# Patient Record
Sex: FEMALE | Race: WHITE | Employment: PART TIME | URBAN - METROPOLITAN AREA
[De-identification: names, ages, dates, MRNs, and addresses within clinical notes are randomized per-mention and may not be internally consistent; named-entity substitution may affect disease eponyms.]

---

## 2017-01-06 ENCOUNTER — OFFICE VISIT (OUTPATIENT)
Dept: FAMILY MEDICINE CLINIC | Facility: CLINIC | Age: 42
End: 2017-01-06

## 2017-01-06 VITALS
TEMPERATURE: 99 F | SYSTOLIC BLOOD PRESSURE: 136 MMHG | BODY MASS INDEX: 48.82 KG/M2 | OXYGEN SATURATION: 96 % | HEART RATE: 80 BPM | RESPIRATION RATE: 16 BRPM | HEIGHT: 65 IN | DIASTOLIC BLOOD PRESSURE: 82 MMHG | WEIGHT: 293 LBS

## 2017-01-06 DIAGNOSIS — IMO0001 UNCONTROLLED TYPE 2 DIABETES MELLITUS WITHOUT COMPLICATION, WITHOUT LONG-TERM CURRENT USE OF INSULIN: ICD-10-CM

## 2017-01-06 DIAGNOSIS — J01.00 ACUTE MAXILLARY SINUSITIS, RECURRENCE NOT SPECIFIED: Primary | ICD-10-CM

## 2017-01-06 LAB
ALBUMIN SERPL-MCNC: 4.5 G/DL (ref 3.5–4.8)
ALP LIVER SERPL-CCNC: 77 U/L (ref 37–98)
ALT SERPL-CCNC: 86 U/L (ref 14–54)
AST SERPL-CCNC: 105 U/L (ref 15–41)
BILIRUB SERPL-MCNC: 0.3 MG/DL (ref 0.1–2)
BUN BLD-MCNC: 14 MG/DL (ref 8–20)
CALCIUM BLD-MCNC: 9.7 MG/DL (ref 8.3–10.3)
CHLORIDE: 103 MMOL/L (ref 101–111)
CHOLEST SMN-MCNC: 280 MG/DL (ref ?–200)
CO2: 23 MMOL/L (ref 22–32)
CREAT BLD-MCNC: 0.65 MG/DL (ref 0.55–1.02)
EST. AVERAGE GLUCOSE BLD GHB EST-MCNC: 160 MG/DL (ref 68–126)
GLUCOSE BLD-MCNC: 101 MG/DL (ref 70–99)
HBA1C MFR BLD HPLC: 7.2 % (ref ?–5.7)
HDLC SERPL-MCNC: 48 MG/DL (ref 45–?)
HDLC SERPL: 5.83 {RATIO} (ref ?–4.44)
LDLC SERPL CALC-MCNC: 170 MG/DL (ref ?–130)
M PROTEIN MFR SERPL ELPH: 8.7 G/DL (ref 6.1–8.3)
NONHDLC SERPL-MCNC: 232 MG/DL (ref ?–130)
POTASSIUM SERPL-SCNC: 4.3 MMOL/L (ref 3.6–5.1)
SODIUM SERPL-SCNC: 136 MMOL/L (ref 136–144)
TRIGLYCERIDES: 310 MG/DL (ref ?–150)
VLDL: 62 MG/DL (ref 5–40)

## 2017-01-06 PROCEDURE — 80061 LIPID PANEL: CPT | Performed by: FAMILY MEDICINE

## 2017-01-06 PROCEDURE — 80053 COMPREHEN METABOLIC PANEL: CPT | Performed by: FAMILY MEDICINE

## 2017-01-06 PROCEDURE — 99214 OFFICE O/P EST MOD 30 MIN: CPT | Performed by: FAMILY MEDICINE

## 2017-01-06 PROCEDURE — 83036 HEMOGLOBIN GLYCOSYLATED A1C: CPT | Performed by: FAMILY MEDICINE

## 2017-01-06 PROCEDURE — 36415 COLL VENOUS BLD VENIPUNCTURE: CPT | Performed by: FAMILY MEDICINE

## 2017-01-06 RX ORDER — FLUTICASONE PROPIONATE 50 MCG
2 SPRAY, SUSPENSION (ML) NASAL DAILY
Qty: 1 BOTTLE | Refills: 0 | Status: SHIPPED | OUTPATIENT
Start: 2017-01-06 | End: 2017-01-27

## 2017-01-06 RX ORDER — AMOXICILLIN 875 MG/1
875 TABLET, COATED ORAL 2 TIMES DAILY
Qty: 20 TABLET | Refills: 0 | Status: SHIPPED | OUTPATIENT
Start: 2017-01-06 | End: 2017-01-14 | Stop reason: ALTCHOICE

## 2017-01-06 NOTE — PROGRESS NOTES
HPI:   Cathy Collazo is a 35year old female who presents for upper respiratory symptoms for  1+  weeks. Patient reports sore throat, congestion, low grade fever, dry cough, sinus pain, OTC cold meds have not been helping.   DM2, compliant with medicati mouth 2 (two) times daily with meals.  Disp: 30 tablet Rfl: 0   Cyclobenzaprine HCl 5 MG Oral Tab Use 1-2 tablets po qhs prn muscle spasms Disp: 30 tablet Rfl: 0   glimepiride 4 MG Oral Tab Take 1 tablet (4 mg total) by mouth every morning before breakfast. exertion  CARDIOVASCULAR: denies chest pain on exertion  GI: no nausea or abdominal pain  NEURO: denies headaches    EXAM:   /82 mmHg  Pulse 80  Temp(Src) 98.8 °F (37.1 °C) (Oral)  Resp 16  Ht 65\"  Wt 294 lb  BMI 48.92 kg/m2  SpO2 96%  LMP 10/14/201

## 2017-01-09 ENCOUNTER — TELEPHONE (OUTPATIENT)
Dept: FAMILY MEDICINE CLINIC | Facility: CLINIC | Age: 42
End: 2017-01-09

## 2017-01-10 ENCOUNTER — TELEPHONE (OUTPATIENT)
Dept: FAMILY MEDICINE CLINIC | Facility: CLINIC | Age: 42
End: 2017-01-10

## 2017-01-10 NOTE — TELEPHONE ENCOUNTER
Spoke with patient regarding her sinus symptoms and she stated she has not tried an antihistamine as directed by Dr during her ov on 01/06/2017, she denied fever and stated that her symptoms have somewhat improved but not completley, she will try antihista

## 2017-01-10 NOTE — TELEPHONE ENCOUNTER
Pt called stated last time she came to see her pcp to get treatment for her sinus, pt was given two medications which are not working at all, amoxicillin and fluticasone.  Pt has all the symptoms again sever pressure, a lot of dripping, sneezing and headach

## 2017-01-11 NOTE — TELEPHONE ENCOUNTER
Patient called today to say she is feeling much better and that her RX is working. She is not requesting a call back- just an Mirna Shah.

## 2017-01-14 ENCOUNTER — APPOINTMENT (OUTPATIENT)
Dept: LAB | Age: 42
End: 2017-01-14
Attending: FAMILY MEDICINE
Payer: MEDICARE

## 2017-01-14 ENCOUNTER — OFFICE VISIT (OUTPATIENT)
Dept: FAMILY MEDICINE CLINIC | Facility: CLINIC | Age: 42
End: 2017-01-14

## 2017-01-14 VITALS
HEART RATE: 85 BPM | RESPIRATION RATE: 14 BRPM | SYSTOLIC BLOOD PRESSURE: 126 MMHG | OXYGEN SATURATION: 97 % | HEIGHT: 65 IN | DIASTOLIC BLOOD PRESSURE: 72 MMHG | TEMPERATURE: 98 F | WEIGHT: 293 LBS | BODY MASS INDEX: 48.82 KG/M2

## 2017-01-14 DIAGNOSIS — E66.9 DIABETES MELLITUS TYPE 2 IN OBESE (HCC): Primary | ICD-10-CM

## 2017-01-14 DIAGNOSIS — I10 ESSENTIAL HYPERTENSION: ICD-10-CM

## 2017-01-14 DIAGNOSIS — R79.89 ELEVATED LFTS: ICD-10-CM

## 2017-01-14 DIAGNOSIS — E11.69 DIABETES MELLITUS TYPE 2 IN OBESE (HCC): Primary | ICD-10-CM

## 2017-01-14 DIAGNOSIS — Z87.19 HISTORY OF HEPATOMEGALY: ICD-10-CM

## 2017-01-14 DIAGNOSIS — E78.5 HYPERLIPIDEMIA, UNSPECIFIED HYPERLIPIDEMIA TYPE: ICD-10-CM

## 2017-01-14 DIAGNOSIS — G47.33 OSA (OBSTRUCTIVE SLEEP APNEA): ICD-10-CM

## 2017-01-14 LAB
HAV IGM SER QL: NONREACTIVE
HBV CORE IGM SER QL: NONREACTIVE
HBV SURFACE AG SERPL QL IA: NONREACTIVE
HEPATITIS C VIRUS AB INTERPRETATION: NONREACTIVE

## 2017-01-14 PROCEDURE — 80074 ACUTE HEPATITIS PANEL: CPT

## 2017-01-14 PROCEDURE — 99214 OFFICE O/P EST MOD 30 MIN: CPT | Performed by: FAMILY MEDICINE

## 2017-01-14 PROCEDURE — 36415 COLL VENOUS BLD VENIPUNCTURE: CPT | Performed by: FAMILY MEDICINE

## 2017-01-14 PROCEDURE — 36415 COLL VENOUS BLD VENIPUNCTURE: CPT

## 2017-01-14 RX ORDER — RAMIPRIL 10 MG/1
10 CAPSULE ORAL 2 TIMES DAILY
Qty: 60 CAPSULE | Refills: 2 | Status: SHIPPED | OUTPATIENT
Start: 2017-01-14 | End: 2017-07-28

## 2017-01-14 NOTE — PATIENT INSTRUCTIONS
Lifestyle Changes to Control Cholesterol  You can control your cholesterol through diet, exercise, weight management, quitting smoking, stress management, and taking your medicines right. These things can also lower your risk for cardiovascular disease. · Riding a bicycle or stationary bike  · Dancing  Managing your weight  If you are overweight or obese, your healthcare provider will work with you to help you lose weight and lower your BMI (body mass index).  Making diet changes and getting more physical · Don’t skip a dose or stop taking your medicine because you feel better or because your cholesterol numbers go down. Never stop taking your medicine unless your healthcare provider has told you it’s OK.   · Ask your healthcare provider if you have any ques © 3532-9884 [address], [address] [place]. All rights reserved. This information is not intended as a substitute for professional medical care. Always follow your healthcare professional's instructions.         Harriet Delaney · Monounsaturated fats are mostly found in vegetable oils, such as olive, canola, and peanut oils. They are also found in avocados and some nuts. Monounsaturated fats are healthy for your heart. That’s because they lower LDL (unhealthy) cholesterol.   · Wagner

## 2017-01-15 NOTE — PROGRESS NOTES
HPI:   Artist Cancer is a 35year old female who presents for recheck of her diabetes, HTN, HL, and recent blood work. DM2, compliant with medication. Patient’s FBS have been 110s. Last visit with ophthalmologist was 9/2016.   Pt has been checking her tablet Rfl: 2   Metoprolol Succinate  MG Oral Tablet 24 Hr Take 1 tablet (200 mg total) by mouth every evening.  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  Diabetes mellitus type 2 in obese (HCC)  -a1c 7.2, fasting glucos

## 2017-01-27 ENCOUNTER — TELEPHONE (OUTPATIENT)
Dept: FAMILY MEDICINE CLINIC | Facility: CLINIC | Age: 42
End: 2017-01-27

## 2017-01-27 DIAGNOSIS — J01.00 ACUTE MAXILLARY SINUSITIS, RECURRENCE NOT SPECIFIED: ICD-10-CM

## 2017-01-27 DIAGNOSIS — J32.9 SINUSITIS, UNSPECIFIED CHRONICITY, UNSPECIFIED LOCATION: Primary | ICD-10-CM

## 2017-01-27 DIAGNOSIS — E11.9 TYPE 2 DIABETES MELLITUS WITHOUT COMPLICATION, WITHOUT LONG-TERM CURRENT USE OF INSULIN (HCC): Primary | ICD-10-CM

## 2017-01-27 RX ORDER — FLUTICASONE PROPIONATE 50 MCG
2 SPRAY, SUSPENSION (ML) NASAL DAILY
Qty: 1 BOTTLE | Refills: 0 | Status: SHIPPED | OUTPATIENT
Start: 2017-01-27 | End: 2017-02-02

## 2017-01-27 RX ORDER — AZITHROMYCIN 250 MG/1
TABLET, FILM COATED ORAL
Qty: 6 TABLET | Refills: 0 | Status: SHIPPED | OUTPATIENT
Start: 2017-01-27 | End: 2017-02-01 | Stop reason: ALTCHOICE

## 2017-01-27 RX ORDER — GLIMEPIRIDE 4 MG/1
4 TABLET ORAL
Qty: 30 TABLET | Refills: 2 | Status: SHIPPED | OUTPATIENT
Start: 2017-01-27 | End: 2017-02-09

## 2017-01-27 NOTE — TELEPHONE ENCOUNTER
Patient is asking for antibiotic, she stated she still has sinus issues, not allergies, as allergy medication is not working, has been sneezing and nasal drip for three days, one episode of diarrhea today, no abdominal pain,denies fever or cough, unable to

## 2017-01-27 NOTE — TELEPHONE ENCOUNTER
May try z-pack and f/u in 1wk for re-eval, cont antihistamine and flonase daily. Please call in rx. Start probiotics to help with diarrhea.

## 2017-01-27 NOTE — TELEPHONE ENCOUNTER
Pt lmsg on vm has sinus infection again/still, cannot come into office no car today wants rx or to sp w/ nurse

## 2017-02-01 ENCOUNTER — OFFICE VISIT (OUTPATIENT)
Dept: FAMILY MEDICINE CLINIC | Facility: CLINIC | Age: 42
End: 2017-02-01

## 2017-02-01 VITALS
TEMPERATURE: 98 F | HEART RATE: 77 BPM | RESPIRATION RATE: 16 BRPM | BODY MASS INDEX: 48.48 KG/M2 | WEIGHT: 291 LBS | DIASTOLIC BLOOD PRESSURE: 72 MMHG | OXYGEN SATURATION: 96 % | HEIGHT: 65 IN | SYSTOLIC BLOOD PRESSURE: 122 MMHG

## 2017-02-01 DIAGNOSIS — J01.00 SUBACUTE MAXILLARY SINUSITIS: Primary | ICD-10-CM

## 2017-02-01 DIAGNOSIS — IMO0001 UNCONTROLLED TYPE 2 DIABETES MELLITUS WITHOUT COMPLICATION, WITHOUT LONG-TERM CURRENT USE OF INSULIN: ICD-10-CM

## 2017-02-01 DIAGNOSIS — I10 ESSENTIAL HYPERTENSION: ICD-10-CM

## 2017-02-01 PROCEDURE — 99214 OFFICE O/P EST MOD 30 MIN: CPT | Performed by: FAMILY MEDICINE

## 2017-02-01 RX ORDER — PREDNISONE 20 MG/1
20 TABLET ORAL DAILY
Qty: 5 TABLET | Refills: 0 | Status: SHIPPED | OUTPATIENT
Start: 2017-02-01 | End: 2017-02-06

## 2017-02-01 NOTE — PROGRESS NOTES
Lyssa Garcia is a 35year old female. HPI:  Having sinus sxs for about 5 weeks now. did course of Amoxcillin on 1/6 for 10 days and then had Z-Pack that she finished yesterday. Used FLonase initially for about 3 weeks and did not seem to help much. TEST) In Vitro Strip Please provide test strips for truetrack meter Disp: 100 strip Rfl: 6   Lancets 30G Does not apply Misc Please provide lancets for truetrack lancing device Disp: 100 each Rfl: 6   Metoprolol Succinate  MG Oral Tablet 24 Hr Take 1 bilat, no erythema  OMM, throat with mild erythema, no exudates  Nasal mucosa: Red, edematous, cloudy drainage  NECK: supple,no adenopathy  LUNGS: clear to auscultation  CARDIO: RRR without murmur  EXTREMITIES: no cyanosis, clubbing or edema  NEURO: A&O x3

## 2017-02-02 ENCOUNTER — TELEPHONE (OUTPATIENT)
Dept: FAMILY MEDICINE CLINIC | Facility: CLINIC | Age: 42
End: 2017-02-02

## 2017-02-02 DIAGNOSIS — J01.00 ACUTE MAXILLARY SINUSITIS, RECURRENCE NOT SPECIFIED: Primary | ICD-10-CM

## 2017-02-02 RX ORDER — FLUTICASONE PROPIONATE 50 MCG
2 SPRAY, SUSPENSION (ML) NASAL DAILY
Qty: 1 BOTTLE | Refills: 2 | Status: SHIPPED | OUTPATIENT
Start: 2017-02-02 | End: 2017-04-25

## 2017-02-02 NOTE — TELEPHONE ENCOUNTER
Refill as per protocol. LOV 2017    No future appointments. Pending Prescriptions Disp Refills    Fluticasone Propionate 50 MCG/ACT Nasal Suspension 1 Bottle 2     Si sprays by Each Nare route daily.         Lov2017,No future appointmen

## 2017-02-02 NOTE — TELEPHONE ENCOUNTER
Pt taking generic Clarinex OTC med directions say she can only take 1 tab in 24 hrs pt would like to know if she can take another one or something else with this med to relieve symptoms?

## 2017-02-08 ENCOUNTER — TELEPHONE (OUTPATIENT)
Dept: FAMILY MEDICINE CLINIC | Facility: CLINIC | Age: 42
End: 2017-02-08

## 2017-02-08 DIAGNOSIS — J32.9 CHRONIC SINUSITIS, UNSPECIFIED LOCATION: Primary | ICD-10-CM

## 2017-02-08 NOTE — TELEPHONE ENCOUNTER
Pt called to request a call back from either her pcp or her nurse, pt stated she finished the antibiotic that she was prescribed for her sinus problem and it didn't help at all pt is still having a dripping runny nose, sneezing and sinus issues, pt mention

## 2017-02-08 NOTE — TELEPHONE ENCOUNTER
Patient stated she is still having sinus pressure, cough and congestion since 01/27/2017, she has tried antihistamines, prednisone and antibiotics without cessation of symptoms denies fever, nausea or vomiting.  Patient was in on 02/01/2017 to see Dr Yehuda Ambrocio

## 2017-02-09 DIAGNOSIS — E88.81 METABOLIC SYNDROME: Primary | ICD-10-CM

## 2017-02-09 RX ORDER — GLIMEPIRIDE 4 MG/1
TABLET ORAL
Qty: 30 TABLET | Refills: 3 | Status: SHIPPED | OUTPATIENT
Start: 2017-02-09 | End: 2017-05-31

## 2017-02-09 NOTE — TELEPHONE ENCOUNTER
Refill as per protocol. LOV 2/1/2017    No future appointments.        Signed Prescriptions Disp Refills    GLIMEPIRIDE 4 MG Oral Tab 30 tablet 3      Sig: TAKE 1 TABLET BY MOUTH ONCE DAILY BEFORE BREAKFAST        Authorizing Provider: Morgan Sinha

## 2017-02-24 ENCOUNTER — HOSPITAL ENCOUNTER (OUTPATIENT)
Dept: ULTRASOUND IMAGING | Age: 42
Discharge: HOME | End: 2017-02-24
Attending: FAMILY MEDICINE
Payer: MEDICARE

## 2017-02-24 DIAGNOSIS — R79.89 ELEVATED LFTS: ICD-10-CM

## 2017-02-24 DIAGNOSIS — Z87.19 HISTORY OF HEPATOMEGALY: ICD-10-CM

## 2017-02-24 PROCEDURE — 76700 US EXAM ABDOM COMPLETE: CPT

## 2017-03-01 NOTE — TELEPHONE ENCOUNTER
Refill as per protocol. LOV 2/1/2017    No future appointments. Pending Prescriptions Disp Refills    PARoxetine HCl 30 MG Oral Tab 30 tablet 2     Sig: Take 1 tablet (30 mg total) by mouth daily. Lov2/1/2017, No future appointments. last fi

## 2017-03-10 ENCOUNTER — TELEPHONE (OUTPATIENT)
Dept: FAMILY MEDICINE CLINIC | Facility: CLINIC | Age: 42
End: 2017-03-10

## 2017-03-10 DIAGNOSIS — N28.1 RENAL CYST: Primary | ICD-10-CM

## 2017-03-10 NOTE — TELEPHONE ENCOUNTER
Patient informed that she needs to follow up with a psychiatrist, telephone number to [facility] was provided.

## 2017-03-14 NOTE — TELEPHONE ENCOUNTER
----- Message from [facility], DO sent at 3/14/2017  3:36 PM CDT -----  Pls inform pt that abdominal ultrasound demonstrated diffuse fatty liver, needs to work on diet/exercise to lose weight to help with fatty liver disease.  There are multiple c

## 2017-03-14 NOTE — TELEPHONE ENCOUNTER
Pt calling again for results of ultrasound, also said she still has no relief from Sinus pressure and drainage feels as if tx from ENT not helping

## 2017-03-14 NOTE — TELEPHONE ENCOUNTER
Patient informed of test results per 's directive and verbalized understanding, information given for urology referral and advised patient to continue her antihistamine and flonase, she verbalized understanding

## 2017-03-17 ENCOUNTER — LAB ENCOUNTER (OUTPATIENT)
Dept: LAB | Age: 42
End: 2017-03-17
Attending: OTOLARYNGOLOGY
Payer: MEDICARE

## 2017-03-17 DIAGNOSIS — J30.9 ALLERGIC RHINITIS: Primary | ICD-10-CM

## 2017-03-17 PROCEDURE — 86003 ALLG SPEC IGE CRUDE XTRC EA: CPT

## 2017-03-17 PROCEDURE — 36415 COLL VENOUS BLD VENIPUNCTURE: CPT

## 2017-03-22 LAB
ALLERGEN,  IGE: <0.1 KU/L
ALLERGEN, A.ALTERNATA(TENUIS): <0.1 KU/L
ALLERGEN, BERMUDA GRASS IGE: <0.1 KU/L
ALLERGEN, BIRCH TREE IGE: <0.1 KU/L
ALLERGEN, BOX ELDER/MAPLE IGE: <0.1 KU/L
ALLERGEN, CAT DANDER IGE: <0.1 KU/L
ALLERGEN, COTTONWOOD IGE: <0.1 KU/L
ALLERGEN, D. FARINAE IGE: 0.11 KU/L
ALLERGEN, D.PTERONYSSINUS IGE: 0.16 KU/L
ALLERGEN, DOG DANDER IGE: <0.1 KU/L
ALLERGEN, GERMAN COCKROACH IGE: <0.1 KU/L
ALLERGEN, HORMODENDRUM IGE: <0.1 KU/L
ALLERGEN, MARSH ELDER IGE: <0.1 KU/L
ALLERGEN, MILK (COW) IGE: <0.1 KU/L
ALLERGEN, MOUNTAIN CEDAR IGE: <0.1 KU/L
ALLERGEN, MOUSE EPITHE IGE: <0.1 KU/L
ALLERGEN, MUCOR RACEMOSUS IGE: <0.1 KU/L
ALLERGEN, NETTLE IGE: <0.1 KU/L
ALLERGEN, OAK TREE IGE: <0.1 KU/L
ALLERGEN, PEANUT IGE: <0.1 KU/L
ALLERGEN, PENICILLIUM NOTATUM: <0.1 KU/L
ALLERGEN, RUSSIAN THISTLE IGE: <0.1 KU/L
ALLERGEN, SHORT RAGWEED IGE: <0.1 KU/L
ALLERGEN, TIMOTHY GRASS IGE: <0.1 KU/L
ALLERGEN, WHITE ASH IGE: <0.1 KU/L
ALLERGEN, WHITE MULBERRY IGE: <0.1 KU/L
ALLERGEN,ASPERGILLUS FUMIGATUS: <0.1 KU/L
IMMUNOGLOBULIN E: 55 KU/L

## 2017-03-29 DIAGNOSIS — E78.5 HYPERLIPIDEMIA, UNSPECIFIED HYPERLIPIDEMIA TYPE: ICD-10-CM

## 2017-03-29 DIAGNOSIS — K21.9 GASTROESOPHAGEAL REFLUX DISEASE, ESOPHAGITIS PRESENCE NOT SPECIFIED: ICD-10-CM

## 2017-03-29 DIAGNOSIS — E11.9 TYPE 2 DIABETES MELLITUS WITHOUT COMPLICATION, UNSPECIFIED LONG TERM INSULIN USE STATUS: ICD-10-CM

## 2017-03-29 DIAGNOSIS — I10 ESSENTIAL HYPERTENSION: Primary | ICD-10-CM

## 2017-03-30 DIAGNOSIS — E11.9 TYPE 2 DIABETES MELLITUS WITHOUT COMPLICATION, UNSPECIFIED LONG TERM INSULIN USE STATUS: Primary | ICD-10-CM

## 2017-03-30 RX ORDER — AMLODIPINE BESYLATE 10 MG/1
TABLET ORAL
Qty: 90 TABLET | Refills: 0 | Status: SHIPPED | OUTPATIENT
Start: 2017-03-30 | End: 2017-06-28

## 2017-03-30 RX ORDER — METFORMIN HYDROCHLORIDE 750 MG/1
TABLET, EXTENDED RELEASE ORAL
Qty: 180 TABLET | Refills: 0 | Status: SHIPPED | OUTPATIENT
Start: 2017-03-30 | End: 2017-06-28

## 2017-03-30 RX ORDER — RAMIPRIL 10 MG/1
CAPSULE ORAL
Qty: 60 CAPSULE | Refills: 2 | Status: SHIPPED | OUTPATIENT
Start: 2017-03-30 | End: 2017-04-21

## 2017-03-30 RX ORDER — GEMFIBROZIL 600 MG/1
TABLET, FILM COATED ORAL
Qty: 180 TABLET | Refills: 0 | Status: SHIPPED | OUTPATIENT
Start: 2017-03-30 | End: 2017-06-28

## 2017-03-30 RX ORDER — OMEPRAZOLE 20 MG/1
CAPSULE, DELAYED RELEASE ORAL
Qty: 90 CAPSULE | Refills: 0 | Status: SHIPPED | OUTPATIENT
Start: 2017-03-30 | End: 2017-06-28

## 2017-03-30 RX ORDER — METOPROLOL SUCCINATE 200 MG/1
TABLET, EXTENDED RELEASE ORAL
Qty: 30 TABLET | Refills: 2 | Status: SHIPPED | OUTPATIENT
Start: 2017-03-30 | End: 2017-06-28

## 2017-03-30 NOTE — TELEPHONE ENCOUNTER
Pending Prescriptions Disp Refills    RAMIPRIL 10 MG Oral Cap [Pharmacy Med Name: RAMIPRIL 10 MG CAPSULE 10 MG CAP] 60 capsule 2     Sig: TAKE ONE (1) CAPSULE BY MOUTH TWICE DAILY      METFORMIN HCL  MG Oral Tablet 24 Hr [Pharmacy Med Name: Margo Brown Appointments  Date Time Provider Tara Dayana   4/13/2017 4:30 PM [facility]   4/21/2017 11:00 AM [facility] GABRIELA Mcdaniel

## 2017-03-31 RX ORDER — GLUCOSAM/CHON-MSM1/C/MANG/BOSW 500-416.6
TABLET ORAL
Qty: 100 EACH | Refills: 6 | Status: SHIPPED | OUTPATIENT
Start: 2017-03-31 | End: 2017-12-15

## 2017-03-31 NOTE — TELEPHONE ENCOUNTER
Refill as per protocol.     LOV 2/1/2017    Future Appointments  Date Time Provider Tara Dayana   4/13/2017 4:30 PM Lara Miller MD [facility] AT [facility]   4/21/2017 11:00 AM [facility] Jonas          Signed Prescriptions Dis

## 2017-04-13 ENCOUNTER — PATIENT OUTREACH (OUTPATIENT)
Dept: CASE MANAGEMENT | Age: 42
End: 2017-04-13

## 2017-04-25 ENCOUNTER — TELEPHONE (OUTPATIENT)
Dept: FAMILY MEDICINE CLINIC | Facility: CLINIC | Age: 42
End: 2017-04-25

## 2017-04-25 DIAGNOSIS — J01.00 ACUTE MAXILLARY SINUSITIS, RECURRENCE NOT SPECIFIED: Primary | ICD-10-CM

## 2017-04-25 RX ORDER — FLUTICASONE PROPIONATE 50 MCG
2 SPRAY, SUSPENSION (ML) NASAL DAILY
Qty: 1 BOTTLE | Refills: 2 | Status: SHIPPED | OUTPATIENT
Start: 2017-04-25 | End: 2018-03-02 | Stop reason: ALTCHOICE

## 2017-04-25 NOTE — TELEPHONE ENCOUNTER
Refill as per protocol.     LOV 2/1/2017    Future Appointments  Date Time Provider Tara Anne   5/19/2017 11:00 AM CON[facility]O [facility], [facility] JENMG Jonas          Pending Prescriptions Disp Refills    Fluticasone Propionate 50 MCG/ACT Nasal Suspen

## 2017-05-02 DIAGNOSIS — E11.9 TYPE 2 DIABETES MELLITUS WITHOUT COMPLICATION, UNSPECIFIED LONG TERM INSULIN USE STATUS: Primary | ICD-10-CM

## 2017-05-03 RX ORDER — ISOPROPYL ALCOHOL 70 ML/100ML
SWAB TOPICAL
Qty: 100 EACH | Refills: 6 | Status: SHIPPED | OUTPATIENT
Start: 2017-05-03 | End: 2017-11-28

## 2017-05-03 NOTE — TELEPHONE ENCOUNTER
Pending Prescriptions Disp Refills    EASY TOUCH ALCOHOL PREP MEDIUM 70 % Does not apply Pads [Pharmacy Med Name: ALCOHOL PREP PADS PAD] 100 each 6     Sig: USE AS DIRECTED ONCE DAILY       Lov2/1/2017, Future Appointments  Date Time Provider Department

## 2017-05-30 ENCOUNTER — TELEPHONE (OUTPATIENT)
Dept: FAMILY MEDICINE CLINIC | Facility: CLINIC | Age: 42
End: 2017-05-30

## 2017-05-31 RX ORDER — GLIMEPIRIDE 4 MG/1
TABLET ORAL
Qty: 30 TABLET | Refills: 3 | Status: SHIPPED | OUTPATIENT
Start: 2017-05-31 | End: 2017-09-22

## 2017-06-09 ENCOUNTER — TELEPHONE (OUTPATIENT)
Dept: FAMILY MEDICINE CLINIC | Facility: CLINIC | Age: 42
End: 2017-06-09

## 2017-06-09 NOTE — TELEPHONE ENCOUNTER
Pt called to say she is currently on the \"Whole 30 Diet\" which you can only eat protein, fruit & veggie. No dairy allowed. Pt states her stomach is upset. Tried to take Pepto - not helping. Please advise.

## 2017-06-13 ENCOUNTER — TELEPHONE (OUTPATIENT)
Dept: FAMILY MEDICINE CLINIC | Facility: CLINIC | Age: 42
End: 2017-06-13

## 2017-06-13 NOTE — TELEPHONE ENCOUNTER
Informed patient that we could not find record of sleep study, she verbalized understanding and stated she will tell pulmonologist that she cant find her records and that she needs a new sleep study.

## 2017-06-13 NOTE — TELEPHONE ENCOUNTER
Attempted to contact patient again regarding her symptoms and phone rang three times then was disconnected, will close this encounter

## 2017-06-13 NOTE — TELEPHONE ENCOUNTER
Pt called to ask for dates and copy of last sleep study done approximately 2yrs ago I could not locate in pt's chart?  She was not sure of facility or exact date of test

## 2017-06-28 DIAGNOSIS — E78.5 HYPERLIPIDEMIA, UNSPECIFIED HYPERLIPIDEMIA TYPE: ICD-10-CM

## 2017-06-28 DIAGNOSIS — K21.9 GASTROESOPHAGEAL REFLUX DISEASE, ESOPHAGITIS PRESENCE NOT SPECIFIED: ICD-10-CM

## 2017-06-28 DIAGNOSIS — E11.9 TYPE 2 DIABETES MELLITUS WITHOUT COMPLICATION, UNSPECIFIED LONG TERM INSULIN USE STATUS: ICD-10-CM

## 2017-06-28 DIAGNOSIS — I10 ESSENTIAL HYPERTENSION: ICD-10-CM

## 2017-06-28 RX ORDER — METFORMIN HYDROCHLORIDE 750 MG/1
TABLET, EXTENDED RELEASE ORAL
Qty: 180 TABLET | Refills: 0 | Status: SHIPPED | OUTPATIENT
Start: 2017-06-28 | End: 2017-09-22

## 2017-06-28 RX ORDER — OMEPRAZOLE 20 MG/1
CAPSULE, DELAYED RELEASE ORAL
Qty: 90 CAPSULE | Refills: 0 | Status: SHIPPED | OUTPATIENT
Start: 2017-06-28 | End: 2017-09-22

## 2017-06-28 RX ORDER — RAMIPRIL 10 MG/1
CAPSULE ORAL
Qty: 180 CAPSULE | Refills: 0 | Status: SHIPPED | OUTPATIENT
Start: 2017-06-28 | End: 2017-09-22

## 2017-06-28 RX ORDER — GEMFIBROZIL 600 MG/1
TABLET, FILM COATED ORAL
Qty: 180 TABLET | Refills: 0 | Status: SHIPPED | OUTPATIENT
Start: 2017-06-28 | End: 2017-09-22

## 2017-06-28 RX ORDER — AMLODIPINE BESYLATE 10 MG/1
TABLET ORAL
Qty: 90 TABLET | Refills: 0 | Status: SHIPPED | OUTPATIENT
Start: 2017-06-28 | End: 2017-09-22

## 2017-06-28 RX ORDER — METOPROLOL SUCCINATE 200 MG/1
TABLET, EXTENDED RELEASE ORAL
Qty: 90 TABLET | Refills: 0 | Status: SHIPPED | OUTPATIENT
Start: 2017-06-28 | End: 2017-09-22

## 2017-06-28 NOTE — TELEPHONE ENCOUNTER
Signed Prescriptions Disp Refills    OMEPRAZOLE 20 MG Oral Capsule Delayed Release 90 capsule 0      Sig: TAKE 1 CAPSULE BY MOUTH EVERY MORNING 30 TO 60 MINUTES BEFORE BREAKFAST        Authorizing Provider: Sherry White        Ordering User: GURJIT

## 2017-07-16 ENCOUNTER — OFFICE VISIT (OUTPATIENT)
Dept: SLEEP CENTER | Facility: HOSPITAL | Age: 42
End: 2017-07-16
Attending: INTERNAL MEDICINE
Payer: MEDICARE

## 2017-07-16 PROCEDURE — 95811 POLYSOM 6/>YRS CPAP 4/> PARM: CPT

## 2017-07-19 NOTE — PROCEDURES
[address]       Accredited by the [facility] ([facility])    PATIENT'S NAME:        Aishwarya Stevenzoey  ATTENDING PHYSICIAN:   Yenny Lan M.D.   REFERRING PHYSICIAN:     PATIENT ACCOUNT #: 8%; stage 2, 32%; stage 3, 37%; stage REM 23%. RESPIRATORY MEASURES:  The patient was initiated on CPAP at 8 cm of water and titrated up to a final pressure of 14 cm of water.   On this setting, the patient had an overall apnea-hypopnea index of 0.2 and

## 2017-07-28 ENCOUNTER — OFFICE VISIT (OUTPATIENT)
Dept: FAMILY MEDICINE CLINIC | Facility: CLINIC | Age: 42
End: 2017-07-28

## 2017-07-28 VITALS
OXYGEN SATURATION: 98 % | RESPIRATION RATE: 20 BRPM | WEIGHT: 285 LBS | SYSTOLIC BLOOD PRESSURE: 130 MMHG | HEIGHT: 64.5 IN | TEMPERATURE: 99 F | HEART RATE: 75 BPM | BODY MASS INDEX: 48.06 KG/M2 | DIASTOLIC BLOOD PRESSURE: 86 MMHG

## 2017-07-28 DIAGNOSIS — G47.33 OSA (OBSTRUCTIVE SLEEP APNEA): ICD-10-CM

## 2017-07-28 DIAGNOSIS — E88.81 METABOLIC SYNDROME: ICD-10-CM

## 2017-07-28 DIAGNOSIS — Z12.31 VISIT FOR SCREENING MAMMOGRAM: ICD-10-CM

## 2017-07-28 DIAGNOSIS — E78.5 HYPERLIPIDEMIA, UNSPECIFIED HYPERLIPIDEMIA TYPE: ICD-10-CM

## 2017-07-28 DIAGNOSIS — Z00.00 ENCOUNTER FOR ANNUAL HEALTH EXAMINATION: Primary | ICD-10-CM

## 2017-07-28 DIAGNOSIS — I10 ESSENTIAL HYPERTENSION: ICD-10-CM

## 2017-07-28 DIAGNOSIS — Z13.6 SCREENING FOR CARDIOVASCULAR CONDITION: ICD-10-CM

## 2017-07-28 DIAGNOSIS — IMO0001 UNCONTROLLED TYPE 2 DIABETES MELLITUS WITHOUT COMPLICATION, WITHOUT LONG-TERM CURRENT USE OF INSULIN: ICD-10-CM

## 2017-07-28 PROCEDURE — G0439 PPPS, SUBSEQ VISIT: HCPCS | Performed by: FAMILY MEDICINE

## 2017-07-28 NOTE — PROGRESS NOTES
HPI:   Estrada Garay is a 38year old female who presents for a Medicare Subsequent Annual Wellness visit (Pt already had Initial Annual Wellness). 34yr old morbidly obese female presents for AWV and f/u on chronic conditions.    DM2, compliant wit BY MOUTH ONCE DAILY   RAMIPRIL 10 MG Oral Cap TAKE ONE (1) CAPSULE BY MOUTH TWICE DAILY   GEMFIBROZIL 600 MG Oral Tab TAKE ONE (1) TABLET BY MOUTH TWICE DAILY BEFORE MEALS   METFORMIN HCL  MG Oral Tablet 24 Hr TAKE ONE (1) TABLET BY MOUTH TWICE DAILY vision  HEENT: denies nasal congestion, sinus pain or ST  LUNGS: denies shortness of breath with exertion  CARDIOVASCULAR: denies chest pain on exertion  GI: denies abdominal pain, denies heartburn  : denies dysuria, vaginal discharge or itching   MUSCUL Cervical nodes normal   Neurologic: Normal         SUGGESTED VACCINATIONS - Influenza, Pneumococcal, Zoster, Tetanus     Immunization History   Administered Date(s) Administered   • Influenza 11/15/2016       ASSESSMENT AND OTHER RELEVANT CHRONIC CONDITION provided information       [facility] on file in Epic:    Rex Casas does not have a Power of  for [employer] on file in [facility].  Discussed with patient and provided information           PLAN:  The patient indicates Alesia 1-Yes    Does pain affect your day to day activities?: 1-Yes (back  pain)     Have you had any memory issues?: 0-No    Fall/Risk Scorin    Scoring Interpretation: 4+ At Risk     Depression Screening (PHQ-2/PHQ-9): Over the LAST 2 WEEKS   Little interes patient. Update Health Maintenance if applicable    Flex Sigmoidoscopy Screen every 10 years No results found for this or any previous visit. No flowsheet data found. Fecal Occult Blood Annually No results found for: FOB No flowsheet data found.     Gla covered with your pharmacy  prescription benefits        SPECIFIC DISEASE MONITORING Internal Lab or Procedure External Lab or Procedure   Annual Monitoring of Persistent     Medications (ACE/ARB, digoxin diuretics, anticonvulsants.)    Potassium  Annually

## 2017-07-28 NOTE — PATIENT INSTRUCTIONS
Toni Murguia's SCREENING SCHEDULE   Tests on this list are recommended by your physician but may not be covered, or covered at this frequency, by your insurer. Please check with your insurance carrier before scheduling to verify coverage.    Annamarie Beckman to Medicare, and non-screening if indicated for medical reasons Electrocardiogram date10/03/2014 Routine EKG is not a screening covered service except at the Welcome to Medicare Visit    Abdominal aortic aneurysm screening (once between ages 73-68) IPPE on on 05/06/2004     Chlamydia  Annually if high risk No results found for: CHLAMYDIA No flowsheet data found.     Screening Mammogram      Mammogram    Recommend Annually to at least age 76, and as needed after 76 Mammogram,1 Yr due on 06/10/2017 Please get t review and print using their home computer and printer. (the forms are also available in [facility])  www. [email or web address] org  This link also has information from the [facility] 2[facility] regarding Advance Directives.     Chucho Murguia's SCREENING (mg/dL)   Date Value   01/06/2017 280 (H)     TRIGLYCERIDES (mg/dL)   Date Value   01/23/2012 379 (H)     Triglycerides (mg/dL)   Date Value   01/06/2017 310 (H)   07/29/2016 695 (H)        EKG - covered if needed at Welcome to Medicare, and non-screening found.     Recommended for 2 year anniversary or as ordered in After Visit Summary   Pap and Pelvic      Pap: Every 3 yrs age 21-65 or Pap+HPV every 5 yrs age 33-67, Covered every 2 yrs up to age 79 or Yearly if High Risk   Pap Smear,3 Years due on 05/06/20 http://www. [place]ph.state. [place]/public/books/advin.htm  A link to the [employer]. This site has a lot of good information including definitions of the different types of Advance Directives.  It also has the State forms available on it's webs

## 2017-08-15 ENCOUNTER — TELEPHONE (OUTPATIENT)
Dept: FAMILY MEDICINE CLINIC | Facility: CLINIC | Age: 42
End: 2017-08-15

## 2017-08-15 NOTE — TELEPHONE ENCOUNTER
Spoke with patient and informed her that she already had a well woman check up and she only needed to follow up if she was having any issues, she verbalized understanding and stated she was doing fine.

## 2017-09-22 DIAGNOSIS — K21.9 GASTROESOPHAGEAL REFLUX DISEASE, ESOPHAGITIS PRESENCE NOT SPECIFIED: ICD-10-CM

## 2017-09-22 DIAGNOSIS — E78.5 HYPERLIPIDEMIA, UNSPECIFIED HYPERLIPIDEMIA TYPE: ICD-10-CM

## 2017-09-22 DIAGNOSIS — E11.9 TYPE 2 DIABETES MELLITUS WITHOUT COMPLICATION, UNSPECIFIED LONG TERM INSULIN USE STATUS: ICD-10-CM

## 2017-09-22 DIAGNOSIS — I10 ESSENTIAL HYPERTENSION: ICD-10-CM

## 2017-09-25 RX ORDER — GEMFIBROZIL 600 MG/1
TABLET, FILM COATED ORAL
Qty: 180 TABLET | Refills: 11 | Status: SHIPPED | OUTPATIENT
Start: 2017-09-25 | End: 2018-02-05 | Stop reason: ALTCHOICE

## 2017-09-25 RX ORDER — OMEPRAZOLE 20 MG/1
CAPSULE, DELAYED RELEASE ORAL
Qty: 90 CAPSULE | Refills: 0 | Status: SHIPPED | OUTPATIENT
Start: 2017-09-25 | End: 2017-12-26

## 2017-09-25 RX ORDER — METFORMIN HYDROCHLORIDE 750 MG/1
TABLET, EXTENDED RELEASE ORAL
Qty: 180 TABLET | Refills: 0 | Status: SHIPPED | OUTPATIENT
Start: 2017-09-25 | End: 2017-12-26

## 2017-09-25 RX ORDER — RAMIPRIL 10 MG/1
CAPSULE ORAL
Qty: 180 CAPSULE | Refills: 0 | Status: SHIPPED | OUTPATIENT
Start: 2017-09-25 | End: 2017-12-26

## 2017-09-25 RX ORDER — AMLODIPINE BESYLATE 10 MG/1
TABLET ORAL
Qty: 90 TABLET | Refills: 0 | Status: SHIPPED | OUTPATIENT
Start: 2017-09-25 | End: 2017-12-26

## 2017-09-25 RX ORDER — METOPROLOL SUCCINATE 200 MG/1
TABLET, EXTENDED RELEASE ORAL
Qty: 90 TABLET | Refills: 0 | Status: SHIPPED | OUTPATIENT
Start: 2017-09-25 | End: 2017-12-26

## 2017-09-25 RX ORDER — GLIMEPIRIDE 4 MG/1
TABLET ORAL
Qty: 90 TABLET | Refills: 0 | Status: SHIPPED | OUTPATIENT
Start: 2017-09-25 | End: 2017-12-26

## 2017-09-25 NOTE — TELEPHONE ENCOUNTER
Signed Prescriptions Disp Refills    RAMIPRIL 10 MG Oral Cap 180 capsule 0      Sig: TAKE ONE (1) CAPSULE BY MOUTH TWICE DAILY        Authorizing Provider: Gloria Thurman        Ordering User: Isabel Melendez      METFORMIN HCL  MG Oral Tab

## 2017-11-28 DIAGNOSIS — E11.9 TYPE 2 DIABETES MELLITUS WITHOUT COMPLICATION, UNSPECIFIED LONG TERM INSULIN USE STATUS: ICD-10-CM

## 2017-11-28 RX ORDER — ISOPROPYL ALCOHOL 70 ML/100ML
SWAB TOPICAL
Qty: 100 EACH | Refills: 6 | Status: SHIPPED | OUTPATIENT
Start: 2017-11-28 | End: 2018-11-29

## 2017-11-28 NOTE — TELEPHONE ENCOUNTER
Refill as per protocol. LOV 7/28/2017    No future appointments.        Signed Prescriptions Disp Refills    EASY TOUCH ALCOHOL PREP MEDIUM 70 % Does not apply Pads 100 each 6      Sig: USE AS DIRECTED ONCE DAILY        Authorizing Provider: Kimberly Wolfe

## 2017-12-15 ENCOUNTER — OFFICE VISIT (OUTPATIENT)
Dept: FAMILY MEDICINE CLINIC | Facility: CLINIC | Age: 42
End: 2017-12-15

## 2017-12-15 VITALS
OXYGEN SATURATION: 98 % | WEIGHT: 291.5 LBS | RESPIRATION RATE: 16 BRPM | BODY MASS INDEX: 49.16 KG/M2 | TEMPERATURE: 99 F | SYSTOLIC BLOOD PRESSURE: 130 MMHG | HEIGHT: 64.5 IN | HEART RATE: 79 BPM | DIASTOLIC BLOOD PRESSURE: 80 MMHG

## 2017-12-15 DIAGNOSIS — E66.9 DIABETES MELLITUS TYPE 2 IN OBESE (HCC): Primary | ICD-10-CM

## 2017-12-15 DIAGNOSIS — E11.69 DIABETES MELLITUS TYPE 2 IN OBESE (HCC): Primary | ICD-10-CM

## 2017-12-15 DIAGNOSIS — E78.5 HYPERLIPIDEMIA, UNSPECIFIED HYPERLIPIDEMIA TYPE: ICD-10-CM

## 2017-12-15 DIAGNOSIS — E66.01 MORBID OBESITY WITH BMI OF 45.0-49.9, ADULT (HCC): ICD-10-CM

## 2017-12-15 DIAGNOSIS — I10 ESSENTIAL HYPERTENSION: ICD-10-CM

## 2017-12-15 DIAGNOSIS — E88.81 METABOLIC SYNDROME: ICD-10-CM

## 2017-12-15 DIAGNOSIS — D18.01 CHERRY HEMANGIOMA: ICD-10-CM

## 2017-12-15 PROCEDURE — 99214 OFFICE O/P EST MOD 30 MIN: CPT | Performed by: FAMILY MEDICINE

## 2017-12-15 RX ORDER — GLUCOSAM/CHON-MSM1/C/MANG/BOSW 500-416.6
TABLET ORAL
Qty: 100 EACH | Refills: 6 | Status: SHIPPED | OUTPATIENT
Start: 2017-12-15 | End: 2019-02-08

## 2017-12-15 NOTE — PROGRESS NOTES
HPI:   Peewee Thomas is a 38year old morbidly obese, female who presents for recheck of her diabetes, HTN, HL and c/o skin \"tag. \" on her scalp. DM2, taking medication as directed. Patient’s FBS have been 90-100s.  Last visit with ophthalmologist was Kit Patient to test twice daily Disp: 1 kit Rfl: 0   Glucose Blood (BLOOD GLUCOSE TEST) In Vitro Strip Please provide test strips for truetrack meter Disp: 100 strip Rfl: 6       Wt Readings from Last 6 Encounters:  12/15/17 : 291 lb 8 oz  07/28/17 : 285 l SURGERY      Comment: BABY  5/11/15: LUMPECTOMY LEFT Left      Comment: Dr. Toño Mccoy  No date: ORTHOPEDIC SURG (PBP) Bilateral      Comment: MANDA CARPAL ISHAAN RELEASE  LEFT FOOT SPUR REMOVAL: OTHER  2015: RADIATION LEFT  4/21/15 Pavithra Zheng EDW: STEREOTACTIC is normal. Bilateral monofilament/sensation of both feet is normal. Pulsation pedal pulse exam of both lower legs/feet is normal as well. ASSESSMENT AND PLAN:   Juany Born is a 38year old female who presents for:  1.  Diabetes mellitus type 2 i

## 2017-12-26 DIAGNOSIS — E78.5 HYPERLIPIDEMIA, UNSPECIFIED HYPERLIPIDEMIA TYPE: ICD-10-CM

## 2017-12-26 DIAGNOSIS — E11.9 TYPE 2 DIABETES MELLITUS WITHOUT COMPLICATION, UNSPECIFIED LONG TERM INSULIN USE STATUS: ICD-10-CM

## 2017-12-26 DIAGNOSIS — K21.9 GASTROESOPHAGEAL REFLUX DISEASE, ESOPHAGITIS PRESENCE NOT SPECIFIED: ICD-10-CM

## 2017-12-26 DIAGNOSIS — I10 ESSENTIAL HYPERTENSION: ICD-10-CM

## 2017-12-27 NOTE — TELEPHONE ENCOUNTER
Pending Prescriptions Disp Refills    RAMIPRIL 10 MG Oral Cap [Pharmacy Med Name: RAMIPRIL 10 MG CAPSULE 10 MG CAP] 180 capsule 0     Sig: TAKE ONE (1) CAPSULE BY MOUTH TWICE DAILY      OMEPRAZOLE 20 MG Oral Capsule Delayed Release [Pharmacy Med Name: OM

## 2017-12-28 ENCOUNTER — NURSE ONLY (OUTPATIENT)
Dept: FAMILY MEDICINE CLINIC | Facility: CLINIC | Age: 42
End: 2017-12-28

## 2017-12-28 DIAGNOSIS — E66.9 DIABETES MELLITUS TYPE 2 IN OBESE (HCC): ICD-10-CM

## 2017-12-28 DIAGNOSIS — E88.81 METABOLIC SYNDROME: ICD-10-CM

## 2017-12-28 DIAGNOSIS — E78.5 HYPERLIPIDEMIA, UNSPECIFIED HYPERLIPIDEMIA TYPE: ICD-10-CM

## 2017-12-28 DIAGNOSIS — E66.01 MORBID OBESITY WITH BMI OF 45.0-49.9, ADULT (HCC): ICD-10-CM

## 2017-12-28 DIAGNOSIS — E11.69 DIABETES MELLITUS TYPE 2 IN OBESE (HCC): ICD-10-CM

## 2017-12-28 PROCEDURE — 99211 OFF/OP EST MAY X REQ PHY/QHP: CPT | Performed by: FAMILY MEDICINE

## 2017-12-28 PROCEDURE — 83036 HEMOGLOBIN GLYCOSYLATED A1C: CPT | Performed by: FAMILY MEDICINE

## 2017-12-28 PROCEDURE — 82570 ASSAY OF URINE CREATININE: CPT | Performed by: FAMILY MEDICINE

## 2017-12-28 PROCEDURE — 80053 COMPREHEN METABOLIC PANEL: CPT | Performed by: FAMILY MEDICINE

## 2017-12-28 PROCEDURE — 80061 LIPID PANEL: CPT | Performed by: FAMILY MEDICINE

## 2017-12-28 PROCEDURE — 83721 ASSAY OF BLOOD LIPOPROTEIN: CPT | Performed by: FAMILY MEDICINE

## 2017-12-28 PROCEDURE — 82043 UR ALBUMIN QUANTITATIVE: CPT | Performed by: FAMILY MEDICINE

## 2017-12-28 RX ORDER — METOPROLOL SUCCINATE 200 MG/1
TABLET, EXTENDED RELEASE ORAL
Qty: 90 TABLET | Refills: 0 | Status: SHIPPED | OUTPATIENT
Start: 2017-12-28 | End: 2018-03-26

## 2017-12-28 RX ORDER — RAMIPRIL 10 MG/1
CAPSULE ORAL
Qty: 180 CAPSULE | Refills: 0 | Status: SHIPPED | OUTPATIENT
Start: 2017-12-28 | End: 2018-03-26

## 2017-12-28 RX ORDER — AMLODIPINE BESYLATE 10 MG/1
TABLET ORAL
Qty: 90 TABLET | Refills: 0 | Status: SHIPPED | OUTPATIENT
Start: 2017-12-28 | End: 2018-03-26

## 2017-12-28 RX ORDER — GLIMEPIRIDE 4 MG/1
TABLET ORAL
Qty: 90 TABLET | Refills: 0 | Status: SHIPPED | OUTPATIENT
Start: 2017-12-28 | End: 2018-03-26

## 2017-12-28 RX ORDER — METFORMIN HYDROCHLORIDE 750 MG/1
TABLET, EXTENDED RELEASE ORAL
Qty: 180 TABLET | Refills: 0 | Status: SHIPPED | OUTPATIENT
Start: 2017-12-28 | End: 2018-03-26

## 2017-12-28 RX ORDER — OMEPRAZOLE 20 MG/1
CAPSULE, DELAYED RELEASE ORAL
Qty: 90 CAPSULE | Refills: 0 | Status: SHIPPED | OUTPATIENT
Start: 2017-12-28 | End: 2018-03-08 | Stop reason: ALTCHOICE

## 2017-12-28 NOTE — TELEPHONE ENCOUNTER
Signed Prescriptions Disp Refills    RAMIPRIL 10 MG Oral Cap 180 capsule 0      Sig: TAKE ONE (1) CAPSULE BY MOUTH TWICE DAILY        Authorizing Provider: Bee Evans        Ordering User: ANGELA Flowers      OMEPRAZOLE 20 MG Oral Capsule Sanjay Deisy

## 2018-01-30 ENCOUNTER — TELEPHONE (OUTPATIENT)
Dept: FAMILY MEDICINE CLINIC | Facility: CLINIC | Age: 43
End: 2018-01-30

## 2018-02-02 ENCOUNTER — OFFICE VISIT (OUTPATIENT)
Dept: FAMILY MEDICINE CLINIC | Facility: CLINIC | Age: 43
End: 2018-02-02

## 2018-02-02 VITALS
HEART RATE: 81 BPM | BODY MASS INDEX: 47.92 KG/M2 | HEIGHT: 64.5 IN | WEIGHT: 284.13 LBS | DIASTOLIC BLOOD PRESSURE: 78 MMHG | OXYGEN SATURATION: 97 % | SYSTOLIC BLOOD PRESSURE: 128 MMHG | TEMPERATURE: 99 F | RESPIRATION RATE: 16 BRPM

## 2018-02-02 DIAGNOSIS — E11.8 TYPE 2 DIABETES MELLITUS WITH COMPLICATION, WITHOUT LONG-TERM CURRENT USE OF INSULIN (HCC): Primary | ICD-10-CM

## 2018-02-02 DIAGNOSIS — E11.29 MICROALBUMINURIA DUE TO TYPE 2 DIABETES MELLITUS (HCC): ICD-10-CM

## 2018-02-02 DIAGNOSIS — I10 ESSENTIAL HYPERTENSION: ICD-10-CM

## 2018-02-02 DIAGNOSIS — R79.89 ELEVATED LFTS: ICD-10-CM

## 2018-02-02 DIAGNOSIS — K76.0 FATTY LIVER DISEASE, NONALCOHOLIC: ICD-10-CM

## 2018-02-02 DIAGNOSIS — E66.01 MORBID OBESITY WITH BMI OF 45.0-49.9, ADULT (HCC): ICD-10-CM

## 2018-02-02 DIAGNOSIS — R80.9 MICROALBUMINURIA DUE TO TYPE 2 DIABETES MELLITUS (HCC): ICD-10-CM

## 2018-02-02 DIAGNOSIS — E78.2 MIXED HYPERLIPIDEMIA: ICD-10-CM

## 2018-02-02 PROCEDURE — 99214 OFFICE O/P EST MOD 30 MIN: CPT | Performed by: FAMILY MEDICINE

## 2018-02-05 RX ORDER — FENOFIBRATE 134 MG/1
134 CAPSULE ORAL
Qty: 30 CAPSULE | Refills: 2 | Status: SHIPPED | OUTPATIENT
Start: 2018-02-05 | End: 2018-03-20 | Stop reason: ALTCHOICE

## 2018-02-05 NOTE — PROGRESS NOTES
HPI:   Herbie Cardoza is a 38year old female who presents for recheck of her diabetes, HTN, HL and recent labs. DM2, compliant with meds. Patient’s FBS have been <110s. Pt has been checking her feet on a regular basis.  Pt denies any tingling of the fee Readings from Last 6 Encounters:  02/02/18 : 284 lb 2 oz  12/15/17 : 291 lb 8 oz  07/28/17 : 285 lb  07/05/17 : 277 lb  04/13/17 : 290 lb  02/01/17 : 291 lb     . Body mass index is 48.02 kg/m².         Lab Results  Component Value Date   A1C 7.1 (H) 12/28 (PBP) Bilateral      Comment: MANDA CARPAL ISHAAN RELEASE  LEFT FOOT SPUR REMOVAL: OTHER  2015: RADIATION LEFT  4/21/15 Green EDW: STEREOTACTIC BREAST BIOPSY Left      Comment: Left Breast  Family History   Problem Relation Age of Onset   • Breast Cancer Ma controlled diet and exercise  - refer to Ophthamology  - check feet daily  - check blood glucose levels daily and keep log to bring at next ov     2.  Microalbuminuria due to type 2 diabetes mellitus (HCC)  - urine micro 1577, decreased from previous  - pt

## 2018-02-26 ENCOUNTER — TELEPHONE (OUTPATIENT)
Dept: FAMILY MEDICINE CLINIC | Facility: CLINIC | Age: 43
End: 2018-02-26

## 2018-02-26 NOTE — TELEPHONE ENCOUNTER
Saundra Fallon =  -  for PT called stating Pt has a GI appointment on 3-8-19 @ 9:40 am. [facility]/ [facility] - Fax # [phone], they need a copy of her medication & paperwork on why Pt needs to be seen.

## 2018-02-27 ENCOUNTER — TELEPHONE (OUTPATIENT)
Dept: FAMILY MEDICINE CLINIC | Facility: CLINIC | Age: 43
End: 2018-02-27

## 2018-02-28 NOTE — TELEPHONE ENCOUNTER
I had referred her to the [facility] navigator to find a psychiatrist in network for her bipolar disorder. It appears that her paroxetine was refilled by a COURTNEY Gaitan. That is probably who she has been following. Please inform.

## 2018-03-02 ENCOUNTER — OFFICE VISIT (OUTPATIENT)
Dept: NEPHROLOGY | Facility: CLINIC | Age: 43
End: 2018-03-02

## 2018-03-02 ENCOUNTER — LAB ENCOUNTER (OUTPATIENT)
Dept: LAB | Age: 43
End: 2018-03-02
Attending: INTERNAL MEDICINE
Payer: MEDICARE

## 2018-03-02 VITALS — SYSTOLIC BLOOD PRESSURE: 126 MMHG | DIASTOLIC BLOOD PRESSURE: 74 MMHG | WEIGHT: 276 LBS | BODY MASS INDEX: 47 KG/M2

## 2018-03-02 DIAGNOSIS — R80.9 PROTEINURIA, UNSPECIFIED TYPE: Primary | ICD-10-CM

## 2018-03-02 DIAGNOSIS — R80.9 PROTEINURIA, UNSPECIFIED TYPE: ICD-10-CM

## 2018-03-02 PROBLEM — R80.8 OTHER PROTEINURIA: Status: ACTIVE | Noted: 2018-03-02

## 2018-03-02 LAB
BILIRUB UR QL STRIP.AUTO: NEGATIVE
COLOR UR AUTO: YELLOW
CREAT UR-SCNC: 158 MG/DL
GLUCOSE UR STRIP.AUTO-MCNC: NEGATIVE MG/DL
HYALINE CASTS #/AREA URNS AUTO: PRESENT /LPF
KETONES UR STRIP.AUTO-MCNC: NEGATIVE MG/DL
NITRITE UR QL STRIP.AUTO: NEGATIVE
PH UR STRIP.AUTO: 5 [PH] (ref 4.5–8)
PROT UR STRIP.AUTO-MCNC: 30 MG/DL
PROT UR-MCNC: 54.3 MG/DL
RBC UR QL AUTO: NEGATIVE
SP GR UR STRIP.AUTO: 1.02 (ref 1–1.03)
UROBILINOGEN UR STRIP.AUTO-MCNC: <2 MG/DL

## 2018-03-02 PROCEDURE — 82570 ASSAY OF URINE CREATININE: CPT

## 2018-03-02 PROCEDURE — 99204 OFFICE O/P NEW MOD 45 MIN: CPT | Performed by: INTERNAL MEDICINE

## 2018-03-02 PROCEDURE — 87086 URINE CULTURE/COLONY COUNT: CPT

## 2018-03-02 PROCEDURE — 84156 ASSAY OF PROTEIN URINE: CPT

## 2018-03-02 PROCEDURE — 81001 URINALYSIS AUTO W/SCOPE: CPT

## 2018-03-02 NOTE — PROGRESS NOTES
Nephrology Consult Note      REASON FOR CONSULT:  Proteinuria         HPI:   Sondra Ghotra is a 38year old female with Patient presents with:  Proteinuria  Hypertension    Dana Reach, DO    38 y /o female with hx of DM/HTN, obesity who presents to EDW: STEREOTACTIC BREAST BIOPSY Left      Comment: Left Breast      Medications (Active prior to today's visit):    Current Outpatient Prescriptions:  PARoxetine HCl 40 MG Oral Tab Take 1 tablet (40 mg total) by mouth every evening.  Disp: 90 tablet Rfl: 1 Smoker                                                                Smokeless tobacco: Never Used                        Alcohol use:  No              Drug use: No            Social History Narrative    Lives  In [place] with mother           Family Histor MD  3/2/2018  10:16 AM

## 2018-03-09 ENCOUNTER — LAB ENCOUNTER (OUTPATIENT)
Dept: LAB | Age: 43
End: 2018-03-09
Attending: STUDENT IN AN ORGANIZED HEALTH CARE EDUCATION/TRAINING PROGRAM
Payer: MEDICARE

## 2018-03-09 DIAGNOSIS — K76.0 FATTY LIVER DISEASE, NONALCOHOLIC: ICD-10-CM

## 2018-03-09 LAB
ALBUMIN SERPL-MCNC: 4.4 G/DL (ref 3.5–4.8)
ALP LIVER SERPL-CCNC: 80 U/L (ref 37–98)
ALT SERPL-CCNC: 63 U/L (ref 14–54)
AST SERPL-CCNC: 66 U/L (ref 15–41)
BASOPHILS # BLD AUTO: 0.04 X10(3) UL (ref 0–0.1)
BASOPHILS NFR BLD AUTO: 0.7 %
BILIRUB SERPL-MCNC: 0.3 MG/DL (ref 0.1–2)
BUN BLD-MCNC: 18 MG/DL (ref 8–20)
CALCIUM BLD-MCNC: 9.9 MG/DL (ref 8.3–10.3)
CHLORIDE: 109 MMOL/L (ref 101–111)
CO2: 25 MMOL/L (ref 22–32)
CREAT BLD-MCNC: 0.83 MG/DL (ref 0.55–1.02)
DEPRECATED HBV CORE AB SER IA-ACNC: 82.5 NG/ML (ref 10–291)
EOSINOPHIL # BLD AUTO: 0.12 X10(3) UL (ref 0–0.3)
EOSINOPHIL NFR BLD AUTO: 2 %
ERYTHROCYTE [DISTWIDTH] IN BLOOD BY AUTOMATED COUNT: 13.2 % (ref 11.5–16)
GLUCOSE BLD-MCNC: 132 MG/DL (ref 70–99)
HBV CORE AB SERPL QL IA: NONREACTIVE
HBV SURFACE AB SER QL: NONREACTIVE
HBV SURFACE AB SERPL IA-ACNC: <3.1 MIU/ML
HBV SURFACE AG SERPL QL IA: NONREACTIVE
HCT VFR BLD AUTO: 39 % (ref 34–50)
HEPATITIS A VIRUS AB, TOTAL: NONREACTIVE
HEPATITIS B SURFACE ANTIGEN INDEX: <0.1
HEPATITIS C VIRUS AB INTERPRETATION: NONREACTIVE
HGB BLD-MCNC: 12.4 G/DL (ref 12–16)
IMMATURE GRANULOCYTE COUNT: 0.01 X10(3) UL (ref 0–1)
IMMATURE GRANULOCYTE RATIO %: 0.2 %
IRON SATURATION: 16 % (ref 13–45)
IRON: 99 UG/DL (ref 28–170)
LYMPHOCYTES # BLD AUTO: 2.56 X10(3) UL (ref 0.9–4)
LYMPHOCYTES NFR BLD AUTO: 41.8 %
M PROTEIN MFR SERPL ELPH: 8.6 G/DL (ref 6.1–8.3)
MCH RBC QN AUTO: 29.9 PG (ref 27–33.2)
MCHC RBC AUTO-ENTMCNC: 31.8 G/DL (ref 31–37)
MCV RBC AUTO: 94 FL (ref 81–100)
MONOCYTES # BLD AUTO: 0.35 X10(3) UL (ref 0.1–1)
MONOCYTES NFR BLD AUTO: 5.7 %
NEUTROPHIL ABS PRELIM: 3.04 X10 (3) UL (ref 1.3–6.7)
NEUTROPHILS # BLD AUTO: 3.04 X10(3) UL (ref 1.3–6.7)
NEUTROPHILS NFR BLD AUTO: 49.6 %
PLATELET # BLD AUTO: 372 10(3)UL (ref 150–450)
POTASSIUM SERPL-SCNC: 4.1 MMOL/L (ref 3.6–5.1)
RBC # BLD AUTO: 4.15 X10(6)UL (ref 3.8–5.1)
RED CELL DISTRIBUTION WIDTH-SD: 45.3 FL (ref 35.1–46.3)
SODIUM SERPL-SCNC: 141 MMOL/L (ref 136–144)
TOTAL IRON BINDING CAPACITY: 603 UG/DL (ref 298–536)
TRANSFERRIN: 405 MG/DL (ref 200–360)
WBC # BLD AUTO: 6.1 X10(3) UL (ref 4–13)

## 2018-03-09 PROCEDURE — 86706 HEP B SURFACE ANTIBODY: CPT

## 2018-03-09 PROCEDURE — 83540 ASSAY OF IRON: CPT

## 2018-03-09 PROCEDURE — 86708 HEPATITIS A ANTIBODY: CPT

## 2018-03-09 PROCEDURE — 80053 COMPREHEN METABOLIC PANEL: CPT

## 2018-03-09 PROCEDURE — 85025 COMPLETE CBC W/AUTO DIFF WBC: CPT

## 2018-03-09 PROCEDURE — 36415 COLL VENOUS BLD VENIPUNCTURE: CPT

## 2018-03-09 PROCEDURE — 86704 HEP B CORE ANTIBODY TOTAL: CPT

## 2018-03-09 PROCEDURE — 87340 HEPATITIS B SURFACE AG IA: CPT

## 2018-03-09 PROCEDURE — 82728 ASSAY OF FERRITIN: CPT

## 2018-03-09 PROCEDURE — 83516 IMMUNOASSAY NONANTIBODY: CPT

## 2018-03-09 PROCEDURE — 83550 IRON BINDING TEST: CPT

## 2018-03-09 PROCEDURE — 86803 HEPATITIS C AB TEST: CPT

## 2018-03-11 LAB — F-ACTIN (SMOOTH MUSCLE) AB: 6 UNITS

## 2018-03-12 ENCOUNTER — TELEPHONE (OUTPATIENT)
Dept: FAMILY MEDICINE CLINIC | Facility: CLINIC | Age: 43
End: 2018-03-12

## 2018-03-12 NOTE — TELEPHONE ENCOUNTER
Pharmacy called and left voicemail asking for nurse to call back for clarification on 2 of pt's meds, could not understand the names.

## 2018-03-15 ENCOUNTER — TELEPHONE (OUTPATIENT)
Dept: FAMILY MEDICINE CLINIC | Facility: CLINIC | Age: 43
End: 2018-03-15

## 2018-03-15 DIAGNOSIS — E78.2 MIXED HYPERLIPIDEMIA: ICD-10-CM

## 2018-03-15 NOTE — TELEPHONE ENCOUNTER
Pt called stating at her last appointment  gave her a new Cholesterol med. She called to say that med needs to be changed due to the cost, its to expensive.  Please advise

## 2018-03-20 RX ORDER — FENOFIBRATE 160 MG/1
160 TABLET ORAL DAILY
Qty: 30 TABLET | Refills: 2 | Status: SHIPPED | OUTPATIENT
Start: 2018-03-20 | End: 2018-07-10

## 2018-03-26 DIAGNOSIS — K21.9 GASTROESOPHAGEAL REFLUX DISEASE, ESOPHAGITIS PRESENCE NOT SPECIFIED: ICD-10-CM

## 2018-03-26 DIAGNOSIS — E78.5 HYPERLIPIDEMIA, UNSPECIFIED HYPERLIPIDEMIA TYPE: ICD-10-CM

## 2018-03-26 DIAGNOSIS — I10 ESSENTIAL HYPERTENSION: ICD-10-CM

## 2018-03-26 DIAGNOSIS — E11.9 TYPE 2 DIABETES MELLITUS WITHOUT COMPLICATION (HCC): ICD-10-CM

## 2018-03-27 RX ORDER — METOPROLOL SUCCINATE 200 MG/1
TABLET, EXTENDED RELEASE ORAL
Qty: 90 TABLET | Refills: 0 | Status: SHIPPED | OUTPATIENT
Start: 2018-03-27 | End: 2018-08-06

## 2018-03-27 RX ORDER — RAMIPRIL 10 MG/1
CAPSULE ORAL
Qty: 180 CAPSULE | Refills: 0 | Status: SHIPPED | OUTPATIENT
Start: 2018-03-27 | End: 2018-08-06

## 2018-03-27 RX ORDER — METFORMIN HYDROCHLORIDE 750 MG/1
TABLET, EXTENDED RELEASE ORAL
Qty: 180 TABLET | Refills: 0 | Status: SHIPPED | OUTPATIENT
Start: 2018-03-27 | End: 2018-08-06

## 2018-03-27 RX ORDER — GLIMEPIRIDE 4 MG/1
TABLET ORAL
Qty: 90 TABLET | Refills: 0 | Status: SHIPPED | OUTPATIENT
Start: 2018-03-27 | End: 2018-08-06

## 2018-03-27 RX ORDER — AMLODIPINE BESYLATE 10 MG/1
TABLET ORAL
Qty: 90 TABLET | Refills: 0 | Status: SHIPPED | OUTPATIENT
Start: 2018-03-27 | End: 2018-08-06

## 2018-03-27 NOTE — TELEPHONE ENCOUNTER
Medication refilled per protocol.        Signed Prescriptions Disp Refills    METOPROLOL SUCCINATE  MG Oral Tablet 24 Hr 90 tablet 0      Sig: TAKE 1 TABLET BY MOUTH ONCE DAILY        Authorizing Provider: Jesus Brown        Ordering User: ETHAN

## 2018-03-30 RX ORDER — LIDOCAINE 50 MG/G
1063.2 OINTMENT TOPICAL 3 TIMES DAILY
Qty: 1063.2 G | Refills: 0 | OUTPATIENT
Start: 2018-03-30

## 2018-04-02 ENCOUNTER — TELEPHONE (OUTPATIENT)
Dept: FAMILY MEDICINE CLINIC | Facility: CLINIC | Age: 43
End: 2018-04-02

## 2018-04-02 DIAGNOSIS — E11.69 DIABETES MELLITUS TYPE 2 IN OBESE (HCC): Primary | ICD-10-CM

## 2018-04-02 DIAGNOSIS — E66.9 DIABETES MELLITUS TYPE 2 IN OBESE (HCC): Primary | ICD-10-CM

## 2018-04-02 RX ORDER — GLUCOSAMINE HCL/CHONDROITIN SU 500-400 MG
CAPSULE ORAL
Qty: 100 STRIP | Refills: 2 | Status: SHIPPED | OUTPATIENT
Start: 2018-04-02 | End: 2018-04-12

## 2018-04-02 NOTE — TELEPHONE ENCOUNTER
Pt called asking for refill on her Test strips to go to [facility]. She said she called pharmacy, they instructed her the Dr's office must put the request in.

## 2018-04-12 RX ORDER — GLUCOSAMINE HCL/CHONDROITIN SU 500-400 MG
CAPSULE ORAL
Qty: 100 STRIP | Refills: 2 | Status: SHIPPED | OUTPATIENT
Start: 2018-04-12 | End: 2018-10-01

## 2018-04-12 NOTE — TELEPHONE ENCOUNTER
[facility] pharmacy called #[phone], they cannot bill medicare for test strips this order needs to go to pt's local pharmacy to get refills.

## 2018-04-25 ENCOUNTER — MED REC SCAN ONLY (OUTPATIENT)
Dept: FAMILY MEDICINE CLINIC | Facility: CLINIC | Age: 43
End: 2018-04-25

## 2018-05-03 ENCOUNTER — MED REC SCAN ONLY (OUTPATIENT)
Dept: FAMILY MEDICINE CLINIC | Facility: CLINIC | Age: 43
End: 2018-05-03

## 2018-05-10 ENCOUNTER — OFFICE VISIT (OUTPATIENT)
Dept: FAMILY MEDICINE CLINIC | Facility: CLINIC | Age: 43
End: 2018-05-10

## 2018-05-10 VITALS
HEIGHT: 65 IN | OXYGEN SATURATION: 99 % | WEIGHT: 289.31 LBS | SYSTOLIC BLOOD PRESSURE: 144 MMHG | RESPIRATION RATE: 16 BRPM | HEART RATE: 63 BPM | BODY MASS INDEX: 48.2 KG/M2 | TEMPERATURE: 98 F | DIASTOLIC BLOOD PRESSURE: 80 MMHG

## 2018-05-10 DIAGNOSIS — I10 ESSENTIAL HYPERTENSION: ICD-10-CM

## 2018-05-10 DIAGNOSIS — E78.2 MIXED HYPERLIPIDEMIA: ICD-10-CM

## 2018-05-10 DIAGNOSIS — Z23 NEED FOR VACCINATION: ICD-10-CM

## 2018-05-10 DIAGNOSIS — L30.9 DERMATITIS: ICD-10-CM

## 2018-05-10 DIAGNOSIS — E11.42 TYPE 2 DIABETES MELLITUS WITH DIABETIC POLYNEUROPATHY, WITHOUT LONG-TERM CURRENT USE OF INSULIN (HCC): Primary | ICD-10-CM

## 2018-05-10 DIAGNOSIS — E66.01 MORBID OBESITY WITH BMI OF 45.0-49.9, ADULT (HCC): ICD-10-CM

## 2018-05-10 PROCEDURE — 90472 IMMUNIZATION ADMIN EACH ADD: CPT | Performed by: FAMILY MEDICINE

## 2018-05-10 PROCEDURE — 99214 OFFICE O/P EST MOD 30 MIN: CPT | Performed by: FAMILY MEDICINE

## 2018-05-10 PROCEDURE — G0010 ADMIN HEPATITIS B VACCINE: HCPCS | Performed by: FAMILY MEDICINE

## 2018-05-10 PROCEDURE — 90632 HEPA VACCINE ADULT IM: CPT | Performed by: FAMILY MEDICINE

## 2018-05-10 PROCEDURE — 90746 HEPB VACCINE 3 DOSE ADULT IM: CPT | Performed by: FAMILY MEDICINE

## 2018-05-10 RX ORDER — PREDNISONE 20 MG/1
20 TABLET ORAL DAILY
Qty: 5 TABLET | Refills: 0 | Status: SHIPPED | OUTPATIENT
Start: 2018-05-10 | End: 2018-05-15

## 2018-05-11 ENCOUNTER — TELEPHONE (OUTPATIENT)
Dept: FAMILY MEDICINE CLINIC | Facility: CLINIC | Age: 43
End: 2018-05-11

## 2018-05-11 ENCOUNTER — OFFICE VISIT (OUTPATIENT)
Dept: FAMILY MEDICINE CLINIC | Facility: CLINIC | Age: 43
End: 2018-05-11

## 2018-05-11 DIAGNOSIS — E11.42 TYPE 2 DIABETES MELLITUS WITH DIABETIC POLYNEUROPATHY, WITHOUT LONG-TERM CURRENT USE OF INSULIN (HCC): ICD-10-CM

## 2018-05-11 PROCEDURE — 80053 COMPREHEN METABOLIC PANEL: CPT | Performed by: FAMILY MEDICINE

## 2018-05-11 PROCEDURE — 83036 HEMOGLOBIN GLYCOSYLATED A1C: CPT | Performed by: FAMILY MEDICINE

## 2018-05-11 PROCEDURE — 80061 LIPID PANEL: CPT | Performed by: FAMILY MEDICINE

## 2018-05-11 NOTE — TELEPHONE ENCOUNTER
Called patient to find out if she is cameing in for pap today and fasting labs or is she going to  waiting for [employer] approval and then schedule WWE visit .

## 2018-05-11 NOTE — PROGRESS NOTES
HPI:    Patient ID: Janell Hawkins is a 37year old female. HPI   36yr old morbidly obese, female presents with c/o rash and f/u on chronic conditions. C/o rash on BUE and BLE extremities over the past week. Describes it as being red and itchy.  Was daily. Disp: 30 tablet Rfl: 2   vitamin E 400 UNITS Oral Cap Take 1 capsule (400 Units total) by mouth 2 (two) times daily. Disp: 180 capsule Rfl: 3   PARoxetine HCl 40 MG Oral Tab Take 1 tablet (40 mg total) by mouth every evening.  Disp: 90 tablet Rfl: 1 regular exercise  - cpm    4. Morbid obesity with BMI of 45.0-49.9, adult ([facility] [address]  - discussed BMI goals and weight loss through diet/exercise    5.  Dermatitis  - advised symptomatic tx: avoid scratching, aloe vera gel/calamine lotion, zyrtec prn and avoid t

## 2018-05-17 ENCOUNTER — OFFICE VISIT (OUTPATIENT)
Dept: FAMILY MEDICINE CLINIC | Facility: CLINIC | Age: 43
End: 2018-05-17

## 2018-05-17 VITALS
HEIGHT: 65 IN | WEIGHT: 286 LBS | TEMPERATURE: 99 F | HEART RATE: 87 BPM | BODY MASS INDEX: 47.65 KG/M2 | OXYGEN SATURATION: 97 % | RESPIRATION RATE: 16 BRPM | DIASTOLIC BLOOD PRESSURE: 74 MMHG | SYSTOLIC BLOOD PRESSURE: 128 MMHG

## 2018-05-17 DIAGNOSIS — B35.6 TINEA CRURIS: ICD-10-CM

## 2018-05-17 DIAGNOSIS — Z01.419 WELL WOMAN EXAM WITH ROUTINE GYNECOLOGICAL EXAM: Primary | ICD-10-CM

## 2018-05-17 PROCEDURE — G0101 CA SCREEN;PELVIC/BREAST EXAM: HCPCS | Performed by: FAMILY MEDICINE

## 2018-05-17 PROCEDURE — 87624 HPV HI-RISK TYP POOLED RSLT: CPT | Performed by: FAMILY MEDICINE

## 2018-05-17 PROCEDURE — 88175 CYTOPATH C/V AUTO FLUID REDO: CPT | Performed by: FAMILY MEDICINE

## 2018-05-17 RX ORDER — KETOCONAZOLE 20 MG/G
1 CREAM TOPICAL DAILY
Qty: 60 G | Refills: 0 | Status: SHIPPED | OUTPATIENT
Start: 2018-05-17 | End: 2018-08-18

## 2018-05-20 NOTE — PATIENT INSTRUCTIONS
Lizette Murguia's SCREENING SCHEDULE   Tests on this list are recommended by your physician but may not be covered, or covered at this frequency, by your insurer. Please check with your insurance carrier before scheduling to verify coverage.    Rufina Roman Medicare, and non-screening if indicated for medical reasons Electrocardiogram date Routine EKG is not a screening covered service except at the [facility] Visit    Abdominal aortic aneurysm screening (once between ages 73-68) IPPE only No results 05/17/2021     Chlamydia  Annually if high risk No results found for: CHLAMYDIA No flowsheet data found.     Screening Mammogram      Mammogram    Recommend Annually to at least age 76, and as needed after 76 Mammogram due on 06/10/2017 Please get this Mamm website for anyone to review and print using their home computer and printer. (the forms are also available in [place]  www. [email or web address] org  This link also has information from the [facility] regarding Advance Directives.

## 2018-05-20 NOTE — PROGRESS NOTES
Patient presents with:  Gyn Exam      HPI:  36yr old morbidly, obese female presents for WWE. Due for her breast, pap and pelvic exam. Menses regular, last for only a couple days. Never sexually active.  States it's been difficult to have her pap done in th • Leaking of urine    • Mild mental retardation VERY MILD   • Mouth sores    • Nausea    • Obesity    • Obstructive apnea     CPAP 14 Sarina's   • Shortness of breath    • Sleep apnea    • Type II or unspecified type diabetes mellitus without mention of Oral Cap TAKE ONE (1) CAPSULE BY MOUTH TWICE DAILY Disp: 180 capsule Rfl: 0   GLIMEPIRIDE 4 MG Oral Tab TAKE 1 TABLET BY MOUTH ONCE DAILY BEFORE BREAKFAST Disp: 90 tablet Rfl: 0   AMLODIPINE BESYLATE 10 MG Oral Tab TAKE 1 TABLET BY MOUTH ONCE DAILY Disp: 9 bleeding. Body habitus limits proper exam. Pt was unable to tolerate speculum, I tried a few times with the larger and smaller specs without any luck. She was experiencing pain/discomfort, therefore exam was ended. Obtained vaginal swab instead.  Erythemato cruris  - breast exam and pelvic performed but body habitus severely limits exam, I was unable to obtain pap with endocervical cells as pt was unable to tolerate speculum  - advised regular SBEs and monitoring for any worrisome s/s  - mammogram scheduled f

## 2018-05-24 ENCOUNTER — TELEPHONE (OUTPATIENT)
Dept: FAMILY MEDICINE CLINIC | Facility: CLINIC | Age: 43
End: 2018-05-24

## 2018-05-24 NOTE — TELEPHONE ENCOUNTER
Pt called to inform Dr Damir Shah that she did not  the Metronidazole\" from the pharmacy because it is not covered by insurance. She states she does not need anything else she will go with an over the counter cream that she has.

## 2018-05-25 NOTE — TELEPHONE ENCOUNTER
S/w pt and informed her that metronidazole is for her BV infection not her rash. She is unable to afford the medication as medicare will not cover it.  In the midst of getting medicaid as a secondary, advised to fill med once she has the approval. She under

## 2018-05-29 RX ORDER — METRONIDAZOLE 500 MG/1
500 TABLET ORAL 2 TIMES DAILY
Qty: 14 TABLET | Refills: 0 | Status: SHIPPED | OUTPATIENT
Start: 2018-05-29 | End: 2018-08-18 | Stop reason: ALTCHOICE

## 2018-05-31 ENCOUNTER — HOSPITAL ENCOUNTER (OUTPATIENT)
Dept: MAMMOGRAPHY | Age: 43
Discharge: HOME | End: 2018-05-31
Attending: FAMILY MEDICINE
Payer: MEDICARE

## 2018-05-31 DIAGNOSIS — Z12.31 VISIT FOR SCREENING MAMMOGRAM: ICD-10-CM

## 2018-05-31 PROCEDURE — 77063 BREAST TOMOSYNTHESIS BI: CPT | Performed by: FAMILY MEDICINE

## 2018-05-31 PROCEDURE — 77067 SCR MAMMO BI INCL CAD: CPT | Performed by: FAMILY MEDICINE

## 2018-06-07 ENCOUNTER — HOSPITAL ENCOUNTER (OUTPATIENT)
Dept: ULTRASOUND IMAGING | Age: 43
Discharge: HOME | End: 2018-06-07
Attending: FAMILY MEDICINE
Payer: MEDICARE

## 2018-06-07 ENCOUNTER — HOSPITAL ENCOUNTER (OUTPATIENT)
Dept: MAMMOGRAPHY | Age: 43
Discharge: HOME | End: 2018-06-07
Attending: FAMILY MEDICINE
Payer: MEDICARE

## 2018-06-07 DIAGNOSIS — R92.2 INCONCLUSIVE MAMMOGRAM: ICD-10-CM

## 2018-06-07 PROCEDURE — 76642 ULTRASOUND BREAST LIMITED: CPT | Performed by: FAMILY MEDICINE

## 2018-06-07 PROCEDURE — 77061 BREAST TOMOSYNTHESIS UNI: CPT | Performed by: FAMILY MEDICINE

## 2018-06-07 PROCEDURE — 77065 DX MAMMO INCL CAD UNI: CPT | Performed by: FAMILY MEDICINE

## 2018-06-08 ENCOUNTER — TELEPHONE (OUTPATIENT)
Dept: FAMILY MEDICINE CLINIC | Facility: CLINIC | Age: 43
End: 2018-06-08

## 2018-06-08 DIAGNOSIS — R92.8 ABNORMAL MAMMOGRAM: Primary | ICD-10-CM

## 2018-06-08 DIAGNOSIS — Z85.3 HX OF BREAST CANCER: ICD-10-CM

## 2018-06-08 NOTE — TELEPHONE ENCOUNTER
----- Message from [facility], DO sent at 6/8/2018 12:31 PM CDT -----  Given pt's hx of breast ca, would recommend she follow-up with breast surgeon, Dr. Aurora Solis first prior to scheduling MRI. Pls place referral and inform pt.

## 2018-06-08 NOTE — TELEPHONE ENCOUNTER
Spoke with pt. She spoke with the radiologist yesterday also. Pt will wait to schedule with Dr Erinn Sanford as she is waiting for an insurance issue to be updated.     Referral placed

## 2018-07-05 ENCOUNTER — TELEPHONE (OUTPATIENT)
Dept: FAMILY MEDICINE CLINIC | Facility: CLINIC | Age: 43
End: 2018-07-05

## 2018-07-05 NOTE — TELEPHONE ENCOUNTER
Left message on answering machine for Edwige Comes at [facility] [place] to call triage.

## 2018-07-05 NOTE — TELEPHONE ENCOUNTER
Leann Powers from [facility] mammogram dept called asking for clarification on what pt needs. She said Pt did not see Dr Aleta Guerra. Questioning breast MRI?

## 2018-07-05 NOTE — TELEPHONE ENCOUNTER
Notes recorded by Harriet Frederick DO on 6/8/2018 at 12:31 PM CDT  Given pt's hx of breast ca, would recommend she follow-up with breast surgeon, Dr. Sade Vaughn first prior to scheduling MRI. Pls place referral and inform pt.

## 2018-07-05 NOTE — TELEPHONE ENCOUNTER
Clarified for [facility] that pt needs to see Dr Aleta Guerra first.    Called pt and gave her Dr Fernanda Arboleda phone number to anita ramirez. She verbalized understanding.

## 2018-07-10 RX ORDER — FENOFIBRATE 160 MG/1
TABLET ORAL
Qty: 90 TABLET | Refills: 1 | Status: SHIPPED | OUTPATIENT
Start: 2018-07-10 | End: 2018-12-26

## 2018-07-10 NOTE — TELEPHONE ENCOUNTER
LOV 5/17/18    LAST LAB 5/11/18    LAST RX 3/20/18  #30  2 refills    Next OV Future Appointments  Date Time Provider Tara Anne   8/16/2018 3:00 PM CONRADO Ricci [facility]   9/10/2018 3:00 PM COURTNEY Oro S[facility] SUB GI

## 2018-08-06 DIAGNOSIS — I10 ESSENTIAL HYPERTENSION: ICD-10-CM

## 2018-08-06 DIAGNOSIS — E11.9 TYPE 2 DIABETES MELLITUS WITHOUT COMPLICATION (HCC): ICD-10-CM

## 2018-08-07 RX ORDER — GLIMEPIRIDE 4 MG/1
TABLET ORAL
Qty: 90 TABLET | Refills: 0 | Status: SHIPPED | OUTPATIENT
Start: 2018-08-07 | End: 2018-10-31

## 2018-08-07 RX ORDER — METFORMIN HYDROCHLORIDE 750 MG/1
TABLET, EXTENDED RELEASE ORAL
Qty: 180 TABLET | Refills: 0 | Status: SHIPPED | OUTPATIENT
Start: 2018-08-07 | End: 2018-10-31

## 2018-08-07 RX ORDER — METOPROLOL SUCCINATE 200 MG/1
TABLET, EXTENDED RELEASE ORAL
Qty: 90 TABLET | Refills: 0 | Status: SHIPPED | OUTPATIENT
Start: 2018-08-07 | End: 2018-10-31

## 2018-08-07 RX ORDER — AMLODIPINE BESYLATE 10 MG/1
TABLET ORAL
Qty: 90 TABLET | Refills: 0 | Status: SHIPPED | OUTPATIENT
Start: 2018-08-07 | End: 2018-10-31

## 2018-08-07 RX ORDER — RAMIPRIL 10 MG/1
CAPSULE ORAL
Qty: 180 CAPSULE | Refills: 0 | Status: SHIPPED | OUTPATIENT
Start: 2018-08-07 | End: 2018-10-31

## 2018-08-07 NOTE — TELEPHONE ENCOUNTER
LOV   5/17/18    LAST LAB 5/11/18     LAST RX 3/27/18  #90  No refill    Next OV Future Appointments  Date Time Provider Tara Anne   8/16/2018 3:00 PM CONRADO [facility]   8/31/2018 1:00 PM Mark Mccoy MD [facility]  [address]

## 2018-08-08 NOTE — TELEPHONE ENCOUNTER
Future Appointments  Date Time Provider Tara Anne   8/13/2018 3:30 PM Veronica Qureshi DO [facility] IM   8/16/2018 3:00 PM CONRADO [facility]   8/31/2018 1:00 PM Reynaldo Partida MD [facility] Surg/Onc   9/10/201

## 2018-08-18 ENCOUNTER — OFFICE VISIT (OUTPATIENT)
Dept: FAMILY MEDICINE CLINIC | Facility: CLINIC | Age: 43
End: 2018-08-18
Payer: MEDICARE

## 2018-08-18 VITALS
DIASTOLIC BLOOD PRESSURE: 82 MMHG | HEART RATE: 80 BPM | TEMPERATURE: 98 F | SYSTOLIC BLOOD PRESSURE: 126 MMHG | BODY MASS INDEX: 48.05 KG/M2 | WEIGHT: 288.38 LBS | RESPIRATION RATE: 16 BRPM | OXYGEN SATURATION: 98 % | HEIGHT: 65 IN

## 2018-08-18 DIAGNOSIS — E78.2 MIXED HYPERLIPIDEMIA: ICD-10-CM

## 2018-08-18 DIAGNOSIS — Z23 NEED FOR HEPATITIS B VACCINATION: ICD-10-CM

## 2018-08-18 DIAGNOSIS — E11.42 TYPE 2 DIABETES MELLITUS WITH DIABETIC POLYNEUROPATHY, WITHOUT LONG-TERM CURRENT USE OF INSULIN (HCC): Primary | ICD-10-CM

## 2018-08-18 DIAGNOSIS — I10 ESSENTIAL HYPERTENSION: ICD-10-CM

## 2018-08-18 PROCEDURE — 99214 OFFICE O/P EST MOD 30 MIN: CPT | Performed by: FAMILY MEDICINE

## 2018-08-18 PROCEDURE — G0010 ADMIN HEPATITIS B VACCINE: HCPCS | Performed by: FAMILY MEDICINE

## 2018-08-18 PROCEDURE — 90746 HEPB VACCINE 3 DOSE ADULT IM: CPT | Performed by: FAMILY MEDICINE

## 2018-08-18 NOTE — PROGRESS NOTES
HPI:   Gilbert Solano is a 37year old female who presents for recheck of her diabetes, HTN and HL. DM2, taking medications as directed. Patient’s FBS have been <140s. Last visit with ophthalmologist was within the past year.   Pt has been checking her lb  05/10/18 : 289 lb 5 oz  03/08/18 : 274 lb 8 oz  03/02/18 : 276 lb  02/02/18 : 284 lb 2 oz     . Body mass index is 47.99 kg/m².         Lab Results  Component Value Date   A1C 6.4 (H) 05/11/2018   A1C 7.1 (H) 12/28/2017   A1C 7.2 (H) 01/06/2017   EAG 1 Surgical History:  6/11/2015: BREAST LUMPECTOMY Left      Comment: Procedure: BREAST LUMPECTOMY;  Surgeon: Monica Shah MD;  Location: [facility] MAIN OR  No date: HERNIA SURGERY      Comment: BABY  5/11/15: LUMPECTOMY LEFT Left      Comment: Dr. Navneet Luciano BMI 47.99 kg/m²   GENERAL: well developed, well nourished,in no apparent distress, morbidly obese  SKIN: no rashes,no suspicious lesions  NECK: supple   LUNGS: clear to auscultation  CARDIO: RRR without murmur  GI: soft, nt/nd, +BS in all four quadrants,

## 2018-08-31 ENCOUNTER — OFFICE VISIT (OUTPATIENT)
Dept: SURGERY | Facility: CLINIC | Age: 43
End: 2018-08-31
Payer: MEDICARE

## 2018-08-31 VITALS
OXYGEN SATURATION: 95 % | HEART RATE: 76 BPM | RESPIRATION RATE: 18 BRPM | WEIGHT: 288 LBS | HEIGHT: 65 IN | BODY MASS INDEX: 47.98 KG/M2 | DIASTOLIC BLOOD PRESSURE: 77 MMHG | SYSTOLIC BLOOD PRESSURE: 146 MMHG

## 2018-08-31 DIAGNOSIS — R92.8 ABNORMAL MAMMOGRAM OF RIGHT BREAST: Primary | ICD-10-CM

## 2018-08-31 DIAGNOSIS — D05.12 DUCTAL CARCINOMA IN SITU (DCIS) OF LEFT BREAST: ICD-10-CM

## 2018-08-31 PROCEDURE — 99204 OFFICE O/P NEW MOD 45 MIN: CPT | Performed by: SURGERY

## 2018-08-31 RX ORDER — MULTIVIT,IRON,MINERALS/LUTEIN
1 TABLET ORAL DAILY
COMMUNITY

## 2018-09-05 NOTE — PROGRESS NOTES
Breast Surgery New Patient Consultation    This is the first visit for this 37year old woman, referred by Dr. Ni Dias, who presents for evaluation of breast pain.     History of Present Illness:   Ms. Kerrie James is a 37year old woman who presents wi complication, not stated as uncontrolled    • Wheezing        Past Surgical History:  6/11/2015: BREAST LUMPECTOMY Left      Comment: Procedure: BREAST LUMPECTOMY;  Surgeon: Stephen Ruiz MD;  Location: [facility] MAIN OR  No date: HERNIA SURGERY tablet (40 mg total) by mouth every evening.  Disp: 90 tablet Rfl: 1   TRUEPLUS LANCETS 30G Does not apply Misc PLEASE PROVIDE LANCETS FOR TRUETRACK LANCING DEVICE Disp: 100 each Rfl: 6   EASY TOUCH ALCOHOL PREP MEDIUM 70 % Does not apply Pads USE AS DIRECT irregular heartbeat, fainting or near-fainting, difficulty breathing when lying flat, SOB/Coughing at night, swelling of the legs or chest pain while walking.     Breasts:  See history of present illness    Gastrointestinal:     There is no history of diffi alert, oriented, well-nourished and  well-developed woman who appears her stated age. Her speech patterns and movements are normal. Her affect is appropriate. HEENT: The head is normocephalic. The neck is supple.  The thyroid is not enlarged and is witho her breast exam. On exam today I found no clinical evidence of malignancy bilaterally. I personally reviewed her recent imaging we discussed this at length.   The patient is reluctant to pursue MRI as she is aware of the high risk of false positives in thi

## 2018-09-12 PROBLEM — F41.1 ANXIETY STATE: Status: ACTIVE | Noted: 2018-09-12

## 2018-09-27 ENCOUNTER — TELEPHONE (OUTPATIENT)
Dept: FAMILY MEDICINE CLINIC | Facility: CLINIC | Age: 43
End: 2018-09-27

## 2018-09-27 NOTE — TELEPHONE ENCOUNTER
Pt is confused she thought she has labs to get done? I don't see order in the system. Can you please let pt know if she needs labs?

## 2018-09-28 ENCOUNTER — LAB ENCOUNTER (OUTPATIENT)
Dept: LAB | Age: 43
End: 2018-09-28
Attending: FAMILY MEDICINE
Payer: MEDICARE

## 2018-09-28 DIAGNOSIS — I10 ESSENTIAL HYPERTENSION: ICD-10-CM

## 2018-09-28 DIAGNOSIS — E78.2 MIXED HYPERLIPIDEMIA: ICD-10-CM

## 2018-09-28 DIAGNOSIS — E11.42 TYPE 2 DIABETES MELLITUS WITH DIABETIC POLYNEUROPATHY, WITHOUT LONG-TERM CURRENT USE OF INSULIN (HCC): ICD-10-CM

## 2018-09-28 LAB
ALBUMIN SERPL-MCNC: 3.9 G/DL (ref 3.5–4.8)
ALBUMIN/GLOB SERPL: 1 {RATIO} (ref 1–2)
ALP LIVER SERPL-CCNC: 50 U/L (ref 37–98)
ALT SERPL-CCNC: 30 U/L (ref 14–54)
ANION GAP SERPL CALC-SCNC: 6 MMOL/L (ref 0–18)
AST SERPL-CCNC: 32 U/L (ref 15–41)
BILIRUB SERPL-MCNC: 0.3 MG/DL (ref 0.1–2)
BUN BLD-MCNC: 13 MG/DL (ref 8–20)
BUN/CREAT SERPL: 17.1 (ref 10–20)
CALCIUM BLD-MCNC: 9.3 MG/DL (ref 8.3–10.3)
CHLORIDE SERPL-SCNC: 105 MMOL/L (ref 101–111)
CHOLEST SMN-MCNC: 227 MG/DL (ref ?–200)
CO2 SERPL-SCNC: 27 MMOL/L (ref 22–32)
CREAT BLD-MCNC: 0.76 MG/DL (ref 0.55–1.02)
EST. AVERAGE GLUCOSE BLD GHB EST-MCNC: 154 MG/DL (ref 68–126)
GLOBULIN PLAS-MCNC: 4.1 G/DL (ref 2.5–4)
GLUCOSE BLD-MCNC: 137 MG/DL (ref 70–99)
HBA1C MFR BLD HPLC: 7 % (ref ?–5.7)
HDLC SERPL-MCNC: 38 MG/DL (ref 40–59)
LDLC SERPL DIRECT ASSAY-MCNC: 137 MG/DL (ref ?–100)
M PROTEIN MFR SERPL ELPH: 8 G/DL (ref 6.1–8.3)
NONHDLC SERPL-MCNC: 189 MG/DL (ref ?–130)
OSMOLALITY SERPL CALC.SUM OF ELEC: 288 MOSM/KG (ref 275–295)
POTASSIUM SERPL-SCNC: 4.6 MMOL/L (ref 3.6–5.1)
SODIUM SERPL-SCNC: 138 MMOL/L (ref 136–144)
TRIGL SERPL-MCNC: 477 MG/DL (ref 30–149)

## 2018-09-28 PROCEDURE — 36415 COLL VENOUS BLD VENIPUNCTURE: CPT

## 2018-09-28 PROCEDURE — 80061 LIPID PANEL: CPT

## 2018-09-28 PROCEDURE — 83036 HEMOGLOBIN GLYCOSYLATED A1C: CPT

## 2018-09-28 PROCEDURE — 80053 COMPREHEN METABOLIC PANEL: CPT

## 2018-09-28 PROCEDURE — 83721 ASSAY OF BLOOD LIPOPROTEIN: CPT

## 2018-10-01 DIAGNOSIS — E66.9 DIABETES MELLITUS TYPE 2 IN OBESE (HCC): ICD-10-CM

## 2018-10-01 DIAGNOSIS — E11.69 DIABETES MELLITUS TYPE 2 IN OBESE (HCC): ICD-10-CM

## 2018-10-01 RX ORDER — CALCIUM CITRATE/VITAMIN D3 200MG-6.25
TABLET ORAL
Qty: 100 STRIP | Refills: 11 | Status: SHIPPED | OUTPATIENT
Start: 2018-10-01

## 2018-10-01 NOTE — TELEPHONE ENCOUNTER
Name from pharmacy: [address] [place] [address] IP          Will file in chart as: TRUE METRIX BLOOD GLUCOSE TEST In Vitro Strip    The source prescription was reordered on 4/12/2018 by Sherly Smart RN.     Sig: USE TO TEST BLOOD SUGAR TWICE DAILY A

## 2018-10-23 NOTE — PROGRESS NOTES
Spoke to patient informed of results and information given. She says she has been taking her RX everyday but that she \"has been eating a lot of bad stuff. \" Advised to watch her diet and exercise.

## 2018-10-31 DIAGNOSIS — E11.9 TYPE 2 DIABETES MELLITUS WITHOUT COMPLICATION (HCC): ICD-10-CM

## 2018-10-31 DIAGNOSIS — I10 ESSENTIAL HYPERTENSION: ICD-10-CM

## 2018-11-02 RX ORDER — METFORMIN HYDROCHLORIDE 750 MG/1
TABLET, EXTENDED RELEASE ORAL
Qty: 180 TABLET | Refills: 0 | Status: SHIPPED | OUTPATIENT
Start: 2018-11-02 | End: 2019-02-08

## 2018-11-02 RX ORDER — AMLODIPINE BESYLATE 10 MG/1
TABLET ORAL
Qty: 90 TABLET | Refills: 0 | Status: SHIPPED | OUTPATIENT
Start: 2018-11-02 | End: 2019-02-08

## 2018-11-02 RX ORDER — METOPROLOL SUCCINATE 200 MG/1
TABLET, EXTENDED RELEASE ORAL
Qty: 90 TABLET | Refills: 0 | Status: SHIPPED | OUTPATIENT
Start: 2018-11-02 | End: 2019-02-08

## 2018-11-02 RX ORDER — GLIMEPIRIDE 4 MG/1
TABLET ORAL
Qty: 90 TABLET | Refills: 0 | Status: SHIPPED | OUTPATIENT
Start: 2018-11-02 | End: 2019-02-08

## 2018-11-02 RX ORDER — RAMIPRIL 10 MG/1
CAPSULE ORAL
Qty: 180 CAPSULE | Refills: 0 | Status: SHIPPED | OUTPATIENT
Start: 2018-11-02 | End: 2019-02-08 | Stop reason: ALTCHOICE

## 2018-11-02 NOTE — TELEPHONE ENCOUNTER
Name from pharmacy: METOPROLOL SUCC  MG T 200 TAB          Will file in chart as: METOPROLOL SUCCINATE  MG Oral Tablet 24 Hr    Sig: TAKE 1 TABLET BY MOUTH ONCE DAILY    Disp:  90 tablet    Refills:  0    Start: 10/31/2018     Class: Normal Will file in chart as: GLIMEPIRIDE 4 MG Oral Tab    Sig: TAKE 1 TABLET BY MOUTH ONCE DAILY BEFORE BREAKFAST    Disp:  90 tablet    Refills:  0    Start: 10/31/2018     Class: Normal    For: Type 2 diabetes mellitus without complication ([facility]    Requested

## 2018-11-10 ENCOUNTER — OFFICE VISIT (OUTPATIENT)
Dept: FAMILY MEDICINE CLINIC | Facility: CLINIC | Age: 43
End: 2018-11-10
Payer: MEDICARE

## 2018-11-10 VITALS
SYSTOLIC BLOOD PRESSURE: 134 MMHG | WEIGHT: 293 LBS | OXYGEN SATURATION: 97 % | HEIGHT: 64.5 IN | TEMPERATURE: 99 F | BODY MASS INDEX: 49.41 KG/M2 | RESPIRATION RATE: 18 BRPM | DIASTOLIC BLOOD PRESSURE: 80 MMHG | HEART RATE: 82 BPM

## 2018-11-10 DIAGNOSIS — Z23 NEED FOR VACCINATION: ICD-10-CM

## 2018-11-10 DIAGNOSIS — E11.9 TYPE 2 DIABETES MELLITUS WITHOUT COMPLICATION, WITHOUT LONG-TERM CURRENT USE OF INSULIN (HCC): ICD-10-CM

## 2018-11-10 DIAGNOSIS — H61.23 IMPACTED CERUMEN OF BOTH EARS: ICD-10-CM

## 2018-11-10 DIAGNOSIS — R06.00 DYSPNEA ON EXERTION: ICD-10-CM

## 2018-11-10 DIAGNOSIS — I10 ESSENTIAL HYPERTENSION: ICD-10-CM

## 2018-11-10 DIAGNOSIS — E88.81 METABOLIC SYNDROME: ICD-10-CM

## 2018-11-10 DIAGNOSIS — Z00.00 ENCOUNTER FOR ANNUAL HEALTH EXAMINATION: Primary | ICD-10-CM

## 2018-11-10 DIAGNOSIS — Z13.31 DEPRESSION SCREENING: ICD-10-CM

## 2018-11-10 DIAGNOSIS — E78.2 MIXED HYPERLIPIDEMIA: ICD-10-CM

## 2018-11-10 PROCEDURE — G0439 PPPS, SUBSEQ VISIT: HCPCS | Performed by: FAMILY MEDICINE

## 2018-11-10 PROCEDURE — 99214 OFFICE O/P EST MOD 30 MIN: CPT | Performed by: FAMILY MEDICINE

## 2018-11-10 PROCEDURE — 90632 HEPA VACCINE ADULT IM: CPT | Performed by: FAMILY MEDICINE

## 2018-11-10 PROCEDURE — 69210 REMOVE IMPACTED EAR WAX UNI: CPT | Performed by: FAMILY MEDICINE

## 2018-11-10 PROCEDURE — 93000 ELECTROCARDIOGRAM COMPLETE: CPT | Performed by: FAMILY MEDICINE

## 2018-11-10 PROCEDURE — G0444 DEPRESSION SCREEN ANNUAL: HCPCS | Performed by: FAMILY MEDICINE

## 2018-11-10 PROCEDURE — 90471 IMMUNIZATION ADMIN: CPT | Performed by: FAMILY MEDICINE

## 2018-11-10 NOTE — PATIENT INSTRUCTIONS
Hiram Meier [facility]'s SCREENING SCHEDULE   Tests on this list are recommended by your physician but may not be covered, or covered at this frequency, by your insurer. Please check with your insurance carrier before scheduling to verify coverage.    Annamarie Beckman (H)     Triglycerides (mg/dL)   Date Value   09/28/2018 477 (H)   07/29/2016 695 (H)        EKG - covered if needed at Welcome to Medicare, and non-screening if indicated for medical reasons Electrocardiogram date Routine EKG is not a screening covered ser and Pelvic      Pap: Every 3 yrs age 21-65 or Pap+HPV every 5 yrs age 33-67, Covered every 2 yrs up to age 79 or Yearly if High Risk   Pap Smear,3 Years due on 05/17/2021     Chlamydia  Annually if high risk No results found for: CHLAMYDIA No flowsheet marita http://www. idph.state. [place]/public/books/advin.htm  A link to the [facility]. This site has a lot of good information including definitions of the different types of Advance Directives.  It also has the State forms available on it's webs

## 2018-11-10 NOTE — PROGRESS NOTES
HPI:   Arely Austin is a 37year old female who presents for a Medicare Subsequent Annual Wellness visit (Pt already had Initial Annual Wellness), f/u on chronic conditions and c/o dyspnea.   C/o feeling more easily short of breath over the past coup doing things (over the last two weeks)?: Not at all  Feeling down, depressed, or hopeless (over the last two weeks)?: Not at all  PHQ-2 SCORE: 0     Advanced Directive:   She does NOT have a Living Will on file in [address] [place] [address] Rd.    Advance care planning including th 3.9 09/28/2018    CREATSERUM 0.76 09/28/2018     (H) 09/28/2018        CBC  (most recent labs)   Lab Results   Component Value Date    WBC 6.1 03/09/2018    HGB 12.4 03/09/2018    .0 03/09/2018        ALLERGIES:   She is allergic to other. 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      PLAN SUMMARY:   Diagnoses and all orders for this visit:    Encounter for annual health your daily physical activity?: Light  How would you describe your current health state?: Fair  How do you maintain positive mental well-being?: Social Interaction;Puzzles;Games; Visiting Friends; Visiting Family      This section provided for quick review of 21-65 or Pap+HPV every 5 yrs age 33-67, age 72 and older at high risk Pap Smear,3 Years due on 05/17/2021 Update Health Maintenance if applicable    Chlamydia  Annually if high risk No results found for: CHLAMYDIA No flowsheet data found.     Screening Mamm flowsheet data found. Drug Serum Conc  Annually No results found for: DIGOXIN, DIG, VALP No flowsheet data found.                Template: OSBALDO KAILASH MEDICARE ANNUAL ASSESSMENT FEMALE [[id number]]

## 2018-11-29 DIAGNOSIS — E11.9 TYPE 2 DIABETES MELLITUS WITHOUT COMPLICATION (HCC): ICD-10-CM

## 2018-11-30 ENCOUNTER — TELEPHONE (OUTPATIENT)
Dept: FAMILY MEDICINE CLINIC | Facility: CLINIC | Age: 43
End: 2018-11-30

## 2018-11-30 RX ORDER — ISOPROPYL ALCOHOL 70 ML/100ML
SWAB TOPICAL
Qty: 100 EACH | Refills: 6 | Status: SHIPPED | OUTPATIENT
Start: 2018-11-30 | End: 2019-12-11

## 2018-11-30 NOTE — TELEPHONE ENCOUNTER
EASY TOUCH ALCOHOL PREP MEDIUM 70 % Does not apply Pads 100 each 6 11/28/2017     LOV 11/18    LAST LAB    LAST RX  EASY TOUCH ALCOHOL PREP MEDIUM 70 % Does not apply Pads 100 each 6 11/28/2017         Next OV    PROTOCOL  Please advise. No protocol.  If

## 2018-11-30 NOTE — TELEPHONE ENCOUNTER
I would rather she see the weight loss clinic. I am not sure about that supplement and how it will impact her other meds.

## 2018-11-30 NOTE — TELEPHONE ENCOUNTER
Pt called stating she saw an advertisement on her phone about a weight loss supplement named \"Elite Keto Max\" . She wants to know if it is ok for her to take.

## 2018-12-06 ENCOUNTER — HOSPITAL ENCOUNTER (OUTPATIENT)
Dept: CV DIAGNOSTICS | Facility: HOSPITAL | Age: 43
Discharge: HOME | End: 2018-12-06
Attending: FAMILY MEDICINE
Payer: MEDICARE

## 2018-12-06 DIAGNOSIS — R06.00 DYSPNEA ON EXERTION: ICD-10-CM

## 2018-12-06 PROCEDURE — 93306 TTE W/DOPPLER COMPLETE: CPT | Performed by: FAMILY MEDICINE

## 2018-12-12 ENCOUNTER — TELEPHONE (OUTPATIENT)
Dept: FAMILY MEDICINE CLINIC | Facility: CLINIC | Age: 43
End: 2018-12-12

## 2018-12-12 NOTE — TELEPHONE ENCOUNTER
Debora Barajas per consent to leave detailed message  Advised to call back with any questions or concerns  Left message for patient with Normal test results .

## 2018-12-27 RX ORDER — FENOFIBRATE 160 MG/1
TABLET ORAL
Qty: 90 TABLET | Refills: 10 | Status: SHIPPED | OUTPATIENT
Start: 2018-12-27 | End: 2019-02-08

## 2018-12-27 NOTE — TELEPHONE ENCOUNTER
Name from pharmacy: FENOFIBRATE 160 MG TABLET 160 TAB          Will file in chart as: FENOFIBRATE 160 MG Oral Tab    Sig: TAKE (1) TABLET BY MOUTH DAILY    Disp:  90 tablet    Refills:  10    Start: 12/26/2018    Class: Normal    Requested on: 7/10/2018

## 2019-02-06 ENCOUNTER — TELEPHONE (OUTPATIENT)
Dept: FAMILY MEDICINE CLINIC | Facility: CLINIC | Age: 44
End: 2019-02-06

## 2019-02-06 NOTE — TELEPHONE ENCOUNTER
Pt called stating \"[facility]\" will be faxing over paperwork to be completed & faxed back to them today. Fax received and put in 's box.

## 2019-02-06 NOTE — TELEPHONE ENCOUNTER
Would need evaluation for the back brace. Pls inform pt. She will be due for her diabetic f/u as well.

## 2019-02-06 NOTE — TELEPHONE ENCOUNTER
Called patient and informed she needs to be seen for Back Brace form completion and for 3 month DM follow-up. She thinks she can get a ride with a friend on Friday.     Future Appointments   Date Time Provider Tara Anne   2/8/2019  1:30 PM Melissa perea

## 2019-02-08 ENCOUNTER — OFFICE VISIT (OUTPATIENT)
Dept: FAMILY MEDICINE CLINIC | Facility: CLINIC | Age: 44
End: 2019-02-08
Payer: MEDICARE

## 2019-02-08 VITALS
BODY MASS INDEX: 48.82 KG/M2 | WEIGHT: 293 LBS | TEMPERATURE: 98 F | DIASTOLIC BLOOD PRESSURE: 82 MMHG | OXYGEN SATURATION: 97 % | HEIGHT: 65 IN | HEART RATE: 81 BPM | SYSTOLIC BLOOD PRESSURE: 136 MMHG | RESPIRATION RATE: 16 BRPM

## 2019-02-08 DIAGNOSIS — R05.8 COUGH DUE TO ACE INHIBITOR: ICD-10-CM

## 2019-02-08 DIAGNOSIS — F41.1 ANXIETY STATE: ICD-10-CM

## 2019-02-08 DIAGNOSIS — E11.9 TYPE 2 DIABETES MELLITUS WITHOUT COMPLICATION (HCC): Primary | ICD-10-CM

## 2019-02-08 DIAGNOSIS — T46.4X5A COUGH DUE TO ACE INHIBITOR: ICD-10-CM

## 2019-02-08 DIAGNOSIS — I10 ESSENTIAL HYPERTENSION: ICD-10-CM

## 2019-02-08 DIAGNOSIS — E78.2 MIXED HYPERLIPIDEMIA: ICD-10-CM

## 2019-02-08 DIAGNOSIS — Z23 NEED FOR VACCINATION: ICD-10-CM

## 2019-02-08 PROCEDURE — 90732 PPSV23 VACC 2 YRS+ SUBQ/IM: CPT | Performed by: FAMILY MEDICINE

## 2019-02-08 PROCEDURE — G0009 ADMIN PNEUMOCOCCAL VACCINE: HCPCS | Performed by: FAMILY MEDICINE

## 2019-02-08 PROCEDURE — 99214 OFFICE O/P EST MOD 30 MIN: CPT | Performed by: FAMILY MEDICINE

## 2019-02-08 RX ORDER — PAROXETINE HYDROCHLORIDE 40 MG/1
40 TABLET, FILM COATED ORAL DAILY
Qty: 90 TABLET | Refills: 1 | Status: CANCELLED | OUTPATIENT
Start: 2019-02-08

## 2019-02-08 RX ORDER — GLIMEPIRIDE 4 MG/1
TABLET ORAL
Qty: 90 TABLET | Refills: 1 | Status: SHIPPED | OUTPATIENT
Start: 2019-02-08 | End: 2019-08-01

## 2019-02-08 RX ORDER — METOPROLOL SUCCINATE 200 MG/1
200 TABLET, EXTENDED RELEASE ORAL
Qty: 90 TABLET | Refills: 1 | Status: SHIPPED | OUTPATIENT
Start: 2019-02-08 | End: 2019-08-01

## 2019-02-08 RX ORDER — METFORMIN HYDROCHLORIDE 750 MG/1
TABLET, EXTENDED RELEASE ORAL
Qty: 180 TABLET | Refills: 1 | Status: SHIPPED | OUTPATIENT
Start: 2019-02-08 | End: 2019-07-29

## 2019-02-08 RX ORDER — FENOFIBRATE 160 MG/1
TABLET ORAL
Qty: 90 TABLET | Refills: 1 | Status: SHIPPED | OUTPATIENT
Start: 2019-02-08 | End: 2019-09-05

## 2019-02-08 RX ORDER — RAMIPRIL 10 MG/1
CAPSULE ORAL
Qty: 180 CAPSULE | Refills: 0 | Status: CANCELLED | OUTPATIENT
Start: 2019-02-08

## 2019-02-08 RX ORDER — TELMISARTAN 80 MG/1
80 TABLET ORAL DAILY
Qty: 30 TABLET | Refills: 0 | Status: SHIPPED | OUTPATIENT
Start: 2019-02-08 | End: 2019-03-01

## 2019-02-08 RX ORDER — AMLODIPINE BESYLATE 10 MG/1
10 TABLET ORAL
Qty: 90 TABLET | Refills: 1 | Status: SHIPPED | OUTPATIENT
Start: 2019-02-08 | End: 2019-08-01

## 2019-02-08 NOTE — PROGRESS NOTES
HPI:   Lamonte Abbott is a 37year old female who presents for recheck of her chronic conditions. DM2, has been taking medications as directed. Patient’s FBS have been 100s. Last visit with ophthalmologist was 6/2018.   Pt has been checking her feet on by mouth 2 (two) times daily.  Disp: 180 capsule Rfl: 3   Blood Glucose Monitoring Suppl (TRUETRACK BLOOD GLUCOSE) W/DEVICE Does not apply Kit Patient to test twice daily Disp: 1 kit Rfl: 0       Wt Readings from Last 6 Encounters:  02/08/19 : 299 lb 5 oz • HIGH BLOOD PRESSURE    • HIGH CHOLESTEROL    • Hyperlipidemia    • Indigestion    • Itch of skin    • Leaking of urine    • Mild mental retardation VERY MILD   • Mouth sores    • Nausea    • Obesity    • Obstructive apnea     CPAP 14 Sarina's   • [employer] part-time    Tobacco Use      Smoking status: Never Smoker      Smokeless tobacco: Never Used    Substance and Sexual Activity      Alcohol use: No      Drug use: No      Sexual activity: Not on file    Other Topics      Concerns:        Caffeine Concern: monitor    4. Mixed hyperlipidemia  - low fat/chol diet and exercise as tolerated  - check lipid panel  - cont fenofibrate    5. Anxiety state  - stable  - denies SI/HI  - cont paxil    6.  Need for vaccination  - PNEUMOCOCCAL IMM (PNEUMOVAX)    The patient

## 2019-02-11 DIAGNOSIS — E11.9 TYPE 2 DIABETES MELLITUS WITHOUT COMPLICATION (HCC): ICD-10-CM

## 2019-02-11 DIAGNOSIS — I10 ESSENTIAL HYPERTENSION: ICD-10-CM

## 2019-02-11 RX ORDER — GLIMEPIRIDE 4 MG/1
TABLET ORAL
Qty: 90 TABLET | Refills: 10 | OUTPATIENT
Start: 2019-02-11

## 2019-02-11 RX ORDER — METOPROLOL SUCCINATE 200 MG/1
TABLET, EXTENDED RELEASE ORAL
Qty: 90 TABLET | Refills: 10 | OUTPATIENT
Start: 2019-02-11

## 2019-02-11 RX ORDER — RAMIPRIL 10 MG/1
CAPSULE ORAL
Qty: 180 CAPSULE | Refills: 10 | OUTPATIENT
Start: 2019-02-11

## 2019-02-15 ENCOUNTER — LAB ENCOUNTER (OUTPATIENT)
Dept: LAB | Age: 44
End: 2019-02-15
Attending: FAMILY MEDICINE
Payer: MEDICARE

## 2019-02-15 DIAGNOSIS — E11.9 TYPE 2 DIABETES MELLITUS WITHOUT COMPLICATION (HCC): ICD-10-CM

## 2019-02-15 DIAGNOSIS — I10 ESSENTIAL HYPERTENSION: ICD-10-CM

## 2019-02-15 LAB
ALBUMIN SERPL-MCNC: 4.2 G/DL (ref 3.4–5)
ALBUMIN/GLOB SERPL: 1.1 {RATIO} (ref 1–2)
ALP LIVER SERPL-CCNC: 63 U/L (ref 37–98)
ALT SERPL-CCNC: 40 U/L (ref 13–56)
ANION GAP SERPL CALC-SCNC: 11 MMOL/L (ref 0–18)
AST SERPL-CCNC: 39 U/L (ref 15–37)
BILIRUB SERPL-MCNC: 0.3 MG/DL (ref 0.1–2)
BUN BLD-MCNC: 11 MG/DL (ref 7–18)
BUN/CREAT SERPL: 14.7 (ref 10–20)
CALCIUM BLD-MCNC: 9.3 MG/DL (ref 8.5–10.1)
CHLORIDE SERPL-SCNC: 107 MMOL/L (ref 98–107)
CHOLEST SMN-MCNC: 243 MG/DL (ref ?–200)
CO2 SERPL-SCNC: 24 MMOL/L (ref 21–32)
CREAT BLD-MCNC: 0.75 MG/DL (ref 0.55–1.02)
CREAT UR-SCNC: 79.3 MG/DL
EST. AVERAGE GLUCOSE BLD GHB EST-MCNC: 166 MG/DL (ref 68–126)
GLOBULIN PLAS-MCNC: 3.9 G/DL (ref 2.8–4.4)
GLUCOSE BLD-MCNC: 126 MG/DL (ref 70–99)
HBA1C MFR BLD HPLC: 7.4 % (ref ?–5.7)
HDLC SERPL-MCNC: 44 MG/DL (ref 40–59)
LDLC SERPL DIRECT ASSAY-MCNC: 130 MG/DL (ref ?–100)
M PROTEIN MFR SERPL ELPH: 8.1 G/DL (ref 6.4–8.2)
MICROALBUMIN UR-MCNC: 234 MG/DL
MICROALBUMIN/CREAT 24H UR-RTO: 2950.8 UG/MG (ref ?–30)
NONHDLC SERPL-MCNC: 199 MG/DL (ref ?–130)
OSMOLALITY SERPL CALC.SUM OF ELEC: 295 MOSM/KG (ref 275–295)
POTASSIUM SERPL-SCNC: 3.9 MMOL/L (ref 3.5–5.1)
SODIUM SERPL-SCNC: 142 MMOL/L (ref 136–145)
TRIGL SERPL-MCNC: 482 MG/DL (ref 30–149)

## 2019-02-15 PROCEDURE — 80061 LIPID PANEL: CPT

## 2019-02-15 PROCEDURE — 83721 ASSAY OF BLOOD LIPOPROTEIN: CPT

## 2019-02-15 PROCEDURE — 83036 HEMOGLOBIN GLYCOSYLATED A1C: CPT

## 2019-02-15 PROCEDURE — 80053 COMPREHEN METABOLIC PANEL: CPT

## 2019-02-15 PROCEDURE — 36415 COLL VENOUS BLD VENIPUNCTURE: CPT

## 2019-02-15 PROCEDURE — 82570 ASSAY OF URINE CREATININE: CPT

## 2019-02-15 PROCEDURE — 82043 UR ALBUMIN QUANTITATIVE: CPT

## 2019-02-22 ENCOUNTER — HOSPITAL ENCOUNTER (OUTPATIENT)
Dept: CV DIAGNOSTICS | Facility: HOSPITAL | Age: 44
Discharge: HOME | End: 2019-02-22
Attending: FAMILY MEDICINE
Payer: MEDICARE

## 2019-02-22 DIAGNOSIS — E88.81 METABOLIC SYNDROME: ICD-10-CM

## 2019-02-22 DIAGNOSIS — R06.00 DYSPNEA ON EXERTION: ICD-10-CM

## 2019-02-22 PROCEDURE — 78452 HT MUSCLE IMAGE SPECT MULT: CPT | Performed by: FAMILY MEDICINE

## 2019-02-22 PROCEDURE — 93018 CV STRESS TEST I&R ONLY: CPT | Performed by: FAMILY MEDICINE

## 2019-02-22 PROCEDURE — 93017 CV STRESS TEST TRACING ONLY: CPT | Performed by: FAMILY MEDICINE

## 2019-03-01 ENCOUNTER — OFFICE VISIT (OUTPATIENT)
Dept: FAMILY MEDICINE CLINIC | Facility: CLINIC | Age: 44
End: 2019-03-01
Payer: MEDICARE

## 2019-03-01 VITALS
OXYGEN SATURATION: 98 % | BODY MASS INDEX: 48.82 KG/M2 | DIASTOLIC BLOOD PRESSURE: 76 MMHG | HEART RATE: 94 BPM | HEIGHT: 65 IN | RESPIRATION RATE: 18 BRPM | TEMPERATURE: 98 F | WEIGHT: 293 LBS | SYSTOLIC BLOOD PRESSURE: 132 MMHG

## 2019-03-01 DIAGNOSIS — E78.2 MIXED HYPERLIPIDEMIA: ICD-10-CM

## 2019-03-01 DIAGNOSIS — R80.9 MICROALBUMINURIA DUE TO TYPE 2 DIABETES MELLITUS (HCC): ICD-10-CM

## 2019-03-01 DIAGNOSIS — I10 ESSENTIAL HYPERTENSION: ICD-10-CM

## 2019-03-01 DIAGNOSIS — E11.29 MICROALBUMINURIA DUE TO TYPE 2 DIABETES MELLITUS (HCC): ICD-10-CM

## 2019-03-01 DIAGNOSIS — E11.65 UNCONTROLLED TYPE 2 DIABETES MELLITUS WITH HYPERGLYCEMIA (HCC): ICD-10-CM

## 2019-03-01 DIAGNOSIS — E11.69 DIABETES MELLITUS TYPE 2 IN OBESE (HCC): Primary | ICD-10-CM

## 2019-03-01 DIAGNOSIS — E88.81 METABOLIC SYNDROME: ICD-10-CM

## 2019-03-01 DIAGNOSIS — E66.9 DIABETES MELLITUS TYPE 2 IN OBESE (HCC): Primary | ICD-10-CM

## 2019-03-01 PROCEDURE — 99214 OFFICE O/P EST MOD 30 MIN: CPT | Performed by: FAMILY MEDICINE

## 2019-03-01 RX ORDER — TELMISARTAN 80 MG/1
80 TABLET ORAL DAILY
Qty: 90 TABLET | Refills: 1 | Status: SHIPPED | OUTPATIENT
Start: 2019-03-01 | End: 2019-08-01

## 2019-03-01 RX ORDER — ATORVASTATIN CALCIUM 20 MG/1
20 TABLET, FILM COATED ORAL NIGHTLY
Qty: 90 TABLET | Refills: 0 | Status: SHIPPED | OUTPATIENT
Start: 2019-04-01 | End: 2019-07-03

## 2019-03-01 RX ORDER — ATORVASTATIN CALCIUM 20 MG/1
20 TABLET, FILM COATED ORAL NIGHTLY
Qty: 30 TABLET | Refills: 0 | Status: SHIPPED | OUTPATIENT
Start: 2019-03-01 | End: 2019-03-13

## 2019-03-01 RX ORDER — GLIMEPIRIDE 2 MG/1
2 TABLET ORAL
Qty: 90 TABLET | Refills: 0 | Status: SHIPPED | OUTPATIENT
Start: 2019-03-01 | End: 2019-07-18 | Stop reason: ALTCHOICE

## 2019-03-05 ENCOUNTER — TELEPHONE (OUTPATIENT)
Dept: FAMILY MEDICINE CLINIC | Facility: CLINIC | Age: 44
End: 2019-03-05

## 2019-03-05 NOTE — TELEPHONE ENCOUNTER
Pt left a voicemail stating she does not want to be on the new medication recently prescribed By  due to weight gain & other side effects.

## 2019-03-05 NOTE — TELEPHONE ENCOUNTER
Returned call to patient, she states the medication she is referring to is Atorvastatin. States she took one pill then looked it up on her phone and read about the side effects. She stopped taking. She has not experienced any side effects.   Explained to

## 2019-03-12 NOTE — PROGRESS NOTES
HPI:   Janell Hawkins is a 37year old morbidly obese, female who presents for recheck of her chronic conditions with a friend who is a RN from her [employer]. DM2, has been compliant with meds but not diet/exercise.  She has not been consistently checking Tab Take 1 tablet by mouth.  Disp:  Rfl:    Glucose Blood (BLOOD GLUCOSE TEST) In Vitro Strip Test 2 times daily as directed;DX E11.69 Disp: 100 strip Rfl: 2   Pantoprazole Sodium 40 MG Oral Tab EC Take one tablet (40 mg total) by mouth once daily, 30 minut @[facility](    )  Past Medical History:   Diagnosis Date   • Anxiety state, unspecified    • Asthma    • Back pain    • Bad breath    • Belching    • Bleeding nose    • Cancer [facility])     left breast   • Chest pain    • Chronic cough    • Depression name: Not on file      Number of children: Not on file      Years of education: Not on file      Highest education level: Not on file    Occupational History      Occupation: works part-time    Social Needs      Financial resource strain: Not on file heartburn  NEURO: denies headaches    EXAM:   /76   Pulse 94   Temp 97.8 °F (36.6 °C) (Temporal)   Resp 18   Ht 65\"   Wt (!) 302 lb   SpO2 98%   BMI 50.26 kg/m²   GENERAL: well developed, well nourished,in no apparent distress, morbidly obese  SKIN:

## 2019-03-13 ENCOUNTER — TELEPHONE (OUTPATIENT)
Dept: FAMILY MEDICINE CLINIC | Facility: CLINIC | Age: 44
End: 2019-03-13

## 2019-03-13 ENCOUNTER — OFFICE VISIT (OUTPATIENT)
Dept: FAMILY MEDICINE CLINIC | Facility: CLINIC | Age: 44
End: 2019-03-13
Payer: MEDICARE

## 2019-03-13 DIAGNOSIS — J11.1 INFLUENZA: Primary | ICD-10-CM

## 2019-03-13 DIAGNOSIS — E11.65 UNCONTROLLED TYPE 2 DIABETES MELLITUS WITH HYPERGLYCEMIA (HCC): ICD-10-CM

## 2019-03-13 DIAGNOSIS — I10 ESSENTIAL HYPERTENSION: ICD-10-CM

## 2019-03-13 PROCEDURE — 99214 OFFICE O/P EST MOD 30 MIN: CPT | Performed by: FAMILY MEDICINE

## 2019-03-13 RX ORDER — OSELTAMIVIR PHOSPHATE 75 MG/1
75 CAPSULE ORAL 2 TIMES DAILY
Qty: 10 CAPSULE | Refills: 0 | Status: SHIPPED | OUTPATIENT
Start: 2019-03-13 | End: 2019-03-18

## 2019-03-13 NOTE — PATIENT INSTRUCTIONS
Take Tylenol ES, 2 tablets and alternate every 4-6 hours with Ibuprofen 600mg for the next 24 hours.  After this can take medications as needed for fevers or headaches    Mucinex DM twice daily as needed for cough

## 2019-03-13 NOTE — TELEPHONE ENCOUNTER
Patient arrived at 4:40 pm stating she had a 5 pm appointment. She said she told the nurse that she could not get a ride to be here by 4. Dr Franc Rao agreed to see patient.

## 2019-03-13 NOTE — TELEPHONE ENCOUNTER
Returned call to patient. States last night she felt tired and out of it. Had fever of 102.4. Coughing during the night with phlegm, but does not expectorate. Has body aches and headache. Has SOB when trying to walk or do activities.   Is taking Padmini S

## 2019-03-13 NOTE — TELEPHONE ENCOUNTER
Patient called and LVM at 11:59 am stating she is having difficulty breathing, fever, and cough. Forwarding voicemail to triage.

## 2019-03-13 NOTE — PROGRESS NOTES
Casey Foley is a 37year old female.   HPI:  Pt with acute onset of bodyaches, headache and fevers up to 102 since yesterday  took Ibuprofen  Took Padmini Serrano Cough and Cold  Some sinus congestion  Cough, feels chest congestion, sometimes cough is pr GLUCOSE) W/DEVICE Does not apply Kit Patient to test twice daily Disp: 1 kit Rfl: 0   Pantoprazole Sodium 40 MG Oral Tab EC TAKE ONE TABLET BY MOUTH ONCE DAILY 30 MINUTES PRIOR TO BREAKFAST.  Disp: 90 tablet Rfl: 0   glimepiride 2 MG Oral Tab Take 1 tablet Social History    Tobacco Use      Smoking status: Never Smoker      Smokeless tobacco: Never Used    Alcohol use: No    Drug use:  No                REVIEW OF SYSTEMS:  GENERAL HEALTH: feels well otherwise, mood is good  SKIN: denies any unusual skin 10 capsule; Refill: 0  Work note given    2. Essential hypertension  BP stable. cpm    3.  Uncontrolled type 2 diabetes mellitus with hyperglycemia ([facility]  Noted recent A1C 7.4  Advised with being sick, BS may be more elevated  Watch diet  Keep well hydrated

## 2019-03-18 VITALS
TEMPERATURE: 99 F | HEIGHT: 65 IN | WEIGHT: 293 LBS | DIASTOLIC BLOOD PRESSURE: 84 MMHG | SYSTOLIC BLOOD PRESSURE: 130 MMHG | OXYGEN SATURATION: 96 % | BODY MASS INDEX: 48.82 KG/M2 | RESPIRATION RATE: 20 BRPM | HEART RATE: 88 BPM

## 2019-03-29 ENCOUNTER — HOSPITAL ENCOUNTER (OUTPATIENT)
Dept: MAMMOGRAPHY | Facility: HOSPITAL | Age: 44
Discharge: HOME | End: 2019-03-29
Attending: SURGERY
Payer: MEDICARE

## 2019-03-29 DIAGNOSIS — R92.8 ABNORMAL MAMMOGRAM OF RIGHT BREAST: ICD-10-CM

## 2019-03-29 PROCEDURE — 77061 BREAST TOMOSYNTHESIS UNI: CPT | Performed by: SURGERY

## 2019-03-29 PROCEDURE — 76641 ULTRASOUND BREAST COMPLETE: CPT | Performed by: SURGERY

## 2019-03-29 PROCEDURE — 77065 DX MAMMO INCL CAD UNI: CPT | Performed by: SURGERY

## 2019-04-11 ENCOUNTER — HOSPITAL ENCOUNTER (INPATIENT)
Facility: HOSPITAL | Age: 44
LOS: 4 days | Discharge: HOME | DRG: 871 | End: 2019-04-15
Attending: EMERGENCY MEDICINE | Admitting: HOSPITALIST
Payer: MEDICARE

## 2019-04-11 ENCOUNTER — APPOINTMENT (OUTPATIENT)
Dept: GENERAL RADIOLOGY | Facility: HOSPITAL | Age: 44
DRG: 871 | End: 2019-04-11
Attending: EMERGENCY MEDICINE
Payer: MEDICARE

## 2019-04-11 DIAGNOSIS — J18.9 COMMUNITY ACQUIRED PNEUMONIA, UNSPECIFIED LATERALITY: Primary | ICD-10-CM

## 2019-04-11 DIAGNOSIS — R09.02 HYPOXIA: ICD-10-CM

## 2019-04-11 PROCEDURE — 5A09457 ASSISTANCE WITH RESPIRATORY VENTILATION, 24-96 CONSECUTIVE HOURS, CONTINUOUS POSITIVE AIRWAY PRESSURE: ICD-10-PCS | Performed by: HOSPITALIST

## 2019-04-11 PROCEDURE — 71046 X-RAY EXAM CHEST 2 VIEWS: CPT | Performed by: EMERGENCY MEDICINE

## 2019-04-11 PROCEDURE — 99223 1ST HOSP IP/OBS HIGH 75: CPT | Performed by: HOSPITALIST

## 2019-04-11 RX ORDER — ONDANSETRON 2 MG/ML
4 INJECTION INTRAMUSCULAR; INTRAVENOUS EVERY 6 HOURS PRN
Status: DISCONTINUED | OUTPATIENT
Start: 2019-04-11 | End: 2019-04-15

## 2019-04-11 RX ORDER — PAROXETINE HYDROCHLORIDE 20 MG/1
40 TABLET, FILM COATED ORAL NIGHTLY
Status: DISCONTINUED | OUTPATIENT
Start: 2019-04-11 | End: 2019-04-15

## 2019-04-11 RX ORDER — FENOFIBRATE 134 MG/1
134 CAPSULE ORAL NIGHTLY
Status: DISCONTINUED | OUTPATIENT
Start: 2019-04-11 | End: 2019-04-15

## 2019-04-11 RX ORDER — AMLODIPINE BESYLATE 5 MG/1
10 TABLET ORAL
Status: DISCONTINUED | OUTPATIENT
Start: 2019-04-11 | End: 2019-04-15

## 2019-04-11 RX ORDER — DEXTROSE MONOHYDRATE 25 G/50ML
50 INJECTION, SOLUTION INTRAVENOUS
Status: DISCONTINUED | OUTPATIENT
Start: 2019-04-11 | End: 2019-04-15

## 2019-04-11 RX ORDER — LOSARTAN POTASSIUM 100 MG/1
100 TABLET ORAL NIGHTLY
Status: DISCONTINUED | OUTPATIENT
Start: 2019-04-11 | End: 2019-04-15

## 2019-04-11 RX ORDER — ENOXAPARIN SODIUM 100 MG/ML
0.5 INJECTION SUBCUTANEOUS NIGHTLY
Status: DISCONTINUED | OUTPATIENT
Start: 2019-04-11 | End: 2019-04-15

## 2019-04-11 RX ORDER — METOPROLOL SUCCINATE 100 MG/1
200 TABLET, EXTENDED RELEASE ORAL
Status: DISCONTINUED | OUTPATIENT
Start: 2019-04-12 | End: 2019-04-15

## 2019-04-11 RX ORDER — ACETAMINOPHEN 325 MG/1
650 TABLET ORAL EVERY 6 HOURS PRN
Status: DISCONTINUED | OUTPATIENT
Start: 2019-04-11 | End: 2019-04-15

## 2019-04-11 RX ORDER — ACETAMINOPHEN 500 MG
1000 TABLET ORAL ONCE
Status: COMPLETED | OUTPATIENT
Start: 2019-04-11 | End: 2019-04-11

## 2019-04-11 RX ORDER — IPRATROPIUM BROMIDE AND ALBUTEROL SULFATE 2.5; .5 MG/3ML; MG/3ML
3 SOLUTION RESPIRATORY (INHALATION) ONCE
Status: COMPLETED | OUTPATIENT
Start: 2019-04-11 | End: 2019-04-11

## 2019-04-11 RX ORDER — SODIUM CHLORIDE 9 MG/ML
INJECTION, SOLUTION INTRAVENOUS CONTINUOUS
Status: DISCONTINUED | OUTPATIENT
Start: 2019-04-11 | End: 2019-04-13

## 2019-04-11 NOTE — ED INITIAL ASSESSMENT (HPI)
Pt to ED with complaints of fever for past couple days. Pt reports productive cough and some difficulty breathing.

## 2019-04-12 ENCOUNTER — TELEPHONE (OUTPATIENT)
Dept: NEPHROLOGY | Facility: CLINIC | Age: 44
End: 2019-04-12

## 2019-04-12 PROCEDURE — 99232 SBSQ HOSP IP/OBS MODERATE 35: CPT | Performed by: INTERNAL MEDICINE

## 2019-04-12 RX ORDER — IBUPROFEN 400 MG/1
400 TABLET ORAL EVERY 6 HOURS PRN
Status: DISCONTINUED | OUTPATIENT
Start: 2019-04-12 | End: 2019-04-15

## 2019-04-12 RX ORDER — IPRATROPIUM BROMIDE AND ALBUTEROL SULFATE 2.5; .5 MG/3ML; MG/3ML
3 SOLUTION RESPIRATORY (INHALATION) EVERY 4 HOURS PRN
Status: DISCONTINUED | OUTPATIENT
Start: 2019-04-12 | End: 2019-04-15

## 2019-04-12 RX ORDER — BENZONATATE 100 MG/1
100 CAPSULE ORAL 3 TIMES DAILY PRN
Status: DISCONTINUED | OUTPATIENT
Start: 2019-04-12 | End: 2019-04-15

## 2019-04-12 NOTE — CM/SW NOTE
Met with pt to discuss DC planning. Pt is a 36 y/o woman with history of mild developmental delay admitted for pneumonia. Pt lives alone in a duplex home. Her mother has dementia and lives in a facility in [place].   Her father and brother are  1600   CM/SW Referral Data   Referral Source Social Work (self-referral)   Reason for Referral Discharge planning;Psychoscial assessment   Informant Patient;Friend   Patient Info   Patient's Mental Status Alert;Oriented   Patient's [address]

## 2019-04-12 NOTE — PROGRESS NOTES
[facility]  Progress Note     [address] Patient Status:  Inpatient    1975 MRN [id number]   [facility] Attending Neymar Read MD   Hosp Day # 1 PCP Janusz Blanton DO     Chief Complaint: cough    S: Patient overa • cefTRIAXone  2 g Intravenous Q24H   • azithromycin  500 mg Intravenous Q24H   • enoxaparin  0.5 mg/kg Subcutaneous Nightly   • Insulin Aspart Pen  1-10 Units Subcutaneous TID AC and HS   • amLODIPine Besylate  10 mg Oral Daily   • fenofibrate micronize

## 2019-04-12 NOTE — PLAN OF CARE
NURSING ADMISSION NOTE      Patient admitted via Cart  Oriented to room 529. Safety precautions initiated. Bed in low position. Call light in reach. Received patietn from ER. Admission database completed. Orders received.  KEON,  and tele in plac learning needs (meds, wound care, etc)  - Arrange for interpreters to assist at discharge as needed  - Consider post-discharge preferences of patient/family/discharge partner  - Complete POLST form as appropriate  - Assess patient's ability to be responsib

## 2019-04-12 NOTE — H&P
[facility]  History and Physical     Baron Duane Valencia Patient Status:  Emergency    1975 MRN [id number]   Location [facility] [address] [facility] Attending Daniel Daugherty MD   Hosp Day # 0 PCP DO Trice Randolph ORTHOPEDIC SURG (PBP) Bilateral     MANDA CARPAL IHSAAN RELEASE   • OTHER  LEFT FOOT SPUR REMOVAL   • RADIATION LEFT  2015   • STEREOTACTIC BREAST BIOPSY Left 4/21/15 [facility]    Left Breast       Social History:  reports that she has never smoked.  She has glimepiride 4 MG Oral Tab TAKE 1 TABLET BY MOUTH ONCE DAILY BEFORE BREAKFAST Disp: 90 tablet Rfl: 1   AmLODIPine Besylate 10 MG Oral Tab Take 1 tablet (10 mg total) by mouth once daily.  Disp: 90 tablet Rfl: 1   EASY TOUCH ALCOHOL PREP MEDIUM 70 % Does no 04/11/19 1933   WBC 4.8   HGB 11.8*   MCV 89.5   .0       Recent Labs   Lab 04/11/19 1933   *  160*   BUN 9  10   CREATSERUM 0.88  0.91   GFRAA 92  88   GFRNAA 80  76   CA 9.3  9.3   ALB 3.8  3.8     139   K 3.6  3.5     108   C

## 2019-04-12 NOTE — PLAN OF CARE
Problem: Diabetes/Glucose Control  Goal: Glucose maintained within prescribed range  Description  INTERVENTIONS:  - Monitor Blood Glucose as ordered  - Assess for signs and symptoms of hyperglycemia and hypoglycemia  - Administer ordered medications to m barriers to discharge w/pt and caregiver  - Include patient/family/discharge partner in discharge planning  - Arrange for needed discharge resources and transportation as appropriate  - Identify discharge learning needs (meds, wound care, etc)  - Arrange f Detail Level: Detailed Detail Level: Simple

## 2019-04-12 NOTE — ED PROVIDER NOTES
Patient Seen in: [facility] Emergency Department    History   Patient presents with:  Fever (infectious)  Cough/URI    Stated Complaint: 104 fever and cough    HPI    43-year-old female presents emergency department complaining of fever for the past Breast           Social History    Tobacco Use      Smoking status: Never Smoker      Smokeless tobacco: Never Used    Alcohol use: No    Drug use: No      Review of Systems    Positive for stated complaint: 104 fever and cough  Other systems are as noted METABOLIC PANEL (14) - Abnormal; Notable for the following components:    Glucose 161 (*)     AST 52 (*)     All other components within normal limits   CBC W/ DIFFERENTIAL - Abnormal; Notable for the following components:    HGB 11.8 (*)     RDW-SD 46.8 ( medical condition. Patient was stable in the emergency department and will be transferred to floor for further definitive care. Patient's questions were answered.   Admission disposition: 4/11/2019  8:24 PM                 Disposition and Plan     [facility]

## 2019-04-12 NOTE — RESPIRATORY THERAPY NOTE
KEON - Equipment Use Daily Summary:                  . Set Mode: AUTO CPAP WITH C-FLEX                . Usage in hours: 6:41                . 90% Pressure (EPAP) level: 17                . 90% Insp. Pressure (IPAP): Faraz Croft AHI: 0.4                .

## 2019-04-12 NOTE — HOME CARE LIAISON
Referral from [place]. Met with patient and nurse friend to offer home health services. Patient declined at this time. Plan is for patient to go back to work soon. Brochure and contact information provided and questions/concerns answered.   Thanks for

## 2019-04-12 NOTE — PROGRESS NOTES
[facility] Progress Note:  Anticoagulation Weight Dose Adjustment for enoxaparin (Zita Marine)    Fauzia See is a 37year old female who has been prescribed enoxaparin (LOVENOX) 40mg q24hr for VTE prophylaxis.       Est CrCl: Estimated Creatinine Clear

## 2019-04-12 NOTE — TELEPHONE ENCOUNTER
Patient called to cancel today's appointment, she is at [facility] with pneumonia. Patient rescheduled for 5/13.

## 2019-04-12 NOTE — PROGRESS NOTES
[facility] Pharmacy Note: Antimicrobial Weight Dose Adjustment for: ceftriaxone (ROCEPHIN)    Peewee Thomas is a 37year old female who has been prescribed ceftriaxone (ROCEPHIN) 1 gm IVPB once. Pt has a weight of 131.5 kg.  Based on this criteria, dose will

## 2019-04-13 ENCOUNTER — APPOINTMENT (OUTPATIENT)
Dept: GENERAL RADIOLOGY | Facility: HOSPITAL | Age: 44
DRG: 871 | End: 2019-04-13
Attending: INTERNAL MEDICINE
Payer: MEDICARE

## 2019-04-13 PROCEDURE — 71045 X-RAY EXAM CHEST 1 VIEW: CPT | Performed by: INTERNAL MEDICINE

## 2019-04-13 PROCEDURE — 99232 SBSQ HOSP IP/OBS MODERATE 35: CPT | Performed by: INTERNAL MEDICINE

## 2019-04-13 RX ORDER — FUROSEMIDE 10 MG/ML
40 INJECTION INTRAMUSCULAR; INTRAVENOUS ONCE
Status: COMPLETED | OUTPATIENT
Start: 2019-04-13 | End: 2019-04-13

## 2019-04-13 NOTE — PLAN OF CARE
Problem: Diabetes/Glucose Control  Goal: Glucose maintained within prescribed range  Description  INTERVENTIONS:  - Monitor Blood Glucose as ordered  - Assess for signs and symptoms of hyperglycemia and hypoglycemia  - Administer ordered medications to m RESPIRATORY - ADULT  Goal: Achieves optimal ventilation and oxygenation  Description  INTERVENTIONS:  - Assess for changes in respiratory status  - Assess for changes in mentation and behavior  - Position to facilitate oxygenation and minimize respiratory

## 2019-04-13 NOTE — PROGRESS NOTES
[facility] HOSPITAL[facility]  Progress Note     [address] Patient Status:  Inpatient    1975 MRN [id number]   [facility] 5NW-A Attending Johnnie Wheeler MD   Hosp Day # 2 PCP Arnel Flood DO     Chief Complaint: hypoxia    S: Patient was Imaging: Imaging data reviewed in Epic.     Medications:   • insulin detemir  8 Units Subcutaneous Nightly   • cefTRIAXone  2 g Intravenous Q24H   • azithromycin  500 mg Intravenous Q24H   • enoxaparin  0.5 mg/kg Subcutaneous Nightly   • Insulin Aspart

## 2019-04-13 NOTE — PLAN OF CARE
Patient alert and orientated, develp. Delayed. Oxygen WNL on room air, . KEON with cpap. NSR/ST per tele. Pt with no complaints of pain. Medications given per MAR. IVF infusing. Pt tolerating diet with accuchecks. Voiding. Up SB.  Pt instructed to call fo Modify environment to reduce risk of injury  - Provide assistive devices as appropriate  - Consider OT/PT consult to assist with strengthening/mobility  - Encourage toileting schedule  Outcome: Progressing     Problem: DISCHARGE PLANNING  Goal: Discharge t

## 2019-04-13 NOTE — RESPIRATORY THERAPY NOTE
KEON : EQUIPMENT USE: DAILY SUMMARY                                            SET MODE: AUTO CPAP WITH CFLEX                                          USAGE IN HOURS:9:32                                          90%

## 2019-04-14 PROCEDURE — 99232 SBSQ HOSP IP/OBS MODERATE 35: CPT | Performed by: INTERNAL MEDICINE

## 2019-04-14 RX ORDER — FUROSEMIDE 10 MG/ML
20 INJECTION INTRAMUSCULAR; INTRAVENOUS ONCE
Status: COMPLETED | OUTPATIENT
Start: 2019-04-14 | End: 2019-04-14

## 2019-04-14 NOTE — PROGRESS NOTES
[facility]ITAL[facility]  Progress Note     [address] Patient Status:  Inpatient    1975 MRN [id number]   [facility] 5NW-A Attending Yuridia Orosco MD   Hosp Day # 3 PCP Geoff Benitez DO     Chief Complaint: hypoxia    S: Patient sti Subcutaneous Nightly   • cefTRIAXone  2 g Intravenous Q24H   • enoxaparin  0.5 mg/kg Subcutaneous Nightly   • Insulin Aspart Pen  1-10 Units Subcutaneous TID AC and HS   • amLODIPine Besylate  10 mg Oral Daily   • fenofibrate micronized  134 mg Oral Nightl

## 2019-04-14 NOTE — RESPIRATORY THERAPY NOTE
KEON : EQUIPMENT USE: DAILY SUMMARY                                            SET MODE: AUTO CPAP WITH CFLEX                                          USAGE IN HOURS:7:21                                          90%

## 2019-04-14 NOTE — PLAN OF CARE
Problem: Diabetes/Glucose Control  Goal: Glucose maintained within prescribed range  Description  INTERVENTIONS:  - Monitor Blood Glucose as ordered  - Assess for signs and symptoms of hyperglycemia and hypoglycemia  - Administer ordered medications to m of injury  - Provide assistive devices as appropriate  - Consider OT/PT consult to assist with strengthening/mobility  - Encourage toileting schedule  Outcome: Progressing    PROBLEM: Pneumonia, parainfluenza    ASSESSMENT: pt is AOx4.  Marian Montero

## 2019-04-14 NOTE — PLAN OF CARE
Patient alert and orientated, develp. Delayed. Oxygen WNL on room air, . KEON with cpap. NSR/ST per tele. Pt with complaints of headache, prn pain medications given. Medications given per MAR. IVF SL. Pt tolerating diet with accuchecks. Voiding. Up SB.  P safety including physical limitations  - Instruct pt to call for assistance with activity based on assessment  - Modify environment to reduce risk of injury  - Provide assistive devices as appropriate  - Consider OT/PT consult to assist with strengthening/ Progressing

## 2019-04-15 VITALS
SYSTOLIC BLOOD PRESSURE: 132 MMHG | OXYGEN SATURATION: 94 % | WEIGHT: 293 LBS | RESPIRATION RATE: 20 BRPM | HEART RATE: 68 BPM | BODY MASS INDEX: 48.82 KG/M2 | DIASTOLIC BLOOD PRESSURE: 70 MMHG | HEIGHT: 65 IN | TEMPERATURE: 98 F

## 2019-04-15 PROCEDURE — 99239 HOSP IP/OBS DSCHRG MGMT >30: CPT | Performed by: INTERNAL MEDICINE

## 2019-04-15 RX ORDER — CEPHALEXIN 500 MG/1
500 CAPSULE ORAL 4 TIMES DAILY
Qty: 16 CAPSULE | Refills: 0 | Status: SHIPPED | OUTPATIENT
Start: 2019-04-15 | End: 2019-04-15

## 2019-04-15 RX ORDER — CEPHALEXIN 500 MG/1
500 CAPSULE ORAL 4 TIMES DAILY
Qty: 16 CAPSULE | Refills: 0 | Status: SHIPPED
Start: 2019-04-15 | End: 2019-04-19

## 2019-04-15 NOTE — PLAN OF CARE
Pt AOx4. Developmentally delayed. VSS on RA. KEON/CPAP. Tele, NSR. Lovenox. Voids. Pt c.o headache this AM. Up SBA. Saline locked. Iv abx. Diabetic diet, QID accu checks. Pt updated on poc, wctm. Multidisciplinary Discharge Rounds held 4/15/2019.     Lopez additional Care Plan goals for specific interventions   4/15/2019 1003 by Bryson Plummer RN  Outcome: Progressing  4/15/2019 1003 by Bryson Plummer RN  Outcome: Progressing  Goal: Patient/Family Short Term Goal  Description  Patient's Short Term Goal: 4/11 a appropriate resources  Description  INTERVENTIONS:  - Identify barriers to discharge w/pt and caregiver  - Include patient/family/discharge partner in discharge planning  - Arrange for needed discharge resources and transportation as appropriate  - Identif

## 2019-04-15 NOTE — RESPIRATORY THERAPY NOTE
KEON - Equipment Use Daily Summary:                  . Set Mode: AUTO CPAP WITH C-FLEX                . Usage in hours: 9:22                . 90% Pressure (EPAP) level: 17                . 90% Insp. Pressure (IPAP): Akosua Christy AHI: 1.1                .

## 2019-04-15 NOTE — PLAN OF CARE
PROBLEM: PNA and Parainfluenza     ASSESSMENT: Pt is A&OX4, developmentally delayed, high functioning. VSS, afebrile. Maintaining O2 sats >94% on 1L, RA is baseline. KEON CPAP. Tele, NSR. Voids, tolerating diet no c/o n/v/d this shift. Denies pain. Up self. Progressing     Problem: SAFETY ADULT - FALL  Goal: Free from fall injury  Description  INTERVENTIONS:  - Assess pt frequently for physical needs  - Identify cognitive and physical deficits and behaviors that affect risk of falls.   - [place] fall precaut broncho-pulmonary hygiene including cough, deep breathe, Incentive Spirometry  - Assess the need for suctioning and perform as needed  - Assess and instruct to report SOB or any respiratory difficulty  - Respiratory Therapy support as indicated  - Manage/a

## 2019-04-15 NOTE — PROGRESS NOTES
SPO2% on room air at rest: 86%  SPO2% ambulation on room air: 84%  SPO2% ambulation on O2: 92% On 2 Liters per Minute

## 2019-04-15 NOTE — DISCHARGE SUMMARY
[facility]OSPITAL[facility]  DISCHARGE SUMMARY     Sandy Murguia Patient Status:  Inpatient    1975 MRN [id number]   [facility] 5NW-A Attending Hiren Garrett MD   [facility] Day # 4 PCP Purvi Gil DO     Date of Admission: 2019  Date of Instructions Prescription details   cephALEXin 500 MG Caps  Commonly known as:  KEFLEX      Take 1 capsule (500 mg total) by mouth 4 (four) times daily for 4 days.    Stop taking on:  4/19/2019  Quantity:  16 capsule  Refills:  0        CHANGE how you take tablet  Refills:  1     Telmisartan 80 MG Tabs  What changed:  when to take this      Take 1 tablet (80 mg total) by mouth daily.    Quantity:  90 tablet  Refills:  1        CONTINUE taking these medications      Instructions Prescription details   alinet scheduled appointment time. 5/13/2019  3:30 PM  EXAM - ESTABLISHED PATIENT [5004] 30 min.  [facility] Lyssa August MD    Patient Instructions:                          Vital signs:  Temp:  [98 °F (36.7 °C)-98.4 °F (36

## 2019-04-15 NOTE — CM/SW NOTE
Referral sent to [facility] via [address] for home oxygen. Per Dr. Roxana Waite, spoke to [facility] about flammability of oxygen. [facility] is willing to bring meals to patient in effort to have her avoid using her stove.      Addendum:  Patient approved for Wesley ceja

## 2019-04-15 NOTE — PLAN OF CARE
Problem: Diabetes/Glucose Control  Goal: Glucose maintained within prescribed range  Description  INTERVENTIONS:  - Monitor Blood Glucose as ordered  - Assess for signs and symptoms of hyperglycemia and hypoglycemia  - Administer ordered medications to m neutropenic guidelines  4/15/2019 1605 by Ramo Cooper RN  Outcome: Adequate for Discharge  4/15/2019 1003 by Ramo Cooper RN  Outcome: Progressing  4/15/2019 1003 by Ramo Cooper RN  Outcome: Progressing     Problem: SAFETY ADULT - FALL  Goal: Free fro Discharge  4/15/2019 1003 by Michael Randle RN  Outcome: Progressing  4/15/2019 1003 by Michael Randle RN  Outcome: Progressing     Problem: RESPIRATORY - ADULT  Goal: Achieves optimal ventilation and oxygenation  Description  INTERVENTIONS:  - Assess for ch

## 2019-04-15 NOTE — PROGRESS NOTES
NURSING DISCHARGE NOTE    Discharged Home via Wheelchair. Accompanied by Friend  Belongings Taken by patient/family. Pt assessed and ready for dc. Iv dc'd. Instructions gone over with pt and friend. To [place] via esteban.

## 2019-04-16 ENCOUNTER — PATIENT OUTREACH (OUTPATIENT)
Dept: CASE MANAGEMENT | Age: 44
End: 2019-04-16

## 2019-04-16 DIAGNOSIS — Z02.9 ENCOUNTERS FOR UNSPECIFIED ADMINISTRATIVE PURPOSE: ICD-10-CM

## 2019-04-19 ENCOUNTER — OFFICE VISIT (OUTPATIENT)
Dept: FAMILY MEDICINE CLINIC | Facility: CLINIC | Age: 44
End: 2019-04-19
Payer: MEDICARE

## 2019-04-19 VITALS
RESPIRATION RATE: 18 BRPM | BODY MASS INDEX: 48.04 KG/M2 | DIASTOLIC BLOOD PRESSURE: 78 MMHG | WEIGHT: 288.31 LBS | SYSTOLIC BLOOD PRESSURE: 134 MMHG | HEART RATE: 74 BPM | OXYGEN SATURATION: 97 % | HEIGHT: 65 IN | TEMPERATURE: 98 F

## 2019-04-19 DIAGNOSIS — B34.8 PARAINFLUENZA INFECTION: ICD-10-CM

## 2019-04-19 DIAGNOSIS — J18.9 PNEUMONIA OF BOTH LUNGS DUE TO INFECTIOUS ORGANISM, UNSPECIFIED PART OF LUNG: Primary | ICD-10-CM

## 2019-04-19 DIAGNOSIS — J18.9 PNEUMONIA OF LEFT LOWER LOBE DUE TO INFECTIOUS ORGANISM: ICD-10-CM

## 2019-04-19 PROCEDURE — 99495 TRANSJ CARE MGMT MOD F2F 14D: CPT | Performed by: FAMILY MEDICINE

## 2019-04-19 RX ORDER — ALBUTEROL SULFATE 90 UG/1
2 AEROSOL, METERED RESPIRATORY (INHALATION) EVERY 6 HOURS PRN
Qty: 1 INHALER | Refills: 0 | Status: SHIPPED | OUTPATIENT
Start: 2019-04-19 | End: 2019-06-20

## 2019-04-19 NOTE — PATIENT INSTRUCTIONS
Using an Inhaler with a Spacer  To control asthma, you need to use your medicines the right way. Some medicines are inhaled using a device called a metered-dose inhaler (MDI). Metered-dose inhalers deliver medicine with a fine spray.  You may be asked to · Can be very serious, especially in infants, young children, and older adults. It’s also serious for those with other long-term health problems or weakened immune systems.   · Is sometimes treated at home and sometimes in the hospital    Antibiotic medicin · Inhaled medicines to help with breathing or chest congestion  · Supplemental oxygen to increase low oxygen levels  Drink fluids and eat healthy  You should eat healthy to help your body fight the infection.  Drinking a lot of fluids helps to replace fluid © 5605-2569 [employer] [address]. [address] [facility], [facility] [place]. All rights reserved. This information is not intended as a substitute for professional medical care. Always follow your healthcare professional's instructions.

## 2019-04-19 NOTE — PROGRESS NOTES
HPI:    Chuckie Epley is a 37year old female here today for hospital follow up.    She was discharged from Inpatient hospital, [facility] to Home   Admission Date: 4/11/19   Discharge Date: 4/15/19  Hospital Discharge Diagnoses (since 3/20/2019) mouth daily with breakfast. Take with your 4mg tab = 6mg po qam (Patient taking differently: Take 2 mg by mouth nightly. Take with your 4mg tab = 6mg po qam )   atorvastatin 20 MG Oral Tab Take 1 tablet (20 mg total) by mouth nightly.    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 Weight as of this encounter: 288 lb 4.8 oz. /78   Pulse 74   Temp 97.8 °F (36.6 °C) (Temporal)   Resp 18   Ht 65\"   Wt 288 lb 4.8 oz   SpO2 97%   BMI 47.98 kg/m²   Physical Exam   Nursing note and vitals reviewed.    Jaqui 30 days:   · Number of Possible Diagnoses and/or Management Options: moderate  · Amount and/or Complexity of Data to Be Reviewed: moderate  · Risk of Significant Complications, Morbidity, and/or Mortality: moderate    Overall Risk:   moderate and high    P

## 2019-04-29 DIAGNOSIS — E11.65 UNCONTROLLED TYPE 2 DIABETES MELLITUS WITH HYPERGLYCEMIA (HCC): ICD-10-CM

## 2019-04-29 DIAGNOSIS — E66.9 DIABETES MELLITUS TYPE 2 IN OBESE (HCC): ICD-10-CM

## 2019-04-29 DIAGNOSIS — E11.69 DIABETES MELLITUS TYPE 2 IN OBESE (HCC): ICD-10-CM

## 2019-04-29 NOTE — TELEPHONE ENCOUNTER
Name from pharmacy: GLIMEPIRIDE 2 MG TABS 2 TAB          Will file in chart as: GLIMEPIRIDE 2 MG Oral Tab    Sig: TAKE 1 TABLET BY MOUTH DAILY WITH BREAKFAST.  TAKE WITH YOUR 4MG TABLET FOR A TOTAL OF 6MG EACH MORNING    Disp:  90 tablet    Refills:  10

## 2019-04-30 RX ORDER — GLIMEPIRIDE 2 MG/1
TABLET ORAL
Qty: 30 TABLET | Refills: 0 | OUTPATIENT
Start: 2019-04-30

## 2019-04-30 NOTE — TELEPHONE ENCOUNTER
LOV 4/19    LAST LAB 4/19    LAST RX   glimepiride 2 MG Oral Tab 90 tablet 0 3/1/2019    Sig:   Take 1 tablet (2 mg total) by mouth daily with breakfast. Take with your 4mg tab = 6mg po qam     Patient taking differently:   Take 2 mg by mouth nightly.  Take

## 2019-05-10 ENCOUNTER — HOSPITAL ENCOUNTER (OUTPATIENT)
Dept: CT IMAGING | Facility: HOSPITAL | Age: 44
Discharge: HOME | End: 2019-05-10
Attending: STUDENT IN AN ORGANIZED HEALTH CARE EDUCATION/TRAINING PROGRAM
Payer: MEDICARE

## 2019-05-10 DIAGNOSIS — R19.00 PALPABLE ABDOMINAL MASS: ICD-10-CM

## 2019-05-10 PROCEDURE — 74177 CT ABD & PELVIS W/CONTRAST: CPT | Performed by: STUDENT IN AN ORGANIZED HEALTH CARE EDUCATION/TRAINING PROGRAM

## 2019-05-11 NOTE — PROGRESS NOTES
Called Radha and reviewed CT findings. Suspect the palpable lump is related to recent Lovenox shots from her hospitalization.     PM

## 2019-05-13 ENCOUNTER — OFFICE VISIT (OUTPATIENT)
Dept: NEPHROLOGY | Facility: CLINIC | Age: 44
End: 2019-05-13
Payer: MEDICARE

## 2019-05-13 VITALS — WEIGHT: 293 LBS | DIASTOLIC BLOOD PRESSURE: 78 MMHG | SYSTOLIC BLOOD PRESSURE: 132 MMHG | BODY MASS INDEX: 49 KG/M2

## 2019-05-13 DIAGNOSIS — R80.9 PROTEINURIA, UNSPECIFIED TYPE: ICD-10-CM

## 2019-05-13 DIAGNOSIS — I10 ESSENTIAL HYPERTENSION: Primary | ICD-10-CM

## 2019-05-13 PROCEDURE — 99213 OFFICE O/P EST LOW 20 MIN: CPT | Performed by: INTERNAL MEDICINE

## 2019-05-13 RX ORDER — MULTIVIT-MIN/IRON/FOLIC ACID/K 18-600-40
1 CAPSULE ORAL DAILY
Qty: 30 TABLET | Refills: 0 | Status: SHIPPED | OUTPATIENT
Start: 2019-05-13 | End: 2019-06-12

## 2019-05-13 NOTE — PROGRESS NOTES
Nephrology Consult Note      REASON FOR CONSULT:  Proteinuria         HPI:   Page Iron is a 53year old female with Patient presents with:  Hypertension  Other: microalbuminuria    Ladell Hearing, DO    53 y /o female with hx of DM/HTN, obesity who BREAST LUMPECTOMY Left 6/11/2015    Performed by Dayne Quijano MD at [facility] MAIN OR   • HERNIA SURGERY      BABY   • LUMPECTOMY LEFT Left 5/11/15    Dr. Lola Galvez   • NEEDLE BIOPSY LIVER     • ORTHOPEDIC SURG (PBP) Bilateral     MANDA CARPAL ISHAAN RELEASE   • TAKE (1) TABLET BY MOUTH DAILY (Patient taking differently: 160 mg nightly.  TAKE (1) TABLET BY MOUTH DAILY ) Disp: 90 tablet Rfl: 1   MetFORMIN HCl  MG Oral Tablet 24 Hr TAKE ONE (1) TABLET BY MOUTH TWICE DAILY WITH MEALS (Patient taking differently: Breast Cancer Maternal Grandmother         not known   • Breast Cancer Paternal Grandmother         not known   • Diabetes Father    • Heart Attack Father    • Other (Other) Father         heart attack-- at age 46   • Cancer Brother         brain tumor 0.1 - 2.0 mg/dL   0.5    TOTAL PROTEIN      6.4 - 8.2 g/dL   7.6    Albumin      3.4 - 5.0 g/dL   3.8    Globulin      2.8 - 4.4 g/dL   3.8    A/G Ratio      1.0 - 2.0   1.0    Patient Fasting?        No      GFR CKD-EPI      >=60.00 mL/min/1.73 m² 82.43 replacement    Lyn Corral MD  5/13/2019

## 2019-05-15 ENCOUNTER — TELEPHONE (OUTPATIENT)
Dept: NEPHROLOGY | Facility: CLINIC | Age: 44
End: 2019-05-15

## 2019-05-18 ENCOUNTER — APPOINTMENT (OUTPATIENT)
Dept: LAB | Facility: HOSPITAL | Age: 44
End: 2019-05-18
Attending: INTERNAL MEDICINE
Payer: MEDICARE

## 2019-05-18 DIAGNOSIS — R80.9 PROTEINURIA, UNSPECIFIED TYPE: ICD-10-CM

## 2019-05-18 PROCEDURE — 84156 ASSAY OF PROTEIN URINE: CPT

## 2019-05-18 PROCEDURE — 82570 ASSAY OF URINE CREATININE: CPT

## 2019-05-23 DIAGNOSIS — N28.1 COMPLEX RENAL CYST: ICD-10-CM

## 2019-05-23 DIAGNOSIS — R80.9 PROTEINURIA, UNSPECIFIED TYPE: Primary | ICD-10-CM

## 2019-05-23 NOTE — PROGRESS NOTES
Pt called; will check serology  Also advised urology evaluation (w/ Dr. Amalia Lunsford) for complex L renal cyst.

## 2019-05-28 ENCOUNTER — LAB ENCOUNTER (OUTPATIENT)
Dept: LAB | Age: 44
End: 2019-05-28
Attending: INTERNAL MEDICINE
Payer: MEDICARE

## 2019-05-28 DIAGNOSIS — R80.9 PROTEINURIA, UNSPECIFIED TYPE: ICD-10-CM

## 2019-05-28 PROCEDURE — 86255 FLUORESCENT ANTIBODY SCREEN: CPT

## 2019-05-28 PROCEDURE — 85610 PROTHROMBIN TIME: CPT

## 2019-05-28 PROCEDURE — 86225 DNA ANTIBODY NATIVE: CPT

## 2019-05-28 PROCEDURE — 86235 NUCLEAR ANTIGEN ANTIBODY: CPT

## 2019-05-28 PROCEDURE — 83516 IMMUNOASSAY NONANTIBODY: CPT

## 2019-05-28 PROCEDURE — 83876 ASSAY MYELOPEROXIDASE: CPT

## 2019-05-28 PROCEDURE — 86038 ANTINUCLEAR ANTIBODIES: CPT

## 2019-05-30 ENCOUNTER — TELEPHONE (OUTPATIENT)
Dept: FAMILY MEDICINE CLINIC | Facility: CLINIC | Age: 44
End: 2019-05-30

## 2019-05-30 DIAGNOSIS — E78.2 MIXED HYPERLIPIDEMIA: ICD-10-CM

## 2019-05-30 DIAGNOSIS — E11.69 DIABETES MELLITUS TYPE 2 IN OBESE (HCC): ICD-10-CM

## 2019-05-30 DIAGNOSIS — E66.9 DIABETES MELLITUS TYPE 2 IN OBESE (HCC): ICD-10-CM

## 2019-05-30 NOTE — TELEPHONE ENCOUNTER
Dr. Larry Yarbrough,   [address]  [facility] patient to schedule appt. To complete Medical Certification for home O2. She states she has not used home O2 for awhile now and doesn't feel like she needs it. She does not have O2 at home right now.   States her breathing is b

## 2019-05-31 RX ORDER — ATORVASTATIN CALCIUM 20 MG/1
TABLET, FILM COATED ORAL
Qty: 90 TABLET | Refills: 10 | OUTPATIENT
Start: 2019-05-31

## 2019-05-31 NOTE — TELEPHONE ENCOUNTER
LOV 4/19    LAST LAB 2/19    LAST RX   atorvastatin 20 MG Oral Tab 90 tablet 0 4/1/2019         Next OV 6/17/2019      PROTOCOL  Cholesterol Medication Protocol Passed    Denied refill too soon.

## 2019-06-06 DIAGNOSIS — R80.9 PROTEINURIA, UNSPECIFIED TYPE: Primary | ICD-10-CM

## 2019-06-13 ENCOUNTER — APPOINTMENT (OUTPATIENT)
Dept: LAB | Facility: HOSPITAL | Age: 44
End: 2019-06-13
Attending: INTERNAL MEDICINE
Payer: MEDICARE

## 2019-06-13 ENCOUNTER — HOSPITAL ENCOUNTER (OUTPATIENT)
Dept: CT IMAGING | Facility: HOSPITAL | Age: 44
Discharge: HOME | End: 2019-06-13
Attending: INTERNAL MEDICINE
Payer: MEDICARE

## 2019-06-13 VITALS
SYSTOLIC BLOOD PRESSURE: 113 MMHG | WEIGHT: 285 LBS | RESPIRATION RATE: 18 BRPM | HEIGHT: 65 IN | BODY MASS INDEX: 47.48 KG/M2 | OXYGEN SATURATION: 99 % | HEART RATE: 80 BPM | DIASTOLIC BLOOD PRESSURE: 66 MMHG | TEMPERATURE: 98 F

## 2019-06-13 DIAGNOSIS — R80.9 PROTEINURIA, UNSPECIFIED TYPE: ICD-10-CM

## 2019-06-13 DIAGNOSIS — R80.9 PROTEINURIA: ICD-10-CM

## 2019-06-13 DIAGNOSIS — R80.9 PROTEINURIA: Primary | ICD-10-CM

## 2019-06-13 PROCEDURE — 77012 CT SCAN FOR NEEDLE BIOPSY: CPT | Performed by: INTERNAL MEDICINE

## 2019-06-13 PROCEDURE — 85027 COMPLETE CBC AUTOMATED: CPT | Performed by: RADIOLOGY

## 2019-06-13 PROCEDURE — 36415 COLL VENOUS BLD VENIPUNCTURE: CPT

## 2019-06-13 PROCEDURE — 88350 IMFLUOR EA ADDL 1ANTB STN PX: CPT | Performed by: INTERNAL MEDICINE

## 2019-06-13 PROCEDURE — 88346 IMFLUOR 1ST 1ANTB STAIN PX: CPT | Performed by: INTERNAL MEDICINE

## 2019-06-13 PROCEDURE — 82962 GLUCOSE BLOOD TEST: CPT

## 2019-06-13 PROCEDURE — 88305 TISSUE EXAM BY PATHOLOGIST: CPT | Performed by: INTERNAL MEDICINE

## 2019-06-13 PROCEDURE — 88313 SPECIAL STAINS GROUP 2: CPT | Performed by: INTERNAL MEDICINE

## 2019-06-13 PROCEDURE — 88348 ELECTRON MICROSCOPY DX: CPT | Performed by: INTERNAL MEDICINE

## 2019-06-13 PROCEDURE — 50200 RENAL BIOPSY PERQ: CPT | Performed by: INTERNAL MEDICINE

## 2019-06-13 PROCEDURE — 99152 MOD SED SAME PHYS/QHP 5/>YRS: CPT | Performed by: INTERNAL MEDICINE

## 2019-06-13 PROCEDURE — 85610 PROTHROMBIN TIME: CPT

## 2019-06-13 RX ORDER — FLUMAZENIL 0.1 MG/ML
0.2 INJECTION, SOLUTION INTRAVENOUS AS NEEDED
Status: DISCONTINUED | OUTPATIENT
Start: 2019-06-13 | End: 2019-06-15

## 2019-06-13 RX ORDER — NALOXONE HYDROCHLORIDE 0.4 MG/ML
80 INJECTION, SOLUTION INTRAMUSCULAR; INTRAVENOUS; SUBCUTANEOUS AS NEEDED
Status: DISCONTINUED | OUTPATIENT
Start: 2019-06-13 | End: 2019-06-15

## 2019-06-13 RX ORDER — MIDAZOLAM HYDROCHLORIDE 1 MG/ML
INJECTION INTRAMUSCULAR; INTRAVENOUS
Status: COMPLETED
Start: 2019-06-13 | End: 2019-06-13

## 2019-06-13 RX ORDER — MIDAZOLAM HYDROCHLORIDE 1 MG/ML
1 INJECTION INTRAMUSCULAR; INTRAVENOUS EVERY 5 MIN PRN
Status: ACTIVE | OUTPATIENT
Start: 2019-06-13 | End: 2019-06-13

## 2019-06-13 RX ORDER — SODIUM CHLORIDE 9 MG/ML
INJECTION, SOLUTION INTRAVENOUS CONTINUOUS
Status: DISCONTINUED | OUTPATIENT
Start: 2019-06-13 | End: 2019-06-15

## 2019-06-13 RX ADMIN — SODIUM CHLORIDE: 9 INJECTION, SOLUTION INTRAVENOUS at 11:05:00

## 2019-06-13 RX ADMIN — MIDAZOLAM HYDROCHLORIDE 1 MG: 1 INJECTION INTRAMUSCULAR; INTRAVENOUS at 11:19:00

## 2019-06-13 RX ADMIN — MIDAZOLAM HYDROCHLORIDE 1 MG: 1 INJECTION INTRAMUSCULAR; INTRAVENOUS at 11:24:00

## 2019-06-13 NOTE — H&P
[address] Patient Status:  Outpatient    1975 MRN [id number]   [facility] Attending Lori Grover MD   Hosp Day # 0 PCP Jennifer Baker DO     Admitting Diagnosis:   53year-old female History:  Social History    Tobacco Use      Smoking status: Never Smoker      Smokeless tobacco: Never Used    Alcohol use: No       Family History:  Family History   Problem Relation Age of Onset   • Breast Cancer Maternal Grandmother         not known MOUTH DAILY ), Disp: 90 tablet, Rfl: 1  •  Metoprolol Succinate  MG Oral Tablet 24 Hr, Take 1 tablet (200 mg total) by mouth once daily.  (Patient taking differently: Take 200 mg by mouth nightly.  ), Disp: 90 tablet, Rfl: 1  •  MetFORMIN HCl  M HGB 12.8   HCT 38.7   MCV 90.0   MCH 29.8   MCHC 33.1   RDW 14.0   WBC 5.5   .0     Recent Labs   Lab 06/13/19  1009   PTP 13.8   INR 1.02     No results for input(s): GLU, BUN, CREATSERUM, GFRAA, GFRNAA, CA, NA, K, CL, CO2 in the last 168 hours.

## 2019-06-13 NOTE — PROCEDURES
[facility]  Procedure Note    [address] Patient Status:  Outpatient    1975 MRN [id number]   [facility] Attending Aliya Sutton MD   Hosp Day # 0 PCP Yelitza Killian DO     Procedure: Renal biopsy    Pre-Procedure Diagno

## 2019-06-13 NOTE — OR NURSING
PATIENT ARRIVED TO UNIT WITH A VERY SCANT AMT OF BLOOD ON BIOPSY SITE.  THIS DRAINAGE REMANED THE SAME SIZE DURING POST-OP STSY

## 2019-06-13 NOTE — IMAGING NOTE
Pt tolerated procedure well, vss on RA, presently denies pain and dressing is CDI. Pt  updated on procedure and plan of care, report called to [facility] RN and awaiting transport to room# 2254with belongings at bedside.

## 2019-06-14 ENCOUNTER — TELEPHONE (OUTPATIENT)
Dept: NEPHROLOGY | Facility: CLINIC | Age: 44
End: 2019-06-14

## 2019-06-14 NOTE — TELEPHONE ENCOUNTER
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     Pt called and notified;

## 2019-06-17 ENCOUNTER — TELEPHONE (OUTPATIENT)
Dept: FAMILY MEDICINE CLINIC | Facility: CLINIC | Age: 44
End: 2019-06-17

## 2019-06-17 ENCOUNTER — OFFICE VISIT (OUTPATIENT)
Dept: FAMILY MEDICINE CLINIC | Facility: CLINIC | Age: 44
End: 2019-06-17
Payer: MEDICARE

## 2019-06-17 VITALS
BODY MASS INDEX: 48.82 KG/M2 | DIASTOLIC BLOOD PRESSURE: 74 MMHG | RESPIRATION RATE: 18 BRPM | SYSTOLIC BLOOD PRESSURE: 112 MMHG | HEART RATE: 85 BPM | HEIGHT: 65 IN | TEMPERATURE: 99 F | WEIGHT: 293 LBS | OXYGEN SATURATION: 98 %

## 2019-06-17 DIAGNOSIS — L08.9 INFECTED SEBACEOUS CYST: ICD-10-CM

## 2019-06-17 DIAGNOSIS — J18.9 PNEUMONIA OF BOTH LUNGS DUE TO INFECTIOUS ORGANISM, UNSPECIFIED PART OF LUNG: ICD-10-CM

## 2019-06-17 DIAGNOSIS — E11.65 TYPE 2 DIABETES MELLITUS WITH HYPERGLYCEMIA, WITHOUT LONG-TERM CURRENT USE OF INSULIN (HCC): Primary | ICD-10-CM

## 2019-06-17 DIAGNOSIS — B02.9 HERPES ZOSTER WITHOUT COMPLICATION: ICD-10-CM

## 2019-06-17 DIAGNOSIS — J18.9 PNEUMONIA OF LEFT LOWER LOBE DUE TO INFECTIOUS ORGANISM: ICD-10-CM

## 2019-06-17 DIAGNOSIS — B35.6 TINEA CRURIS: ICD-10-CM

## 2019-06-17 DIAGNOSIS — B34.8 PARAINFLUENZA INFECTION: ICD-10-CM

## 2019-06-17 DIAGNOSIS — E66.01 MORBID OBESITY (HCC): ICD-10-CM

## 2019-06-17 DIAGNOSIS — E55.9 VITAMIN D DEFICIENCY: ICD-10-CM

## 2019-06-17 DIAGNOSIS — L72.3 INFECTED SEBACEOUS CYST: ICD-10-CM

## 2019-06-17 PROCEDURE — 99214 OFFICE O/P EST MOD 30 MIN: CPT | Performed by: FAMILY MEDICINE

## 2019-06-17 PROCEDURE — 82962 GLUCOSE BLOOD TEST: CPT | Performed by: FAMILY MEDICINE

## 2019-06-17 RX ORDER — KETOCONAZOLE 20 MG/G
1 CREAM TOPICAL DAILY
Qty: 30 G | Refills: 1 | Status: SHIPPED | OUTPATIENT
Start: 2019-06-17 | End: 2019-07-01

## 2019-06-17 RX ORDER — VALACYCLOVIR HYDROCHLORIDE 500 MG/1
1000 TABLET, FILM COATED ORAL 3 TIMES DAILY
Qty: 42 TABLET | Refills: 0 | Status: SHIPPED | OUTPATIENT
Start: 2019-06-17 | End: 2019-06-24

## 2019-06-17 RX ORDER — CEFADROXIL 500 MG/1
500 CAPSULE ORAL 2 TIMES DAILY
Qty: 14 CAPSULE | Refills: 0 | Status: SHIPPED | OUTPATIENT
Start: 2019-06-17 | End: 2019-06-24

## 2019-06-17 RX ORDER — ERGOCALCIFEROL 1.25 MG/1
CAPSULE ORAL
COMMUNITY
End: 2019-07-03 | Stop reason: DRUGHIGH

## 2019-06-17 NOTE — PATIENT INSTRUCTIONS
Understanding Tinea Cruris  Tinea cruris is a type of fungal infection. The fungus infects the skin in the groin. It is more commonly called jock itch. Men are more prone to it than women. How to say it  TIN-ee-a CROO-ris   What causes tinea cruris?   Ti · Symptoms that don’t get better, or get worse  · New symptoms   Date Last Reviewed: 3/30/2016  © 8728-2007 [employer] [address]. [address] [facility], [address] [place] [address]. All rights reserved.  This information is not intended as a substitute for prof

## 2019-06-17 NOTE — PROGRESS NOTES
HPI:   Domo Jeong is a 53year old female who presents with her friend presents for recheck of her diabetes and multiple other complaints. DM2, has been taking medication as directed. Patient’s FBS have been <130s.  Last visit with ophthalmologist w mouth daily with breakfast. Take with your 4mg tab = 6mg po qam (Patient taking differently: Take 2 mg by mouth nightly.  Take with your 4mg tab = 6mg po qam ) Disp: 90 tablet Rfl: 0   atorvastatin 20 MG Oral Tab Take 1 tablet (20 mg total) by mouth nightly lb 4.8 oz  04/14/19 : 294 lb 1.6 oz     . Body mass index is 48.92 kg/m².       Lab Results   Component Value Date    A1C 6.9 (H) 04/12/2019    A1C 7.4 (H) 02/15/2019    A1C 7.0 (H) 09/28/2018     (H) 04/12/2019     (H) 02/15/2019     • Leaking of urine    • Mild mental retardation VERY MILD   • Mouth sores    • Nausea    • Night sweats    • Obesity    • Obstructive apnea     CPAP 14 Sarina's   • Shortness of breath    • Sleep apnea    • Type II or unspecified type diabetes mellitus Sexual Activity      Alcohol use: No      Drug use: No      Sexual activity: Not on file    Lifestyle      Physical activity:        Days per week: Not on file        Minutes per session: Not on file      Stress: Not on file    Relationships      Social co clear to auscultation, no w/r/r  CARDIO: RRR without murmur  GI: soft, nt/nd, +BS in all four quadrants, no r/r/g  EXTREMITIES: no cyanosis, clubbing or edema    ASSESSMENT AND PLAN:   Janell Hawkins is a 53year old female who presents for:  1.  Type 2

## 2019-06-17 NOTE — TELEPHONE ENCOUNTER
Dr. Troy Cazares,  Please advise    Per pharmacy, Econazole Nitrate is not covered. They believe the preferred alternative is ketoconazole.     ketoconazole external cream 2 % 1 RO; DL    DL: Dispensing Limit- Drug may be limited to a one-month supply   RO: Pratik

## 2019-06-19 DIAGNOSIS — J18.9 PNEUMONIA OF BOTH LUNGS DUE TO INFECTIOUS ORGANISM, UNSPECIFIED PART OF LUNG: ICD-10-CM

## 2019-06-19 DIAGNOSIS — J18.9 PNEUMONIA OF LEFT LOWER LOBE DUE TO INFECTIOUS ORGANISM: ICD-10-CM

## 2019-06-19 DIAGNOSIS — B34.8 PARAINFLUENZA INFECTION: ICD-10-CM

## 2019-06-19 RX ORDER — ALBUTEROL SULFATE 90 UG/1
2 AEROSOL, METERED RESPIRATORY (INHALATION) EVERY 6 HOURS PRN
Qty: 1 INHALER | Refills: 0 | Status: CANCELLED | OUTPATIENT
Start: 2019-06-19

## 2019-06-20 RX ORDER — ALBUTEROL SULFATE 90 UG/1
2 AEROSOL, METERED RESPIRATORY (INHALATION) EVERY 6 HOURS PRN
Qty: 1 INHALER | Refills: 0 | Status: SHIPPED | OUTPATIENT
Start: 2019-06-20 | End: 2021-06-13 | Stop reason: ALTCHOICE

## 2019-06-20 NOTE — TELEPHONE ENCOUNTER
Patient called and states she talked to Dr. Camryn Manzanares on Monday 6/17/19 about a refill of her Albuterol inhaler. Her pharmacy states they have not received an order. Dr. Camryn Manzanares,  Please advise  Order pending.     LOV   6/17/19    LAST LAB     LAST RX 4/19/

## 2019-07-03 DIAGNOSIS — E11.69 DIABETES MELLITUS TYPE 2 IN OBESE (HCC): ICD-10-CM

## 2019-07-03 DIAGNOSIS — E78.2 MIXED HYPERLIPIDEMIA: ICD-10-CM

## 2019-07-03 DIAGNOSIS — E66.9 DIABETES MELLITUS TYPE 2 IN OBESE (HCC): ICD-10-CM

## 2019-07-05 RX ORDER — ATORVASTATIN CALCIUM 20 MG/1
TABLET, FILM COATED ORAL
Qty: 90 TABLET | Refills: 1 | Status: SHIPPED | OUTPATIENT
Start: 2019-07-05 | End: 2019-09-05

## 2019-07-05 NOTE — TELEPHONE ENCOUNTER
LOV 6/17/19 (cholesterol not addressed)    LAST LAB 2/15/19    LAST RX 4/1/19 x 3 mmonths    Next OV   Future Appointments   Date Time Provider Tara Anne   7/18/2019  2:40 PM WILLIS Chamorro [facility] [address]   7/19/2019 10:00 AM Niurka Shukla

## 2019-07-18 ENCOUNTER — OFFICE VISIT (OUTPATIENT)
Dept: INTERNAL MEDICINE CLINIC | Facility: CLINIC | Age: 44
End: 2019-07-18
Payer: MEDICARE

## 2019-07-18 VITALS
BODY MASS INDEX: 49.25 KG/M2 | SYSTOLIC BLOOD PRESSURE: 140 MMHG | WEIGHT: 292 LBS | RESPIRATION RATE: 14 BRPM | HEART RATE: 68 BPM | DIASTOLIC BLOOD PRESSURE: 80 MMHG | HEIGHT: 64.5 IN

## 2019-07-18 DIAGNOSIS — G47.33 OSA ON CPAP: ICD-10-CM

## 2019-07-18 DIAGNOSIS — Z99.89 OSA ON CPAP: ICD-10-CM

## 2019-07-18 DIAGNOSIS — E55.9 VITAMIN D DEFICIENCY: ICD-10-CM

## 2019-07-18 DIAGNOSIS — Z51.81 ENCOUNTER FOR THERAPEUTIC DRUG MONITORING: Primary | ICD-10-CM

## 2019-07-18 DIAGNOSIS — K76.0 FATTY LIVER: ICD-10-CM

## 2019-07-18 DIAGNOSIS — E78.2 MIXED HYPERLIPIDEMIA: ICD-10-CM

## 2019-07-18 DIAGNOSIS — E66.01 MORBID OBESITY WITH BMI OF 45.0-49.9, ADULT (HCC): ICD-10-CM

## 2019-07-18 DIAGNOSIS — E11.9 TYPE 2 DIABETES MELLITUS WITHOUT COMPLICATION, WITHOUT LONG-TERM CURRENT USE OF INSULIN (HCC): ICD-10-CM

## 2019-07-18 DIAGNOSIS — I10 ESSENTIAL HYPERTENSION: ICD-10-CM

## 2019-07-18 DIAGNOSIS — Z85.3 HISTORY OF BREAST CANCER: ICD-10-CM

## 2019-07-18 PROCEDURE — 99214 OFFICE O/P EST MOD 30 MIN: CPT | Performed by: NURSE PRACTITIONER

## 2019-07-18 RX ORDER — SEMAGLUTIDE 1.34 MG/ML
0.25 INJECTION, SOLUTION SUBCUTANEOUS WEEKLY
Qty: 1 PEN | Refills: 0 | COMMUNITY
Start: 2019-07-18 | End: 2019-08-22 | Stop reason: DRUGHIGH

## 2019-07-18 NOTE — PROGRESS NOTES
HISTORY OF PRESENT ILLNESS  Patient presents with:  Weight Problem: Patient referred by Dr Enedelia Deal is a 53year old female new to our office today with POA and friend, adán Ruelas RN, for initiation of medical weight loss pr personal history of Pancreatic issues / Medullary Thyroid Cancer: negative  History of bariatric surgery: negative    [address] reviewed: obesity in parent/s or sibling: yes    REVIEW OF SYSTEMS  GENERAL: feels well otherwise  SKIN: denies any rashes to skin fol BUNCREA 11.0 04/11/2019    BUNCREA 10.2 04/11/2019    CREATSERUM 0.85 05/09/2019    ANIONGAP 7 04/11/2019    ANIONGAP 10 04/11/2019     12/28/2017    GFRNAA 76 04/11/2019    GFRNAA 80 04/11/2019    GFRAA 88 04/11/2019    GFRAA 92 04/11/2019    CA 9. Oral Tab EC TAKE ONE TABLET BY MOUTH ONCE DAILY 30 MINUTES PRIOR TO BREAKFAST. Disp: 90 tablet Rfl: 3   PARoxetine HCl (PAXIL) 20 MG Oral Tab Take 2 tablets (40 mg total) by mouth daily.  Disp: 60 tablet Rfl: 2   E-400 400 units Oral Cap TAKE ONE (1) CAPSUL kit Rfl: 0     No current facility-administered medications on file prior to visit.      ASSESSMENT  Initial Weight Data and Goal Weight Loss:       Diagnoses and all orders for this visit:    Encounter for therapeutic drug monitoring  -     VITAMIN B12; Fu Contract reviewed and signed. Patient Instructions   Welcome to the [place] [facility]...your Lifestyle Renovation begins now!   Thank you for placing your trust in our health care team, I look forward to working with you along [facility] Keeping a paper food journal is an option as well to remain accountable for your choices- this is the start to mindful eating! Continue or start exercising to help establish a routine.  If not already exercising begin with 1 day and progress as able with

## 2019-07-18 NOTE — PROGRESS NOTES
Good news! Madonna Talbert is covered  Based on the information provided, your patient can expect to pay:  $3.8    Good news! Arlet Sigala is covered  Based on the information provided, your patient can expect to pay:  $3.8  Patient teaching on Ozempic  performed.  Pa

## 2019-07-18 NOTE — PATIENT INSTRUCTIONS
Welcome to the [facility] Program...your Lifestyle Renovation begins now! Thank you for placing your trust in our health care team, I look forward to working with you along this journey to better health!     Next steps:     1.  Sched remain accountable for your choices- this is the start to mindful eating! Continue or start exercising to help establish a routine.  If not already exercising begin with 1 day and progress as able with long-term goal of 30 minutes 5 days a week at a mini

## 2019-07-19 ENCOUNTER — OFFICE VISIT (OUTPATIENT)
Dept: NEPHROLOGY | Facility: CLINIC | Age: 44
End: 2019-07-19
Payer: MEDICARE

## 2019-07-19 VITALS — DIASTOLIC BLOOD PRESSURE: 72 MMHG | WEIGHT: 291 LBS | BODY MASS INDEX: 49 KG/M2 | SYSTOLIC BLOOD PRESSURE: 128 MMHG

## 2019-07-19 DIAGNOSIS — R80.9 PROTEINURIA, UNSPECIFIED TYPE: Primary | ICD-10-CM

## 2019-07-19 DIAGNOSIS — E11.21 DIABETIC NEPHROPATHY ASSOCIATED WITH TYPE 2 DIABETES MELLITUS (HCC): ICD-10-CM

## 2019-07-19 PROBLEM — Z51.81 ENCOUNTER FOR THERAPEUTIC DRUG MONITORING: Status: ACTIVE | Noted: 2019-07-19

## 2019-07-19 PROBLEM — E55.9 VITAMIN D DEFICIENCY: Status: ACTIVE | Noted: 2019-07-19

## 2019-07-19 PROBLEM — Z85.3 HISTORY OF BREAST CANCER: Status: ACTIVE | Noted: 2019-07-19

## 2019-07-19 PROBLEM — E66.01 MORBID OBESITY WITH BMI OF 45.0-49.9, ADULT (HCC): Status: ACTIVE | Noted: 2019-07-19

## 2019-07-19 PROCEDURE — 99213 OFFICE O/P EST LOW 20 MIN: CPT | Performed by: INTERNAL MEDICINE

## 2019-07-19 NOTE — PROGRESS NOTES
Nephrology Consult Note      REASON FOR CONSULT:  Proteinuria         HPI:   Lyssa Garcia is a 53year old female with Patient presents with:  Proteinuria  Hypertension    Stan Flores DO    53 y /o female with hx of DM/HTN, obesity who presents to LUMPECTOMY Left 6/11/2015    Performed by Vitaly Dempsey MD at [facility] MAIN OR   • HERNIA SURGERY      BABY   • LUMPECTOMY LEFT Left 5/11/15    Dr. Ana Luisa Sanchez   • NEEDLE BIOPSY LIVER     • ORTHOPEDIC SURG (PBP) Bilateral     MANDA CARPAL ISHAAN RELEASE   • OTHER 1   MetFORMIN HCl  MG Oral Tablet 24 Hr TAKE ONE (1) TABLET BY MOUTH TWICE DAILY WITH MEALS (Patient taking differently: 750 mg nightly.  TAKE ONE (1) TABLET BY MOUTH TWICE DAILY WITH MEALS ) Disp: 180 tablet Rfl: 1   glimepiride 4 MG Oral Tab TAKE 1 not known   • Diabetes Father    • Heart Attack Father    • Other (Other) Father         heart attack-- at age 46   • Cancer Brother         brain tumor   • Diabetes Brother    • Dementia Mother    • Diabetes Mother    • Stroke Mother    • Bipolar Diso Nasal cannula      Glucose Latest Ref Range: 70 - 99 mg/dL  196 (H)     Sodium Latest Ref Range: 136 - 144 mmol/L  139     Potassium Latest Ref Range: 3.60 - 5.10 mmol/L  4.00     Chloride Latest Ref Range: 101 - 111 mmol/L  103     Carbon Dioxide, Total L Range: 31.0 - 37.0 g/dL    33.1   MCV Latest Ref Range: 80.0 - 100.0 fL    90.0   RDW Latest Ref Range: 11.0 - 15.0 %    14.0   RDW-SD Latest Ref Range: 35.1 - 46.3 fL    45.4       CT scan 5/2019  right kidney have an essentially unremarkable appearance.

## 2019-07-25 ENCOUNTER — TELEPHONE (OUTPATIENT)
Dept: FAMILY MEDICINE CLINIC | Facility: CLINIC | Age: 44
End: 2019-07-25

## 2019-07-25 DIAGNOSIS — E66.9 DIABETES MELLITUS TYPE 2 IN OBESE (HCC): ICD-10-CM

## 2019-07-25 DIAGNOSIS — E11.69 DIABETES MELLITUS TYPE 2 IN OBESE (HCC): ICD-10-CM

## 2019-07-25 DIAGNOSIS — E78.2 MIXED HYPERLIPIDEMIA: Primary | ICD-10-CM

## 2019-07-25 NOTE — TELEPHONE ENCOUNTER
Dr. Helena Jones,  Please advise    Dr. Matt Rousseau has already entered orders for Mg, Vit. D, Phos, uric acid, CBC w/diff w/pl, and Vit B12 by Weight loss clinic. Any other labs requested?

## 2019-07-25 NOTE — TELEPHONE ENCOUNTER
Patient called in and said dr Ed Mahoney was supposed to place blood work orders , as well as her weight loss clinic dr Ellen    No orders seen by dr Ed Mahoney

## 2019-07-29 ENCOUNTER — MED REC SCAN ONLY (OUTPATIENT)
Dept: FAMILY MEDICINE CLINIC | Facility: CLINIC | Age: 44
End: 2019-07-29

## 2019-07-29 DIAGNOSIS — E11.9 TYPE 2 DIABETES MELLITUS WITHOUT COMPLICATION (HCC): ICD-10-CM

## 2019-07-29 RX ORDER — METFORMIN HYDROCHLORIDE 750 MG/1
TABLET, EXTENDED RELEASE ORAL
Qty: 180 TABLET | Refills: 1 | Status: SHIPPED | OUTPATIENT
Start: 2019-07-29 | End: 2019-09-05

## 2019-07-29 NOTE — TELEPHONE ENCOUNTER
[facility] advising patient Dr. Ez English did add fasting lab orders to those already placed by Diabetic clinic.

## 2019-07-29 NOTE — TELEPHONE ENCOUNTER
Diabetic Medication Protocol Passed7/29 8:50 AM   HgBA1C procedure resulted in past 6 months    Last HgBA1C < 7.5    Microalbumin procedure in past 12 months or taking ACE/ARB    Appointment in past 6 or next 3 months     LOV 6/17/19     LAST LAB  4/12/19

## 2019-07-30 ENCOUNTER — APPOINTMENT (OUTPATIENT)
Dept: LAB | Age: 44
End: 2019-07-30
Attending: NURSE PRACTITIONER
Payer: MEDICARE

## 2019-07-30 DIAGNOSIS — E78.2 MIXED HYPERLIPIDEMIA: ICD-10-CM

## 2019-07-30 DIAGNOSIS — Z51.81 ENCOUNTER FOR THERAPEUTIC DRUG MONITORING: ICD-10-CM

## 2019-07-30 DIAGNOSIS — E66.01 MORBID OBESITY WITH BMI OF 45.0-49.9, ADULT (HCC): ICD-10-CM

## 2019-07-30 DIAGNOSIS — E55.9 VITAMIN D DEFICIENCY: ICD-10-CM

## 2019-07-30 DIAGNOSIS — E11.9 TYPE 2 DIABETES MELLITUS WITHOUT COMPLICATION, WITHOUT LONG-TERM CURRENT USE OF INSULIN (HCC): ICD-10-CM

## 2019-07-30 LAB
VIT B12 SERPL-MCNC: 269 PG/ML (ref 193–986)
VIT D+METAB SERPL-MCNC: 52.2 NG/ML (ref 30–100)

## 2019-07-30 PROCEDURE — 82306 VITAMIN D 25 HYDROXY: CPT

## 2019-07-30 PROCEDURE — 82607 VITAMIN B-12: CPT

## 2019-08-01 DIAGNOSIS — I10 ESSENTIAL HYPERTENSION: ICD-10-CM

## 2019-08-01 DIAGNOSIS — E11.29 MICROALBUMINURIA DUE TO TYPE 2 DIABETES MELLITUS (HCC): ICD-10-CM

## 2019-08-01 DIAGNOSIS — E11.9 TYPE 2 DIABETES MELLITUS WITHOUT COMPLICATION (HCC): ICD-10-CM

## 2019-08-01 DIAGNOSIS — E66.9 DIABETES MELLITUS TYPE 2 IN OBESE (HCC): ICD-10-CM

## 2019-08-01 DIAGNOSIS — R80.9 MICROALBUMINURIA DUE TO TYPE 2 DIABETES MELLITUS (HCC): ICD-10-CM

## 2019-08-01 DIAGNOSIS — E11.69 DIABETES MELLITUS TYPE 2 IN OBESE (HCC): ICD-10-CM

## 2019-08-01 DIAGNOSIS — E11.65 UNCONTROLLED TYPE 2 DIABETES MELLITUS WITH HYPERGLYCEMIA (HCC): ICD-10-CM

## 2019-08-05 NOTE — TELEPHONE ENCOUNTER
LOV 6/19 The patient is asked to return in 6wks    LAST LAB 5/19    LAST RX   Metoprolol Succinate  MG Oral Tablet 24 Hr 90 tablet 1 2/8/2019          Next OV 8/22/2019      PROTOCOL  Hypertension Medications Protocol Passed    Left detailed message

## 2019-08-05 NOTE — TELEPHONE ENCOUNTER
Patient returned call stating she still needs to have her blood work done, will have to give us a call back to schedule

## 2019-08-08 RX ORDER — GLIMEPIRIDE 2 MG/1
TABLET ORAL
Qty: 30 TABLET | Refills: 0 | Status: SHIPPED | OUTPATIENT
Start: 2019-08-08 | End: 2019-08-22

## 2019-08-08 RX ORDER — GLIMEPIRIDE 4 MG/1
TABLET ORAL
Qty: 30 TABLET | Refills: 0 | Status: SHIPPED | OUTPATIENT
Start: 2019-08-08 | End: 2019-09-05

## 2019-08-08 RX ORDER — METFORMIN HYDROCHLORIDE 750 MG/1
TABLET, EXTENDED RELEASE ORAL
Qty: 60 TABLET | Refills: 0 | Status: SHIPPED | OUTPATIENT
Start: 2019-08-08 | End: 2019-09-05

## 2019-08-08 RX ORDER — METOPROLOL SUCCINATE 200 MG/1
TABLET, EXTENDED RELEASE ORAL
Qty: 30 TABLET | Refills: 0 | Status: SHIPPED | OUTPATIENT
Start: 2019-08-08 | End: 2019-09-05

## 2019-08-08 RX ORDER — AMLODIPINE BESYLATE 10 MG/1
10 TABLET ORAL
Qty: 30 TABLET | Refills: 0 | Status: SHIPPED | OUTPATIENT
Start: 2019-08-08 | End: 2019-09-05

## 2019-08-08 RX ORDER — TELMISARTAN 80 MG/1
TABLET ORAL
Qty: 30 TABLET | Refills: 0 | Status: SHIPPED | OUTPATIENT
Start: 2019-08-08 | End: 2019-09-05

## 2019-08-08 NOTE — TELEPHONE ENCOUNTER
Future Appointments   Date Time Provider Tara Anne   8/22/2019  3:00 PM Iron Kc, DO [facility]    Refilled until her appointment.

## 2019-08-12 DIAGNOSIS — E11.9 TYPE 2 DIABETES MELLITUS WITHOUT COMPLICATION (HCC): ICD-10-CM

## 2019-08-12 DIAGNOSIS — E66.9 DIABETES MELLITUS TYPE 2 IN OBESE (HCC): ICD-10-CM

## 2019-08-12 DIAGNOSIS — E11.65 UNCONTROLLED TYPE 2 DIABETES MELLITUS WITH HYPERGLYCEMIA (HCC): ICD-10-CM

## 2019-08-12 DIAGNOSIS — R80.9 MICROALBUMINURIA DUE TO TYPE 2 DIABETES MELLITUS (HCC): ICD-10-CM

## 2019-08-12 DIAGNOSIS — E11.69 DIABETES MELLITUS TYPE 2 IN OBESE (HCC): ICD-10-CM

## 2019-08-12 DIAGNOSIS — E11.29 MICROALBUMINURIA DUE TO TYPE 2 DIABETES MELLITUS (HCC): ICD-10-CM

## 2019-08-12 DIAGNOSIS — I10 ESSENTIAL HYPERTENSION: ICD-10-CM

## 2019-08-13 RX ORDER — GLIMEPIRIDE 4 MG/1
TABLET ORAL
Qty: 90 TABLET | Refills: 10 | OUTPATIENT
Start: 2019-08-13

## 2019-08-13 RX ORDER — METFORMIN HYDROCHLORIDE 750 MG/1
TABLET, EXTENDED RELEASE ORAL
Qty: 180 TABLET | Refills: 10 | OUTPATIENT
Start: 2019-08-13

## 2019-08-13 RX ORDER — TELMISARTAN 80 MG/1
TABLET ORAL
Qty: 90 TABLET | Refills: 10 | OUTPATIENT
Start: 2019-08-13

## 2019-08-13 RX ORDER — GLIMEPIRIDE 2 MG/1
TABLET ORAL
Qty: 30 TABLET | Refills: 10 | OUTPATIENT
Start: 2019-08-13

## 2019-08-13 RX ORDER — METOPROLOL SUCCINATE 200 MG/1
TABLET, EXTENDED RELEASE ORAL
Qty: 90 TABLET | Refills: 10 | OUTPATIENT
Start: 2019-08-13

## 2019-08-13 NOTE — TELEPHONE ENCOUNTER
Future Appointments   Date Time Provider Tara Anne   8/22/2019  1:40 PM WILLIS Burgos [facility]   8/22/2019  3:00 PM Vero MYERS,  [facility]   9/4/2019  3:00 PM Chris Nowak RD [facility]     R

## 2019-08-16 ENCOUNTER — LAB ENCOUNTER (OUTPATIENT)
Dept: LAB | Age: 44
End: 2019-08-16
Attending: FAMILY MEDICINE
Payer: MEDICARE

## 2019-08-16 DIAGNOSIS — E11.69 DIABETES MELLITUS TYPE 2 IN OBESE (HCC): ICD-10-CM

## 2019-08-16 DIAGNOSIS — E66.9 DIABETES MELLITUS TYPE 2 IN OBESE (HCC): ICD-10-CM

## 2019-08-16 DIAGNOSIS — E78.2 MIXED HYPERLIPIDEMIA: ICD-10-CM

## 2019-08-16 LAB
ALBUMIN SERPL-MCNC: 4.2 G/DL (ref 3.4–5)
ALBUMIN/GLOB SERPL: 1.2 {RATIO} (ref 1–2)
ALP LIVER SERPL-CCNC: 49 U/L (ref 39–100)
ALT SERPL-CCNC: 28 U/L (ref 13–56)
ANION GAP SERPL CALC-SCNC: 6 MMOL/L (ref 0–18)
AST SERPL-CCNC: 22 U/L (ref 15–37)
BILIRUB SERPL-MCNC: 0.4 MG/DL (ref 0.1–2)
BUN BLD-MCNC: 14 MG/DL (ref 7–18)
BUN/CREAT SERPL: 19.2 (ref 10–20)
CALCIUM BLD-MCNC: 9.6 MG/DL (ref 8.5–10.1)
CHLORIDE SERPL-SCNC: 108 MMOL/L (ref 98–112)
CHOLEST SMN-MCNC: 163 MG/DL (ref ?–200)
CO2 SERPL-SCNC: 25 MMOL/L (ref 21–32)
CREAT BLD-MCNC: 0.73 MG/DL (ref 0.55–1.02)
CREAT UR-SCNC: 59.4 MG/DL
EST. AVERAGE GLUCOSE BLD GHB EST-MCNC: 154 MG/DL (ref 68–126)
GLOBULIN PLAS-MCNC: 3.5 G/DL (ref 2.8–4.4)
GLUCOSE BLD-MCNC: 110 MG/DL (ref 70–99)
HBA1C MFR BLD HPLC: 7 % (ref ?–5.7)
HDLC SERPL-MCNC: 38 MG/DL (ref 40–59)
LDLC SERPL CALC-MCNC: 72 MG/DL (ref ?–100)
M PROTEIN MFR SERPL ELPH: 7.7 G/DL (ref 6.4–8.2)
MICROALBUMIN UR-MCNC: 32.6 MG/DL
MICROALBUMIN/CREAT 24H UR-RTO: 548.8 UG/MG (ref ?–30)
NONHDLC SERPL-MCNC: 125 MG/DL (ref ?–130)
OSMOLALITY SERPL CALC.SUM OF ELEC: 289 MOSM/KG (ref 275–295)
POTASSIUM SERPL-SCNC: 3.9 MMOL/L (ref 3.5–5.1)
SODIUM SERPL-SCNC: 139 MMOL/L (ref 136–145)
TRIGL SERPL-MCNC: 266 MG/DL (ref 30–149)
VLDLC SERPL CALC-MCNC: 53 MG/DL (ref 0–30)

## 2019-08-16 PROCEDURE — 80053 COMPREHEN METABOLIC PANEL: CPT

## 2019-08-16 PROCEDURE — 82570 ASSAY OF URINE CREATININE: CPT

## 2019-08-16 PROCEDURE — 83036 HEMOGLOBIN GLYCOSYLATED A1C: CPT

## 2019-08-16 PROCEDURE — 82043 UR ALBUMIN QUANTITATIVE: CPT

## 2019-08-16 PROCEDURE — 80061 LIPID PANEL: CPT

## 2019-08-22 ENCOUNTER — OFFICE VISIT (OUTPATIENT)
Dept: INTERNAL MEDICINE CLINIC | Facility: CLINIC | Age: 44
End: 2019-08-22
Payer: MEDICARE

## 2019-08-22 VITALS
HEART RATE: 70 BPM | WEIGHT: 293 LBS | SYSTOLIC BLOOD PRESSURE: 124 MMHG | DIASTOLIC BLOOD PRESSURE: 70 MMHG | BODY MASS INDEX: 49.41 KG/M2 | RESPIRATION RATE: 16 BRPM | HEIGHT: 64.5 IN

## 2019-08-22 DIAGNOSIS — Z99.89 OSA ON CPAP: ICD-10-CM

## 2019-08-22 DIAGNOSIS — E78.2 MIXED HYPERLIPIDEMIA: ICD-10-CM

## 2019-08-22 DIAGNOSIS — K76.0 FATTY LIVER: ICD-10-CM

## 2019-08-22 DIAGNOSIS — E66.01 MORBID OBESITY WITH BMI OF 45.0-49.9, ADULT (HCC): ICD-10-CM

## 2019-08-22 DIAGNOSIS — G47.33 OSA ON CPAP: ICD-10-CM

## 2019-08-22 DIAGNOSIS — Z51.81 ENCOUNTER FOR THERAPEUTIC DRUG MONITORING: Primary | ICD-10-CM

## 2019-08-22 DIAGNOSIS — I10 ESSENTIAL HYPERTENSION: ICD-10-CM

## 2019-08-22 DIAGNOSIS — E11.9 TYPE 2 DIABETES MELLITUS WITHOUT COMPLICATION, WITHOUT LONG-TERM CURRENT USE OF INSULIN (HCC): ICD-10-CM

## 2019-08-22 PROCEDURE — 99214 OFFICE O/P EST MOD 30 MIN: CPT | Performed by: NURSE PRACTITIONER

## 2019-08-22 RX ORDER — SEMAGLUTIDE 1.34 MG/ML
0.25 INJECTION, SOLUTION SUBCUTANEOUS WEEKLY
Qty: 1 PEN | Refills: 0 | Status: CANCELLED | OUTPATIENT
Start: 2019-08-22

## 2019-08-22 RX ORDER — SEMAGLUTIDE 1.34 MG/ML
0.5 INJECTION, SOLUTION SUBCUTANEOUS WEEKLY
Qty: 1 PEN | Refills: 1 | Status: SHIPPED | OUTPATIENT
Start: 2019-08-22 | End: 2019-10-01 | Stop reason: DRUGHIGH

## 2019-08-22 NOTE — PROGRESS NOTES
Estrada Garay is a 53year old female presents today with POA and friend, Katerina Biggs RN, for 1 month follow-up on medical weight loss program for the treatment of overweight, obesity, or morbid obesity with associated Type 2 Diabetes, HTN, hyperlipi intact  PSYCH: pleasant, cooperative, normal mood and affect    ASSESSMENT AND PLAN:  Encounter for therapeutic drug monitoring  (primary encounter diagnosis)  Morbid obesity with bmi of 45.0-49.9, adult (hcc)  Type 2 diabetes mellitus without complication [email or web address] Full fitness center with group fitness and personal training- ask for Melisa Chand @ [phone] or [email or web address] to discuss discounts offered through the [facility] program.  · He Fitspiration - journal to better health (found at [employer] in fitness aisle)          Medication use and SEs reviewed with patient. Return in about 6 weeks (around 10/3/2019) for weight management. Patient verbalizes understanding.

## 2019-08-22 NOTE — PATIENT INSTRUCTIONS
Continue making lifestyle changes that focus on good nutrition, regular exercise and stress management. Medication Plan: Increase Ozempic to .5 mg weekly- script sent to pharmacy.     Next steps to work on before next office visit include: Good work on d meal delivery programs  · Meal Village in [facility] for homemade meals to go @ [email or web address]  · Diet Doctor @ www. dietdoctor. com - low carb swaps    Stress Management/Behavior/Mindful Eating  · CALM meditation thomas  · Headspace  · Am I Hungry?  Mindful eat

## 2019-08-27 DIAGNOSIS — I10 ESSENTIAL HYPERTENSION: ICD-10-CM

## 2019-08-27 DIAGNOSIS — E11.65 UNCONTROLLED TYPE 2 DIABETES MELLITUS WITH HYPERGLYCEMIA (HCC): ICD-10-CM

## 2019-08-27 DIAGNOSIS — E66.9 DIABETES MELLITUS TYPE 2 IN OBESE (HCC): ICD-10-CM

## 2019-08-27 DIAGNOSIS — E11.9 TYPE 2 DIABETES MELLITUS WITHOUT COMPLICATION (HCC): ICD-10-CM

## 2019-08-27 DIAGNOSIS — E11.69 DIABETES MELLITUS TYPE 2 IN OBESE (HCC): ICD-10-CM

## 2019-08-27 DIAGNOSIS — E11.29 MICROALBUMINURIA DUE TO TYPE 2 DIABETES MELLITUS (HCC): ICD-10-CM

## 2019-08-27 DIAGNOSIS — R80.9 MICROALBUMINURIA DUE TO TYPE 2 DIABETES MELLITUS (HCC): ICD-10-CM

## 2019-08-28 RX ORDER — TELMISARTAN 80 MG/1
TABLET ORAL
Qty: 30 TABLET | Refills: 10 | OUTPATIENT
Start: 2019-08-28

## 2019-08-28 RX ORDER — GLIMEPIRIDE 4 MG/1
TABLET ORAL
Qty: 30 TABLET | Refills: 10 | OUTPATIENT
Start: 2019-08-28

## 2019-08-28 RX ORDER — AMLODIPINE BESYLATE 10 MG/1
10 TABLET ORAL
Qty: 30 TABLET | Refills: 10 | OUTPATIENT
Start: 2019-08-28

## 2019-08-28 RX ORDER — METOPROLOL SUCCINATE 200 MG/1
TABLET, EXTENDED RELEASE ORAL
Qty: 30 TABLET | Refills: 10 | OUTPATIENT
Start: 2019-08-28

## 2019-08-28 RX ORDER — METFORMIN HYDROCHLORIDE 750 MG/1
TABLET, EXTENDED RELEASE ORAL
Qty: 60 TABLET | Refills: 10 | OUTPATIENT
Start: 2019-08-28

## 2019-08-28 NOTE — TELEPHONE ENCOUNTER
Me           10:11 AM   Note      LOV 6/19 The patient is asked to return in 6wks     LAST LAB 5/19     LAST RX   Metoprolol Succinate  MG Oral Tablet 24 Hr 90 tablet 1 2/8/2019              Next OV 8/22/2019           Had to change appt because autumn

## 2019-09-04 ENCOUNTER — OFFICE VISIT (OUTPATIENT)
Dept: INTERNAL MEDICINE CLINIC | Facility: CLINIC | Age: 44
End: 2019-09-04
Payer: MEDICARE

## 2019-09-04 VITALS — HEIGHT: 64.5 IN | BODY MASS INDEX: 49.41 KG/M2 | WEIGHT: 293 LBS

## 2019-09-04 DIAGNOSIS — E66.01 CLASS 3 SEVERE OBESITY DUE TO EXCESS CALORIES WITH SERIOUS COMORBIDITY AND BODY MASS INDEX (BMI) OF 50.0 TO 59.9 IN ADULT (HCC): Primary | ICD-10-CM

## 2019-09-04 NOTE — PROGRESS NOTES
INITIAL OUTPATIENT NUTRITION CONSULTATION    Nutrition Assessment    Medical Diagnosis: Obesity    Physical Findings: None reported    Client Age and Gender: 53year old female    Marital Status and Occupation: Single, PT clerical work      Meds:    Cu Fenofibrate 160 MG Oral Tab TAKE (1) TABLET BY MOUTH DAILY (Patient taking differently: 160 mg nightly.  TAKE (1) TABLET BY MOUTH DAILY ) Disp: 90 tablet Rfl: 1   EASY TOUCH ALCOHOL PREP MEDIUM 70 % Does not apply Pads USE AS DIRECTED ONCE DAILY Disp: 100 Comment:     <26    mg/dL  Highest CHD risk  25-35  mg/dL  High CHD risk  35-45  mg/dL  Moderate CHD risk  45-60  mg/dL  Average CHD risk  60-75  mg/dL  Below Average CHD risk     Direct HDL   Date Value Ref Range Status   07/29/2016 39 (L) 40 - 60 mg/dL F 5756-8115 cals/d for 1-2 pounds/week weight loss    Physical Activity: Yardwork or housework, walks the dog three days per week    Food Journal: None currently    Spent 45 minutes in consultation with the patient.       Nutrition Intervention/Education:  Co

## 2019-09-05 ENCOUNTER — OFFICE VISIT (OUTPATIENT)
Dept: FAMILY MEDICINE CLINIC | Facility: CLINIC | Age: 44
End: 2019-09-05
Payer: MEDICARE

## 2019-09-05 VITALS
HEART RATE: 85 BPM | OXYGEN SATURATION: 99 % | TEMPERATURE: 99 F | RESPIRATION RATE: 16 BRPM | HEIGHT: 64.5 IN | BODY MASS INDEX: 49.41 KG/M2 | SYSTOLIC BLOOD PRESSURE: 122 MMHG | DIASTOLIC BLOOD PRESSURE: 74 MMHG | WEIGHT: 293 LBS

## 2019-09-05 DIAGNOSIS — E11.9 TYPE 2 DIABETES MELLITUS WITHOUT COMPLICATION (HCC): Primary | ICD-10-CM

## 2019-09-05 DIAGNOSIS — E78.2 MIXED HYPERLIPIDEMIA: ICD-10-CM

## 2019-09-05 DIAGNOSIS — E11.29 MICROALBUMINURIA DUE TO TYPE 2 DIABETES MELLITUS (HCC): ICD-10-CM

## 2019-09-05 DIAGNOSIS — E66.9 DIABETES MELLITUS TYPE 2 IN OBESE (HCC): ICD-10-CM

## 2019-09-05 DIAGNOSIS — E11.69 DIABETES MELLITUS TYPE 2 IN OBESE (HCC): ICD-10-CM

## 2019-09-05 DIAGNOSIS — R80.9 MICROALBUMINURIA DUE TO TYPE 2 DIABETES MELLITUS (HCC): ICD-10-CM

## 2019-09-05 DIAGNOSIS — E66.01 MORBID OBESITY (HCC): ICD-10-CM

## 2019-09-05 DIAGNOSIS — I10 ESSENTIAL HYPERTENSION: ICD-10-CM

## 2019-09-05 PROCEDURE — 99214 OFFICE O/P EST MOD 30 MIN: CPT | Performed by: FAMILY MEDICINE

## 2019-09-05 RX ORDER — ATORVASTATIN CALCIUM 20 MG/1
TABLET, FILM COATED ORAL
Qty: 90 TABLET | Refills: 1 | Status: SHIPPED | OUTPATIENT
Start: 2019-09-05 | End: 2019-12-30 | Stop reason: DRUGHIGH

## 2019-09-05 RX ORDER — TELMISARTAN 80 MG/1
80 TABLET ORAL
Qty: 90 TABLET | Refills: 1 | Status: SHIPPED | OUTPATIENT
Start: 2019-09-05 | End: 2020-02-25

## 2019-09-05 RX ORDER — BLOOD SUGAR DIAGNOSTIC
STRIP MISCELLANEOUS
Qty: 100 STRIP | Refills: 2 | Status: SHIPPED | OUTPATIENT
Start: 2019-09-05 | End: 2019-09-10

## 2019-09-05 RX ORDER — METFORMIN HYDROCHLORIDE 750 MG/1
750 TABLET, EXTENDED RELEASE ORAL 2 TIMES DAILY WITH MEALS
Qty: 180 TABLET | Refills: 1 | Status: SHIPPED | OUTPATIENT
Start: 2019-09-05 | End: 2019-12-04

## 2019-09-05 RX ORDER — GLIMEPIRIDE 4 MG/1
4 TABLET ORAL
Qty: 90 TABLET | Refills: 1 | Status: SHIPPED | OUTPATIENT
Start: 2019-09-05 | End: 2019-12-30

## 2019-09-05 RX ORDER — METOPROLOL SUCCINATE 200 MG/1
200 TABLET, EXTENDED RELEASE ORAL
Qty: 90 TABLET | Refills: 1 | Status: SHIPPED | OUTPATIENT
Start: 2019-09-05 | End: 2020-02-25

## 2019-09-05 RX ORDER — FENOFIBRATE 160 MG/1
TABLET ORAL
Qty: 90 TABLET | Refills: 1 | Status: SHIPPED | OUTPATIENT
Start: 2019-09-05 | End: 2020-06-19

## 2019-09-05 RX ORDER — AMLODIPINE BESYLATE 10 MG/1
10 TABLET ORAL
Qty: 90 TABLET | Refills: 1 | Status: SHIPPED | OUTPATIENT
Start: 2019-09-05 | End: 2020-02-25

## 2019-09-05 NOTE — PROGRESS NOTES
HPI:   Scott Ross is a 53year old morbidly obese, female presents with her POA, Elin Penny, RN for f/u on her chronic conditions and recent lab results. DM2, taking medication as directed.  Taking metformin 750mg po bid and glimperidie 4mg po marita Inhaler Rfl: 0   Pantoprazole Sodium 40 MG Oral Tab EC TAKE ONE TABLET BY MOUTH ONCE DAILY 30 MINUTES PRIOR TO BREAKFAST.  Disp: 90 tablet Rfl: 3   E-400 400 units Oral Cap TAKE ONE (1) CAPSULE BY MOUTH TWICE DAILY (Patient taking differently: TAKE ONE (1) TRIG 477 (H) 09/28/2018     Lab Results   Component Value Date    AST 22 08/16/2019    AST 51 (H) 04/11/2019    AST 52 (H) 04/11/2019     Lab Results   Component Value Date    ALT 28 08/16/2019    ALT 38 04/11/2019    ALT 37 04/11/2019     @EDBRIEFLAB( not known   • Breast Cancer Paternal Grandmother         not known   • Diabetes Father    • Heart Attack Father    • Other (Other) Father         heart attack-- at age 46   • Cancer Brother         brain tumor   • Diabetes Brother    • Dementia Concern: Not Asked    Social History Narrative      Lives  In [place] with mother      Smoking status: Never Smoker   Smokeless tobacco: Never Used       Alcohol use No      REVIEW OF SYSTEMS:   GENERAL HEALTH: feels well otherwise  SKIN: denies any unusual improved significantly since last draw in 8/2019  - , HDL 38, .  LDL 72  - cont atorvastatin 20mg po qhs and fenofibrate 160mg po daily  - low fat/chol diet and regular exercise as able  - Fenofibrate 160 MG Oral Tab; TAKE (1) TABLET BY MOUTH DA

## 2019-09-10 RX ORDER — BLOOD SUGAR DIAGNOSTIC
STRIP MISCELLANEOUS
Qty: 100 STRIP | Refills: 2 | Status: SHIPPED | OUTPATIENT
Start: 2019-09-10 | End: 2019-10-16

## 2019-09-26 DIAGNOSIS — E11.9 TYPE 2 DIABETES MELLITUS WITHOUT COMPLICATION, WITHOUT LONG-TERM CURRENT USE OF INSULIN (HCC): ICD-10-CM

## 2019-09-26 DIAGNOSIS — I10 ESSENTIAL HYPERTENSION: ICD-10-CM

## 2019-09-26 DIAGNOSIS — E78.2 MIXED HYPERLIPIDEMIA: ICD-10-CM

## 2019-09-26 DIAGNOSIS — E66.01 MORBID OBESITY WITH BMI OF 45.0-49.9, ADULT (HCC): ICD-10-CM

## 2019-09-26 DIAGNOSIS — Z51.81 ENCOUNTER FOR THERAPEUTIC DRUG MONITORING: ICD-10-CM

## 2019-09-26 RX ORDER — SEMAGLUTIDE 1.34 MG/ML
0.5 INJECTION, SOLUTION SUBCUTANEOUS WEEKLY
Qty: 1.5 ML | Refills: 10 | OUTPATIENT
Start: 2019-09-26

## 2019-09-26 NOTE — TELEPHONE ENCOUNTER
Requesting Ozempic  LOV: 8/22/19  RTC: 6 weeks  Last Relevant Labs: 8/16/19  Filled: 8/22/19 #1 pen with 1 refills    Future Appointments   Date Time Provider Tara Anne   11/14/2019  3:00 PM Radha Godwin, DO [facility]     Should have

## 2019-10-01 ENCOUNTER — OFFICE VISIT (OUTPATIENT)
Dept: INTERNAL MEDICINE CLINIC | Facility: CLINIC | Age: 44
End: 2019-10-01
Payer: MEDICARE

## 2019-10-01 VITALS
DIASTOLIC BLOOD PRESSURE: 90 MMHG | SYSTOLIC BLOOD PRESSURE: 142 MMHG | BODY MASS INDEX: 49.41 KG/M2 | HEART RATE: 88 BPM | WEIGHT: 293 LBS | HEIGHT: 64.5 IN

## 2019-10-01 DIAGNOSIS — E66.01 MORBID OBESITY WITH BMI OF 45.0-49.9, ADULT (HCC): ICD-10-CM

## 2019-10-01 DIAGNOSIS — Z99.89 OSA ON CPAP: ICD-10-CM

## 2019-10-01 DIAGNOSIS — G47.33 OSA ON CPAP: ICD-10-CM

## 2019-10-01 DIAGNOSIS — Z51.81 ENCOUNTER FOR THERAPEUTIC DRUG MONITORING: Primary | ICD-10-CM

## 2019-10-01 DIAGNOSIS — I10 ESSENTIAL HYPERTENSION: ICD-10-CM

## 2019-10-01 DIAGNOSIS — E11.9 TYPE 2 DIABETES MELLITUS WITHOUT COMPLICATION, WITHOUT LONG-TERM CURRENT USE OF INSULIN (HCC): ICD-10-CM

## 2019-10-01 DIAGNOSIS — E78.2 MIXED HYPERLIPIDEMIA: ICD-10-CM

## 2019-10-01 PROCEDURE — 99214 OFFICE O/P EST MOD 30 MIN: CPT | Performed by: NURSE PRACTITIONER

## 2019-10-01 RX ORDER — TOPIRAMATE 25 MG/1
TABLET ORAL
Qty: 180 TABLET | Refills: 0 | Status: SHIPPED | OUTPATIENT
Start: 2019-10-01 | End: 2019-11-25

## 2019-10-01 RX ORDER — SEMAGLUTIDE 1.34 MG/ML
1 INJECTION, SOLUTION SUBCUTANEOUS WEEKLY
Qty: 2 PEN | Refills: 1 | Status: SHIPPED | OUTPATIENT
Start: 2019-10-01 | End: 2019-11-25

## 2019-10-01 NOTE — PROGRESS NOTES
Gilbert Solano is a 53year old female presents today with POA and friend, Pacheco Black RN, for 2 month follow-up on medical weight loss program for the treatment of overweight, obesity, or morbid obesity with associated Type 2 Diabetes, HTN, hyperlipi N/V/D/C  MUSCULOSKELETAL: no acute joint or muscle pain  NEURO: denies headaches  PSYCH: denies change in behavior or mood, denies feeling sad or depressed    EXAM:  /90   Pulse 88   Ht 64.5\"   Wt 296 lb 3.2 oz (134.4 kg)   LMP 09/17/2019   BMI 50. 0 instructions below for additional plans and patient counseling. Patient goals for next visit: reduce cravings. Patient Instructions   Continue making lifestyle changes that focus on good nutrition, regular exercise and stress management.     Medication adrenaline, CRH, and cortisol. Your brain and body prepare to handle the threat by making you feel alert, ready for action and able to withstand an injury.  In the short-term, adrenaline helps you feel less hungry as your blood flows away from the internal physical work dealing with the threat. Anxiety  When we have a surge of adrenaline as part of our fight/flight response, we get fidgety and activated. Adrenaline is the reason for the “wired up” feeling we get when we’re stressed.  While we may burn off so they also work more hours. Working in urban areas may mean long, jammed commutes, which both increase stress and interfere with willpower because we are hungrier when we get home later.  [address] research study showed, in laboratory mice Eating programs train you in meditation, which helps you cope with stress, and change your consciousness around eating. You learn to slow down and tune in to your sensory experience of the food, including its sight, texture or smell.  You also learn to tune

## 2019-10-01 NOTE — PATIENT INSTRUCTIONS
Continue making lifestyle changes that focus on good nutrition, regular exercise and stress management. Medication Plan: Increase Ozempic to 1 mg weekly- script sent to pharmacy.  Start Topamax at 1 tab daily for 7 days, then increase to 1 tab twice a da an injury.  In the short-term, adrenaline helps you feel less hungry as your blood flows away from the internal organs and to your large muscles to prepare for “fight or flight.” However, once the effects of adrenaline wear off, cortisol, known as the [employer] activated. Adrenaline is the reason for the “wired up” feeling we get when we’re stressed.  While we may burn off some extra calories fidgeting or running around cleaning because we can’t sit still, anxiety can also trigger “emotional eating.” Overeating or because we are hungrier when we get home later.  15 [address] research study showed, in laboratory mice, that being “stressed” by exposure to the smell of a predator lead the mice to eat more high-fat food pellets, when given the choice of e down and tune in to your sensory experience of the food, including its sight, texture or smell.  You also learn to tune into your subjective feelings of hunger or fullness, rather than eating just because it’s a mealtime or because there is food in front of

## 2019-10-02 ENCOUNTER — TELEPHONE (OUTPATIENT)
Dept: INTERNAL MEDICINE CLINIC | Facility: CLINIC | Age: 44
End: 2019-10-02

## 2019-10-02 NOTE — TELEPHONE ENCOUNTER
Pharmacy called to clarify rx sent yesterday - rx ozempic 2.5     Pharmacy # [phone]     fax # [phone]

## 2019-10-02 NOTE — TELEPHONE ENCOUNTER
OZEMPIC, 1 MG/DOSE, 2 MG/1.5ML Subcutaneous Solution Pen-injector 2 pen 1 10/1/2019     Sig - Route: Inject 1 mg into the skin once a week.  - Subcutaneous    Sent to pharmacy as: Ozempic (1 MG/DOSE) 2 MG/1.5ML Subcutaneous Solution Pen-injector    E-Prescr

## 2019-10-15 NOTE — TELEPHONE ENCOUNTER
Pt needs freestyle lite test strips for blood glucose monitor sent to [facility] on file said they were sent to [address] and insurance will not cover

## 2019-10-16 RX ORDER — BLOOD SUGAR DIAGNOSTIC
STRIP MISCELLANEOUS
Qty: 100 STRIP | Refills: 2 | Status: SHIPPED | OUTPATIENT
Start: 2019-10-16 | End: 2020-10-14

## 2019-11-07 ENCOUNTER — TELEPHONE (OUTPATIENT)
Dept: INTERNAL MEDICINE CLINIC | Facility: CLINIC | Age: 44
End: 2019-11-07

## 2019-11-07 NOTE — TELEPHONE ENCOUNTER
Caretaker Tim Larry on HIPPA called today with concerns. Patient has gi upset, increased belching, no appetite, not eating. She thinks it is the topamax, but patient just started Ozempic as well.   I asked her to verify what dose she is on of the Ozempic as

## 2019-11-08 NOTE — TELEPHONE ENCOUNTER
Stephanie Pandya called back - she did call and leave message yesterday for me. Kam Henson was taking the topamax together instead of separately. She has since changed that and already feels better.   They are going to monitor this for now and call if her symptoms g

## 2019-11-14 ENCOUNTER — TELEPHONE (OUTPATIENT)
Dept: INTERNAL MEDICINE CLINIC | Facility: CLINIC | Age: 44
End: 2019-11-14

## 2019-11-14 ENCOUNTER — OFFICE VISIT (OUTPATIENT)
Dept: FAMILY MEDICINE CLINIC | Facility: CLINIC | Age: 44
End: 2019-11-14
Payer: MEDICARE

## 2019-11-14 VITALS
TEMPERATURE: 99 F | HEIGHT: 64.5 IN | WEIGHT: 281.25 LBS | DIASTOLIC BLOOD PRESSURE: 80 MMHG | HEART RATE: 82 BPM | BODY MASS INDEX: 47.43 KG/M2 | SYSTOLIC BLOOD PRESSURE: 126 MMHG | OXYGEN SATURATION: 98 % | RESPIRATION RATE: 16 BRPM

## 2019-11-14 DIAGNOSIS — Z13.31 DEPRESSION SCREENING: ICD-10-CM

## 2019-11-14 DIAGNOSIS — Z00.00 ENCOUNTER FOR ANNUAL HEALTH EXAMINATION: Primary | ICD-10-CM

## 2019-11-14 DIAGNOSIS — Z12.31 VISIT FOR SCREENING MAMMOGRAM: ICD-10-CM

## 2019-11-14 DIAGNOSIS — R63.0 DECREASED APPETITE: ICD-10-CM

## 2019-11-14 DIAGNOSIS — E55.9 VITAMIN D DEFICIENCY: ICD-10-CM

## 2019-11-14 DIAGNOSIS — G47.33 OSA ON CPAP: ICD-10-CM

## 2019-11-14 DIAGNOSIS — E66.01 MORBID OBESITY (HCC): ICD-10-CM

## 2019-11-14 DIAGNOSIS — E11.29 MICROALBUMINURIA DUE TO TYPE 2 DIABETES MELLITUS (HCC): ICD-10-CM

## 2019-11-14 DIAGNOSIS — E11.9 TYPE 2 DIABETES MELLITUS WITHOUT COMPLICATION, WITHOUT LONG-TERM CURRENT USE OF INSULIN (HCC): ICD-10-CM

## 2019-11-14 DIAGNOSIS — R80.9 MICROALBUMINURIA DUE TO TYPE 2 DIABETES MELLITUS (HCC): ICD-10-CM

## 2019-11-14 DIAGNOSIS — E66.9 DIABETES MELLITUS TYPE 2 IN OBESE (HCC): ICD-10-CM

## 2019-11-14 DIAGNOSIS — E78.2 MIXED HYPERLIPIDEMIA: ICD-10-CM

## 2019-11-14 DIAGNOSIS — Z99.89 OSA ON CPAP: ICD-10-CM

## 2019-11-14 DIAGNOSIS — J35.1 CHRONIC TONSILLAR HYPERTROPHY: ICD-10-CM

## 2019-11-14 DIAGNOSIS — E11.69 DIABETES MELLITUS TYPE 2 IN OBESE (HCC): ICD-10-CM

## 2019-11-14 DIAGNOSIS — R11.0 NAUSEA: ICD-10-CM

## 2019-11-14 DIAGNOSIS — I10 ESSENTIAL HYPERTENSION: ICD-10-CM

## 2019-11-14 PROCEDURE — G0444 DEPRESSION SCREEN ANNUAL: HCPCS | Performed by: FAMILY MEDICINE

## 2019-11-14 PROCEDURE — 99214 OFFICE O/P EST MOD 30 MIN: CPT | Performed by: FAMILY MEDICINE

## 2019-11-14 PROCEDURE — G0439 PPPS, SUBSEQ VISIT: HCPCS | Performed by: FAMILY MEDICINE

## 2019-11-14 NOTE — PROGRESS NOTES
HPI:   Cas Asif is a 53year old female who presents for a Medicare Subsequent Annual Wellness visit (Pt already had Initial Annual Wellness) and f/u on chronic conditions. DM2, taking medications as prescribed.  Has been started on Ozempic week (if present), and forms available to patient in AVS       She does have a POA but we do NOT have it on file in [address] [place] [address].    The patient has this document but we do not have it in [facility], and patient is instructed to get our office a copy of it for scanning into E CBC  (most recent labs)   Lab Results   Component Value Date    WBC 5.5 06/13/2019    HGB 12.8 06/13/2019    .0 06/13/2019        ALLERGIES:   She is allergic to other.     CURRENT MEDICATIONS:   Glucose Blood (FREESTYLE TEST) In Vitro Strip, Josie daily.     Blood Glucose Monitoring Suppl (TRUETRACK BLOOD GLUCOSE) W/DEVICE Does not apply Kit, Patient to test twice daily       MEDICAL INFORMATION:   She  has a past medical history of Anxiety state, unspecified, Asthma, Back pain, Bad breath, Belching, double vision  HEENT: denies nasal congestion, sinus pain or ST  LUNGS: denies shortness of breath with exertion  CARDIOVASCULAR: denies chest pain on exertion  GI: per hpi  : denies dysuria, vaginal discharge or itching, no complaint of urinary incontin cyanosis or edema   Pulses: 2+ and symmetric   Skin: Skin color, texture, turgor normal, no rashes or lesions   Lymph nodes: Cervical nodes normal   Neurologic: Normal       Vaccination History     Immunization History   Administered Date(s) Administered General Health     In the past six months, have you lost more than 10 pounds without trying?: 3 - Don't know  Has your appetite been poor?: Yes  How does the patient maintain a good energy level?: Other  How would you describe your daily physical activ Pap and Pelvic      Pap: Every 3 yrs age 21-65 or Pap+HPV every 5 yrs age 33-67, age 72 and older at high risk Pap Smear,3 Years due on 05/17/2021 Update Health Maintenance if applicable    Chlamydia  Annually if high risk No results found for: CHLAMYDIA data found. Drug Serum Conc  Annually No results found for: DIGOXIN, DIG, VALP No flowsheet data found.        Diabetes      HgbA1C  Annually HGBA1C (%)   Date Value   01/23/2012 8.3 (H)     HbA1c (%)   Date Value   07/29/2016 8.1 (H)     HgbA1C (%)   Da

## 2019-11-14 NOTE — TELEPHONE ENCOUNTER
Patient and Chelsie Mccall called states primary discontinued rx topomax , stated pt is having side effects ,gi issues and low blood sugar

## 2019-11-14 NOTE — PATIENT INSTRUCTIONS
Sandy Murguia's SCREENING SCHEDULE   Tests on this list are recommended by your physician but may not be covered, or covered at this frequency, by your insurer. Please check with your insurance carrier before scheduling to verify coverage.    Eulalia Figueroa if indicated for medical reasons Electrocardiogram date11/10/2018 Routine EKG is not a screening covered service except at the [place] to Medicare Visit    Abdominal aortic aneurysm screening (once between ages 73-68) IPPE only No results found for this or Years due on 05/17/2021     Chlamydia  Annually if high risk No results found for: CHLAMYDIA No flowsheet data found.     Screening Mammogram      Mammogram    Recommend Annually to at least age 76, and as needed after 76 Mammogram due on 03/29/2020 Please of good information including definitions of the different types of Advance Directives.  It also has the State forms available on it's website for anyone to review and print using their home computer and printer. (the forms are also available in [address])  w start yearly mammograms. 3    Cervical cancer All women in this age group, except women who have had a complete hysterectomy Pap test every 3 years or Pap test plus human papilloma virus (HPV) test every 5 years   Colorectal cancer Women age 39 years and ol All women in this age group Complete exam at age 36 and eye exams every 2 to 4 years. If you have a chronic disease, ask your healthcare provider how often you should have your eyes examined. 4   Vaccine Who needs it How often   Chickenpox (varicella) All w children At routine exams   Sexually transmitted infection prevention Women at increased risk for infection–talk with your healthcare provider At routine exams   Use of tobacco and the health effects it can cause All women in this age group Every exam   1

## 2019-11-20 DIAGNOSIS — E78.2 MIXED HYPERLIPIDEMIA: ICD-10-CM

## 2019-11-20 DIAGNOSIS — E11.9 TYPE 2 DIABETES MELLITUS WITHOUT COMPLICATION, WITHOUT LONG-TERM CURRENT USE OF INSULIN (HCC): ICD-10-CM

## 2019-11-20 DIAGNOSIS — Z51.81 ENCOUNTER FOR THERAPEUTIC DRUG MONITORING: ICD-10-CM

## 2019-11-20 DIAGNOSIS — E66.01 MORBID OBESITY WITH BMI OF 45.0-49.9, ADULT (HCC): ICD-10-CM

## 2019-11-20 DIAGNOSIS — I10 ESSENTIAL HYPERTENSION: ICD-10-CM

## 2019-11-20 NOTE — TELEPHONE ENCOUNTER
Patient has discontinued the topamax per Dr. Hemal Birch. She was taking topamax 2 together and changed to bid after complaint of GI upset and belching, no appetite.   She was still experiencing problems so her PCP told her to discontinue the topamax and contin

## 2019-11-21 RX ORDER — SEMAGLUTIDE 1.34 MG/ML
INJECTION, SOLUTION SUBCUTANEOUS
Qty: 3 ML | Refills: 11 | OUTPATIENT
Start: 2019-11-21

## 2019-11-21 RX ORDER — TOPIRAMATE 25 MG/1
TABLET ORAL
Qty: 180 TABLET | Refills: 11 | OUTPATIENT
Start: 2019-11-21

## 2019-11-21 NOTE — TELEPHONE ENCOUNTER
Requesting Ozempic and Topiramate  LOV: 10/1/19  RTC: one month  Last Relevant Labs: 8/16/19  Filled: 10/1/19 #2 pens with 1 refills Ozempic  Filled: 10/1/19 #180 with 0 refills Topiramate    Future Appointments   Date Time Provider Tara Anne   11/

## 2019-11-25 ENCOUNTER — OFFICE VISIT (OUTPATIENT)
Dept: INTERNAL MEDICINE CLINIC | Facility: CLINIC | Age: 44
End: 2019-11-25
Payer: MEDICARE

## 2019-11-25 VITALS
BODY MASS INDEX: 47.56 KG/M2 | DIASTOLIC BLOOD PRESSURE: 74 MMHG | HEIGHT: 64.5 IN | WEIGHT: 282 LBS | SYSTOLIC BLOOD PRESSURE: 120 MMHG | HEART RATE: 88 BPM

## 2019-11-25 DIAGNOSIS — E11.9 TYPE 2 DIABETES MELLITUS WITHOUT COMPLICATION, WITHOUT LONG-TERM CURRENT USE OF INSULIN (HCC): ICD-10-CM

## 2019-11-25 DIAGNOSIS — I10 ESSENTIAL HYPERTENSION: ICD-10-CM

## 2019-11-25 DIAGNOSIS — E66.01 MORBID OBESITY WITH BMI OF 45.0-49.9, ADULT (HCC): ICD-10-CM

## 2019-11-25 DIAGNOSIS — E78.2 MIXED HYPERLIPIDEMIA: ICD-10-CM

## 2019-11-25 DIAGNOSIS — Z51.81 ENCOUNTER FOR THERAPEUTIC DRUG MONITORING: Primary | ICD-10-CM

## 2019-11-25 PROCEDURE — 99214 OFFICE O/P EST MOD 30 MIN: CPT | Performed by: NURSE PRACTITIONER

## 2019-11-25 RX ORDER — SEMAGLUTIDE 1.34 MG/ML
1 INJECTION, SOLUTION SUBCUTANEOUS WEEKLY
Qty: 2 PEN | Refills: 2 | Status: SHIPPED | OUTPATIENT
Start: 2019-11-25 | End: 2020-01-27

## 2019-11-25 NOTE — PROGRESS NOTES
Sanchez Olson is a 53year old female presents today with POA and friend, Janeth Queen RN, for 4 month follow-up on medical weight loss program for the treatment of overweight, obesity, or morbid obesity with associated Type 2 Diabetes, HTN, hyperlipi GENERAL: well developed, well nourished, in no apparent distress, morbidly obese  EYES: conjunctiva pink, sclera non icteric, PERRLA  LUNGS: CTA in all fields  CARDIO: RRR without murmur, normal S1 and S2 without clicks or gallops, no pedal edema.   GI: + controlled and HgbA1c lab test is improved. Next steps to work on before next office visit include: Great work with making changes to your nutrition choices! Continue to maintain a positive attitude.   Make it a routine of reading the daily blog from www

## 2019-11-25 NOTE — PATIENT INSTRUCTIONS
Continue making lifestyle changes that focus on good nutrition, regular exercise and stress management. Medication Plan: Continue current medication regimen.  Continue to monitor blood sugar readings, likely will plan to discontinue glimepiride with next

## 2019-11-26 DIAGNOSIS — E78.2 MIXED HYPERLIPIDEMIA: ICD-10-CM

## 2019-11-26 DIAGNOSIS — E11.9 TYPE 2 DIABETES MELLITUS WITHOUT COMPLICATION, WITHOUT LONG-TERM CURRENT USE OF INSULIN (HCC): ICD-10-CM

## 2019-11-26 DIAGNOSIS — I10 ESSENTIAL HYPERTENSION: ICD-10-CM

## 2019-11-26 DIAGNOSIS — Z51.81 ENCOUNTER FOR THERAPEUTIC DRUG MONITORING: ICD-10-CM

## 2019-11-26 DIAGNOSIS — E66.01 MORBID OBESITY WITH BMI OF 45.0-49.9, ADULT (HCC): ICD-10-CM

## 2019-11-26 RX ORDER — SEMAGLUTIDE 1.34 MG/ML
INJECTION, SOLUTION SUBCUTANEOUS
Qty: 3 ML | Refills: 10 | OUTPATIENT
Start: 2019-11-26

## 2019-12-11 DIAGNOSIS — E11.9 TYPE 2 DIABETES MELLITUS WITHOUT COMPLICATION (HCC): ICD-10-CM

## 2019-12-12 RX ORDER — ISOPROPYL ALCOHOL 70 ML/100ML
SWAB TOPICAL
Qty: 100 EACH | Refills: 6 | Status: SHIPPED | OUTPATIENT
Start: 2019-12-12 | End: 2020-08-17

## 2019-12-12 NOTE — TELEPHONE ENCOUNTER
Name from pharmacy: ALCOHOL PREP PADS 100CT 79 [address] [facility]         Will file in chart as: [address] [facility] 70 % Does not apply Pads         Sig: USE AS DIRECTED ONCE DAILY    Disp:  Not specified (Pharmacy requested: 100)    Refills:  6    Start: 12/11/2019

## 2019-12-16 ENCOUNTER — TELEPHONE (OUTPATIENT)
Dept: FAMILY MEDICINE CLINIC | Facility: CLINIC | Age: 44
End: 2019-12-16

## 2019-12-16 NOTE — TELEPHONE ENCOUNTER
Called patient to find out if she had labs done but informed that the lab only drawn dr Janiya Caraballo labs but not Dr Vin Mtz orders . Patient will reschedule appointment for week after holindays .

## 2019-12-20 NOTE — TELEPHONE ENCOUNTER
Left message to call back - Labs that Dr. Peter Pond ordered are not the same testing that Dr. Mateusz Pennington requested. We are able to see results that Dr. Peter Pond wanted although additional labs are needed that Dr. Mateusz Pennington ordered.

## 2019-12-20 NOTE — TELEPHONE ENCOUNTER
Pt notified that only Dr. Harper Evans labs were drawn. Pt needs to have others taken care of. She will come in early next week fasting for lab draw.

## 2019-12-20 NOTE — TELEPHONE ENCOUNTER
Pt victor manuel on our vm said she does not know why she needs more b/w said Nida Rodriguez ordered b/w for her and wants to know if  can review those labs?

## 2019-12-23 ENCOUNTER — LAB ENCOUNTER (OUTPATIENT)
Dept: LAB | Age: 44
End: 2019-12-23
Attending: FAMILY MEDICINE
Payer: MEDICARE

## 2019-12-23 DIAGNOSIS — E11.21 DIABETIC NEPHROPATHY ASSOCIATED WITH TYPE 2 DIABETES MELLITUS (HCC): ICD-10-CM

## 2019-12-23 DIAGNOSIS — R80.9 PROTEINURIA, UNSPECIFIED TYPE: ICD-10-CM

## 2019-12-23 DIAGNOSIS — E11.9 TYPE 2 DIABETES MELLITUS WITHOUT COMPLICATION (HCC): ICD-10-CM

## 2019-12-23 PROCEDURE — 84550 ASSAY OF BLOOD/URIC ACID: CPT

## 2019-12-23 PROCEDURE — 80053 COMPREHEN METABOLIC PANEL: CPT

## 2019-12-23 PROCEDURE — 82570 ASSAY OF URINE CREATININE: CPT

## 2019-12-23 PROCEDURE — 83036 HEMOGLOBIN GLYCOSYLATED A1C: CPT

## 2019-12-23 PROCEDURE — 85025 COMPLETE CBC W/AUTO DIFF WBC: CPT

## 2019-12-23 PROCEDURE — 84100 ASSAY OF PHOSPHORUS: CPT

## 2019-12-23 PROCEDURE — 80061 LIPID PANEL: CPT

## 2019-12-23 PROCEDURE — 83735 ASSAY OF MAGNESIUM: CPT

## 2019-12-23 PROCEDURE — 84156 ASSAY OF PROTEIN URINE: CPT

## 2019-12-23 PROCEDURE — 82306 VITAMIN D 25 HYDROXY: CPT

## 2019-12-30 ENCOUNTER — OFFICE VISIT (OUTPATIENT)
Dept: FAMILY MEDICINE CLINIC | Facility: CLINIC | Age: 44
End: 2019-12-30
Payer: MEDICARE

## 2019-12-30 VITALS
BODY MASS INDEX: 48.58 KG/M2 | TEMPERATURE: 98 F | SYSTOLIC BLOOD PRESSURE: 120 MMHG | WEIGHT: 288.06 LBS | HEART RATE: 84 BPM | OXYGEN SATURATION: 97 % | DIASTOLIC BLOOD PRESSURE: 70 MMHG | RESPIRATION RATE: 16 BRPM | HEIGHT: 64.5 IN

## 2019-12-30 DIAGNOSIS — I10 ESSENTIAL HYPERTENSION: ICD-10-CM

## 2019-12-30 DIAGNOSIS — E78.2 MIXED HYPERLIPIDEMIA: ICD-10-CM

## 2019-12-30 DIAGNOSIS — E66.01 MORBID OBESITY (HCC): ICD-10-CM

## 2019-12-30 DIAGNOSIS — E11.9 TYPE 2 DIABETES MELLITUS WITHOUT COMPLICATION (HCC): Primary | ICD-10-CM

## 2019-12-30 DIAGNOSIS — E11.69 DIABETES MELLITUS TYPE 2 IN OBESE (HCC): ICD-10-CM

## 2019-12-30 DIAGNOSIS — E66.9 DIABETES MELLITUS TYPE 2 IN OBESE (HCC): ICD-10-CM

## 2019-12-30 PROBLEM — F39 EPISODIC MOOD DISORDER: Status: ACTIVE | Noted: 2019-12-30

## 2019-12-30 PROBLEM — F39 EPISODIC MOOD DISORDER (HCC): Status: ACTIVE | Noted: 2019-12-30

## 2019-12-30 PROCEDURE — 99214 OFFICE O/P EST MOD 30 MIN: CPT | Performed by: FAMILY MEDICINE

## 2019-12-30 RX ORDER — GLIMEPIRIDE 2 MG/1
2 TABLET ORAL
Qty: 90 TABLET | Refills: 0 | Status: SHIPPED | OUTPATIENT
Start: 2019-12-30 | End: 2020-02-25

## 2019-12-30 RX ORDER — ATORVASTATIN CALCIUM 40 MG/1
40 TABLET, FILM COATED ORAL NIGHTLY
Qty: 90 TABLET | Refills: 0 | Status: SHIPPED | OUTPATIENT
Start: 2019-12-30 | End: 2020-02-25

## 2019-12-30 NOTE — PATIENT INSTRUCTIONS
Controlling Your Cholesterol  Cholesterol is a waxy substance. It travels in your blood through the blood vessels. When you have high cholesterol, it can build up along the walls of the blood vessels.  This makes the vessels narrower and decreases blood f · Choose an activity you enjoy. Walking, swimming, and riding a bike are some good ways to be active. · Start at a level where you feel comfortable. Increase your time and pace a little each week.   · Work up to 30 to 40 minutes of moderate to high intensi Servings and portions. What’s the difference? These terms can be very confusing. But learning to measure serving sizes can help you figure out how many carbohydrates (“carbs”) and other foods you eat each day.  They are also powerful tools for managing your © 3602-8961 [address]. [address] [facility], [facility] [employer]. All rights reserved. This information is not intended as a substitute for professional medical care. Always follow your healthcare professional's instructions.

## 2019-12-30 NOTE — PROGRESS NOTES
HPI:   Fauzia See is a 53year old female who presents for recheck of her chronic conditions. DM2, taking meds as directed. No SEs noted. Patient’s FBS have been 90-120s. Last visit with ophthalmologist was within this year.   Pt has been checking h MOUTH TWICE DAILY (Patient taking differently: TAKE ONE (1) CAPSULE BY MOUTH once nightly) 180 capsule 3   • Glucose Blood (TRUE METRIX BLOOD GLUCOSE TEST) In Vitro Strip USE TO TEST BLOOD SUGAR TWICE DAILY AS DIRECTED 100 strip 11   • Multiple Vitamins-Mi Belching    • Bleeding nose    • Cancer (HCC)     left breast   • Chest pain    • Chest pain on exertion    • Chronic cough    • Depression    • Diabetes mellitus ([facility] [address]    • Diarrhea, unspecified    • Disorder of liver     fatty liver   • Ductal carcinoma Self 40     Social History    Socioeconomic History      Marital status: Single      Spouse name: Not on file      Number of children: Not on file      Years of education: Not on file      Highest education level: Not on file    Occupational History      O exertion  GI: denies abdominal pain and denies heartburn  NEURO: denies headaches    EXAM:   /70   Pulse 84   Temp 98.2 °F (36.8 °C) (Oral)   Resp 16   Ht 64.5\"   Wt 288 lb 1 oz (130.7 kg)   SpO2 97%   BMI 48.68 kg/m²   GENERAL: well developed, well

## 2020-01-27 ENCOUNTER — OFFICE VISIT (OUTPATIENT)
Dept: INTERNAL MEDICINE CLINIC | Facility: CLINIC | Age: 45
End: 2020-01-27
Payer: MEDICARE

## 2020-01-27 VITALS
WEIGHT: 289 LBS | DIASTOLIC BLOOD PRESSURE: 78 MMHG | BODY MASS INDEX: 48.74 KG/M2 | SYSTOLIC BLOOD PRESSURE: 130 MMHG | HEIGHT: 64.5 IN | HEART RATE: 82 BPM

## 2020-01-27 DIAGNOSIS — I10 ESSENTIAL HYPERTENSION: ICD-10-CM

## 2020-01-27 DIAGNOSIS — E78.2 MIXED HYPERLIPIDEMIA: ICD-10-CM

## 2020-01-27 DIAGNOSIS — Z51.81 ENCOUNTER FOR THERAPEUTIC DRUG MONITORING: Primary | ICD-10-CM

## 2020-01-27 DIAGNOSIS — E11.9 TYPE 2 DIABETES MELLITUS WITHOUT COMPLICATION, WITHOUT LONG-TERM CURRENT USE OF INSULIN (HCC): ICD-10-CM

## 2020-01-27 DIAGNOSIS — E66.01 MORBID OBESITY WITH BMI OF 45.0-49.9, ADULT (HCC): ICD-10-CM

## 2020-01-27 PROCEDURE — 99214 OFFICE O/P EST MOD 30 MIN: CPT | Performed by: NURSE PRACTITIONER

## 2020-01-27 RX ORDER — METFORMIN HYDROCHLORIDE 750 MG/1
TABLET, EXTENDED RELEASE ORAL
COMMUNITY
Start: 2020-01-02 | End: 2020-02-25

## 2020-01-27 RX ORDER — SEMAGLUTIDE 1.34 MG/ML
1 INJECTION, SOLUTION SUBCUTANEOUS WEEKLY
Qty: 2 PEN | Refills: 2 | Status: SHIPPED | OUTPATIENT
Start: 2020-01-27 | End: 2020-05-04

## 2020-01-27 RX ORDER — ATORVASTATIN CALCIUM 20 MG/1
TABLET, FILM COATED ORAL
COMMUNITY
Start: 2020-01-02 | End: 2020-03-04 | Stop reason: DRUGHIGH

## 2020-01-27 NOTE — PROGRESS NOTES
Scott Ross is a 53year old female presents today with POA and friend, Elin Penny RN, for 6 month follow-up on medical weight loss program for the treatment of overweight, obesity, or morbid obesity with associated Type 2 Diabetes, HTN, hyperlipi obese  EYES: conjunctiva pink, sclera non icteric, PERRLA  LUNGS: CTA in all fields  CARDIO: RRR without murmur, normal S1 and S2 without clicks or gallops, no pedal edema.   GI: +BS, soft  NEURO/MS: motor and sensory grossly intact  PSYCH: pleasant, raoul salad to your nutrition plan daily and eliminating animal based proteins (meat, dairy and eggs) 2 days of the week. Additional resources for support and education below.     Re-thinking Nutrition: Learning to See Food as Fuel  October 8, 2019 • Posted in [place] health and happiness. Building Blocks of Good Nutrition  A diet without enough fiber, protein and healthy fats can cause your cells to become brittle, leaky and tired.  When cells can't do what they are designed to do, problems like inflammation, cancer an series   www. ialzj10OOP. webme  · Sit and Be Fit - Chair exercise series www.sitandbefit. org      Apps for on the [employer]  · [employer] - on the go group exercise  · [place] 7 Minute Workout - free on the go HIIT training  · 5 Minute Kitchen Workout [email or web address]

## 2020-01-27 NOTE — PATIENT INSTRUCTIONS
Continue making lifestyle changes that focus on good nutrition, regular exercise and stress management. Medication Plan: Continue current medication regimen. Next steps to work on before next office visit include: Great job on improving your health! to your bones, brain, skin, nerves, muscles, etc. The health and lifespan of your cells depend on the nutrients you get from food. That is why healthy food choices are so important.    Once you can start to see food as being fuel for your body, you will get http://[email or web address] [email or web address]. Personal training and Group Fitness classes with Lopezstephan Nathan [phone]  · [employer] [place] @ [email or web address] Group Fitness [phone] and/or email Earl Biggs at [email or web address]  · Hard K

## 2020-02-24 DIAGNOSIS — R80.9 MICROALBUMINURIA DUE TO TYPE 2 DIABETES MELLITUS (HCC): ICD-10-CM

## 2020-02-24 DIAGNOSIS — E11.69 DIABETES MELLITUS TYPE 2 IN OBESE (HCC): ICD-10-CM

## 2020-02-24 DIAGNOSIS — E11.9 TYPE 2 DIABETES MELLITUS WITHOUT COMPLICATION (HCC): ICD-10-CM

## 2020-02-24 DIAGNOSIS — E11.29 MICROALBUMINURIA DUE TO TYPE 2 DIABETES MELLITUS (HCC): ICD-10-CM

## 2020-02-24 DIAGNOSIS — E78.2 MIXED HYPERLIPIDEMIA: ICD-10-CM

## 2020-02-24 DIAGNOSIS — E66.9 DIABETES MELLITUS TYPE 2 IN OBESE (HCC): ICD-10-CM

## 2020-02-24 DIAGNOSIS — I10 ESSENTIAL HYPERTENSION: ICD-10-CM

## 2020-02-25 DIAGNOSIS — E11.29 MICROALBUMINURIA DUE TO TYPE 2 DIABETES MELLITUS (HCC): ICD-10-CM

## 2020-02-25 DIAGNOSIS — R80.9 MICROALBUMINURIA DUE TO TYPE 2 DIABETES MELLITUS (HCC): ICD-10-CM

## 2020-02-25 DIAGNOSIS — I10 ESSENTIAL HYPERTENSION: ICD-10-CM

## 2020-02-25 DIAGNOSIS — E11.69 DIABETES MELLITUS TYPE 2 IN OBESE (HCC): ICD-10-CM

## 2020-02-25 DIAGNOSIS — E66.9 DIABETES MELLITUS TYPE 2 IN OBESE (HCC): ICD-10-CM

## 2020-02-25 RX ORDER — AMLODIPINE BESYLATE 10 MG/1
TABLET ORAL
Qty: 90 TABLET | Refills: 0 | OUTPATIENT
Start: 2020-02-25

## 2020-02-25 RX ORDER — TELMISARTAN 80 MG/1
TABLET ORAL
Qty: 30 TABLET | Refills: 0 | Status: SHIPPED | OUTPATIENT
Start: 2020-02-25 | End: 2020-03-04

## 2020-02-25 RX ORDER — ATORVASTATIN CALCIUM 40 MG/1
TABLET, FILM COATED ORAL
Qty: 30 TABLET | Refills: 0 | Status: SHIPPED | OUTPATIENT
Start: 2020-02-25 | End: 2020-03-04

## 2020-02-25 RX ORDER — METFORMIN HYDROCHLORIDE 750 MG/1
TABLET, EXTENDED RELEASE ORAL
Qty: 60 TABLET | Refills: 0 | Status: SHIPPED | OUTPATIENT
Start: 2020-02-25 | End: 2020-03-04

## 2020-02-25 RX ORDER — AMLODIPINE BESYLATE 10 MG/1
TABLET ORAL
Qty: 30 TABLET | Refills: 0 | Status: SHIPPED | OUTPATIENT
Start: 2020-02-25 | End: 2020-03-04

## 2020-02-25 RX ORDER — GLIMEPIRIDE 2 MG/1
TABLET ORAL
Qty: 30 TABLET | Refills: 0 | Status: SHIPPED | OUTPATIENT
Start: 2020-02-25 | End: 2020-03-04

## 2020-02-25 RX ORDER — TELMISARTAN 80 MG/1
TABLET ORAL
Qty: 90 TABLET | Refills: 0 | OUTPATIENT
Start: 2020-02-25

## 2020-02-25 RX ORDER — METOPROLOL SUCCINATE 200 MG/1
TABLET, EXTENDED RELEASE ORAL
Qty: 90 TABLET | Refills: 0 | OUTPATIENT
Start: 2020-02-25

## 2020-02-25 RX ORDER — METFORMIN HYDROCHLORIDE 750 MG/1
TABLET, EXTENDED RELEASE ORAL
Qty: 180 TABLET | Refills: 0 | OUTPATIENT
Start: 2020-02-25

## 2020-02-25 RX ORDER — METOPROLOL SUCCINATE 200 MG/1
TABLET, EXTENDED RELEASE ORAL
Qty: 30 TABLET | Refills: 0 | Status: SHIPPED | OUTPATIENT
Start: 2020-02-25 | End: 2020-03-04

## 2020-03-03 ENCOUNTER — TELEPHONE (OUTPATIENT)
Dept: FAMILY MEDICINE CLINIC | Facility: CLINIC | Age: 45
End: 2020-03-03

## 2020-03-03 DIAGNOSIS — I10 ESSENTIAL HYPERTENSION: ICD-10-CM

## 2020-03-03 DIAGNOSIS — E11.29 MICROALBUMINURIA DUE TO TYPE 2 DIABETES MELLITUS (HCC): ICD-10-CM

## 2020-03-03 DIAGNOSIS — E11.69 DIABETES MELLITUS TYPE 2 IN OBESE (HCC): ICD-10-CM

## 2020-03-03 DIAGNOSIS — E11.9 TYPE 2 DIABETES MELLITUS WITHOUT COMPLICATION (HCC): ICD-10-CM

## 2020-03-03 DIAGNOSIS — R80.9 MICROALBUMINURIA DUE TO TYPE 2 DIABETES MELLITUS (HCC): ICD-10-CM

## 2020-03-03 DIAGNOSIS — E66.9 DIABETES MELLITUS TYPE 2 IN OBESE (HCC): ICD-10-CM

## 2020-03-03 DIAGNOSIS — E78.2 MIXED HYPERLIPIDEMIA: ICD-10-CM

## 2020-03-03 NOTE — TELEPHONE ENCOUNTER
There is confusion between patient, caretaker and pharmacy. Patient does not want to  30 day meds from the pharmacy because it will cost more than the 90 mail order. I informed patient we can not send the 90 day until the OV has been completed.

## 2020-03-03 NOTE — TELEPHONE ENCOUNTER
Dr. Eileen Wade,  Please advise    Will simon be duie for any labs prior to 3 month DM, HTN follow up appt in March?     LOV  12/30/19    Last Labs 12/23/19    Future Appointments   Date Time Provider Tara Anne   3/23/2020  3:20 PM Jessica Easley

## 2020-03-04 RX ORDER — AMLODIPINE BESYLATE 10 MG/1
10 TABLET ORAL
Qty: 90 TABLET | Refills: 0 | Status: SHIPPED | OUTPATIENT
Start: 2020-03-04 | End: 2020-05-29

## 2020-03-04 RX ORDER — TELMISARTAN 80 MG/1
80 TABLET ORAL
Qty: 90 TABLET | Refills: 0 | Status: SHIPPED | OUTPATIENT
Start: 2020-03-04 | End: 2020-05-29

## 2020-03-04 RX ORDER — METOPROLOL SUCCINATE 200 MG/1
200 TABLET, EXTENDED RELEASE ORAL
Qty: 90 TABLET | Refills: 0 | Status: SHIPPED | OUTPATIENT
Start: 2020-03-04 | End: 2020-05-29

## 2020-03-04 RX ORDER — ATORVASTATIN CALCIUM 40 MG/1
TABLET, FILM COATED ORAL
Qty: 90 TABLET | Refills: 0 | Status: SHIPPED | OUTPATIENT
Start: 2020-03-04 | End: 2020-05-29

## 2020-03-04 RX ORDER — GLIMEPIRIDE 2 MG/1
TABLET ORAL
Qty: 90 TABLET | Refills: 0 | Status: SHIPPED | OUTPATIENT
Start: 2020-03-04 | End: 2020-05-29

## 2020-03-04 RX ORDER — METFORMIN HYDROCHLORIDE 750 MG/1
TABLET, EXTENDED RELEASE ORAL
Qty: 180 TABLET | Refills: 0 | Status: SHIPPED | OUTPATIENT
Start: 2020-03-04 | End: 2020-05-29

## 2020-03-04 NOTE — TELEPHONE ENCOUNTER
You may provide her with a 90d supply of her meds, because she is not due for her labs until 3/23/20 and insurance will not cover it sooner.

## 2020-03-04 NOTE — TELEPHONE ENCOUNTER
Returned call to patient's friend, Ej Qureshi (on HIPAA) and advised 90 day rx will be sent for requested meds. Verified they should go to [facility]. Reviewed meds that are being refilled. Advised to keep appt later this month.   Verbalizes [employer]

## 2020-03-06 DIAGNOSIS — J18.9 PNEUMONIA OF BOTH LUNGS DUE TO INFECTIOUS ORGANISM, UNSPECIFIED PART OF LUNG: ICD-10-CM

## 2020-03-06 DIAGNOSIS — B34.8 PARAINFLUENZA INFECTION: ICD-10-CM

## 2020-03-06 DIAGNOSIS — J18.9 PNEUMONIA OF LEFT LOWER LOBE DUE TO INFECTIOUS ORGANISM: ICD-10-CM

## 2020-03-08 ENCOUNTER — OFFICE VISIT (OUTPATIENT)
Dept: FAMILY MEDICINE CLINIC | Facility: CLINIC | Age: 45
End: 2020-03-08
Payer: MEDICARE

## 2020-03-08 ENCOUNTER — APPOINTMENT (OUTPATIENT)
Dept: CT IMAGING | Facility: HOSPITAL | Age: 45
End: 2020-03-08
Attending: EMERGENCY MEDICINE
Payer: MEDICARE

## 2020-03-08 ENCOUNTER — HOSPITAL ENCOUNTER (EMERGENCY)
Facility: HOSPITAL | Age: 45
Discharge: HOME | End: 2020-03-09
Attending: EMERGENCY MEDICINE
Payer: MEDICARE

## 2020-03-08 VITALS
HEART RATE: 87 BPM | OXYGEN SATURATION: 98 % | SYSTOLIC BLOOD PRESSURE: 136 MMHG | DIASTOLIC BLOOD PRESSURE: 78 MMHG | HEIGHT: 65 IN | TEMPERATURE: 99 F | RESPIRATION RATE: 20 BRPM | BODY MASS INDEX: 47.98 KG/M2 | WEIGHT: 288 LBS

## 2020-03-08 DIAGNOSIS — J03.90 ACUTE TONSILLITIS, UNSPECIFIED ETIOLOGY: Primary | ICD-10-CM

## 2020-03-08 DIAGNOSIS — J35.8 TONSIL STONE: ICD-10-CM

## 2020-03-08 DIAGNOSIS — J06.9 VIRAL UPPER RESPIRATORY TRACT INFECTION: Primary | ICD-10-CM

## 2020-03-08 LAB
ANION GAP SERPL CALC-SCNC: 7 MMOL/L (ref 0–18)
BASOPHILS # BLD AUTO: 0.03 X10(3) UL (ref 0–0.2)
BASOPHILS NFR BLD AUTO: 0.3 %
BUN BLD-MCNC: 13 MG/DL (ref 7–18)
BUN/CREAT SERPL: 14.8 (ref 10–20)
CALCIUM BLD-MCNC: 9.5 MG/DL (ref 8.5–10.1)
CHLORIDE SERPL-SCNC: 109 MMOL/L (ref 98–112)
CO2 SERPL-SCNC: 23 MMOL/L (ref 21–32)
CONTROL LINE PRESENT WITH A CLEAR BACKGROUND (YES/NO): YES YES/NO
CREAT BLD-MCNC: 0.88 MG/DL (ref 0.55–1.02)
DEPRECATED RDW RBC AUTO: 46 FL (ref 35.1–46.3)
EOSINOPHIL # BLD AUTO: 0.1 X10(3) UL (ref 0–0.7)
EOSINOPHIL NFR BLD AUTO: 0.9 %
ERYTHROCYTE [DISTWIDTH] IN BLOOD BY AUTOMATED COUNT: 14.2 % (ref 11–15)
GLUCOSE BLD-MCNC: 140 MG/DL (ref 70–99)
HCT VFR BLD AUTO: 37.5 % (ref 35–48)
HGB BLD-MCNC: 12.3 G/DL (ref 12–16)
IMM GRANULOCYTES # BLD AUTO: 0.03 X10(3) UL (ref 0–1)
IMM GRANULOCYTES NFR BLD: 0.3 %
KIT LOT #: NORMAL NUMERIC
LYMPHOCYTES # BLD AUTO: 2.13 X10(3) UL (ref 1–4)
LYMPHOCYTES NFR BLD AUTO: 19.6 %
MCH RBC QN AUTO: 29.4 PG (ref 26–34)
MCHC RBC AUTO-ENTMCNC: 32.8 G/DL (ref 31–37)
MCV RBC AUTO: 89.5 FL (ref 80–100)
MONOCYTES # BLD AUTO: 0.75 X10(3) UL (ref 0.1–1)
MONOCYTES NFR BLD AUTO: 6.9 %
MONOSCREEN: NEGATIVE
NEUTROPHILS # BLD AUTO: 7.84 X10 (3) UL (ref 1.5–7.7)
NEUTROPHILS # BLD AUTO: 7.84 X10(3) UL (ref 1.5–7.7)
NEUTROPHILS NFR BLD AUTO: 72 %
OSMOLALITY SERPL CALC.SUM OF ELEC: 290 MOSM/KG (ref 275–295)
PLATELET # BLD AUTO: 308 10(3)UL (ref 150–450)
POTASSIUM SERPL-SCNC: 3.9 MMOL/L (ref 3.5–5.1)
RBC # BLD AUTO: 4.19 X10(6)UL (ref 3.8–5.3)
SODIUM SERPL-SCNC: 139 MMOL/L (ref 136–145)
WBC # BLD AUTO: 10.9 X10(3) UL (ref 4–11)

## 2020-03-08 PROCEDURE — 80048 BASIC METABOLIC PNL TOTAL CA: CPT | Performed by: EMERGENCY MEDICINE

## 2020-03-08 PROCEDURE — 99284 EMERGENCY DEPT VISIT MOD MDM: CPT

## 2020-03-08 PROCEDURE — 85025 COMPLETE CBC W/AUTO DIFF WBC: CPT | Performed by: EMERGENCY MEDICINE

## 2020-03-08 PROCEDURE — 96374 THER/PROPH/DIAG INJ IV PUSH: CPT

## 2020-03-08 PROCEDURE — 86403 PARTICLE AGGLUT ANTBDY SCRN: CPT | Performed by: EMERGENCY MEDICINE

## 2020-03-08 PROCEDURE — 86664 EPSTEIN-BARR NUCLEAR ANTIGEN: CPT | Performed by: EMERGENCY MEDICINE

## 2020-03-08 PROCEDURE — 87880 STREP A ASSAY W/OPTIC: CPT | Performed by: NURSE PRACTITIONER

## 2020-03-08 PROCEDURE — 87081 CULTURE SCREEN ONLY: CPT | Performed by: EMERGENCY MEDICINE

## 2020-03-08 PROCEDURE — 70491 CT SOFT TISSUE NECK W/DYE: CPT | Performed by: EMERGENCY MEDICINE

## 2020-03-08 PROCEDURE — 86665 EPSTEIN-BARR CAPSID VCA: CPT | Performed by: EMERGENCY MEDICINE

## 2020-03-08 PROCEDURE — 96375 TX/PRO/DX INJ NEW DRUG ADDON: CPT

## 2020-03-08 PROCEDURE — 87430 STREP A AG IA: CPT | Performed by: EMERGENCY MEDICINE

## 2020-03-08 PROCEDURE — 99213 OFFICE O/P EST LOW 20 MIN: CPT | Performed by: NURSE PRACTITIONER

## 2020-03-08 RX ORDER — ALBUTEROL SULFATE 90 UG/1
2 AEROSOL, METERED RESPIRATORY (INHALATION) EVERY 4 HOURS PRN
Qty: 1 INHALER | Refills: 6 | Status: SHIPPED | OUTPATIENT
Start: 2020-03-08

## 2020-03-08 RX ORDER — DEXAMETHASONE SODIUM PHOSPHATE 4 MG/ML
10 VIAL (ML) INJECTION ONCE
Status: COMPLETED | OUTPATIENT
Start: 2020-03-08 | End: 2020-03-08

## 2020-03-08 RX ORDER — KETOROLAC TROMETHAMINE 30 MG/ML
15 INJECTION, SOLUTION INTRAMUSCULAR; INTRAVENOUS ONCE
Status: COMPLETED | OUTPATIENT
Start: 2020-03-08 | End: 2020-03-08

## 2020-03-08 NOTE — PROGRESS NOTES
CHIEF COMPLAINT:   Patient presents with:  Sore Throat      HPI:   Chuckie Epley is a 53year old female who presents for upper respiratory symptoms for  2 days. Patient reports sore throat, congestion, and slight SOB. Rosie Zhang  Symptoms have been constant sinc • Multiple Vitamins-Minerals (CENTRUM SILVER ULTRA WOMENS) Oral Tab Take 1 tablet by mouth daily.        • Blood Glucose Monitoring Suppl (TRUETRACK BLOOD GLUCOSE) W/DEVICE Does not apply Kit Patient to test twice daily 1 kit 0   • GNP ALCOHOL SWABS 70 % Do • ORTHOPEDIC SURG (PBP) Bilateral     MANDA CARPAL ISHAAN RELEASE   • OTHER  LEFT FOOT SPUR REMOVAL   • OTHER SURGICAL HISTORY      bilateral carpal tunnel release   • RADIATION LEFT  2015   • STEREOTACTIC BREAST BIOPSY Left 4/21/15 Green EDW    Left Breast PLAN: Meds as below.   Comfort care as described in Patient Instructions    Meds & Refills for this Visit:  Requested Prescriptions     Signed Prescriptions Disp Refills   • Albuterol Sulfate HFA (VENTOLIN HFA) 108 (90 Base) MCG/ACT Inhalation Aero Soln 1 I · Take acetaminophen or an NSAID, such as ibuprofen, to ease throat pain    Ease digestive problems  · Put fluids back into your body. Take frequent sips of clear liquids such as water or broth.  Avoid drinks that have a lot of sugar in them, such as juices

## 2020-03-09 VITALS
BODY MASS INDEX: 48.15 KG/M2 | HEART RATE: 89 BPM | DIASTOLIC BLOOD PRESSURE: 96 MMHG | SYSTOLIC BLOOD PRESSURE: 144 MMHG | WEIGHT: 289 LBS | TEMPERATURE: 99 F | RESPIRATION RATE: 18 BRPM | HEIGHT: 65 IN | OXYGEN SATURATION: 95 %

## 2020-03-09 LAB
EBV NA IGG SER QL IA: NEGATIVE
EBV VCA IGG SER QL IA: NEGATIVE
EBV VCA IGM SER QL IA: NEGATIVE

## 2020-03-09 RX ORDER — ALBUTEROL SULFATE 90 UG/1
AEROSOL, METERED RESPIRATORY (INHALATION)
Qty: 8.5 G | Refills: 0 | OUTPATIENT
Start: 2020-03-09

## 2020-03-09 RX ORDER — AMOXICILLIN AND CLAVULANATE POTASSIUM 875; 125 MG/1; MG/1
1 TABLET, FILM COATED ORAL 2 TIMES DAILY
Qty: 20 TABLET | Refills: 0 | Status: SHIPPED | OUTPATIENT
Start: 2020-03-09 | End: 2020-03-19

## 2020-03-09 NOTE — ED INITIAL ASSESSMENT (HPI)
C/o sore throat since Thursday, states she was at Urgent Care today, was told Strep test was negative. Pt states the pain is increasing \"in the throat and around the tonsils, and the Ibuprofen was not helping. \" pt in no acute distress

## 2020-03-09 NOTE — ED PROVIDER NOTES
Patient Seen in: [facility] Emergency Department      History   Patient presents with:  Sore Throat  Dyspnea ROMERO SOB    Stated Complaint: sore throat, ROMERO    HPI    49-year-old female presents emergency with chief complaint of sore throat, symptoms lymphadenopathy. HEART: Regular rate and rhythm, no murmurs. LUNGS: Clear to auscultation bilaterally. No Rales, no rhonchi, no wheezing, no stridor.   ABDOMEN: Soft, nondistended,non tender  EXTREMITIES: No peripheral edema    ED Course     Labs Reviewe follow-up with primary care within the next 1-2 days for reevaluation, alternate ibuprofen and Tylenol, stay well-hydrated, warm salt water gargles. Patient well-appearing discharged good condition.               Disposition and Plan     Clinical Impressio

## 2020-03-10 ENCOUNTER — OFFICE VISIT (OUTPATIENT)
Dept: FAMILY MEDICINE CLINIC | Facility: CLINIC | Age: 45
End: 2020-03-10
Payer: MEDICARE

## 2020-03-10 ENCOUNTER — TELEPHONE (OUTPATIENT)
Dept: FAMILY MEDICINE CLINIC | Facility: CLINIC | Age: 45
End: 2020-03-10

## 2020-03-10 VITALS
BODY MASS INDEX: 48.32 KG/M2 | DIASTOLIC BLOOD PRESSURE: 80 MMHG | RESPIRATION RATE: 16 BRPM | SYSTOLIC BLOOD PRESSURE: 120 MMHG | WEIGHT: 290 LBS | OXYGEN SATURATION: 98 % | HEIGHT: 65 IN | HEART RATE: 83 BPM | TEMPERATURE: 99 F

## 2020-03-10 DIAGNOSIS — E11.69 DIABETES MELLITUS TYPE 2 IN OBESE (HCC): ICD-10-CM

## 2020-03-10 DIAGNOSIS — E66.9 DIABETES MELLITUS TYPE 2 IN OBESE (HCC): ICD-10-CM

## 2020-03-10 DIAGNOSIS — J03.90 ACUTE TONSILLITIS, UNSPECIFIED ETIOLOGY: Primary | ICD-10-CM

## 2020-03-10 DIAGNOSIS — I10 ESSENTIAL HYPERTENSION: ICD-10-CM

## 2020-03-10 DIAGNOSIS — E78.2 MIXED HYPERLIPIDEMIA: ICD-10-CM

## 2020-03-10 DIAGNOSIS — J35.8 TONSIL STONE: ICD-10-CM

## 2020-03-10 DIAGNOSIS — J35.1 CHRONIC TONSILLAR HYPERTROPHY: ICD-10-CM

## 2020-03-10 DIAGNOSIS — R05.9 COUGH: ICD-10-CM

## 2020-03-10 PROCEDURE — 99214 OFFICE O/P EST MOD 30 MIN: CPT | Performed by: FAMILY MEDICINE

## 2020-03-10 RX ORDER — FLUTICASONE PROPIONATE 50 MCG
2 SPRAY, SUSPENSION (ML) NASAL DAILY
Qty: 1 BOTTLE | Refills: 2 | Status: SHIPPED | OUTPATIENT
Start: 2020-03-10 | End: 2020-11-17

## 2020-03-10 RX ORDER — BENZONATATE 200 MG/1
200 CAPSULE ORAL 3 TIMES DAILY PRN
Qty: 30 CAPSULE | Refills: 0 | Status: SHIPPED | OUTPATIENT
Start: 2020-03-10 | End: 2020-05-18 | Stop reason: ALTCHOICE

## 2020-03-10 NOTE — PROGRESS NOTES
HPI:   Esdras Flores is a 53year old female who presents for f/u after recent ER visit for tonsillitis on Sun., 3/8. Was seen at the [facility] and her strep was negative.  She was having worsening sore throat and difficulty breathing, which led her to the ED tablets (40 mg total) by mouth daily. 180 tablet 0   • VITAMIN E 400 UNITS Oral Cap TAKE ONE (1) CAPSULE BY MOUTH TWICE DAILY 180 capsule 11   • OZEMPIC, 1 MG/DOSE, 2 MG/1.5ML Subcutaneous Solution Pen-injector Inject 1 mg into the skin once a week.  2 pen VERY MILD   • Mouth sores    • Nausea    • Night sweats    • Obesity    • Obstructive apnea     CPAP 14 Sarina's   • Shortness of breath    • Sleep apnea    • Type II or unspecified type diabetes mellitus without mention of complication, not stated as unc distress  SKIN: no rashes,no suspicious lesions  EYES:PERRLA, EOMI, conjunctiva are clear  HEENT: atraumatic, normocephalic, ears are clear, no sinus tenderness and posterior pharynx shows enlarged tonsils bilaterally 2+ with tonsil stones on either tonsil

## 2020-03-10 NOTE — TELEPHONE ENCOUNTER
Patient is on Ozempic 1 mg and topamax 25 mg bid. Opal Mccracken had called 11/7/19 with complaints at that time. LM to call me back. Hailey Ninoska

## 2020-03-10 NOTE — TELEPHONE ENCOUNTER
Pt called stating the rice of the cough medicine prescribed at today's thomas is to expensive therefore she did not get. She wants to know what over the counter can she take. Please advise.

## 2020-03-10 NOTE — PATIENT INSTRUCTIONS
When You Have a Sore Throat    A sore throat can be painful. There are many reasons why you may have a sore throat. Your healthcare provider will work with you to find the cause of your sore throat. He or she will also find the best treatment for you.   Woo Kelley During the exam, your healthcare provider checks your ears, nose, and throat for problems.  He or she also checks for swelling in the neck, and may listen to your chest.  Possible tests  Other tests your healthcare provider may perform include:  · A throat If your sore throat is due to a bacterial infection, antibiotics may speed healing and prevent complications.  Although group A streptococcus (\"strep throat\" or GAS) is the major treatable infection for a sore throat, GAS causes only 5% to 15% of sore thr © 5989-4205 The [address]. [address] [facility], [facility] [place]. All rights reserved. This information is not intended as a substitute for professional medical care. Always follow your healthcare professional's instructions.

## 2020-03-30 ENCOUNTER — TELEPHONE (OUTPATIENT)
Dept: FAMILY MEDICINE CLINIC | Facility: CLINIC | Age: 45
End: 2020-03-30

## 2020-03-30 DIAGNOSIS — J02.9 SORE THROAT: Primary | ICD-10-CM

## 2020-03-30 DIAGNOSIS — J35.8 TONSIL STONE: ICD-10-CM

## 2020-03-30 PROCEDURE — G2012 BRIEF CHECK IN BY MD/QHP: HCPCS | Performed by: FAMILY MEDICINE

## 2020-03-30 NOTE — TELEPHONE ENCOUNTER
Virtual/Telephone Check-In    555 Benjamin Peter verbally consents to a Virtual/Telephone Check-In service on 03/30/20. Patient understands and accepts financial responsibility for any deductible, co-insurance and/or co-pays associated with this service.

## 2020-03-30 NOTE — TELEPHONE ENCOUNTER
Patient called and stated her tonsil stones are growing back and throat really hurts, please advise.

## 2020-04-29 DIAGNOSIS — Z51.81 ENCOUNTER FOR THERAPEUTIC DRUG MONITORING: ICD-10-CM

## 2020-04-29 DIAGNOSIS — E78.2 MIXED HYPERLIPIDEMIA: ICD-10-CM

## 2020-04-29 DIAGNOSIS — I10 ESSENTIAL HYPERTENSION: ICD-10-CM

## 2020-04-29 DIAGNOSIS — E66.01 MORBID OBESITY WITH BMI OF 45.0-49.9, ADULT (HCC): ICD-10-CM

## 2020-04-29 DIAGNOSIS — E11.9 TYPE 2 DIABETES MELLITUS WITHOUT COMPLICATION, WITHOUT LONG-TERM CURRENT USE OF INSULIN (HCC): ICD-10-CM

## 2020-04-29 RX ORDER — SEMAGLUTIDE 1.34 MG/ML
1 INJECTION, SOLUTION SUBCUTANEOUS WEEKLY
Qty: 6 PEN | Refills: 0 | Status: CANCELLED | OUTPATIENT
Start: 2020-04-29

## 2020-04-29 NOTE — TELEPHONE ENCOUNTER
Requesting Ozempic 1 mg  LOV: 1/27/20  RTC: 2 months  Last Relevant Labs: 12/23/19  Filled: 1/27/20 #2 pens with 2 refills    I spoke with patient and she did schedule for a telephone visit with SHELDON on Monday.   She is not on my chart and said she tried befo

## 2020-05-04 ENCOUNTER — VIRTUAL PHONE E/M (OUTPATIENT)
Dept: INTERNAL MEDICINE CLINIC | Facility: CLINIC | Age: 45
End: 2020-05-04
Payer: MEDICARE

## 2020-05-04 DIAGNOSIS — E11.9 TYPE 2 DIABETES MELLITUS WITHOUT COMPLICATION, WITHOUT LONG-TERM CURRENT USE OF INSULIN (HCC): ICD-10-CM

## 2020-05-04 DIAGNOSIS — G47.33 OSA ON CPAP: ICD-10-CM

## 2020-05-04 DIAGNOSIS — E78.2 MIXED HYPERLIPIDEMIA: ICD-10-CM

## 2020-05-04 DIAGNOSIS — E66.01 MORBID OBESITY WITH BMI OF 45.0-49.9, ADULT (HCC): ICD-10-CM

## 2020-05-04 DIAGNOSIS — Z99.89 OSA ON CPAP: ICD-10-CM

## 2020-05-04 DIAGNOSIS — I10 ESSENTIAL HYPERTENSION: ICD-10-CM

## 2020-05-04 DIAGNOSIS — Z51.81 ENCOUNTER FOR THERAPEUTIC DRUG MONITORING: Primary | ICD-10-CM

## 2020-05-04 PROCEDURE — 99442 PHONE E/M BY PHYS 11-20 MIN: CPT | Performed by: NURSE PRACTITIONER

## 2020-05-04 RX ORDER — SEMAGLUTIDE 1.34 MG/ML
1 INJECTION, SOLUTION SUBCUTANEOUS WEEKLY
Qty: 2 PEN | Refills: 2 | Status: SHIPPED | OUTPATIENT
Start: 2020-05-04 | End: 2020-11-17

## 2020-05-04 RX ORDER — PEN NEEDLE, DIABETIC 30 GX3/16"
1 NEEDLE, DISPOSABLE MISCELLANEOUS DAILY
Qty: 30 EACH | Refills: 0 | OUTPATIENT
Start: 2020-05-04

## 2020-05-04 NOTE — PATIENT INSTRUCTIONS
Thank you for taking the time for our virtual encounter today! Here are the next steps and plans we discussed:    1. Continue your medications, no changes.   2. Great job exercising daily, consider adding at home options like [employer]omas and Fitness  than before. It does this by getting nutrition from whole grains, vitamins, minerals, fats, protein and other nutrients. These essential nutrients matter greatly to every single cell in your body.  They make up your:  • Cell membrane  • Nucleus  • Mitochon difference? These terms can be very confusing. But learning to measure serving sizes can help you figure out how many carbohydrates (“carbs”) and other foods you eat each day. They are also powerful tools for managing your weight.   Servings and portions  M medium-sized piece) is about the size of your fist.       1/2 Cup: 1/2 cup is about the size of your cupped hand. Date Last Reviewed: 3/1/2016  © 1363-9901 The [address] [place] [address], [address] [place] [address]. All rights reserved.  This

## 2020-05-04 NOTE — PROGRESS NOTES
Virtual Telephone Check-In    Adrián Peter verbally consents to a Virtual/Telephone Check-In visit on 5/4/2020. Patient understands and accepts financial responsibility for any deductible, co-insurance and/or co-pays associated with this service. therapeutic drug monitoring  - has labs ordered by PCP  -     OZEMPIC, 1 MG/DOSE, 2 MG/1.5ML Subcutaneous Solution Pen-injector; Inject 1 mg into the skin once a week.     Morbid obesity with BMI of 45.0-49.9, adult ([address]  - CPM  -  on alternative fi

## 2020-05-18 ENCOUNTER — VIRTUAL PHONE E/M (OUTPATIENT)
Dept: FAMILY MEDICINE CLINIC | Facility: CLINIC | Age: 45
End: 2020-05-18
Payer: MEDICARE

## 2020-05-18 DIAGNOSIS — E11.69 DIABETES MELLITUS TYPE 2 IN OBESE (HCC): Primary | ICD-10-CM

## 2020-05-18 DIAGNOSIS — E66.9 DIABETES MELLITUS TYPE 2 IN OBESE (HCC): Primary | ICD-10-CM

## 2020-05-18 DIAGNOSIS — J35.1 CHRONIC TONSILLAR HYPERTROPHY: ICD-10-CM

## 2020-05-18 DIAGNOSIS — E78.2 MIXED HYPERLIPIDEMIA: ICD-10-CM

## 2020-05-18 DIAGNOSIS — E66.01 MORBID OBESITY (HCC): ICD-10-CM

## 2020-05-18 DIAGNOSIS — I10 ESSENTIAL HYPERTENSION: ICD-10-CM

## 2020-05-18 PROCEDURE — 99441 PHONE E/M BY PHYS 5-10 MIN: CPT | Performed by: FAMILY MEDICINE

## 2020-05-18 NOTE — PROGRESS NOTES
Virtual Telephone Check-In    Adrián Peter verbally consents to a Virtual/Telephone Check-In visit on 05/18/20. Patient understands and accepts financial responsibility for any deductible, co-insurance and/or co-pays associated with this service. total) by mouth once daily. 90 tablet 0   • PARoxetine HCl (PAXIL) 20 MG Oral Tab Take 2 tablets (40 mg total) by mouth daily.  180 tablet 0   • VITAMIN E 400 UNITS Oral Cap TAKE ONE (1) CAPSULE BY MOUTH TWICE DAILY 180 capsule 11   • Pantoprazole Sodium 40 Date    LDL 95 12/23/2019    LDL 72 08/16/2019    LDL  02/15/2019      Comment:      Unable to calculate LDL due to Triglycerides >400.0 mg/dL      Desirable <100 mg/dL   Borderline 100-129 mg/dL   High     >=130mg/dL         Lab Results   Component Value LIVER     • ORTHOPEDIC SURG (PBP) Bilateral     MANDA CARPAL ISHAAN RELEASE   • OTHER  LEFT FOOT SPUR REMOVAL   • OTHER SURGICAL HISTORY      bilateral carpal tunnel release   • RADIATION LEFT  2015   • STEREOTACTIC BREAST BIOPSY Left 4/21/15 Paul MCINTOSH    L partner: Not on file        Emotionally abused: Not on file        Physically abused: Not on file        Forced sexual activity: Not on file    Other Topics      Concerns:        Caffeine Concern: Yes          1 ice tea daily         Exercise: No        Se

## 2020-05-20 ENCOUNTER — TELEPHONE (OUTPATIENT)
Dept: FAMILY MEDICINE CLINIC | Facility: CLINIC | Age: 45
End: 2020-05-20

## 2020-05-20 DIAGNOSIS — E78.2 MIXED HYPERLIPIDEMIA: ICD-10-CM

## 2020-05-20 DIAGNOSIS — E11.69 DIABETES MELLITUS TYPE 2 IN OBESE (HCC): ICD-10-CM

## 2020-05-20 DIAGNOSIS — E66.9 DIABETES MELLITUS TYPE 2 IN OBESE (HCC): ICD-10-CM

## 2020-05-20 DIAGNOSIS — E11.9 TYPE 2 DIABETES MELLITUS WITHOUT COMPLICATION (HCC): ICD-10-CM

## 2020-05-20 DIAGNOSIS — R80.9 MICROALBUMINURIA DUE TO TYPE 2 DIABETES MELLITUS (HCC): ICD-10-CM

## 2020-05-20 DIAGNOSIS — I10 ESSENTIAL HYPERTENSION: ICD-10-CM

## 2020-05-20 DIAGNOSIS — E11.29 MICROALBUMINURIA DUE TO TYPE 2 DIABETES MELLITUS (HCC): ICD-10-CM

## 2020-05-20 RX ORDER — AMLODIPINE BESYLATE 10 MG/1
TABLET ORAL
Qty: 90 TABLET | Refills: 10 | OUTPATIENT
Start: 2020-05-20

## 2020-05-20 RX ORDER — METOPROLOL SUCCINATE 200 MG/1
TABLET, EXTENDED RELEASE ORAL
Qty: 90 TABLET | Refills: 10 | OUTPATIENT
Start: 2020-05-20

## 2020-05-20 RX ORDER — ATORVASTATIN CALCIUM 40 MG/1
TABLET, FILM COATED ORAL
Qty: 90 TABLET | Refills: 10 | OUTPATIENT
Start: 2020-05-20

## 2020-05-20 RX ORDER — TELMISARTAN 80 MG/1
TABLET ORAL
Qty: 90 TABLET | Refills: 10 | OUTPATIENT
Start: 2020-05-20

## 2020-05-20 RX ORDER — GLIMEPIRIDE 2 MG/1
TABLET ORAL
Qty: 90 TABLET | Refills: 10 | OUTPATIENT
Start: 2020-05-20

## 2020-05-20 RX ORDER — METFORMIN HYDROCHLORIDE 750 MG/1
TABLET, EXTENDED RELEASE ORAL
Qty: 180 TABLET | Refills: 10 | OUTPATIENT
Start: 2020-05-20

## 2020-05-20 NOTE — TELEPHONE ENCOUNTER
LOV 5-18-20    LAST LAB    LAST RX    Next OV No future appointments.     PROTOCOL     Name from pharmacy: METOPROLOL SUCC ER 200MG  TAB         Will file in chart as: METOPROLOL SUCCINATE  MG Oral Tablet 24 Hr         Sig: TAKE 1 TABLET BY MOUT Protocol Details    Name from pharmacy: TELMISARTAN 80 MG TABS 80 TAB         Will file in chart as: TELMISARTAN 80 MG Oral Tab         Sig: TAKE (1) TABLET BY MOUTH ONCE DAILY    Disp:  90 tablet    Refills:  10    Start: 5/20/2020    Class: Normal    Non Appointment within past 12 or next 3 months

## 2020-05-29 RX ORDER — METOPROLOL SUCCINATE 200 MG/1
200 TABLET, EXTENDED RELEASE ORAL
Qty: 90 TABLET | Refills: 0 | Status: SHIPPED | OUTPATIENT
Start: 2020-05-29 | End: 2020-08-18

## 2020-05-29 RX ORDER — ATORVASTATIN CALCIUM 40 MG/1
TABLET, FILM COATED ORAL
Qty: 90 TABLET | Refills: 0 | Status: SHIPPED | OUTPATIENT
Start: 2020-05-29 | End: 2020-08-18

## 2020-05-29 RX ORDER — AMLODIPINE BESYLATE 10 MG/1
10 TABLET ORAL
Qty: 90 TABLET | Refills: 0 | Status: SHIPPED | OUTPATIENT
Start: 2020-05-29 | End: 2020-08-18

## 2020-05-29 RX ORDER — TELMISARTAN 80 MG/1
80 TABLET ORAL
Qty: 90 TABLET | Refills: 0 | Status: SHIPPED | OUTPATIENT
Start: 2020-05-29 | End: 2020-08-18

## 2020-05-29 RX ORDER — GLIMEPIRIDE 2 MG/1
TABLET ORAL
Qty: 90 TABLET | Refills: 0 | Status: SHIPPED | OUTPATIENT
Start: 2020-05-29 | End: 2020-08-18

## 2020-05-29 RX ORDER — METFORMIN HYDROCHLORIDE 750 MG/1
TABLET, EXTENDED RELEASE ORAL
Qty: 180 TABLET | Refills: 0 | Status: SHIPPED | OUTPATIENT
Start: 2020-05-29 | End: 2020-08-18

## 2020-05-29 NOTE — TELEPHONE ENCOUNTER
Savage Jimenes from [employer] called asking status of the medications they sent over. (Pt did have a phone visit on 5-18-20. Please advise.

## 2020-06-19 DIAGNOSIS — E78.2 MIXED HYPERLIPIDEMIA: ICD-10-CM

## 2020-06-19 RX ORDER — FENOFIBRATE 160 MG/1
TABLET ORAL
Qty: 90 TABLET | Refills: 1 | Status: SHIPPED | OUTPATIENT
Start: 2020-06-19 | End: 2020-12-22

## 2020-06-19 NOTE — TELEPHONE ENCOUNTER
LOV 5/18/20    LAST LAB 12 /23/ 20    LAST RX   Fenofibrate 160 MG Oral Tab 90 tablet 1 9/5/2019    Sig:   TAKE (1) TABLET BY MOUTH DAILY     Route:   (none)           Next OV   Future Appointments   Date Time Provider Tara Anne   7/10/2020 11:00 A

## 2020-06-22 ENCOUNTER — TELEPHONE (OUTPATIENT)
Dept: INTERNAL MEDICINE CLINIC | Facility: CLINIC | Age: 45
End: 2020-06-22

## 2020-06-22 NOTE — TELEPHONE ENCOUNTER
Name of Medication: pen needles for ozempic     Dose:     How is medication prescribed:    Specific name of pharmacy and location:    Name of mail order company:

## 2020-06-22 NOTE — TELEPHONE ENCOUNTER
I called the pharmacy and left message that needles come with the Ozempic - she should not need separate RX. Call back if there is some kind of problem.

## 2020-06-26 ENCOUNTER — TELEPHONE (OUTPATIENT)
Dept: INTERNAL MEDICINE CLINIC | Facility: CLINIC | Age: 45
End: 2020-06-26

## 2020-06-26 NOTE — TELEPHONE ENCOUNTER
# [phone] pen needles      Name of Medication: pen needles  Dose:     How is medication prescribed:    Specific [facility] of pharmacy [facility]     Name of mail order company:

## 2020-07-11 ENCOUNTER — HOSPITAL ENCOUNTER (OUTPATIENT)
Dept: MAMMOGRAPHY | Age: 45
Discharge: HOME | End: 2020-07-11
Attending: FAMILY MEDICINE
Payer: MEDICARE

## 2020-07-11 DIAGNOSIS — Z12.31 VISIT FOR SCREENING MAMMOGRAM: ICD-10-CM

## 2020-07-11 PROCEDURE — 77063 BREAST TOMOSYNTHESIS BI: CPT | Performed by: FAMILY MEDICINE

## 2020-07-11 PROCEDURE — 77067 SCR MAMMO BI INCL CAD: CPT | Performed by: FAMILY MEDICINE

## 2020-08-11 ENCOUNTER — TELEPHONE (OUTPATIENT)
Dept: FAMILY MEDICINE CLINIC | Facility: CLINIC | Age: 45
End: 2020-08-11

## 2020-08-11 NOTE — TELEPHONE ENCOUNTER
Pt stated that this morning she is experiencing bad grogginess. She stated that off and on she feels off balance. She stated she stayed home from work and would like to speak with nurse.     I asked if she had a fever, but she stated that she didn't thin

## 2020-08-11 NOTE — TELEPHONE ENCOUNTER
Spoke to patient who states first woke up during the night and thought she saw smoke (did not smell smoke). When she got up at 4:30am to shower for work she felt very wobbly and off balance. Had a hard time focusing.   Took a long time to answer a text to Skin normal color for race, warm, dry and intact. No evidence of rash.

## 2020-08-17 ENCOUNTER — OFFICE VISIT (OUTPATIENT)
Dept: FAMILY MEDICINE CLINIC | Facility: CLINIC | Age: 45
End: 2020-08-17
Payer: MEDICARE

## 2020-08-17 VITALS
DIASTOLIC BLOOD PRESSURE: 80 MMHG | HEIGHT: 65 IN | HEART RATE: 89 BPM | RESPIRATION RATE: 16 BRPM | TEMPERATURE: 97 F | OXYGEN SATURATION: 98 % | SYSTOLIC BLOOD PRESSURE: 126 MMHG | WEIGHT: 289.19 LBS | BODY MASS INDEX: 48.18 KG/M2

## 2020-08-17 DIAGNOSIS — I10 ESSENTIAL HYPERTENSION: ICD-10-CM

## 2020-08-17 DIAGNOSIS — E66.01 MORBID OBESITY (HCC): ICD-10-CM

## 2020-08-17 DIAGNOSIS — E11.69 DIABETES MELLITUS TYPE 2 IN OBESE (HCC): ICD-10-CM

## 2020-08-17 DIAGNOSIS — R42 EPISODIC LIGHTHEADEDNESS: Primary | ICD-10-CM

## 2020-08-17 DIAGNOSIS — E11.9 TYPE 2 DIABETES MELLITUS WITHOUT COMPLICATION (HCC): ICD-10-CM

## 2020-08-17 DIAGNOSIS — E66.9 DIABETES MELLITUS TYPE 2 IN OBESE (HCC): ICD-10-CM

## 2020-08-17 DIAGNOSIS — E78.2 MIXED HYPERLIPIDEMIA: ICD-10-CM

## 2020-08-17 PROCEDURE — 99214 OFFICE O/P EST MOD 30 MIN: CPT | Performed by: FAMILY MEDICINE

## 2020-08-17 RX ORDER — BLOOD SUGAR DIAGNOSTIC
1 STRIP MISCELLANEOUS DAILY
Qty: 100 STRIP | Refills: 5 | Status: SHIPPED | OUTPATIENT
Start: 2020-08-17 | End: 2021-02-12

## 2020-08-17 RX ORDER — CALCIUM CITRATE/VITAMIN D3 200MG-6.25
TABLET ORAL
Qty: 100 STRIP | Refills: 11 | Status: CANCELLED | OUTPATIENT
Start: 2020-08-17

## 2020-08-17 RX ORDER — BLOOD SUGAR DIAGNOSTIC
STRIP MISCELLANEOUS
Qty: 100 STRIP | Refills: 2 | Status: CANCELLED | OUTPATIENT
Start: 2020-08-17 | End: 2021-08-16

## 2020-08-17 RX ORDER — BLOOD SUGAR DIAGNOSTIC
1 STRIP MISCELLANEOUS DAILY
Qty: 100 STRIP | Refills: 5 | Status: SHIPPED | OUTPATIENT
Start: 2020-08-17 | End: 2020-08-17

## 2020-08-17 RX ORDER — LANCETS 33 GAUGE
1 EACH MISCELLANEOUS DAILY
Qty: 1 BOX | Refills: 5 | Status: SHIPPED | OUTPATIENT
Start: 2020-08-17

## 2020-08-17 RX ORDER — BLOOD-GLUCOSE METER
1 EACH MISCELLANEOUS DAILY
Qty: 1 KIT | Refills: 0 | Status: SHIPPED | OUTPATIENT
Start: 2020-08-17 | End: 2020-08-17

## 2020-08-17 RX ORDER — BLOOD-GLUCOSE METER
1 EACH MISCELLANEOUS DAILY
Qty: 1 KIT | Refills: 0 | Status: SHIPPED | OUTPATIENT
Start: 2020-08-17 | End: 2021-09-13

## 2020-08-17 RX ORDER — ISOPROPYL ALCOHOL 70 ML/100ML
SWAB TOPICAL
Qty: 100 EACH | Refills: 6 | Status: SHIPPED | OUTPATIENT
Start: 2020-08-17

## 2020-08-17 RX ORDER — LANCETS 33 GAUGE
1 EACH MISCELLANEOUS DAILY
Qty: 1 BOX | Refills: 5 | Status: SHIPPED | OUTPATIENT
Start: 2020-08-17 | End: 2020-08-17

## 2020-08-17 NOTE — PATIENT INSTRUCTIONS
Understanding Type 2 Diabetes   When your body is working normally, the food you eat is digested and used as fuel. This fuel gives energy to the body’s cells. When you have diabetes, the fuel can’t enter the cells.  If not treated, diabetes can cause seri If high blood sugar is not controlled, blood vessels all over the body become damaged. Ongoing high blood sugar affects organs, blood vessels, and nerves. This raises the risk of damage to the heart, kidneys, eyes, nerves, and limbs.  Diabetes also makes ot

## 2020-08-17 NOTE — PROGRESS NOTES
HPI:    Patient ID: Janell Hawkins is a 50year old female. HPI   45yr old morbidly obese, female presents for f/u on recent episode of lightheadedness and f/u on chronic conditions. States the last Tuesday, she woke up and felt very tired.  She cou a week. 2 pen 2   • Albuterol Sulfate HFA (VENTOLIN HFA) 108 (90 Base) MCG/ACT Inhalation Aero Soln Inhale 2 puffs into the lungs every 4 (four) hours as needed for Wheezing.  1 Inhaler 6   • VITAMIN E 400 UNITS Oral Cap TAKE ONE (1) CAPSULE BY MOUTH TWICE round, and reactive to light. EOM are normal.   Neck: Neck supple. Cardiovascular: Normal rate and regular rhythm. Pulmonary/Chest: Effort normal and breath sounds normal. No respiratory distress. She has no wheezes. She has no rales.    Neurological: S

## 2020-08-18 DIAGNOSIS — E11.9 TYPE 2 DIABETES MELLITUS WITHOUT COMPLICATION (HCC): ICD-10-CM

## 2020-08-18 DIAGNOSIS — E66.9 DIABETES MELLITUS TYPE 2 IN OBESE (HCC): ICD-10-CM

## 2020-08-18 DIAGNOSIS — I10 ESSENTIAL HYPERTENSION: ICD-10-CM

## 2020-08-18 DIAGNOSIS — R80.9 MICROALBUMINURIA DUE TO TYPE 2 DIABETES MELLITUS (HCC): ICD-10-CM

## 2020-08-18 DIAGNOSIS — E78.2 MIXED HYPERLIPIDEMIA: ICD-10-CM

## 2020-08-18 DIAGNOSIS — E11.69 DIABETES MELLITUS TYPE 2 IN OBESE (HCC): ICD-10-CM

## 2020-08-18 DIAGNOSIS — E11.29 MICROALBUMINURIA DUE TO TYPE 2 DIABETES MELLITUS (HCC): ICD-10-CM

## 2020-08-18 RX ORDER — METFORMIN HYDROCHLORIDE 750 MG/1
TABLET, EXTENDED RELEASE ORAL
Qty: 180 TABLET | Refills: 0 | Status: SHIPPED | OUTPATIENT
Start: 2020-08-18 | End: 2020-11-16

## 2020-08-18 RX ORDER — AMLODIPINE BESYLATE 10 MG/1
TABLET ORAL
Qty: 90 TABLET | Refills: 0 | Status: SHIPPED | OUTPATIENT
Start: 2020-08-18 | End: 2020-11-16

## 2020-08-18 RX ORDER — TELMISARTAN 80 MG/1
TABLET ORAL
Qty: 90 TABLET | Refills: 0 | Status: SHIPPED | OUTPATIENT
Start: 2020-08-18 | End: 2020-11-16

## 2020-08-18 RX ORDER — GLIMEPIRIDE 2 MG/1
TABLET ORAL
Qty: 90 TABLET | Refills: 0 | Status: SHIPPED | OUTPATIENT
Start: 2020-08-18 | End: 2020-11-16

## 2020-08-18 RX ORDER — METOPROLOL SUCCINATE 200 MG/1
TABLET, EXTENDED RELEASE ORAL
Qty: 90 TABLET | Refills: 0 | Status: SHIPPED | OUTPATIENT
Start: 2020-08-18 | End: 2020-11-16

## 2020-08-18 RX ORDER — ATORVASTATIN CALCIUM 40 MG/1
TABLET, FILM COATED ORAL
Qty: 90 TABLET | Refills: 0 | Status: SHIPPED | OUTPATIENT
Start: 2020-08-18 | End: 2020-11-16

## 2020-08-18 NOTE — TELEPHONE ENCOUNTER
Hypertension Medications Protocol Passed8/18 1:57 PM   CMP or BMP in past 12 months    Last serum creatinine< 2.0    Appointment in past 6 or next 3 months     LOV 8/17/20     LAST LAB 12/23/2019    LAST RX 5/29/20 90 tabs     Next OV   Future Appointments

## 2020-08-28 ENCOUNTER — LAB ENCOUNTER (OUTPATIENT)
Dept: LAB | Age: 45
End: 2020-08-28
Attending: FAMILY MEDICINE
Payer: MEDICARE

## 2020-08-28 DIAGNOSIS — E11.69 DIABETES MELLITUS TYPE 2 IN OBESE (HCC): ICD-10-CM

## 2020-08-28 DIAGNOSIS — I10 ESSENTIAL HYPERTENSION: ICD-10-CM

## 2020-08-28 DIAGNOSIS — E78.2 MIXED HYPERLIPIDEMIA: ICD-10-CM

## 2020-08-28 DIAGNOSIS — E66.9 DIABETES MELLITUS TYPE 2 IN OBESE (HCC): ICD-10-CM

## 2020-08-28 LAB
ALBUMIN SERPL-MCNC: 4.2 G/DL (ref 3.4–5)
ALBUMIN/GLOB SERPL: 1.2 {RATIO} (ref 1–2)
ALP LIVER SERPL-CCNC: 46 U/L (ref 39–100)
ALT SERPL-CCNC: 26 U/L (ref 13–56)
ANION GAP SERPL CALC-SCNC: 4 MMOL/L (ref 0–18)
AST SERPL-CCNC: 18 U/L (ref 15–37)
BILIRUB SERPL-MCNC: 0.4 MG/DL (ref 0.1–2)
BUN BLD-MCNC: 13 MG/DL (ref 7–18)
BUN/CREAT SERPL: 17.1 (ref 10–20)
CALCIUM BLD-MCNC: 9.9 MG/DL (ref 8.5–10.1)
CHLORIDE SERPL-SCNC: 109 MMOL/L (ref 98–112)
CHOLEST SMN-MCNC: 153 MG/DL (ref ?–200)
CO2 SERPL-SCNC: 26 MMOL/L (ref 21–32)
CREAT BLD-MCNC: 0.76 MG/DL (ref 0.55–1.02)
CREAT UR-SCNC: 75.5 MG/DL
EST. AVERAGE GLUCOSE BLD GHB EST-MCNC: 134 MG/DL (ref 68–126)
GLOBULIN PLAS-MCNC: 3.6 G/DL (ref 2.8–4.4)
GLUCOSE BLD-MCNC: 120 MG/DL (ref 70–99)
HBA1C MFR BLD HPLC: 6.3 % (ref ?–5.7)
HDLC SERPL-MCNC: 48 MG/DL (ref 40–59)
LDLC SERPL CALC-MCNC: 50 MG/DL (ref ?–100)
M PROTEIN MFR SERPL ELPH: 7.8 G/DL (ref 6.4–8.2)
MICROALBUMIN UR-MCNC: 110 MG/DL
MICROALBUMIN/CREAT 24H UR-RTO: 1457 UG/MG (ref ?–30)
NONHDLC SERPL-MCNC: 105 MG/DL (ref ?–130)
OSMOLALITY SERPL CALC.SUM OF ELEC: 289 MOSM/KG (ref 275–295)
PATIENT FASTING Y/N/NP: YES
PATIENT FASTING Y/N/NP: YES
POTASSIUM SERPL-SCNC: 4.1 MMOL/L (ref 3.5–5.1)
SODIUM SERPL-SCNC: 139 MMOL/L (ref 136–145)
TRIGL SERPL-MCNC: 275 MG/DL (ref 30–149)
VLDLC SERPL CALC-MCNC: 55 MG/DL (ref 0–30)

## 2020-08-28 PROCEDURE — 82570 ASSAY OF URINE CREATININE: CPT

## 2020-08-28 PROCEDURE — 80053 COMPREHEN METABOLIC PANEL: CPT

## 2020-08-28 PROCEDURE — 83036 HEMOGLOBIN GLYCOSYLATED A1C: CPT

## 2020-08-28 PROCEDURE — 80061 LIPID PANEL: CPT

## 2020-08-28 PROCEDURE — 82043 UR ALBUMIN QUANTITATIVE: CPT

## 2020-09-08 ENCOUNTER — MED REC SCAN ONLY (OUTPATIENT)
Dept: FAMILY MEDICINE CLINIC | Facility: CLINIC | Age: 45
End: 2020-09-08

## 2020-11-08 DIAGNOSIS — Z51.81 ENCOUNTER FOR THERAPEUTIC DRUG MONITORING: ICD-10-CM

## 2020-11-08 DIAGNOSIS — E11.9 TYPE 2 DIABETES MELLITUS WITHOUT COMPLICATION, WITHOUT LONG-TERM CURRENT USE OF INSULIN (HCC): ICD-10-CM

## 2020-11-08 DIAGNOSIS — E66.01 MORBID OBESITY WITH BMI OF 45.0-49.9, ADULT (HCC): ICD-10-CM

## 2020-11-08 DIAGNOSIS — I10 ESSENTIAL HYPERTENSION: ICD-10-CM

## 2020-11-08 DIAGNOSIS — E78.2 MIXED HYPERLIPIDEMIA: ICD-10-CM

## 2020-11-09 NOTE — TELEPHONE ENCOUNTER
Requesting ozempic 1 mg  LOV: 5/4/20  RTC: 2 months  Last Relevant Labs: 8/28/20  Filled: 5/4/20 #2 pens with 2 refills    appt needed. My chart sent.

## 2020-11-11 RX ORDER — SEMAGLUTIDE 1.34 MG/ML
INJECTION, SOLUTION SUBCUTANEOUS
Qty: 3 ML | Refills: 2 | OUTPATIENT
Start: 2020-11-11

## 2020-11-11 NOTE — TELEPHONE ENCOUNTER
Future Appointments   Date Time Provider Tara Anne   11/24/2020  4:00 PM WILLIS Ricci [facility]     No appt still. I refused refill since she would have already run out in August.  Denied with note to call us and schedule.

## 2020-11-16 ENCOUNTER — TELEPHONE (OUTPATIENT)
Dept: FAMILY MEDICINE CLINIC | Facility: CLINIC | Age: 45
End: 2020-11-16

## 2020-11-16 DIAGNOSIS — E11.29 MICROALBUMINURIA DUE TO TYPE 2 DIABETES MELLITUS (HCC): ICD-10-CM

## 2020-11-16 DIAGNOSIS — E11.9 TYPE 2 DIABETES MELLITUS WITHOUT COMPLICATION (HCC): ICD-10-CM

## 2020-11-16 DIAGNOSIS — I10 ESSENTIAL HYPERTENSION: ICD-10-CM

## 2020-11-16 DIAGNOSIS — R80.9 MICROALBUMINURIA DUE TO TYPE 2 DIABETES MELLITUS (HCC): ICD-10-CM

## 2020-11-16 DIAGNOSIS — E66.9 DIABETES MELLITUS TYPE 2 IN OBESE (HCC): ICD-10-CM

## 2020-11-16 DIAGNOSIS — E78.2 MIXED HYPERLIPIDEMIA: ICD-10-CM

## 2020-11-16 DIAGNOSIS — E11.69 DIABETES MELLITUS TYPE 2 IN OBESE (HCC): ICD-10-CM

## 2020-11-16 RX ORDER — TELMISARTAN 80 MG/1
TABLET ORAL
Qty: 30 TABLET | Refills: 5 | Status: SHIPPED | OUTPATIENT
Start: 2020-11-16 | End: 2021-05-14

## 2020-11-16 RX ORDER — METFORMIN HYDROCHLORIDE 750 MG/1
TABLET, EXTENDED RELEASE ORAL
Qty: 60 TABLET | Refills: 0 | Status: SHIPPED | OUTPATIENT
Start: 2020-11-16 | End: 2020-12-17

## 2020-11-16 RX ORDER — GLIMEPIRIDE 2 MG/1
TABLET ORAL
Qty: 30 TABLET | Refills: 5 | Status: SHIPPED | OUTPATIENT
Start: 2020-11-16 | End: 2021-05-14

## 2020-11-16 RX ORDER — METOPROLOL SUCCINATE 200 MG/1
TABLET, EXTENDED RELEASE ORAL
Qty: 30 TABLET | Refills: 5 | Status: SHIPPED | OUTPATIENT
Start: 2020-11-16 | End: 2021-05-14

## 2020-11-16 RX ORDER — AMLODIPINE BESYLATE 10 MG/1
TABLET ORAL
Qty: 30 TABLET | Refills: 0 | Status: SHIPPED | OUTPATIENT
Start: 2020-11-16 | End: 2020-12-17

## 2020-11-16 RX ORDER — ATORVASTATIN CALCIUM 40 MG/1
TABLET, FILM COATED ORAL
Qty: 30 TABLET | Refills: 5 | Status: SHIPPED | OUTPATIENT
Start: 2020-11-16 | End: 2021-05-14

## 2020-11-16 NOTE — TELEPHONE ENCOUNTER
LOV 8/17/2020    LAST LAB 8/28/2020    LAST RX 8/18/2020 90 tablet 0 refills - for all pended medications.       Next OV   Future Appointments   Date Time Provider Tara Anne   11/17/2020  3:00 PM Dhaval Sharma,  [facility]   11/24/202

## 2020-11-16 NOTE — TELEPHONE ENCOUNTER
Pt answered travel screening questions with cough and shortness of breath. Called pt and she said she's had that for a while along with enlarged tonsils. Should this be triaged?  Appt tmrw with Dr. Ashley Obando at 3pm.

## 2020-11-17 ENCOUNTER — OFFICE VISIT (OUTPATIENT)
Dept: FAMILY MEDICINE CLINIC | Facility: CLINIC | Age: 45
End: 2020-11-17
Payer: MEDICARE

## 2020-11-17 VITALS
SYSTOLIC BLOOD PRESSURE: 134 MMHG | WEIGHT: 290.19 LBS | BODY MASS INDEX: 48.35 KG/M2 | OXYGEN SATURATION: 97 % | HEIGHT: 65 IN | HEART RATE: 84 BPM | TEMPERATURE: 98 F | DIASTOLIC BLOOD PRESSURE: 84 MMHG | RESPIRATION RATE: 16 BRPM

## 2020-11-17 DIAGNOSIS — E11.9 TYPE 2 DIABETES MELLITUS WITHOUT COMPLICATION, WITHOUT LONG-TERM CURRENT USE OF INSULIN (HCC): Primary | ICD-10-CM

## 2020-11-17 DIAGNOSIS — B35.3 TINEA PEDIS OF BOTH FEET: ICD-10-CM

## 2020-11-17 DIAGNOSIS — M79.671 HEEL PAIN, CHRONIC, RIGHT: ICD-10-CM

## 2020-11-17 DIAGNOSIS — Z51.81 ENCOUNTER FOR THERAPEUTIC DRUG MONITORING: ICD-10-CM

## 2020-11-17 DIAGNOSIS — E78.2 MIXED HYPERLIPIDEMIA: ICD-10-CM

## 2020-11-17 DIAGNOSIS — G89.29 HEEL PAIN, CHRONIC, RIGHT: ICD-10-CM

## 2020-11-17 DIAGNOSIS — E66.01 MORBID OBESITY WITH BMI OF 45.0-49.9, ADULT (HCC): ICD-10-CM

## 2020-11-17 DIAGNOSIS — I10 ESSENTIAL HYPERTENSION: ICD-10-CM

## 2020-11-17 PROCEDURE — 99214 OFFICE O/P EST MOD 30 MIN: CPT | Performed by: FAMILY MEDICINE

## 2020-11-17 RX ORDER — VITAMIN E 268 MG
CAPSULE ORAL
COMMUNITY
Start: 2020-06-02 | End: 2020-11-17

## 2020-11-17 RX ORDER — SEMAGLUTIDE 1.34 MG/ML
1 INJECTION, SOLUTION SUBCUTANEOUS WEEKLY
Qty: 2 PEN | Refills: 0 | Status: SHIPPED | OUTPATIENT
Start: 2020-11-17 | End: 2020-12-09

## 2020-11-17 NOTE — TELEPHONE ENCOUNTER
Future Appointments   Date Time Provider Tara Anne   11/17/2020  3:00 PM Pooja Sharma, DO [facility]   11/24/2020  4:00 PM WILLIS Hauser [facility] for patient to return call asap to review cough and SOB symptoms

## 2020-11-17 NOTE — TELEPHONE ENCOUNTER
Called patient who states the cough and SOB she is having is not new. It has been going on for several years, possibly related to her tonsils. Did not follow up with ENT. Lost contact info.   We will sent a [facility] message with contact info for Dr. Catherine Yusuf

## 2020-11-17 NOTE — PROGRESS NOTES
HPI:   Casey Foley is a 50year old female who presents for f/u on chronic conditions and c/o R heel pain. DM2, has been taking medications as directed. Was following with [facility] who had started her on Ozempic weekly.  Overdue for appt, therefore her oz Fenofibrate 160 MG Oral Tab TAKE 1 TABLET BY MOUTH ONCE DAILY 90 tablet 1   • Albuterol Sulfate HFA (VENTOLIN HFA) 108 (90 Base) MCG/ACT Inhalation Aero Soln Inhale 2 puffs into the lungs every 4 (four) hours as needed for Wheezing.  1 Inhaler 6   • VITAMIN AST 22 08/16/2019     Lab Results   Component Value Date    ALT 26 08/28/2020    ALT 25 12/23/2019    ALT 28 08/16/2019     @EDWBRIEFLAB(    )  Past Medical History:   Diagnosis Date   • Anxiety state, unspecified    • Asthma    • Back pain    • Bad breath Father    • Other (Other) Father         heart attack-- at age 46   • Cancer Brother         brain tumor   • Diabetes Brother    • Dementia Mother    • Diabetes Mother    • Stroke Mother    • Bipolar Disorder Other    • Suicide History Other    • Luisa Smokeless tobacco: Never Used       Alcohol use No        REVIEW OF SYSTEMS:   GENERAL HEALTH: feels well otherwise  SKIN: denies any unusual skin lesions or rashes  RESPIRATORY: denies shortness of breath with exertion  CARDIOVASCULAR: denies chest pain indicates understanding of these issues and agrees to the plan. The patient is asked to return in [place] for AWV.

## 2020-11-17 NOTE — PATIENT INSTRUCTIONS
RICE    RICE stands for rest, ice, compression, and elevation. Doing these things helps limit pain and swelling after an injury. RICE also helps injuries heal faster. Use RICE for sprains, strains, and severe bruises or bumps.  Follow the tips on this martini Choosing the Right Shoes for Comfort                     You walk on your feet every day, forcing them to support the weight of your body. Repeated stress on your feet can cause damage over time. The right shoes can help protect your feet.  The wrong shoes · A stiff, snug back to keep your foot from sliding around  · A smooth lining with no rough seams  Shoe shopping tips  Below are some do’s and don’ts for when you go to the shoe store. Do:  · Select the shoes that feel right. Wear them around the house.  Yayo Bob Bonnie last reviewed this educational content on 3/1/2020  © 4043-3788 The [address]. [address] [facility], [facility] [place]. All rights reserved. This information is not intended as a substitute for professional medical care.  Always sulyo

## 2020-11-20 ENCOUNTER — HOSPITAL ENCOUNTER (OUTPATIENT)
Dept: GENERAL RADIOLOGY | Age: 45
Discharge: HOME | End: 2020-11-20
Attending: FAMILY MEDICINE
Payer: MEDICARE

## 2020-11-20 DIAGNOSIS — Z51.81 ENCOUNTER FOR THERAPEUTIC DRUG MONITORING: ICD-10-CM

## 2020-11-20 DIAGNOSIS — E11.9 TYPE 2 DIABETES MELLITUS WITHOUT COMPLICATION, WITHOUT LONG-TERM CURRENT USE OF INSULIN (HCC): ICD-10-CM

## 2020-11-20 DIAGNOSIS — G89.29 HEEL PAIN, CHRONIC, RIGHT: ICD-10-CM

## 2020-11-20 DIAGNOSIS — M79.671 HEEL PAIN, CHRONIC, RIGHT: ICD-10-CM

## 2020-11-20 DIAGNOSIS — E66.01 MORBID OBESITY WITH BMI OF 45.0-49.9, ADULT (HCC): ICD-10-CM

## 2020-11-20 PROCEDURE — 73650 X-RAY EXAM OF HEEL: CPT | Performed by: FAMILY MEDICINE

## 2020-11-24 ENCOUNTER — TELEPHONE (OUTPATIENT)
Dept: FAMILY MEDICINE CLINIC | Facility: CLINIC | Age: 45
End: 2020-11-24

## 2020-11-24 RX ORDER — SEMAGLUTIDE 1.34 MG/ML
INJECTION, SOLUTION SUBCUTANEOUS
Qty: 3 PEN | Refills: 0 | OUTPATIENT
Start: 2020-11-24

## 2020-11-24 NOTE — TELEPHONE ENCOUNTER
- will provide temporary refill for ozempic        LOV  11/17/2020    LAST LAB 8/28/2020    LAST RX   OZEMPIC, 1 MG/DOSE, 2 MG/1.5ML Subcutaneous Solution Pen-injector 2 pen 0 11/17/2020         Next OV   Future Appointments   Date Time Provider Department

## 2020-11-25 NOTE — TELEPHONE ENCOUNTER
YOBANY with more detailed information regarding foot xray. Advised it shows arthritic changes in the ankle joints and some inflammation in areas of the foot where the ligaments attach.   Informed Dr. Avani Bernardo has advised she follow up with a Podiatrist, Dr. Natalie Mcghee

## 2020-11-25 NOTE — TELEPHONE ENCOUNTER
Returned call to patient and advised of explanation of Xray result and plan to refer to Podiatry. Contact information given for Dr. Maricarmen rAnold.

## 2020-12-01 DIAGNOSIS — E78.2 MIXED HYPERLIPIDEMIA: ICD-10-CM

## 2020-12-01 DIAGNOSIS — Z51.81 ENCOUNTER FOR THERAPEUTIC DRUG MONITORING: ICD-10-CM

## 2020-12-01 DIAGNOSIS — E66.01 MORBID OBESITY WITH BMI OF 45.0-49.9, ADULT (HCC): ICD-10-CM

## 2020-12-01 DIAGNOSIS — E11.9 TYPE 2 DIABETES MELLITUS WITHOUT COMPLICATION, WITHOUT LONG-TERM CURRENT USE OF INSULIN (HCC): ICD-10-CM

## 2020-12-02 RX ORDER — SEMAGLUTIDE 1.34 MG/ML
1 INJECTION, SOLUTION SUBCUTANEOUS WEEKLY
Qty: 2 PEN | Refills: 0 | Status: CANCELLED | OUTPATIENT
Start: 2020-12-02

## 2020-12-02 NOTE — TELEPHONE ENCOUNTER
LOV 11/17/2020    LAST LAB 8/28/20     LAST RX   Fenofibrate 160 MG Oral Tab 90 tablet 1 6/19/2020    Sig:   TAKE 1 TABLET BY MOUTH ONCE DAILY       metFORMIN HCl  MG Oral Tablet 24 Hr 60 tablet 0 11/16/2020    Sig:   TAKE ONE (1) TABLET BY MOUTH TWI

## 2020-12-04 ENCOUNTER — OFFICE VISIT (OUTPATIENT)
Dept: ORTHOPEDICS CLINIC | Facility: CLINIC | Age: 45
End: 2020-12-04
Payer: MEDICARE

## 2020-12-04 DIAGNOSIS — M72.2 PLANTAR FASCIITIS, RIGHT: Primary | ICD-10-CM

## 2020-12-04 PROCEDURE — 99202 OFFICE O/P NEW SF 15 MIN: CPT | Performed by: PODIATRIST

## 2020-12-04 PROCEDURE — 20551 NJX 1 TENDON ORIGIN/INSJ: CPT | Performed by: PODIATRIST

## 2020-12-04 RX ORDER — BETAMETHASONE SODIUM PHOSPHATE AND BETAMETHASONE ACETATE 3; 3 MG/ML; MG/ML
12 INJECTION, SUSPENSION INTRA-ARTICULAR; INTRALESIONAL; INTRAMUSCULAR; SOFT TISSUE ONCE
Status: COMPLETED | OUTPATIENT
Start: 2020-12-04 | End: 2020-12-04

## 2020-12-04 RX ADMIN — BETAMETHASONE SODIUM PHOSPHATE AND BETAMETHASONE ACETATE 12 MG: 3; 3 INJECTION, SUSPENSION INTRA-ARTICULAR; INTRALESIONAL; INTRAMUSCULAR; SOFT TISSUE at 15:27:00

## 2020-12-04 NOTE — H&P
[facility] New Patient Note    CC: Patient presents with: Foot Pain: Patient is here today due to having right foot pain, states that pain started back in october.       HPI: This 50year old female presents today with complaints of right heel carl [place] EDW    Left Breast     Current Outpatient Medications   Medication Sig Dispense Refill   • PARoxetine HCl (PAXIL) 20 MG Oral Tab Take 2.5 tablets (50 mg total) by mouth daily.  225 tablet 2   • OZEMPIC, 1 MG/DOSE, 2 MG/1.5ML Subcutaneous Solution Pen- as needed for Wheezing or Shortness of Breath (cough).  1 Inhaler 0   • Glucose Blood (TRUE METRIX BLOOD GLUCOSE TEST) In Vitro Strip USE TO TEST BLOOD SUGAR TWICE DAILY AS DIRECTED 100 strip 11   • Multiple Vitamins-Minerals (CENTRUM SILVER ULTRA WOMENS) O Assessment/Plan:  There are no diagnoses linked to this encounter. Assessment:  Plantar fascitis right heel        Plan:     Good shoes, inserts and stretching  Recommend cortisone injections. Karin Reddy.  Bradley Boone, [facility] Orth[facility]

## 2020-12-08 ENCOUNTER — TELEPHONE (OUTPATIENT)
Dept: INTERNAL MEDICINE CLINIC | Facility: CLINIC | Age: 45
End: 2020-12-08

## 2020-12-09 ENCOUNTER — OFFICE VISIT (OUTPATIENT)
Dept: INTERNAL MEDICINE CLINIC | Facility: CLINIC | Age: 45
End: 2020-12-09
Payer: MEDICARE

## 2020-12-09 VITALS
RESPIRATION RATE: 16 BRPM | BODY MASS INDEX: 48.82 KG/M2 | WEIGHT: 293 LBS | HEIGHT: 65 IN | SYSTOLIC BLOOD PRESSURE: 122 MMHG | DIASTOLIC BLOOD PRESSURE: 84 MMHG | HEART RATE: 86 BPM

## 2020-12-09 DIAGNOSIS — E78.2 MIXED HYPERLIPIDEMIA: ICD-10-CM

## 2020-12-09 DIAGNOSIS — E11.9 TYPE 2 DIABETES MELLITUS WITHOUT COMPLICATION, WITHOUT LONG-TERM CURRENT USE OF INSULIN (HCC): ICD-10-CM

## 2020-12-09 DIAGNOSIS — I10 ESSENTIAL HYPERTENSION: ICD-10-CM

## 2020-12-09 DIAGNOSIS — E66.01 MORBID OBESITY WITH BMI OF 45.0-49.9, ADULT (HCC): ICD-10-CM

## 2020-12-09 DIAGNOSIS — Z91.89 CARDIOVASCULAR RISK FACTOR: ICD-10-CM

## 2020-12-09 DIAGNOSIS — Z51.81 ENCOUNTER FOR THERAPEUTIC DRUG MONITORING: Primary | ICD-10-CM

## 2020-12-09 PROCEDURE — 99214 OFFICE O/P EST MOD 30 MIN: CPT | Performed by: NURSE PRACTITIONER

## 2020-12-09 RX ORDER — SEMAGLUTIDE 1.34 MG/ML
1 INJECTION, SOLUTION SUBCUTANEOUS WEEKLY
Qty: 6 PEN | Refills: 0 | Status: SHIPPED | OUTPATIENT
Start: 2020-12-09 | End: 2021-03-02

## 2020-12-09 NOTE — PATIENT INSTRUCTIONS
Continue making lifestyle changes that focus on good nutrition, regular exercise and stress management. Medication Plan: Continue current medication regimen.     Next steps to work on before next office visit include: Resume tracking nutrition to remain smoking  Social  Healthy eating and physical activity can be affected by many social and habitual factors:  • College  • Marriage  • Children  • Jobs and commute time  • senior care  • Family holidays  • Neighborhood environment  • Income level  This catego small group fitness classes targeted at weight loss- [phone] and/or email @ [email or web address]  · AnySize Fitness @ http://anysizeRevolution Foods.com.  Personal training and Group Fitness classes with Nucorwin Sender [phone]  · 360FIT Raheel Echeverria https://youtu. be/[email or web address])  · The Dr. Kati [place] by Dr. Awa Wen MD  · Fitlosophy Fitspiration - journal to better health (found at [employer] in fitness aisle)  · Whole Again by Neeta Kenny - discovering your true self after trauma

## 2020-12-09 NOTE — PROGRESS NOTES
Rex Casas is a 50year old female presents today for follow-up on medical weight loss program for the treatment of overweight, obesity, or morbid obesity with associated Type 2 Diabetes, HTN, hyperlipidemia, KEON, fatty liver.     S:  Current weight obese  EYES: conjunctiva pink, sclera non icteric, PERRLA  LUNGS: CTA in all fields  CARDIO: RRR without murmur, normal S1 and S2 without clicks or gallops, no pedal edema.   GI: +BS, soft  NEURO/MS: motor and sensory grossly intact  PSYCH: pleasant, raoul grams/day. Life events can certainly impact our weight and throw us of course, especially when something unexpected and often traumatizing comes along.  Return to the 4 pillars of health: nutrition, fitness, sleep and stress and work to rebuild your foun around the clock. Some people have limited access to healthy food and places where it is safe to do leisurely activities such as walking the dog. Psychological  How we react to and cope with the events in our lives can also impact our weight.  Someone with [email or web address] com    Online Fitness  · Fitness  on Top Hat in 10 DVD series   www. owdsy54DGE. [place]  · Sit and Be Fit - Chair exercise series www.sitandbefit. org      Apps for on the [employer]  · [employer] - on the go group exercise  · Rupesh sanders the Physician's Committee, Nutrition Facts by Dr. Eladio Terrell            Medication use and SEs reviewed with patient. Return in about 2 months (around 2/9/2021) for weight management. Patient verbalizes understanding.

## 2020-12-15 ENCOUNTER — OFFICE VISIT (OUTPATIENT)
Dept: PHYSICAL THERAPY | Facility: HOSPITAL | Age: 45
End: 2020-12-15
Attending: PODIATRIST
Payer: MEDICARE

## 2020-12-15 DIAGNOSIS — M72.2 PLANTAR FASCIITIS, RIGHT: ICD-10-CM

## 2020-12-15 PROCEDURE — 97162 PT EVAL MOD COMPLEX 30 MIN: CPT

## 2020-12-15 PROCEDURE — 97110 THERAPEUTIC EXERCISES: CPT

## 2020-12-15 NOTE — PROGRESS NOTES
LOWER EXTREMITY EVALUATION:   Referring Physician: Dr. Hedy Cohen  Diagnosis:    Plantar fasciitis, right (M72.2) Date of Service: 12/15/2020     PATIENT SUMMARY   General Rosas is a 50year old female who presents to therapy today with complaints of R uncontrolled, and Wheezing.  She also has no past medical history of Anesthesia complication, Dementia ([address] Difficult intubation, Hypothyroidism, Malignant hyperthermia, Other specified diseases of blood and blood-forming organs(289.89), PCOS (polycystic B  Extension: NT  Abduction: NT  ER: R:5/5, L: 5/5  IR: R:5/5, L:4+/5 Flexion: 5 B  Extension: 5 B    DF: 5 B  PF: R 3/5; L 3+/5  INV: 5/5  EV: 5/5 B  Great toe ext:5/5 B   * could only do 10 SL calf raise on L, about 9 on R    Gait: pt ambulates on level · Pt will report <2/10 pain with work and home activities such as walking, squats, etc.   · Pt will be independent and compliant with comprehensive HEP to maintain progress achieved in PT      Frequency / Duration: Patient will be seen for 2 x/week or a

## 2020-12-16 DIAGNOSIS — I10 ESSENTIAL HYPERTENSION: ICD-10-CM

## 2020-12-17 ENCOUNTER — OFFICE VISIT (OUTPATIENT)
Dept: PHYSICAL THERAPY | Facility: HOSPITAL | Age: 45
End: 2020-12-17
Attending: PODIATRIST
Payer: MEDICARE

## 2020-12-17 DIAGNOSIS — M72.2 PLANTAR FASCIITIS, RIGHT: ICD-10-CM

## 2020-12-17 PROCEDURE — 97110 THERAPEUTIC EXERCISES: CPT

## 2020-12-17 PROCEDURE — 97140 MANUAL THERAPY 1/> REGIONS: CPT

## 2020-12-17 RX ORDER — AMLODIPINE BESYLATE 10 MG/1
TABLET ORAL
Qty: 90 TABLET | Refills: 0 | Status: SHIPPED | OUTPATIENT
Start: 2020-12-17 | End: 2021-03-04

## 2020-12-17 RX ORDER — METFORMIN HYDROCHLORIDE 750 MG/1
TABLET, EXTENDED RELEASE ORAL
Qty: 60 TABLET | Refills: 2 | Status: SHIPPED | OUTPATIENT
Start: 2020-12-17 | End: 2021-03-04

## 2020-12-17 NOTE — PROGRESS NOTES
Dx:     Plantar fasciitis, right (M72.2)    Insurance (Authorized # of Visits):  10 per Alfredo Ruiz          Authorizing Physician: Dr. Antoni Alfonso  Next MD visit: none scheduled  Fall Risk: standard         Precautions: n/a             Subjective:  Pt states she wobbleboard AP for ROM  3x15\" balance AP on wobble       HEP: frozen water bottle plantraf fascia roll out x2 min (spikey ball in clinic), 2x30\" plantar fascia stretch seated, 2x30\" calf stretch at wall (R), 2x10 toe lifts for arch strengthening, 20 ank

## 2020-12-17 NOTE — TELEPHONE ENCOUNTER
Hypertension Medications Protocol Daakhb2312/16/2020 04:02 PM   CMP or BMP in past 12 months Protocol Details    Last serum creatinine< 2.0     Appointment in past 6 or next 3 months      LOV 11/17/20     LAST LAB  8/28/20     LAST RX  11/16/20 30 tabs 0 ref PROTOCOL pass

## 2020-12-21 ENCOUNTER — OFFICE VISIT (OUTPATIENT)
Dept: PHYSICAL THERAPY | Facility: HOSPITAL | Age: 45
End: 2020-12-21
Attending: FAMILY MEDICINE
Payer: MEDICARE

## 2020-12-21 PROCEDURE — 97110 THERAPEUTIC EXERCISES: CPT

## 2020-12-21 PROCEDURE — 97140 MANUAL THERAPY 1/> REGIONS: CPT

## 2020-12-21 PROCEDURE — 97112 NEUROMUSCULAR REEDUCATION: CPT

## 2020-12-21 NOTE — PROGRESS NOTES
Dx:     Plantar fasciitis, right (M72.2)    Insurance (Authorized # of Visits):  10 per Alfredo Ruiz          Authorizing Physician: Dr. Kareem Holloway  Next MD visit: none scheduled  Fall Risk: standard         Precautions: n/a             Subjective:  Pt states her squats  15 toe lifts for arch strengthening   20 ankle circles CW/CCW  2x30\" seated plantar flexion stretch      Manual; grade III posterior talar glide followed by PROM into DF, IASTM to volar surface/plantar fascia,  x15 min.  Manual; grade III posterior

## 2020-12-22 RX ORDER — FENOFIBRATE 160 MG/1
TABLET ORAL
Qty: 90 TABLET | Refills: 1 | Status: SHIPPED | OUTPATIENT
Start: 2020-12-22 | End: 2021-05-28 | Stop reason: ALTCHOICE

## 2020-12-22 RX ORDER — METFORMIN HYDROCHLORIDE 750 MG/1
TABLET, EXTENDED RELEASE ORAL
Qty: 60 TABLET | Refills: 0 | OUTPATIENT
Start: 2020-12-22

## 2020-12-29 ENCOUNTER — TELEPHONE (OUTPATIENT)
Dept: INTERNAL MEDICINE CLINIC | Facility: CLINIC | Age: 45
End: 2020-12-29

## 2020-12-29 ENCOUNTER — APPOINTMENT (OUTPATIENT)
Dept: PHYSICAL THERAPY | Facility: HOSPITAL | Age: 45
End: 2020-12-29
Attending: FAMILY MEDICINE
Payer: MEDICARE

## 2020-12-29 ENCOUNTER — TELEPHONE (OUTPATIENT)
Dept: PHYSICAL THERAPY | Facility: HOSPITAL | Age: 45
End: 2020-12-29

## 2020-12-29 DIAGNOSIS — E11.9 TYPE 2 DIABETES MELLITUS WITHOUT COMPLICATION, WITHOUT LONG-TERM CURRENT USE OF INSULIN (HCC): Primary | ICD-10-CM

## 2020-12-29 NOTE — TELEPHONE ENCOUNTER
----- Message from [facility] sent at 12/29/2020  1:44 PM CST -----  Regarding: Dietitian Order needed  Hi Dr. Damian Chen,    Can you please enter a Dietitian order for patient of Latonia's patient who is seeing [address] [facility] tomorrow for appt.  She is a Medicare patie

## 2020-12-30 ENCOUNTER — TELEPHONE (OUTPATIENT)
Dept: FAMILY MEDICINE CLINIC | Facility: CLINIC | Age: 45
End: 2020-12-30

## 2020-12-30 ENCOUNTER — OFFICE VISIT (OUTPATIENT)
Dept: INTERNAL MEDICINE CLINIC | Facility: CLINIC | Age: 45
End: 2020-12-30
Payer: MEDICARE

## 2020-12-30 VITALS — WEIGHT: 293 LBS | HEIGHT: 65 IN | BODY MASS INDEX: 48.82 KG/M2

## 2020-12-30 DIAGNOSIS — E11.9 TYPE 2 DIABETES MELLITUS WITHOUT COMPLICATION, WITHOUT LONG-TERM CURRENT USE OF INSULIN (HCC): Primary | ICD-10-CM

## 2020-12-30 PROCEDURE — 97803 MED NUTRITION INDIV SUBSEQ: CPT | Performed by: DIETITIAN, REGISTERED

## 2020-12-30 NOTE — PROGRESS NOTES
FOLLOW UP NUTRITION CONSULTATION    Nutrition Assessment    Number of consultations with dietitian: 2    Height:  Ht Readings from Last 1 Encounters:  12/30/20 : 5' 5\" (1.651 m)      Weight:   Wt Readings from Last 5 Encounters:  12/30/20 : (!) 303 lb recommended. Reviewed healthier convenience food/FF options, tracking food in the Lose It thomas, and the importance of incorporating exercise. Also reviewed goal of staying under 125g CHO per day to improve blood sugars.  Patient agreed to goals below, will f

## 2020-12-31 ENCOUNTER — OFFICE VISIT (OUTPATIENT)
Dept: PHYSICAL THERAPY | Facility: HOSPITAL | Age: 45
End: 2020-12-31
Attending: FAMILY MEDICINE
Payer: MEDICARE

## 2020-12-31 PROCEDURE — 97140 MANUAL THERAPY 1/> REGIONS: CPT

## 2020-12-31 PROCEDURE — 97110 THERAPEUTIC EXERCISES: CPT

## 2020-12-31 PROCEDURE — 97112 NEUROMUSCULAR REEDUCATION: CPT

## 2020-12-31 NOTE — PROGRESS NOTES
Dx:     Plantar fasciitis, right (M72.2)    Insurance (Authorized # of Visits):  10 per Alfredo Ruiz          Authorizing Physician: Dr. Hedy Cohen  Next MD visit: none scheduled  Fall Risk: standard         Precautions: n/a             Subjective:  Pt reports cody seated plantar flexion stretch 15 mini squats  15 toe lifts for arch strengthening   20 ankle circles CW/CCW  2x30\" seated plantar flexion stretch 15 mini squats  20 toe raises  10 step ups to bosu, B  15 toe lifts for arch strengthening   20 ankle [place]

## 2021-01-05 ENCOUNTER — OFFICE VISIT (OUTPATIENT)
Dept: PHYSICAL THERAPY | Facility: HOSPITAL | Age: 46
End: 2021-01-05
Attending: FAMILY MEDICINE
Payer: MEDICARE

## 2021-01-05 PROCEDURE — 97112 NEUROMUSCULAR REEDUCATION: CPT

## 2021-01-05 PROCEDURE — 97110 THERAPEUTIC EXERCISES: CPT

## 2021-01-05 PROCEDURE — 97140 MANUAL THERAPY 1/> REGIONS: CPT

## 2021-01-05 NOTE — PROGRESS NOTES
Dx:     Plantar fasciitis, right (M72.2)    Insurance (Authorized # of Visits):  10 per Alfredo Ruiz          Authorizing Physician: Dr. Dollie Lesches  Next MD visit: none scheduled  Fall Risk: standard         Precautions: n/a             Subjective:  Pt reports cody strengthening   20 ankle circles CW/CCW  2x30\" seated plantar flexion stretch 15 mini squats  15 toe lifts for arch strengthening   20 ankle circles CW/CCW  2x30\" seated plantar flexion stretch 15 mini squats  20 toe raises  10 step ups to jase B  15 to

## 2021-01-07 ENCOUNTER — OFFICE VISIT (OUTPATIENT)
Dept: PHYSICAL THERAPY | Facility: HOSPITAL | Age: 46
End: 2021-01-07
Attending: FAMILY MEDICINE
Payer: MEDICARE

## 2021-01-07 PROCEDURE — 97112 NEUROMUSCULAR REEDUCATION: CPT

## 2021-01-07 PROCEDURE — 97110 THERAPEUTIC EXERCISES: CPT

## 2021-01-07 PROCEDURE — 97140 MANUAL THERAPY 1/> REGIONS: CPT

## 2021-01-07 NOTE — PROGRESS NOTES
Dx:     Plantar fasciitis, right (M72.2)    Insurance (Authorized # of Visits):  10 per Alfredo Ruiz          Authorizing Physician: Dr. Mikel Lopez  Next MD visit: none scheduled  Fall Risk: standard         Precautions: n/a             Subjective:  Pt reports cody wall (R) 5 min recum bike level 6   2x30\" calf stretch on slantboard 5 min recum bike level 6   2x30\" calf stretch on slantboard 5 min recum bike level 6   2x30\" calf stretch on slantboard 5 min recum bike level 5  2x30\" calf slantboard stretch   15 to beam    2 way ankle x15 green T band resistance  Into ever and DF 2 way ankle x15 red T band resistance  Into ever and DF   HEP: frozen water bottle plantraf fascia roll out x2 min (spikey ball in clinic), 2x30\" plantar fascia stretch seated, 2x30\" calf

## 2021-01-08 ENCOUNTER — OFFICE VISIT (OUTPATIENT)
Dept: ORTHOPEDICS CLINIC | Facility: CLINIC | Age: 46
End: 2021-01-08
Payer: MEDICARE

## 2021-01-08 DIAGNOSIS — M72.2 PLANTAR FASCIITIS: Primary | ICD-10-CM

## 2021-01-08 DIAGNOSIS — E11.9 TYPE 2 DIABETES MELLITUS WITHOUT COMPLICATION, WITHOUT LONG-TERM CURRENT USE OF INSULIN (HCC): ICD-10-CM

## 2021-01-08 PROCEDURE — 99213 OFFICE O/P EST LOW 20 MIN: CPT | Performed by: PODIATRIST

## 2021-01-08 RX ORDER — CLOTRIMAZOLE AND BETAMETHASONE DIPROPIONATE 10; .64 MG/G; MG/G
1 CREAM TOPICAL 2 TIMES DAILY PRN
Qty: 60 G | Refills: 3 | Status: SHIPPED | OUTPATIENT
Start: 2021-01-08 | End: 2021-06-10

## 2021-01-08 NOTE — PROGRESS NOTES
[facility] Progress Note    Subjective: Mo Button returns for follow-up of right heel pain, plantar fasciitis. She had 1 injection back about 1 month ago and it was helpful but has not made it back.   She did take a fall and thinks she could have ma

## 2021-01-12 ENCOUNTER — OFFICE VISIT (OUTPATIENT)
Dept: PHYSICAL THERAPY | Facility: HOSPITAL | Age: 46
End: 2021-01-12
Attending: FAMILY MEDICINE
Payer: MEDICARE

## 2021-01-12 PROCEDURE — 97112 NEUROMUSCULAR REEDUCATION: CPT

## 2021-01-12 PROCEDURE — 97110 THERAPEUTIC EXERCISES: CPT

## 2021-01-12 PROCEDURE — 97140 MANUAL THERAPY 1/> REGIONS: CPT

## 2021-01-12 NOTE — PROGRESS NOTES
Dx:     Plantar fasciitis, right (M72.2)    Insurance (Authorized # of Visits):  10 per Alfredo Ruiz          Authorizing Physician: Dr. Bradley Boone  Next MD visit: none scheduled  Fall Risk: standard         Precautions: n/a             Subjective: Pt states that recum bike level 6   2x30\" calf stretch on slantboard 5 min recum bike level 5  2x30\" calf slantboard stretch 3x30\" slant board stretch     15 toe lifts for arch strengthening   20 ankle circles CW/CCW  2x30\" seated plantar flexion stretch 15 mini squa taps AP, 20x M/L  3x15\" balance AP on wobble Neuro re-ed:  20 x wobbleboard taps AP, 20x M/L  2x10 high knee march on sanddune.         20 marches on sanddune  2x30\" tandem stance on balance beam    2 way ankle x15 green T band resistance  Into ever and D

## 2021-01-14 ENCOUNTER — TELEPHONE (OUTPATIENT)
Dept: PHYSICAL THERAPY | Facility: HOSPITAL | Age: 46
End: 2021-01-14

## 2021-01-14 ENCOUNTER — APPOINTMENT (OUTPATIENT)
Dept: PHYSICAL THERAPY | Facility: HOSPITAL | Age: 46
End: 2021-01-14
Payer: MEDICARE

## 2021-01-21 ENCOUNTER — APPOINTMENT (OUTPATIENT)
Dept: PHYSICAL THERAPY | Facility: HOSPITAL | Age: 46
End: 2021-01-21
Attending: FAMILY MEDICINE
Payer: MEDICARE

## 2021-01-22 ENCOUNTER — OFFICE VISIT (OUTPATIENT)
Dept: ORTHOPEDICS CLINIC | Facility: CLINIC | Age: 46
End: 2021-01-22
Payer: MEDICARE

## 2021-01-22 DIAGNOSIS — M72.2 PLANTAR FASCIITIS: Primary | ICD-10-CM

## 2021-01-22 PROCEDURE — 99212 OFFICE O/P EST SF 10 MIN: CPT | Performed by: PODIATRIST

## 2021-01-22 PROCEDURE — L4387 NON-PNEUM WALK BOOT PRE OTS: HCPCS | Performed by: PODIATRIST

## 2021-01-22 NOTE — PROGRESS NOTES
[facility] Progress Note    Subjective: Pt here for follow up PF and possibly 3rd injection of heel. Last injection of right heel was helpful but only for several days.   Now pain is returned and is on the back of the heel    Objective:     Right

## 2021-02-04 ENCOUNTER — TELEPHONE (OUTPATIENT)
Dept: PHYSICAL THERAPY | Facility: HOSPITAL | Age: 46
End: 2021-02-04

## 2021-02-04 ENCOUNTER — APPOINTMENT (OUTPATIENT)
Dept: PHYSICAL THERAPY | Facility: HOSPITAL | Age: 46
End: 2021-02-04
Attending: FAMILY MEDICINE
Payer: MEDICARE

## 2021-02-09 ENCOUNTER — APPOINTMENT (OUTPATIENT)
Dept: PHYSICAL THERAPY | Facility: HOSPITAL | Age: 46
End: 2021-02-09
Attending: FAMILY MEDICINE
Payer: MEDICARE

## 2021-02-12 ENCOUNTER — OFFICE VISIT (OUTPATIENT)
Dept: FAMILY MEDICINE CLINIC | Facility: CLINIC | Age: 46
End: 2021-02-12
Payer: MEDICARE

## 2021-02-12 VITALS
SYSTOLIC BLOOD PRESSURE: 122 MMHG | OXYGEN SATURATION: 98 % | HEIGHT: 65 IN | RESPIRATION RATE: 16 BRPM | DIASTOLIC BLOOD PRESSURE: 80 MMHG | HEART RATE: 91 BPM | TEMPERATURE: 98 F | WEIGHT: 293 LBS | BODY MASS INDEX: 48.82 KG/M2

## 2021-02-12 DIAGNOSIS — E66.9 DIABETES MELLITUS TYPE 2 IN OBESE (HCC): ICD-10-CM

## 2021-02-12 DIAGNOSIS — E55.9 VITAMIN D DEFICIENCY: ICD-10-CM

## 2021-02-12 DIAGNOSIS — Z12.31 VISIT FOR SCREENING MAMMOGRAM: ICD-10-CM

## 2021-02-12 DIAGNOSIS — E11.9 TYPE 2 DIABETES MELLITUS WITHOUT COMPLICATION (HCC): ICD-10-CM

## 2021-02-12 DIAGNOSIS — E78.2 MIXED HYPERLIPIDEMIA: ICD-10-CM

## 2021-02-12 DIAGNOSIS — Z13.6 SCREENING FOR CARDIOVASCULAR CONDITION: ICD-10-CM

## 2021-02-12 DIAGNOSIS — G47.33 OSA ON CPAP: ICD-10-CM

## 2021-02-12 DIAGNOSIS — E11.69 DIABETES MELLITUS TYPE 2 IN OBESE (HCC): ICD-10-CM

## 2021-02-12 DIAGNOSIS — Z00.00 ENCOUNTER FOR ANNUAL HEALTH EXAMINATION: Primary | ICD-10-CM

## 2021-02-12 DIAGNOSIS — Z99.89 OSA ON CPAP: ICD-10-CM

## 2021-02-12 DIAGNOSIS — I10 ESSENTIAL HYPERTENSION: ICD-10-CM

## 2021-02-12 DIAGNOSIS — F39 EPISODIC MOOD DISORDER (HCC): ICD-10-CM

## 2021-02-12 DIAGNOSIS — Z13.31 DEPRESSION SCREENING: ICD-10-CM

## 2021-02-12 DIAGNOSIS — E66.01 MORBID OBESITY WITH BMI OF 45.0-49.9, ADULT (HCC): ICD-10-CM

## 2021-02-12 PROCEDURE — 99214 OFFICE O/P EST MOD 30 MIN: CPT | Performed by: FAMILY MEDICINE

## 2021-02-12 PROCEDURE — G0439 PPPS, SUBSEQ VISIT: HCPCS | Performed by: FAMILY MEDICINE

## 2021-02-12 PROCEDURE — G0444 DEPRESSION SCREEN ANNUAL: HCPCS | Performed by: FAMILY MEDICINE

## 2021-02-12 RX ORDER — BLOOD SUGAR DIAGNOSTIC
1 STRIP MISCELLANEOUS DAILY
Qty: 100 STRIP | Refills: 5 | Status: SHIPPED | OUTPATIENT
Start: 2021-02-12

## 2021-02-12 NOTE — PROGRESS NOTES
HPI:   Amanda Duque is a 50year old female who presents for a Medicare Subsequent Annual Wellness visit (Pt already had Initial Annual Wellness) and f/u on chronic conditions. DM2, taking meds as directed. Fasting glucose levels have been stable. at low risk.     Patient Care Team: Patient Care Team:  Soledad Taveras DO as PCP - General [facility] [place])  Soledad Taveras DO as PCP - [facility]  Dyana Damon MD (Radiation Oncology)  Martir Dey MD ([place] [id number])  London Rodriges, as directed.     •  Vitamin E 180 MG Oral Cap,     •  Pantoprazole Sodium 40 MG Oral Tab EC, TAKE 1 TABLET BY MOUTH ONCE DAILY 30 MINUTES PRIOR TO BREAKFAST    •  clotrimazole-betamethasone 1-0.05 % External Cream, Apply 1 Application topically 2 (two) time Blood Glucose Monitoring Suppl (TRUETRACK BLOOD GLUCOSE) W/DEVICE Does not apply Kit, Patient to test twice daily       MEDICAL INFORMATION:   She  has a past medical history of Anxiety state, unspecified, Asthma, Back pain, Bad breath, Belching, Bleeding vision  HEENT: denies nasal congestion, sinus pain or ST  LUNGS: denies shortness of breath with exertion  CARDIOVASCULAR: denies chest pain on exertion  GI: denies abdominal pain, denies heartburn  : denies dysuria, vaginal discharge or itching, no comp turgor normal, no rashes or lesions   Lymph nodes: Cervical nodes normal   Neurologic: Normal       Vaccination History     Immunization History   Administered Date(s) Administered   • Flucelvax 0.5ml Vaccine 10/27/2020   • HEP B, Adult 05/10/2018, 08/18/2 assessment: good     PLAN:  The patient indicates understanding of these issues and agrees to the plan. Reinforced healthy diet, lifestyle, and exercise. Return in 3 months (on 5/12/2021).      Griselda Caller, DO, 2/12/2021     General Health     In the Eye Exam 7/13/2020       Bone Density Screening      Dexascan Every two years No results found for this or any previous visit. No flowsheet data found.     Pap and Pelvic      Pap: Every 3 yrs age 21-65 or Pap+HPV every 5 yrs age 33-67, age 72 and older at Creatinine  Annually CREATININE (mg/dL)   Date Value   01/23/2012 0.8     Creatinine (mg/dL)   Date Value   08/28/2020 0.76   12/07/2019 0.79    No flowsheet data found.     Drug Serum Conc  Annually No results found for: DIGOXIN, DIG, VALP No flowsheet marita

## 2021-02-12 NOTE — PATIENT INSTRUCTIONS
Lizette Murguia's SCREENING SCHEDULE   Tests on this list are recommended by your physician but may not be covered, or covered at this frequency, by your insurer. Please check with your insurance carrier before scheduling to verify coverage.    Rufina Roman if indicated for medical reasons Electrocardiogram date11/10/2018 Routine EKG is not a screening covered service except at the [place] to Medicare Visit    Abdominal aortic aneurysm screening (once between ages 73-68) IPPE only No results found for this or Years due on 05/17/2021     Chlamydia  Annually if high risk No results found for: CHLAMYDIA No flowsheet data found.     Screening Mammogram      Mammogram    Recommend Annually to at least age 76, and as needed after 76 Mammogram due on 07/11/2021 Please of good information including definitions of the different types of Advance Directives.  It also has the State forms available on it's website for anyone to review and print using their home computer and printer. (the forms are also available in [address])  w

## 2021-02-16 DIAGNOSIS — I10 ESSENTIAL HYPERTENSION: ICD-10-CM

## 2021-02-16 DIAGNOSIS — E66.01 MORBID OBESITY WITH BMI OF 45.0-49.9, ADULT (HCC): ICD-10-CM

## 2021-02-16 DIAGNOSIS — E78.2 MIXED HYPERLIPIDEMIA: ICD-10-CM

## 2021-02-16 DIAGNOSIS — Z91.89 CARDIOVASCULAR RISK FACTOR: ICD-10-CM

## 2021-02-16 DIAGNOSIS — Z51.81 ENCOUNTER FOR THERAPEUTIC DRUG MONITORING: ICD-10-CM

## 2021-02-16 DIAGNOSIS — E11.9 TYPE 2 DIABETES MELLITUS WITHOUT COMPLICATION, WITHOUT LONG-TERM CURRENT USE OF INSULIN (HCC): ICD-10-CM

## 2021-02-16 RX ORDER — SEMAGLUTIDE 1.34 MG/ML
INJECTION, SOLUTION SUBCUTANEOUS
Qty: 3 ML | Refills: 0 | OUTPATIENT
Start: 2021-02-16

## 2021-02-16 NOTE — TELEPHONE ENCOUNTER
Diabetic Medication Protocol [id number]/16/2021 02:10 PM   HgBA1C procedure resulted in past 6 months Protocol Details    Last HgBA1C < 7.5     Microalbumin procedure in past 12 months or taking ACE/ARB     Appointment in past 6 or next 3 months      LOV 2/1

## 2021-02-16 NOTE — TELEPHONE ENCOUNTER
Requesting ozempic 1 mg  LOV: 12/9/20  RTC: 2 months  Last Relevant Labs: 8/28/20  Filled: 12/9/20 #6 pens with 0 refills    No future appointments. Receipt confirmed at pharmacy requesting. Denied with note as such.

## 2021-02-24 NOTE — TELEPHONE ENCOUNTER
Received call from [address] regarding Ozempic refill. Advised Dr. Nadja Smith is not managing this medication. Medication was ordered by [facility], WILLIS Villanueva.   Returned call to [address] and gave contact information fo

## 2021-02-26 DIAGNOSIS — Z51.81 ENCOUNTER FOR THERAPEUTIC DRUG MONITORING: ICD-10-CM

## 2021-02-26 DIAGNOSIS — I10 ESSENTIAL HYPERTENSION: ICD-10-CM

## 2021-02-26 DIAGNOSIS — E66.01 MORBID OBESITY WITH BMI OF 45.0-49.9, ADULT (HCC): ICD-10-CM

## 2021-02-26 DIAGNOSIS — E78.2 MIXED HYPERLIPIDEMIA: ICD-10-CM

## 2021-02-26 DIAGNOSIS — E11.9 TYPE 2 DIABETES MELLITUS WITHOUT COMPLICATION, WITHOUT LONG-TERM CURRENT USE OF INSULIN (HCC): ICD-10-CM

## 2021-02-26 DIAGNOSIS — Z91.89 CARDIOVASCULAR RISK FACTOR: ICD-10-CM

## 2021-02-26 NOTE — TELEPHONE ENCOUNTER
[facility] pharmacy called left [facility] to request a refill         Name of Medication: OZEMPIC     Dose:     How is medication prescribed:    Specific name of pharmacy and location:    Name of mail order company:

## 2021-03-01 NOTE — TELEPHONE ENCOUNTER
Requesting Ozempic 1 mg  LOV: 12/9/20  RTC: 2 months  Last Relevant Labs: 8/2020  Filled: 12/9/20 #6 pens with 0 refills    Patient is due for a visit. My chart sent.

## 2021-03-02 RX ORDER — SEMAGLUTIDE 1.34 MG/ML
1 INJECTION, SOLUTION SUBCUTANEOUS WEEKLY
Qty: 6 PEN | Refills: 0 | Status: SHIPPED | OUTPATIENT
Start: 2021-03-02 | End: 2021-05-18

## 2021-03-03 DIAGNOSIS — I10 ESSENTIAL HYPERTENSION: ICD-10-CM

## 2021-03-04 RX ORDER — METFORMIN HYDROCHLORIDE 750 MG/1
TABLET, EXTENDED RELEASE ORAL
Qty: 60 TABLET | Refills: 3 | Status: SHIPPED | OUTPATIENT
Start: 2021-03-04 | End: 2021-07-09

## 2021-03-04 RX ORDER — AMLODIPINE BESYLATE 10 MG/1
TABLET ORAL
Qty: 30 TABLET | Refills: 5 | Status: SHIPPED | OUTPATIENT
Start: 2021-03-04 | End: 2021-09-17

## 2021-03-04 NOTE — TELEPHONE ENCOUNTER
Hypertension Medications Protocol Uetfsv9903/03/2021 04:15 PM   CMP or BMP in past 12 months Protocol Details    Last serum creatinine< 2.0     Appointment in past 6 or next 3 months      LOV 2/12/21     LAST LAB   8/28/20     LAST RX  12/17/20 90 tabs 0 ref

## 2021-03-12 ENCOUNTER — OFFICE VISIT (OUTPATIENT)
Dept: ORTHOPEDICS CLINIC | Facility: CLINIC | Age: 46
End: 2021-03-12
Payer: MEDICARE

## 2021-03-12 DIAGNOSIS — L85.3 XEROSIS OF SKIN: ICD-10-CM

## 2021-03-12 DIAGNOSIS — M76.61 ACHILLES TENDINITIS OF RIGHT LOWER EXTREMITY: ICD-10-CM

## 2021-03-12 DIAGNOSIS — M72.2 PLANTAR FASCIITIS: Primary | ICD-10-CM

## 2021-03-12 PROCEDURE — 99213 OFFICE O/P EST LOW 20 MIN: CPT | Performed by: PODIATRIST

## 2021-03-12 RX ORDER — IBUPROFEN 800 MG/1
400 TABLET ORAL 3 TIMES DAILY
Qty: 45 TABLET | Refills: 1 | Status: SHIPPED | OUTPATIENT
Start: 2021-03-12 | End: 2021-05-13

## 2021-03-12 NOTE — PROGRESS NOTES
[facility] Progress Note    Subjective: Patient returns for follow-up right heel pain. She is still having significant pain and was doing okay for a while but now is back quite a bit.   Less plantar heel pain but more in the back of the heel now

## 2021-03-16 ENCOUNTER — TELEPHONE (OUTPATIENT)
Dept: ORTHOPEDICS CLINIC | Facility: CLINIC | Age: 46
End: 2021-03-16

## 2021-03-16 NOTE — TELEPHONE ENCOUNTER
Patient returned the call, per Brenda Mccabe she got approved for her Orthotics. So she is scheduled for an appointment on the 04/09 at 11:20am for Orthotic casting. Please create an order for this patient. Thanks.     Patient can be reached at [phone]

## 2021-03-17 DIAGNOSIS — Z23 NEED FOR VACCINATION: ICD-10-CM

## 2021-03-19 ENCOUNTER — IMMUNIZATION (OUTPATIENT)
Dept: LAB | Age: 46
End: 2021-03-19
Attending: HOSPITALIST
Payer: MEDICARE

## 2021-03-19 DIAGNOSIS — Z23 NEED FOR VACCINATION: Primary | ICD-10-CM

## 2021-03-19 PROCEDURE — 0001A SARSCOV2 VAC 30MCG/0.3ML IM: CPT

## 2021-04-01 ENCOUNTER — MED REC SCAN ONLY (OUTPATIENT)
Dept: ORTHOPEDICS CLINIC | Facility: CLINIC | Age: 46
End: 2021-04-01

## 2021-04-09 ENCOUNTER — OFFICE VISIT (OUTPATIENT)
Dept: ORTHOPEDICS CLINIC | Facility: CLINIC | Age: 46
End: 2021-04-09
Payer: MEDICARE

## 2021-04-09 ENCOUNTER — IMMUNIZATION (OUTPATIENT)
Dept: LAB | Age: 46
End: 2021-04-09
Attending: HOSPITALIST
Payer: MEDICARE

## 2021-04-09 DIAGNOSIS — M72.2 PLANTAR FASCIITIS: Primary | ICD-10-CM

## 2021-04-09 DIAGNOSIS — Z23 NEED FOR VACCINATION: Primary | ICD-10-CM

## 2021-04-09 PROCEDURE — 99212 OFFICE O/P EST SF 10 MIN: CPT | Performed by: PODIATRIST

## 2021-04-09 PROCEDURE — L3000 FT INSERT UCB BERKELEY SHELL: HCPCS | Performed by: PODIATRIST

## 2021-04-09 PROCEDURE — 0002A SARSCOV2 VAC 30MCG/0.3ML IM: CPT

## 2021-04-09 NOTE — PROGRESS NOTES
[facility] Progress Note    Subjective: Guillermo Loaiza is here for casting of orthotics. She continues to have a lot of chronic bilateral foot pain.     Objective: Tenderness persists bilateral feet at the plantar fascial insertion plantar medial aspect

## 2021-05-07 ENCOUNTER — OFFICE VISIT (OUTPATIENT)
Dept: ORTHOPEDICS CLINIC | Facility: CLINIC | Age: 46
End: 2021-05-07
Payer: MEDICARE

## 2021-05-07 DIAGNOSIS — M72.2 PLANTAR FASCIITIS: Primary | ICD-10-CM

## 2021-05-07 PROCEDURE — 97760 ORTHOTIC MGMT&TRAING 1ST ENC: CPT | Performed by: PODIATRIST

## 2021-05-07 NOTE — PROGRESS NOTES
[facility] Progress Note    Subjective: Pt here for  of orthotics    Objective:     Orthotics conform well to her arches, are ccomfortable and fit well in her shoes    Exam mild pain plantar heels  No swelling          Assessment/Plan:   PF

## 2021-05-12 DIAGNOSIS — R80.9 MICROALBUMINURIA DUE TO TYPE 2 DIABETES MELLITUS (HCC): ICD-10-CM

## 2021-05-12 DIAGNOSIS — E11.29 MICROALBUMINURIA DUE TO TYPE 2 DIABETES MELLITUS (HCC): ICD-10-CM

## 2021-05-12 DIAGNOSIS — E78.2 MIXED HYPERLIPIDEMIA: ICD-10-CM

## 2021-05-12 DIAGNOSIS — I10 ESSENTIAL HYPERTENSION: ICD-10-CM

## 2021-05-12 DIAGNOSIS — Z51.81 ENCOUNTER FOR THERAPEUTIC DRUG MONITORING: ICD-10-CM

## 2021-05-12 DIAGNOSIS — E11.9 TYPE 2 DIABETES MELLITUS WITHOUT COMPLICATION, WITHOUT LONG-TERM CURRENT USE OF INSULIN (HCC): ICD-10-CM

## 2021-05-12 DIAGNOSIS — E66.01 MORBID OBESITY WITH BMI OF 45.0-49.9, ADULT (HCC): ICD-10-CM

## 2021-05-12 DIAGNOSIS — E11.9 TYPE 2 DIABETES MELLITUS WITHOUT COMPLICATION (HCC): ICD-10-CM

## 2021-05-12 DIAGNOSIS — Z91.89 CARDIOVASCULAR RISK FACTOR: ICD-10-CM

## 2021-05-12 DIAGNOSIS — E66.9 DIABETES MELLITUS TYPE 2 IN OBESE (HCC): ICD-10-CM

## 2021-05-12 DIAGNOSIS — E11.69 DIABETES MELLITUS TYPE 2 IN OBESE (HCC): ICD-10-CM

## 2021-05-12 NOTE — TELEPHONE ENCOUNTER
Requesting Ozempic 1 mg  LOV: 12/9/20  RTC: 2 months  Last Relevant Labs: 8/28/20  Filled: 3/2/21 #6 pens with 0 refills    Never scheduled f/u. My chart sent.

## 2021-05-12 NOTE — TELEPHONE ENCOUNTER
LOV: 5/7/21  RTC: 3 wks  Filled:   Medication Quantity Refills Start End   ibuprofen 800 MG Oral Tab 45 tablet 1 3/12/2021 3/7/2022   Sig:   Take 0.5 tablets (400 mg total) by mouth 3 (three) times daily.        Future Appointments   Date Time Provider [place]

## 2021-05-13 RX ORDER — IBUPROFEN 800 MG/1
TABLET ORAL
Qty: 45 TABLET | Refills: 1 | Status: SHIPPED | OUTPATIENT
Start: 2021-05-13 | End: 2021-09-12

## 2021-05-14 RX ORDER — ATORVASTATIN CALCIUM 40 MG/1
TABLET, FILM COATED ORAL
Qty: 30 TABLET | Refills: 5 | Status: SHIPPED | OUTPATIENT
Start: 2021-05-14 | End: 2021-11-12

## 2021-05-14 RX ORDER — GLIMEPIRIDE 2 MG/1
TABLET ORAL
Qty: 30 TABLET | Refills: 5 | Status: SHIPPED | OUTPATIENT
Start: 2021-05-14 | End: 2021-11-12

## 2021-05-14 RX ORDER — METOPROLOL SUCCINATE 200 MG/1
TABLET, EXTENDED RELEASE ORAL
Qty: 30 TABLET | Refills: 5 | Status: SHIPPED | OUTPATIENT
Start: 2021-05-14 | End: 2021-11-12

## 2021-05-14 RX ORDER — TELMISARTAN 80 MG/1
TABLET ORAL
Qty: 30 TABLET | Refills: 5 | Status: SHIPPED | OUTPATIENT
Start: 2021-05-14 | End: 2021-11-12

## 2021-05-14 NOTE — TELEPHONE ENCOUNTER
LOV 2/12/2021    LAST LAB 8/8/2020    LAST RX   GLIMEPIRIDE 2 MG Oral Tab 30 tablet 5 11/16/2020    Sig:   TAKE 1 TABLET BY MOUTH EVERY MORNING BEFORE BREAKFAST       METOPROLOL SUCCINATE  MG Oral Tablet 24 Hr 30 tablet 5 11/16/2020    Sig:   TAKE 1

## 2021-05-14 NOTE — TELEPHONE ENCOUNTER
Received call to check on refill for Ozempic from [address] today. I called them to let them know patient needs an office visit.

## 2021-05-16 NOTE — TELEPHONE ENCOUNTER
I have tried to reach patient and pharmacy said they are having trouble as well. Do you want to allow any refill while we try?

## 2021-05-18 RX ORDER — SEMAGLUTIDE 1.34 MG/ML
INJECTION, SOLUTION SUBCUTANEOUS
Qty: 3 ML | Refills: 0 | Status: SHIPPED | OUTPATIENT
Start: 2021-05-18 | End: 2021-09-14

## 2021-05-21 ENCOUNTER — LAB ENCOUNTER (OUTPATIENT)
Dept: LAB | Age: 46
End: 2021-05-21
Attending: FAMILY MEDICINE
Payer: MEDICARE

## 2021-05-21 DIAGNOSIS — E11.9 TYPE 2 DIABETES MELLITUS WITHOUT COMPLICATION (HCC): ICD-10-CM

## 2021-05-21 DIAGNOSIS — Z13.6 SCREENING FOR CARDIOVASCULAR CONDITION: ICD-10-CM

## 2021-05-21 DIAGNOSIS — I10 ESSENTIAL HYPERTENSION: ICD-10-CM

## 2021-05-21 DIAGNOSIS — E66.9 DIABETES MELLITUS TYPE 2 IN OBESE (HCC): ICD-10-CM

## 2021-05-21 DIAGNOSIS — E55.9 VITAMIN D DEFICIENCY: ICD-10-CM

## 2021-05-21 DIAGNOSIS — E11.69 DIABETES MELLITUS TYPE 2 IN OBESE (HCC): ICD-10-CM

## 2021-05-21 DIAGNOSIS — K76.0 FATTY LIVER DISEASE, NONALCOHOLIC: ICD-10-CM

## 2021-05-21 DIAGNOSIS — E78.2 MIXED HYPERLIPIDEMIA: ICD-10-CM

## 2021-05-21 PROCEDURE — 83036 HEMOGLOBIN GLYCOSYLATED A1C: CPT

## 2021-05-21 PROCEDURE — 85025 COMPLETE CBC W/AUTO DIFF WBC: CPT

## 2021-05-21 PROCEDURE — 80053 COMPREHEN METABOLIC PANEL: CPT

## 2021-05-21 PROCEDURE — 82306 VITAMIN D 25 HYDROXY: CPT

## 2021-05-21 PROCEDURE — 80061 LIPID PANEL: CPT

## 2021-05-21 PROCEDURE — 83721 ASSAY OF BLOOD LIPOPROTEIN: CPT

## 2021-05-21 PROCEDURE — 36415 COLL VENOUS BLD VENIPUNCTURE: CPT

## 2021-05-22 NOTE — PROGRESS NOTES
Liver enzymes look great. Keep working on weight loss through diet and exercise. Follow-up in 6 months.   Please call with any questions,    Betty Naidu MD

## 2021-05-28 ENCOUNTER — OFFICE VISIT (OUTPATIENT)
Dept: FAMILY MEDICINE CLINIC | Facility: CLINIC | Age: 46
End: 2021-05-28
Payer: MEDICARE

## 2021-05-28 VITALS
RESPIRATION RATE: 18 BRPM | TEMPERATURE: 97 F | DIASTOLIC BLOOD PRESSURE: 84 MMHG | SYSTOLIC BLOOD PRESSURE: 134 MMHG | BODY MASS INDEX: 48.82 KG/M2 | HEIGHT: 65 IN | WEIGHT: 293 LBS | OXYGEN SATURATION: 98 % | HEART RATE: 82 BPM

## 2021-05-28 DIAGNOSIS — E55.9 VITAMIN D INSUFFICIENCY: ICD-10-CM

## 2021-05-28 DIAGNOSIS — E66.9 DIABETES MELLITUS TYPE 2 IN OBESE (HCC): Primary | ICD-10-CM

## 2021-05-28 DIAGNOSIS — E11.69 DIABETES MELLITUS TYPE 2 IN OBESE (HCC): Primary | ICD-10-CM

## 2021-05-28 DIAGNOSIS — E78.1 HYPERTRIGLYCERIDEMIA: ICD-10-CM

## 2021-05-28 DIAGNOSIS — J35.1 CHRONIC TONSILLAR HYPERTROPHY: ICD-10-CM

## 2021-05-28 DIAGNOSIS — E66.01 MORBID OBESITY WITH BMI OF 45.0-49.9, ADULT (HCC): ICD-10-CM

## 2021-05-28 PROCEDURE — 99214 OFFICE O/P EST MOD 30 MIN: CPT | Performed by: FAMILY MEDICINE

## 2021-05-28 RX ORDER — ICOSAPENT ETHYL 500 MG/1
CAPSULE ORAL
Qty: 240 CAPSULE | Refills: 0 | Status: CANCELLED | OUTPATIENT
Start: 2021-05-28 | End: 2021-06-27

## 2021-05-28 RX ORDER — GEMFIBROZIL 600 MG/1
600 TABLET, FILM COATED ORAL
Qty: 180 TABLET | Refills: 0 | Status: SHIPPED | OUTPATIENT
Start: 2021-05-28 | End: 2021-08-13

## 2021-05-28 NOTE — PATIENT INSTRUCTIONS
Triglycerides  Does this test have other names? Lipid panel, fasting lipoprotein panel  What is this test?  This test measures the amount of triglycerides in your blood. Triglycerides are one of several types of fats in your blood.  Other kinds are LDL classified:  · 150 to 199 mg/dL: Borderline high  · 200 to 499 mg/dL: High  · 500 mg/dL and above: Very high  If you have a high triglyceride level, you have a greater risk for heart attack and stroke.    A triglyceride level above 150 mg/dL also means that Certain medicines can affect your results, as can drinking alcohol.    How do I prepare for the test?  If you have this test as part of a cholesterol screening, you will need to not eat or drink anything but water for 9 to 12 hours before the test. Tell you

## 2021-05-28 NOTE — PROGRESS NOTES
HPI:   Janell Hawkins is a 48year old female who presents for recheck of her chronic conditions and recent labs. DM2, has been following with diabetes clinic. Last visit with ophthalmologist was within the past year.   Pt has been checking her feet on stick route daily. Use as directed. 100 strip 5   • clotrimazole-betamethasone 1-0.05 % External Cream Apply 1 Application topically 2 (two) times daily as needed.  60 g 3   • PARoxetine HCl (PAXIL) 20 MG Oral Tab Take 2.5 tablets (50 mg total) by mouth marita CHOLEST 180 12/23/2019     Lab Results   Component Value Date    HDL 41 05/21/2021    HDL 48 08/28/2020    HDL 40 12/23/2019     Lab Results   Component Value Date    LDL  05/21/2021      Comment:      Unable to calculate LDL due to Triglycerides >400. 0 • LUMPECTOMY LEFT Left 5/11/15    Dr. Rodrigez [place]   • NEEDLE BIOPSY LIVER     • ORTHOPEDIC SURG (PBP) Bilateral     MANDA CARPAL ISHAAN RELEASE   • OTHER  LEFT FOOT SPUR REMOVAL   • OTHER SURGICAL HISTORY      bilateral carpal tunnel release   • RADIATION Physical Activity:       Days of Exercise per Week:       Minutes of Exercise per Session:   Stress:       Feeling of Stress :   Social Connections:       Frequency of Communication with Friends and Family:       Frequency of Social Gatherings with Yan Cobos rhabdomyolysis, advised to monitor for any pains  - gemfibrozil 600 MG Oral Tab; Take 1 tablet (600 mg total) by mouth 2 (two) times daily before meals. Dispense: 180 tablet; Refill: 0    3.  Chronic tonsillar hypertrophy  - pt would like to hold off on an

## 2021-06-08 DIAGNOSIS — E78.2 MIXED HYPERLIPIDEMIA: ICD-10-CM

## 2021-06-08 RX ORDER — FENOFIBRATE 160 MG/1
TABLET ORAL
Qty: 30 TABLET | Refills: 1 | OUTPATIENT
Start: 2021-06-08

## 2021-06-08 NOTE — TELEPHONE ENCOUNTER
LOV: 5/7/21  RTC: 3wks   Filled:   Medication Quantity Refills Start End   clotrimazole-betamethasone 1-0.05 % External Cream 60 g 3 1/8/2021    Sig:   Apply 1 Application topically 2 (two) times daily as needed.            Future Appointments   Date Time P

## 2021-06-09 DIAGNOSIS — E11.9 TYPE 2 DIABETES MELLITUS WITHOUT COMPLICATION, WITHOUT LONG-TERM CURRENT USE OF INSULIN (HCC): ICD-10-CM

## 2021-06-09 DIAGNOSIS — E78.2 MIXED HYPERLIPIDEMIA: ICD-10-CM

## 2021-06-09 DIAGNOSIS — Z51.81 ENCOUNTER FOR THERAPEUTIC DRUG MONITORING: ICD-10-CM

## 2021-06-09 DIAGNOSIS — E66.01 MORBID OBESITY WITH BMI OF 45.0-49.9, ADULT (HCC): ICD-10-CM

## 2021-06-09 DIAGNOSIS — I10 ESSENTIAL HYPERTENSION: ICD-10-CM

## 2021-06-09 DIAGNOSIS — Z91.89 CARDIOVASCULAR RISK FACTOR: ICD-10-CM

## 2021-06-09 RX ORDER — SEMAGLUTIDE 1.34 MG/ML
INJECTION, SOLUTION SUBCUTANEOUS
Qty: 3 ML | Refills: 0 | OUTPATIENT
Start: 2021-06-09

## 2021-06-09 NOTE — TELEPHONE ENCOUNTER
Denied refill of ozempic since we have been trying to reach patient since last month and she has not scheduled. Victor Manuel Zapien said with last refill no further without appointment.   Denied with note to pharmacy to have patient call

## 2021-06-10 RX ORDER — CLOTRIMAZOLE AND BETAMETHASONE DIPROPIONATE 10; .64 MG/G; MG/G
1 CREAM TOPICAL 2 TIMES DAILY PRN
Qty: 60 G | Refills: 3 | Status: SHIPPED | OUTPATIENT
Start: 2021-06-10 | End: 2021-06-13 | Stop reason: ALTCHOICE

## 2021-06-10 RX ORDER — CLOTRIMAZOLE AND BETAMETHASONE DIPROPIONATE 10; .64 MG/G; MG/G
CREAM TOPICAL
Qty: 45 G | Refills: 3 | Status: SHIPPED | OUTPATIENT
Start: 2021-06-10

## 2021-06-17 ENCOUNTER — TELEPHONE (OUTPATIENT)
Dept: INTERNAL MEDICINE CLINIC | Facility: CLINIC | Age: 46
End: 2021-06-17

## 2021-06-17 NOTE — TELEPHONE ENCOUNTER
Priti Kc from [facility] called to obtain a refill of ozempic for patient. I explained she has not been seen since December and we denied refill and I spoke with male pharmacist there and let him know that she needs an appointment and must call us.   She h

## 2021-06-29 RX ORDER — FENOFIBRATE 160 MG/1
TABLET ORAL
COMMUNITY
Start: 2021-05-28 | End: 2021-06-29 | Stop reason: ALTCHOICE

## 2021-07-09 RX ORDER — METFORMIN HYDROCHLORIDE 750 MG/1
TABLET, EXTENDED RELEASE ORAL
Qty: 60 TABLET | Refills: 3 | Status: SHIPPED | OUTPATIENT
Start: 2021-07-09 | End: 2021-11-12

## 2021-07-09 NOTE — TELEPHONE ENCOUNTER
Diabetic Medication Protocol Ifrpnz2507/09/2021 11:03 AM   HgBA1C procedure resulted in past 6 months Protocol Details    Last HgBA1C < 7.5     Microalbumin procedure in past 12 months or taking ACE/ARB     Appointment in past 6 or next 3 months      LOV 5/2

## 2021-07-10 RX ORDER — METFORMIN HYDROCHLORIDE 750 MG/1
TABLET, EXTENDED RELEASE ORAL
Qty: 60 TABLET | Refills: 3 | OUTPATIENT
Start: 2021-07-10

## 2021-07-12 ENCOUNTER — HOSPITAL ENCOUNTER (OUTPATIENT)
Dept: MAMMOGRAPHY | Facility: HOSPITAL | Age: 46
Discharge: HOME | End: 2021-07-12
Attending: FAMILY MEDICINE
Payer: MEDICARE

## 2021-07-12 DIAGNOSIS — E78.2 MIXED HYPERLIPIDEMIA: ICD-10-CM

## 2021-07-12 DIAGNOSIS — E66.01 MORBID OBESITY WITH BMI OF 45.0-49.9, ADULT (HCC): ICD-10-CM

## 2021-07-12 DIAGNOSIS — Z91.89 CARDIOVASCULAR RISK FACTOR: ICD-10-CM

## 2021-07-12 DIAGNOSIS — Z12.31 VISIT FOR SCREENING MAMMOGRAM: ICD-10-CM

## 2021-07-12 DIAGNOSIS — I10 ESSENTIAL HYPERTENSION: ICD-10-CM

## 2021-07-12 DIAGNOSIS — Z51.81 ENCOUNTER FOR THERAPEUTIC DRUG MONITORING: ICD-10-CM

## 2021-07-12 DIAGNOSIS — E11.9 TYPE 2 DIABETES MELLITUS WITHOUT COMPLICATION, WITHOUT LONG-TERM CURRENT USE OF INSULIN (HCC): ICD-10-CM

## 2021-07-12 PROCEDURE — 77067 SCR MAMMO BI INCL CAD: CPT | Performed by: FAMILY MEDICINE

## 2021-07-12 PROCEDURE — 77063 BREAST TOMOSYNTHESIS BI: CPT | Performed by: FAMILY MEDICINE

## 2021-07-12 RX ORDER — FENOFIBRATE 160 MG/1
TABLET ORAL
Qty: 30 TABLET | Refills: 0 | OUTPATIENT
Start: 2021-07-12

## 2021-07-12 NOTE — TELEPHONE ENCOUNTER
Cholesterol Medication Protocol Zpvyqu4507/12/2021 10:15 AM   ALT < 80 Protocol Details    ALT resulted within past year     Lipid panel within past 12 months     Appointment within past 12 or next 3 months      LOV 5/28/21     LAST LAB   5/21/21     LAST RX

## 2021-07-15 RX ORDER — SEMAGLUTIDE 1.34 MG/ML
INJECTION, SOLUTION SUBCUTANEOUS
Qty: 3 ML | Refills: 0 | OUTPATIENT
Start: 2021-07-15

## 2021-07-15 NOTE — TELEPHONE ENCOUNTER
Yes, a certified letter would be great. Not sure if she plans to continue with the [facility]?

## 2021-08-13 DIAGNOSIS — E11.9 TYPE 2 DIABETES MELLITUS WITHOUT COMPLICATION, WITHOUT LONG-TERM CURRENT USE OF INSULIN (HCC): ICD-10-CM

## 2021-08-13 DIAGNOSIS — E66.01 MORBID OBESITY WITH BMI OF 45.0-49.9, ADULT (HCC): ICD-10-CM

## 2021-08-13 DIAGNOSIS — E78.2 MIXED HYPERLIPIDEMIA: ICD-10-CM

## 2021-08-13 DIAGNOSIS — Z91.89 CARDIOVASCULAR RISK FACTOR: ICD-10-CM

## 2021-08-13 DIAGNOSIS — Z51.81 ENCOUNTER FOR THERAPEUTIC DRUG MONITORING: ICD-10-CM

## 2021-08-13 DIAGNOSIS — I10 ESSENTIAL HYPERTENSION: ICD-10-CM

## 2021-08-13 DIAGNOSIS — E78.1 HYPERTRIGLYCERIDEMIA: ICD-10-CM

## 2021-08-13 RX ORDER — SEMAGLUTIDE 1.34 MG/ML
INJECTION, SOLUTION SUBCUTANEOUS
Qty: 3 ML | Refills: 0 | OUTPATIENT
Start: 2021-08-13

## 2021-08-13 RX ORDER — GEMFIBROZIL 600 MG/1
TABLET, FILM COATED ORAL
Qty: 180 TABLET | Refills: 0 | Status: SHIPPED | OUTPATIENT
Start: 2021-08-13 | End: 2021-11-11

## 2021-08-13 NOTE — TELEPHONE ENCOUNTER
Cholesterol Medication Protocol Svanaj4208/13/2021 11:07 AM   ALT < 80 Protocol Details    ALT resulted within past year     Lipid panel within past 12 months     Appointment within past 12 or next 3 months      LOV 5/28/21     LAST LAB  5/21/21    LAST RX

## 2021-09-12 RX ORDER — IBUPROFEN 800 MG/1
TABLET ORAL
Qty: 45 TABLET | Refills: 1 | Status: SHIPPED | OUTPATIENT
Start: 2021-09-12 | End: 2022-01-05

## 2021-09-13 DIAGNOSIS — E66.9 DIABETES MELLITUS TYPE 2 IN OBESE (HCC): ICD-10-CM

## 2021-09-13 DIAGNOSIS — E11.9 TYPE 2 DIABETES MELLITUS WITHOUT COMPLICATION (HCC): ICD-10-CM

## 2021-09-13 DIAGNOSIS — E11.69 DIABETES MELLITUS TYPE 2 IN OBESE (HCC): ICD-10-CM

## 2021-09-13 RX ORDER — BLOOD-GLUCOSE METER
EACH MISCELLANEOUS
Refills: 0 | Status: CANCELLED | OUTPATIENT
Start: 2021-09-13

## 2021-09-13 RX ORDER — BLOOD-GLUCOSE METER
EACH MISCELLANEOUS
Qty: 1 KIT | Refills: 0 | Status: SHIPPED | OUTPATIENT
Start: 2021-09-13 | End: 2021-10-25

## 2021-09-13 NOTE — TELEPHONE ENCOUNTER
Diabetic Supplies Protocol Passed 09/13/2021 01:02 PM    Appointment in the past 12 or next 3 months

## 2021-09-14 ENCOUNTER — OFFICE VISIT (OUTPATIENT)
Dept: FAMILY MEDICINE CLINIC | Facility: CLINIC | Age: 46
End: 2021-09-14
Payer: MEDICARE

## 2021-09-14 VITALS
HEART RATE: 83 BPM | WEIGHT: 293 LBS | BODY MASS INDEX: 48.82 KG/M2 | TEMPERATURE: 98 F | SYSTOLIC BLOOD PRESSURE: 126 MMHG | DIASTOLIC BLOOD PRESSURE: 80 MMHG | OXYGEN SATURATION: 99 % | RESPIRATION RATE: 16 BRPM | HEIGHT: 65 IN

## 2021-09-14 DIAGNOSIS — E78.2 MIXED HYPERLIPIDEMIA: ICD-10-CM

## 2021-09-14 DIAGNOSIS — Z51.81 ENCOUNTER FOR THERAPEUTIC DRUG MONITORING: ICD-10-CM

## 2021-09-14 DIAGNOSIS — G47.00 INSOMNIA, UNSPECIFIED TYPE: Primary | ICD-10-CM

## 2021-09-14 DIAGNOSIS — I10 ESSENTIAL HYPERTENSION: ICD-10-CM

## 2021-09-14 DIAGNOSIS — E66.01 MORBID OBESITY WITH BMI OF 45.0-49.9, ADULT (HCC): ICD-10-CM

## 2021-09-14 DIAGNOSIS — E11.9 TYPE 2 DIABETES MELLITUS WITHOUT COMPLICATION, WITHOUT LONG-TERM CURRENT USE OF INSULIN (HCC): ICD-10-CM

## 2021-09-14 PROCEDURE — 99214 OFFICE O/P EST MOD 30 MIN: CPT | Performed by: FAMILY MEDICINE

## 2021-09-14 RX ORDER — BLOOD SUGAR DIAGNOSTIC
STRIP MISCELLANEOUS
Qty: 100 STRIP | Refills: 2 | Status: SHIPPED | OUTPATIENT
Start: 2021-09-14 | End: 2022-09-14

## 2021-09-14 RX ORDER — SEMAGLUTIDE 1.34 MG/ML
1 INJECTION, SOLUTION SUBCUTANEOUS WEEKLY
Qty: 3 ML | Refills: 1 | Status: SHIPPED | OUTPATIENT
Start: 2021-09-14 | End: 2021-09-28

## 2021-09-14 RX ORDER — CHOLECALCIFEROL (VITAMIN D3) 125 MCG
5 CAPSULE ORAL EVERY EVENING
Qty: 30 TABLET | Refills: 1 | COMMUNITY
Start: 2021-09-14

## 2021-09-14 RX ORDER — SEMAGLUTIDE 1.34 MG/ML
INJECTION, SOLUTION SUBCUTANEOUS
Qty: 3 ML | Refills: 0 | Status: CANCELLED | OUTPATIENT
Start: 2021-09-14

## 2021-09-14 RX ORDER — LANCETS 33 GAUGE
1 EACH MISCELLANEOUS DAILY
Qty: 100 EACH | Refills: 2 | Status: SHIPPED | OUTPATIENT
Start: 2021-09-14 | End: 2021-09-28

## 2021-09-14 RX ORDER — FENOFIBRATE 160 MG/1
160 TABLET ORAL DAILY
COMMUNITY
Start: 2021-06-30

## 2021-09-14 NOTE — PATIENT INSTRUCTIONS
Treating Insomnia  Good sleeping habits are a key part of treatment. If needed, some medicines may help you sleep better at first. But, making healthy lifestyle changes and learning to relax can improve your sleep long-term.  Treating insomnia takes commi to relax before bedtime can improve your sleep. Stress, anxiety, and body tension may keep you awake at night. To unwind before bedtime, try a warm bath, meditation, or yoga. Also try the following:   · Deep breathing. Sit or lie back in a chair.  Take

## 2021-09-14 NOTE — PROGRESS NOTES
HPI:   Tere Galeana is a 48year old female who presents for c/o insomnia and f/u on chronic conditions. Has been experiencing difficulty falling asleep and staying asleep over the past couple weeks.  States she was unable to sleep for 4 nights a cou SUCCINATE  MG Oral Tablet 24 Hr TAKE (1) TABLET BY MOUTH ONCE DAILY 30 tablet 5   • GLIMEPIRIDE 2 MG Oral Tab TAKE 1 TABLET BY MOUTH EVERY MORNING BEFORE BREAKFAST 30 tablet 5   • VITAMIN E 180 MG Oral Cap TAKE ONE (1) CAPSULE BY MOUTH TWICE DAILY 60 05/21/2021     (H) 08/28/2020     (H) 12/23/2019     Lab Results   Component Value Date    CHOLEST 175 05/21/2021    CHOLEST 153 08/28/2020    CHOLEST 180 12/23/2019     Lab Results   Component Value Date    HDL 41 05/21/2021    HDL 48 08/28/ Date   • BREAST LUMPECTOMY Left 6/11/2015    Procedure: BREAST LUMPECTOMY;  Surgeon: Jonathan Fierro MD;  Location: [address] [place] [address] OR   • HERNIA SURGERY      BABY   • LUMPECTOMY LEFT Left 5/11/15    Dr. Nerissa Abrams   • NEEDLE BIOPSY LIVER     • ORTHOPEDIC SURG (PBP) Last Year: Not on file      Ran Out of Food in the Last Year: Not on file  Transportation Needs:       Lack of Transportation (Medical): Not on file      Lack of Transportation (Non-Medical):  Not on file  Physical Activity:       Days of Exercise per Week: Grayson Schulz is a 48year old female who presents for:  1.  Insomnia, unspecified type  - discussed proper sleep hygiene  - advised to start melatonin 5mg po at bedtime, reviewed indications, dosing and SEs  - reviewed alternative ways to manage her

## 2021-09-17 DIAGNOSIS — Z51.81 ENCOUNTER FOR THERAPEUTIC DRUG MONITORING: ICD-10-CM

## 2021-09-17 DIAGNOSIS — E78.2 MIXED HYPERLIPIDEMIA: ICD-10-CM

## 2021-09-17 DIAGNOSIS — E11.9 TYPE 2 DIABETES MELLITUS WITHOUT COMPLICATION, WITHOUT LONG-TERM CURRENT USE OF INSULIN (HCC): ICD-10-CM

## 2021-09-17 DIAGNOSIS — I10 ESSENTIAL HYPERTENSION: ICD-10-CM

## 2021-09-17 DIAGNOSIS — E66.01 MORBID OBESITY WITH BMI OF 45.0-49.9, ADULT (HCC): ICD-10-CM

## 2021-09-17 RX ORDER — AMLODIPINE BESYLATE 10 MG/1
TABLET ORAL
Qty: 90 TABLET | Refills: 1 | Status: SHIPPED | OUTPATIENT
Start: 2021-09-17 | End: 2021-12-02

## 2021-09-17 NOTE — TELEPHONE ENCOUNTER
Hypertension Medications Protocol Passed 09/17/2021 09:18 AM   Protocol Details  CMP or BMP in past 12 months    Last serum creatinine< 2.0    Appointment in past 6 or next 3 months

## 2021-09-20 RX ORDER — SEMAGLUTIDE 1.34 MG/ML
INJECTION, SOLUTION SUBCUTANEOUS
Qty: 3 ML | Refills: 0 | OUTPATIENT
Start: 2021-09-20

## 2021-09-20 NOTE — TELEPHONE ENCOUNTER
Requesting   Requested Prescriptions     Pending Prescriptions Disp Refills   • OZEMPIC, 1 MG/DOSE, 2 MG/1.5ML Subcutaneous Solution Pen-injector [Pharmacy Med Name: OZEMPIC 1MG DOSE PEN 2 Solution Pen-injector] 3 mL 0     Sig: INJECT 1 MG SUBCUTANEOUSLY O

## 2021-09-28 ENCOUNTER — OFFICE VISIT (OUTPATIENT)
Dept: INTERNAL MEDICINE CLINIC | Facility: CLINIC | Age: 46
End: 2021-09-28
Payer: MEDICARE

## 2021-09-28 VITALS
SYSTOLIC BLOOD PRESSURE: 112 MMHG | HEIGHT: 65 IN | HEART RATE: 80 BPM | BODY MASS INDEX: 48.82 KG/M2 | DIASTOLIC BLOOD PRESSURE: 84 MMHG | WEIGHT: 293 LBS

## 2021-09-28 DIAGNOSIS — E66.9 DIABETES MELLITUS TYPE 2 IN OBESE (HCC): ICD-10-CM

## 2021-09-28 DIAGNOSIS — Z99.89 OSA ON CPAP: ICD-10-CM

## 2021-09-28 DIAGNOSIS — G47.33 OSA ON CPAP: ICD-10-CM

## 2021-09-28 DIAGNOSIS — Z51.81 ENCOUNTER FOR THERAPEUTIC DRUG MONITORING: Primary | ICD-10-CM

## 2021-09-28 DIAGNOSIS — E11.69 DIABETES MELLITUS TYPE 2 IN OBESE (HCC): ICD-10-CM

## 2021-09-28 DIAGNOSIS — I10 ESSENTIAL HYPERTENSION: ICD-10-CM

## 2021-09-28 DIAGNOSIS — E78.2 MIXED HYPERLIPIDEMIA: ICD-10-CM

## 2021-09-28 DIAGNOSIS — E66.01 MORBID OBESITY WITH BMI OF 45.0-49.9, ADULT (HCC): ICD-10-CM

## 2021-09-28 PROCEDURE — 99214 OFFICE O/P EST MOD 30 MIN: CPT | Performed by: NURSE PRACTITIONER

## 2021-09-28 RX ORDER — SEMAGLUTIDE 1.34 MG/ML
0.25 INJECTION, SOLUTION SUBCUTANEOUS WEEKLY
Qty: 1.5 ML | Refills: 1 | Status: SHIPPED | OUTPATIENT
Start: 2021-09-28 | End: 2021-10-28 | Stop reason: DRUGHIGH

## 2021-09-28 NOTE — PATIENT INSTRUCTIONS
Continue making lifestyle changes that focus on good nutrition, regular exercise and stress management. Medication Plan: Gatha Christine at .25mg weekly for 3 weeks, then increase to .5 mg weekly until next visit.     Next steps to work on before next of portion size, and presentation. Breathe deeply. Look again before taking a mouthful. Chew every mouthful: Chewing a lot helps to thoroughly release the flavor of foods. Let food sit on your tongue.  This allows your taste buds to absorb the flavor and tr Chair exercise series Www.sitandbefit. org  · Hip Hop Fit with Jeff Amos at www.hiphopfit. net    Apps for on the Go Fitness  · [employer] 7 Minute Workout (orange box with white 7) - free on the go HIIT training dexter  · Bonnie Dexter @ www. onepeloton. com  · 5 Jolie Ph.D  · The Game Changers- NetMoneyReef Documentary on plant based nutrition  · Fed Up - documentary about obesity (Free on [facility])  · The Truth About Sugar - documentary on sugar (Free on [employer], https://youtu. be/2S7veemTH0f)  · The Dr. Neha Lopez by

## 2021-09-28 NOTE — PROGRESS NOTES
Christin Hdez is a 48year old female presents today for follow-up on medical weight loss program for the treatment of overweight, obesity, or morbid obesity with associated Type 2 Diabetes, HTN, hyperlipidemia, KEON, fatty liver.     S:  Current weight intact  PSYCH: pleasant, cooperative, normal mood and affect    ASSESSMENT AND PLAN:  Encounter for therapeutic drug monitoring  (primary encounter diagnosis)  Morbid obesity with bmi of 45.0-49.9, adult (hcc)  Mixed hyperlipidemia  Essential hypertension with protein and produce at all meals: 1/4 plate- protein, 1/2 plate non starchy veggie preferred over fruit  3. Water with all meals  4.  Eliminate/reduce late night eating after 7pm.          Mindful Eating Tips:  When we sit down to a meal by ourselves o is bound to do. Do not try to polish off all of the food in front of you. Instead, aim for the moment when flavor peaks and you feel an internal “ah” of satisfaction—then stop.     Evaluate how full you are: Keep asking yourself while you are eating, “Am I www.MyGameGeneticsabolicMeals. com - individual prepared meals to go  · [facility], Home , Every Plate, Sunbasket- on line meal delivery programs for preparation at home  · Meal Shree in [place] for homemade meals to go @ www.OkCopay  · Diet Doct Exam Room by the [de-identified] Committee, Nutrition Facts by Dr. René Macario        Medication use and SEs reviewed with patient. Return in about 1 month (around 10/28/2021) for weight management. Patient verbalizes understanding.

## 2021-10-18 ENCOUNTER — LAB ENCOUNTER (OUTPATIENT)
Dept: LAB | Age: 46
End: 2021-10-18
Attending: FAMILY MEDICINE
Payer: MEDICARE

## 2021-10-18 DIAGNOSIS — E11.9 TYPE 2 DIABETES MELLITUS WITHOUT COMPLICATION, WITHOUT LONG-TERM CURRENT USE OF INSULIN (HCC): ICD-10-CM

## 2021-10-18 PROCEDURE — 82043 UR ALBUMIN QUANTITATIVE: CPT

## 2021-10-18 PROCEDURE — 82570 ASSAY OF URINE CREATININE: CPT

## 2021-10-20 ENCOUNTER — TELEPHONE (OUTPATIENT)
Dept: ORTHOPEDICS CLINIC | Facility: CLINIC | Age: 46
End: 2021-10-20

## 2021-10-20 RX ORDER — IBUPROFEN 800 MG/1
TABLET ORAL
Qty: 45 TABLET | Refills: 1 | OUTPATIENT
Start: 2021-10-20

## 2021-10-20 NOTE — TELEPHONE ENCOUNTER
Pt is following up for her right foot pain. Will be seen by Dr. Goss Hence first before imaging needs determined.

## 2021-10-20 NOTE — TELEPHONE ENCOUNTER
Pt called and clarify that she has pain in her RT foot. No updated imaging on file. Please advice, thanks.   Future Appointments   Date Time Provider Tara Anne   10/28/2021  2:40 PM WILLIS Goins [facility] [address]   11/2/2021 10:30 AM Fel

## 2021-10-20 NOTE — TELEPHONE ENCOUNTER
Sindi Patricio., SHERMAN  You 19 hours ago (1:03 PM)     I think she has been taking this for a long time. I would like for Charlotte to follow up before re fill   JF        Verified voicemail reached with verbal consent signed. Attempted to call Randine Leventhal regarding notification she needs to schedule a follow up appt to ensure ibuprofen script is appropriate, will be denied at this time. Reached voicemail, left voicemail and provided a detail message with my call back contact information.

## 2021-10-21 ENCOUNTER — TELEPHONE (OUTPATIENT)
Dept: FAMILY MEDICINE CLINIC | Facility: CLINIC | Age: 46
End: 2021-10-21

## 2021-10-21 NOTE — TELEPHONE ENCOUNTER
Called Re[facility] who states they do have the [employer] in stock and patient can come to  any time. Called patient and advised per Reba, she can come and  glucometer kit anytime.

## 2021-10-21 NOTE — TELEPHONE ENCOUNTER
patient was told by [facility] pharmacy they are no longer carrying glucose monitor she was prescribed in September. Patient unable to recall name of monitor she was prescribed, but wants to change pharmacies also.    Please call patient for more details, a

## 2021-10-25 ENCOUNTER — TELEMEDICINE (OUTPATIENT)
Dept: FAMILY MEDICINE CLINIC | Facility: CLINIC | Age: 46
End: 2021-10-25
Payer: MEDICARE

## 2021-10-25 ENCOUNTER — LAB ENCOUNTER (OUTPATIENT)
Dept: LAB | Facility: HOSPITAL | Age: 46
End: 2021-10-25
Attending: FAMILY MEDICINE
Payer: MEDICARE

## 2021-10-25 DIAGNOSIS — J01.00 ACUTE MAXILLARY SINUSITIS, RECURRENCE NOT SPECIFIED: ICD-10-CM

## 2021-10-25 DIAGNOSIS — E11.29 MICROALBUMINURIA DUE TO TYPE 2 DIABETES MELLITUS (HCC): ICD-10-CM

## 2021-10-25 DIAGNOSIS — E66.9 DIABETES MELLITUS TYPE 2 IN OBESE (HCC): ICD-10-CM

## 2021-10-25 DIAGNOSIS — R80.9 MICROALBUMINURIA DUE TO TYPE 2 DIABETES MELLITUS (HCC): ICD-10-CM

## 2021-10-25 DIAGNOSIS — R68.89 FORGETFULNESS: ICD-10-CM

## 2021-10-25 DIAGNOSIS — R05.9 COUGH: Primary | ICD-10-CM

## 2021-10-25 DIAGNOSIS — E11.69 DIABETES MELLITUS TYPE 2 IN OBESE (HCC): ICD-10-CM

## 2021-10-25 DIAGNOSIS — R05.9 COUGH: ICD-10-CM

## 2021-10-25 PROCEDURE — 99214 OFFICE O/P EST MOD 30 MIN: CPT | Performed by: FAMILY MEDICINE

## 2021-10-25 RX ORDER — SEMAGLUTIDE 1.34 MG/ML
1 INJECTION, SOLUTION SUBCUTANEOUS WEEKLY
COMMUNITY
Start: 2021-10-20 | End: 2021-10-28

## 2021-10-25 RX ORDER — FLUTICASONE PROPIONATE 50 MCG
2 SPRAY, SUSPENSION (ML) NASAL DAILY
Qty: 1 EACH | Refills: 1 | Status: SHIPPED | OUTPATIENT
Start: 2021-10-25 | End: 2021-12-02 | Stop reason: ALTCHOICE

## 2021-10-25 RX ORDER — AMOXICILLIN AND CLAVULANATE POTASSIUM 875; 125 MG/1; MG/1
1 TABLET, FILM COATED ORAL 2 TIMES DAILY
Qty: 20 TABLET | Refills: 0 | Status: SHIPPED | OUTPATIENT
Start: 2021-10-25 | End: 2021-11-04

## 2021-10-25 NOTE — PROGRESS NOTES
Video visit  Please note that the following visit was completed using two-way, real-time interactive audio and video communication.   This has been done in good alley to provide continuity of care in the best interest of the provider-patient relationship, d OR 0.5 MG/DOSE, 2 MG/1.5ML Subcutaneous Solution Pen-injector Inject 0.25 mg into the skin once a week. 1.5 mL 1   • AMLODIPINE 10 MG Oral Tab TAKE 1 TABLET BY MOUTH EVERY DAY 90 tablet 1   • Fenofibrate 160 MG Oral Tab Take 160 mg by mouth daily.      • Me • Multiple Vitamins-Minerals (CENTRUM SILVER ULTRA WOMENS) Oral Tab Take 1 tablet by mouth daily.        • Blood Glucose Monitoring Suppl (TRUETRACK BLOOD GLUCOSE) W/DEVICE Does not apply Kit Patient to test twice daily 1 kit 0   • Glucose Blood (ONETOUCH NEEDLE BIOPSY LIVER     • ORTHOPEDIC SURG (PBP) Bilateral     MANDA CARPAL ISHAAN RELEASE   • OTHER  LEFT FOOT SPUR REMOVAL   • OTHER SURGICAL HISTORY      bilateral carpal tunnel release   • RADIATION LEFT  2015   • STEREOTACTIC BREAST BIOPSY Left 4/21/15 mouth 2 (two) times daily for 10 days. Dispense: 20 tablet; Refill: 0  - fluticasone propionate 50 MCG/ACT Nasal Suspension; 2 sprays by Each Nare route daily. Dispense: 1 each; Refill: 1    3.  Forgetfulness  - discussed possible etiologies  - will refer

## 2021-10-28 ENCOUNTER — OFFICE VISIT (OUTPATIENT)
Dept: INTERNAL MEDICINE CLINIC | Facility: CLINIC | Age: 46
End: 2021-10-28
Payer: MEDICARE

## 2021-10-28 VITALS
BODY MASS INDEX: 48.82 KG/M2 | SYSTOLIC BLOOD PRESSURE: 140 MMHG | RESPIRATION RATE: 18 BRPM | WEIGHT: 293 LBS | HEART RATE: 80 BPM | DIASTOLIC BLOOD PRESSURE: 80 MMHG | HEIGHT: 65 IN

## 2021-10-28 DIAGNOSIS — E66.9 DIABETES MELLITUS TYPE 2 IN OBESE (HCC): ICD-10-CM

## 2021-10-28 DIAGNOSIS — Z51.81 ENCOUNTER FOR THERAPEUTIC DRUG MONITORING: Primary | ICD-10-CM

## 2021-10-28 DIAGNOSIS — E11.69 DIABETES MELLITUS TYPE 2 IN OBESE (HCC): ICD-10-CM

## 2021-10-28 DIAGNOSIS — I10 ESSENTIAL HYPERTENSION: ICD-10-CM

## 2021-10-28 DIAGNOSIS — E66.01 MORBID OBESITY WITH BMI OF 45.0-49.9, ADULT (HCC): ICD-10-CM

## 2021-10-28 DIAGNOSIS — E78.2 MIXED HYPERLIPIDEMIA: ICD-10-CM

## 2021-10-28 DIAGNOSIS — Z99.89 OSA ON CPAP: ICD-10-CM

## 2021-10-28 DIAGNOSIS — G47.33 OSA ON CPAP: ICD-10-CM

## 2021-10-28 PROCEDURE — 99214 OFFICE O/P EST MOD 30 MIN: CPT | Performed by: NURSE PRACTITIONER

## 2021-10-28 NOTE — PROGRESS NOTES
Herbie Cardoza is a 48year old female presents today for follow-up on medical weight loss program for the treatment of overweight, obesity, or morbid obesity with associated Type 2 Diabetes, HTN, hyperlipidemia, KEON, fatty liver.     S:  Current weight edema.  GI: +BS  NEURO/MS: motor and sensory grossly intact  PSYCH: pleasant, cooperative, normal mood and affect    ASSESSMENT AND PLAN:  Encounter for therapeutic drug monitoring  (primary encounter diagnosis)  Morbid obesity with bmi of 45.0-49.9, adult what we eat will have an effect on our bodies.  We know that regularly eating nutritious foods will:  • Give us more energy  • Build and repair muscles  • Control our blood sugar levels  • Help our body with daily tasks and processes  But have you ever thou lot of omega-3 fatty acids. These are great for your thinking abilities, increasing blood flow in the brain and building the structure of neurons.   Whole Grains  Don't always associate grains with “bad carbs.” Whole grains like brown rice, barley, oatmeal and provides a feeling of fullness with fewer calories. This in turn supports weight loss. Eat the rainbow daily to support your weight loss journey! Medication use and SEs reviewed with patient.     Return in about 3 months (around 1/28/2022) for alisa

## 2021-10-28 NOTE — PATIENT INSTRUCTIONS
Continue making lifestyle changes that focus on good nutrition, regular exercise and stress management. Medication Plan: Increase Ozempic to 1 mg weekly. New script at pharmacy.     Next steps to work on before next office visit include: Cravings for fas focusing  Foods that [address] [place]  Regularly eating whole, nutritious foods can do your brain a lot of good.  If you struggle with mental health disorders or need help concentrating on work or school, you'll probably notice a huge difference — just by your brain. The bottom line, though, is that your diet should never leave you feeling hungry, weak, unfocused or deprived. Moderation is always dave!              Mindy Powell Yellow/Orange Red Purple   Monday Tuesday Wednesday Thursday

## 2021-11-01 ENCOUNTER — TELEPHONE (OUTPATIENT)
Dept: FAMILY MEDICINE CLINIC | Facility: CLINIC | Age: 46
End: 2021-11-01

## 2021-11-02 ENCOUNTER — OFFICE VISIT (OUTPATIENT)
Dept: ORTHOPEDICS CLINIC | Facility: CLINIC | Age: 46
End: 2021-11-02
Payer: MEDICARE

## 2021-11-02 VITALS — HEIGHT: 65 IN | BODY MASS INDEX: 48.32 KG/M2 | WEIGHT: 290 LBS

## 2021-11-02 DIAGNOSIS — M72.2 PLANTAR FASCIITIS: Primary | ICD-10-CM

## 2021-11-02 DIAGNOSIS — E11.9 TYPE 2 DIABETES MELLITUS WITHOUT COMPLICATION, WITHOUT LONG-TERM CURRENT USE OF INSULIN (HCC): ICD-10-CM

## 2021-11-02 PROCEDURE — 99213 OFFICE O/P EST LOW 20 MIN: CPT | Performed by: PODIATRIST

## 2021-11-02 NOTE — TELEPHONE ENCOUNTER
LOV: 11/1/21  Filled:   Medication Quantity Refills Start End   IBUPROFEN 800 MG Oral Tab 45 tablet 1 9/12/2021    Sig: Raymond Night 1/2 TABLET BY MOUTH THREE TIMES A DAY         Future Appointments   Date Time Provider Tara Anne   11/23/2021 11:40 AM JORDAN LUNDY SGIEDW None   11/23/2021 12:10 PM JORDAN LUNDY SGIEDW None       Rx pended for approval

## 2021-11-02 NOTE — PROGRESS NOTES
[facility] Progress Note    Subjective: This he follows up today with continued pain in the back of her heel.   History of plantar fasciitis left heel for years that resolved with heel spur excision and now right heel has been painful for about 1

## 2021-11-11 DIAGNOSIS — E78.1 HYPERTRIGLYCERIDEMIA: ICD-10-CM

## 2021-11-11 RX ORDER — GEMFIBROZIL 600 MG/1
TABLET, FILM COATED ORAL
Qty: 180 TABLET | Refills: 1 | Status: SHIPPED | OUTPATIENT
Start: 2021-11-11

## 2021-11-11 RX ORDER — IBUPROFEN 800 MG/1
TABLET ORAL
Qty: 45 TABLET | Refills: 1 | OUTPATIENT
Start: 2021-11-11

## 2021-11-11 NOTE — TELEPHONE ENCOUNTER
LOV: 11/2/21  RTC: prn  Filled: 9/12/21 #45 with 1 refill    Future Appointments   Date Time Provider Tara Anne   11/23/2021 11:05 AM NIKKIE, PROCEDURE SGIEDW None   11/23/2021 11:35 AM NIKKIE, PROCEDURE SGIEDW None       Rx pended for approval

## 2021-11-11 NOTE — TELEPHONE ENCOUNTER
Cholesterol Medication Protocol Passed 11/11/2021 12:30 PM   Protocol Details  ALT < 80    ALT resulted within past year    Lipid panel within past 12 months    Appointment within past 12 or next 3 months     LOV  9/14/21     LAST LAB 5/21/21     LAST RX 8

## 2021-11-12 DIAGNOSIS — E11.9 TYPE 2 DIABETES MELLITUS WITHOUT COMPLICATION (HCC): ICD-10-CM

## 2021-11-12 DIAGNOSIS — R80.9 MICROALBUMINURIA DUE TO TYPE 2 DIABETES MELLITUS (HCC): ICD-10-CM

## 2021-11-12 DIAGNOSIS — E78.2 MIXED HYPERLIPIDEMIA: ICD-10-CM

## 2021-11-12 DIAGNOSIS — E66.9 DIABETES MELLITUS TYPE 2 IN OBESE (HCC): ICD-10-CM

## 2021-11-12 DIAGNOSIS — E11.69 DIABETES MELLITUS TYPE 2 IN OBESE (HCC): ICD-10-CM

## 2021-11-12 DIAGNOSIS — I10 ESSENTIAL HYPERTENSION: ICD-10-CM

## 2021-11-12 DIAGNOSIS — E11.29 MICROALBUMINURIA DUE TO TYPE 2 DIABETES MELLITUS (HCC): ICD-10-CM

## 2021-11-12 RX ORDER — TELMISARTAN 80 MG/1
TABLET ORAL
Qty: 90 TABLET | Refills: 1 | Status: SHIPPED | OUTPATIENT
Start: 2021-11-12

## 2021-11-12 RX ORDER — METOPROLOL SUCCINATE 200 MG/1
TABLET, EXTENDED RELEASE ORAL
Qty: 90 TABLET | Refills: 1 | Status: SHIPPED | OUTPATIENT
Start: 2021-11-12

## 2021-11-12 RX ORDER — METFORMIN HYDROCHLORIDE 750 MG/1
TABLET, EXTENDED RELEASE ORAL
Qty: 180 TABLET | Refills: 1 | Status: SHIPPED | OUTPATIENT
Start: 2021-11-12

## 2021-11-12 RX ORDER — GLIMEPIRIDE 2 MG/1
TABLET ORAL
Qty: 90 TABLET | Refills: 1 | Status: SHIPPED | OUTPATIENT
Start: 2021-11-12

## 2021-11-12 RX ORDER — ATORVASTATIN CALCIUM 40 MG/1
TABLET, FILM COATED ORAL
Qty: 90 TABLET | Refills: 1 | Status: SHIPPED | OUTPATIENT
Start: 2021-11-12

## 2021-11-12 NOTE — TELEPHONE ENCOUNTER
Diabetic Medication Protocol Passed 11/12/2021 11:57 AM   Protocol Details  HgBA1C procedure resulted in past 6 months    Last HgBA1C < 7.5    Microalbumin procedure in past 12 months or taking ACE/ARB    Appointment in past 6 or next 3 months

## 2021-11-12 NOTE — TELEPHONE ENCOUNTER
Diabetic Medication Protocol Passed 11/12/2021 08:48 AM   Protocol Details  HgBA1C procedure resulted in past 6 months    Last HgBA1C < 7.5    Microalbumin procedure in past 12 months or taking ACE/ARB    Appointment in past 6 or next 3 months        LOV

## 2021-11-20 ENCOUNTER — LAB ENCOUNTER (OUTPATIENT)
Dept: LAB | Facility: HOSPITAL | Age: 46
End: 2021-11-20
Attending: INTERNAL MEDICINE
Payer: MEDICARE

## 2021-11-20 DIAGNOSIS — Z12.11 COLON CANCER SCREENING: ICD-10-CM

## 2021-11-22 NOTE — PAT NURSING NOTE
Received call from [address] [place] [address] at Dr. Cait Fish office stating patient does have an adult to stay with her overnight after procedure on 11/23/21.

## 2021-11-23 ENCOUNTER — ANESTHESIA EVENT (OUTPATIENT)
Dept: ENDOSCOPY | Facility: HOSPITAL | Age: 46
End: 2021-11-23
Payer: MEDICARE

## 2021-11-23 ENCOUNTER — ANESTHESIA (OUTPATIENT)
Dept: ENDOSCOPY | Facility: HOSPITAL | Age: 46
End: 2021-11-23
Payer: MEDICARE

## 2021-11-23 ENCOUNTER — HOSPITAL ENCOUNTER (OUTPATIENT)
Facility: HOSPITAL | Age: 46
Setting detail: HOSPITAL OUTPATIENT SURGERY
Discharge: HOME | End: 2021-11-23
Attending: INTERNAL MEDICINE | Admitting: INTERNAL MEDICINE
Payer: MEDICARE

## 2021-11-23 VITALS
OXYGEN SATURATION: 95 % | HEART RATE: 80 BPM | SYSTOLIC BLOOD PRESSURE: 145 MMHG | RESPIRATION RATE: 18 BRPM | DIASTOLIC BLOOD PRESSURE: 87 MMHG | HEIGHT: 65 IN | BODY MASS INDEX: 48.32 KG/M2 | TEMPERATURE: 98 F | WEIGHT: 290 LBS

## 2021-11-23 DIAGNOSIS — Z12.11 COLON CANCER SCREENING: Primary | ICD-10-CM

## 2021-11-23 PROCEDURE — 0DBP8ZX EXCISION OF RECTUM, VIA NATURAL OR ARTIFICIAL OPENING ENDOSCOPIC, DIAGNOSTIC: ICD-10-PCS | Performed by: INTERNAL MEDICINE

## 2021-11-23 PROCEDURE — 0DB98ZX EXCISION OF DUODENUM, VIA NATURAL OR ARTIFICIAL OPENING ENDOSCOPIC, DIAGNOSTIC: ICD-10-PCS | Performed by: INTERNAL MEDICINE

## 2021-11-23 PROCEDURE — 0DB68ZX EXCISION OF STOMACH, VIA NATURAL OR ARTIFICIAL OPENING ENDOSCOPIC, DIAGNOSTIC: ICD-10-PCS | Performed by: INTERNAL MEDICINE

## 2021-11-23 PROCEDURE — 82962 GLUCOSE BLOOD TEST: CPT

## 2021-11-23 PROCEDURE — 0DB58ZX EXCISION OF ESOPHAGUS, VIA NATURAL OR ARTIFICIAL OPENING ENDOSCOPIC, DIAGNOSTIC: ICD-10-PCS | Performed by: INTERNAL MEDICINE

## 2021-11-23 PROCEDURE — 0DB78ZX EXCISION OF STOMACH, PYLORUS, VIA NATURAL OR ARTIFICIAL OPENING ENDOSCOPIC, DIAGNOSTIC: ICD-10-PCS | Performed by: INTERNAL MEDICINE

## 2021-11-23 PROCEDURE — 88305 TISSUE EXAM BY PATHOLOGIST: CPT | Performed by: INTERNAL MEDICINE

## 2021-11-23 RX ORDER — SODIUM CHLORIDE, SODIUM LACTATE, POTASSIUM CHLORIDE, CALCIUM CHLORIDE 600; 310; 30; 20 MG/100ML; MG/100ML; MG/100ML; MG/100ML
INJECTION, SOLUTION INTRAVENOUS CONTINUOUS
Status: DISCONTINUED | OUTPATIENT
Start: 2021-11-23 | End: 2021-11-23

## 2021-11-23 RX ORDER — HYDROCODONE BITARTRATE AND ACETAMINOPHEN 10; 325 MG/1; MG/1
2 TABLET ORAL AS NEEDED
Status: DISCONTINUED | OUTPATIENT
Start: 2021-11-23 | End: 2021-11-23

## 2021-11-23 RX ORDER — DEXTROSE MONOHYDRATE 25 G/50ML
50 INJECTION, SOLUTION INTRAVENOUS
Status: DISCONTINUED | OUTPATIENT
Start: 2021-11-23 | End: 2021-11-23

## 2021-11-23 RX ORDER — LIDOCAINE HYDROCHLORIDE 10 MG/ML
INJECTION, SOLUTION EPIDURAL; INFILTRATION; INTRACAUDAL; PERINEURAL AS NEEDED
Status: DISCONTINUED | OUTPATIENT
Start: 2021-11-23 | End: 2021-11-23 | Stop reason: SURG

## 2021-11-23 RX ORDER — METOCLOPRAMIDE HYDROCHLORIDE 5 MG/ML
10 INJECTION INTRAMUSCULAR; INTRAVENOUS AS NEEDED
Status: DISCONTINUED | OUTPATIENT
Start: 2021-11-23 | End: 2021-11-23

## 2021-11-23 RX ORDER — HYDROMORPHONE HYDROCHLORIDE 1 MG/ML
0.4 INJECTION, SOLUTION INTRAMUSCULAR; INTRAVENOUS; SUBCUTANEOUS EVERY 5 MIN PRN
Status: DISCONTINUED | OUTPATIENT
Start: 2021-11-23 | End: 2021-11-23

## 2021-11-23 RX ORDER — NALOXONE HYDROCHLORIDE 0.4 MG/ML
80 INJECTION, SOLUTION INTRAMUSCULAR; INTRAVENOUS; SUBCUTANEOUS AS NEEDED
Status: DISCONTINUED | OUTPATIENT
Start: 2021-11-23 | End: 2021-11-23

## 2021-11-23 RX ORDER — ONDANSETRON 2 MG/ML
4 INJECTION INTRAMUSCULAR; INTRAVENOUS AS NEEDED
Status: DISCONTINUED | OUTPATIENT
Start: 2021-11-23 | End: 2021-11-23

## 2021-11-23 RX ORDER — HYDROCODONE BITARTRATE AND ACETAMINOPHEN 10; 325 MG/1; MG/1
1 TABLET ORAL AS NEEDED
Status: DISCONTINUED | OUTPATIENT
Start: 2021-11-23 | End: 2021-11-23

## 2021-11-23 RX ADMIN — SODIUM CHLORIDE, SODIUM LACTATE, POTASSIUM CHLORIDE, CALCIUM CHLORIDE: 600; 310; 30; 20 INJECTION, SOLUTION INTRAVENOUS at 11:03:00

## 2021-11-23 RX ADMIN — LIDOCAINE HYDROCHLORIDE 25 MG: 10 INJECTION, SOLUTION EPIDURAL; INFILTRATION; INTRACAUDAL; PERINEURAL at 10:41:00

## 2021-11-23 RX ADMIN — SODIUM CHLORIDE, SODIUM LACTATE, POTASSIUM CHLORIDE, CALCIUM CHLORIDE: 600; 310; 30; 20 INJECTION, SOLUTION INTRAVENOUS at 10:42:00

## 2021-11-23 NOTE — ANESTHESIA PREPROCEDURE EVALUATION
PRE-OP EVALUATION    Patient Name: Valarie Kevin    Admit Diagnosis: Colon cancer screening [Z12.11]    Pre-op Diagnosis: Colon cancer screening [Z12.11]    COLONOSCOPY, ESOPHAGOGASTRODUODENOSCOPY (EGD)    Anesthesia Procedure: COLONOSCOPY, ESOPHAGOGAS mouth daily. , Disp: , Rfl:   Melatonin 5 MG Oral Tab, Take 1 tablet (5 mg total) by mouth every evening., Disp: 30 tablet, Rfl: 1  IBUPROFEN 800 MG Oral Tab, TAKE 1/2 TABLET BY MOUTH THREE TIMES A DAY, Disp: 45 tablet, Rfl: 1  pantoprazole 40 MG Oral Tab E Evaluation    Patient summary reviewed.     Anesthetic Complications           GI/Hepatic/Renal      (+) GERD          (+) liver disease                 Cardiovascular                (+) obesity  (+) hypertension                                     Endo/Oth

## 2021-11-23 NOTE — H&P
[facility] Patient Status:  Hospital Outpatient Surgery    1975 MRN [id number]   Location [id number] [place] [address] Attending Dung Carrillo MD   Date 2021 PCP Monae Fernando DO     CC: Family History   Problem Relation Age of Onset   • Breast Cancer Maternal Grandmother         not known   • Breast Cancer Paternal Grandmother         not known   • Diabetes Father    • Heart Attack Father    • Other (Other) Father         heart attack

## 2021-11-23 NOTE — ANESTHESIA POSTPROCEDURE EVALUATION
[facility] Patient Status:  Hospital Outpatient Surgery   Age/Gender 48year old female MRN [id number]   Location [id number] [place] [address] Attending Indigo Marin, [address] [facility] [address] Day # 0 PCP DO Franci Holden

## 2021-11-23 NOTE — OPERATIVE REPORT
[facility] Patient Status:  Hospital Outpatient Surgery    1975 MRN [id number]   Location [address] [place] [address] Attending Augusto Rizzo MD   Date 2021 PCP Steven Robbins DO     PREOPERATIVE DIAGNOSIS/INDICATION: Sc Giovana Cevallos MD  11/23/2021  11:02 AM

## 2021-11-23 NOTE — OPERATIVE REPORT
[address] [facility] Patient Status:  Hospital Outpatient Surgery    1975 MRN [id number]   Location [address] [place] [address] Attending Carrie Cronin MD   Date 2021 PCP Sam Lopez DO     PREOPERATIVE DIAGNOSIS/INDICATION: GE

## 2021-11-29 ENCOUNTER — LAB ENCOUNTER (OUTPATIENT)
Dept: LAB | Age: 46
End: 2021-11-29
Attending: FAMILY MEDICINE
Payer: MEDICARE

## 2021-11-29 DIAGNOSIS — E78.2 MIXED HYPERLIPIDEMIA: ICD-10-CM

## 2021-11-29 DIAGNOSIS — E11.9 TYPE 2 DIABETES MELLITUS WITHOUT COMPLICATION, WITHOUT LONG-TERM CURRENT USE OF INSULIN (HCC): ICD-10-CM

## 2021-11-29 DIAGNOSIS — I10 ESSENTIAL HYPERTENSION: ICD-10-CM

## 2021-11-29 PROCEDURE — 80053 COMPREHEN METABOLIC PANEL: CPT

## 2021-11-29 PROCEDURE — 36415 COLL VENOUS BLD VENIPUNCTURE: CPT

## 2021-11-29 PROCEDURE — 83036 HEMOGLOBIN GLYCOSYLATED A1C: CPT

## 2021-11-29 PROCEDURE — 80061 LIPID PANEL: CPT

## 2021-12-01 NOTE — PROGRESS NOTES
Date: 2021    To: Jorge Batres  : 1975     I hope this letter finds you doing well. I am writing to inform you of the following:      The biopsies obtained at the time of your recent endoscopic procedures were benign and showed no evidence

## 2021-12-02 ENCOUNTER — OFFICE VISIT (OUTPATIENT)
Dept: NEPHROLOGY | Facility: CLINIC | Age: 46
End: 2021-12-02
Payer: MEDICARE

## 2021-12-02 VITALS — BODY MASS INDEX: 49 KG/M2 | DIASTOLIC BLOOD PRESSURE: 84 MMHG | WEIGHT: 293 LBS | SYSTOLIC BLOOD PRESSURE: 110 MMHG

## 2021-12-02 DIAGNOSIS — R80.9 PROTEINURIA, UNSPECIFIED TYPE: Primary | ICD-10-CM

## 2021-12-02 PROCEDURE — 99214 OFFICE O/P EST MOD 30 MIN: CPT | Performed by: INTERNAL MEDICINE

## 2021-12-02 RX ORDER — HYDRALAZINE HYDROCHLORIDE 25 MG/1
25 TABLET, FILM COATED ORAL 2 TIMES DAILY
Qty: 60 TABLET | Refills: 1 | Status: SHIPPED | OUTPATIENT
Start: 2021-12-02

## 2021-12-02 NOTE — PROGRESS NOTES
Nephrology Consult Note      REASON FOR CONSULT:  Proteinuria         HPI:   Nick Fuentes is a 48year old female with Patient presents with:  New Patient    Juancho Sickle, DO    48 y /o female with hx of DM/HTN, obesity who presents to clinic for fol Surgical History:   Procedure Laterality Date   • BREAST LUMPECTOMY Left 6/11/2015    Procedure: BREAST LUMPECTOMY;  Surgeon: Ana Zepeda MD;  Location: [facility] MAIN OR   • COLONOSCOPY N/A 11/23/2021    Procedure: COLONOSCOPY with polypectomy, ESOPHAGOGASTRODU MOUTH THREE TIMES A DAY 45 tablet 1   • pantoprazole 40 MG Oral Tab EC TAKE 1 TABLET BY MOUTH ONCE DAILY 30 MINUTES PRIOR TO BREAKFAST 90 tablet 3   • CLOTRIMAZOLE-BETAMETHASONE 1-0.05 % External Cream APPLY 1 APPLICATION TOPICALLY TWICE DAILY AS NEEDED 45 Caffeine Concern: Yes          1 ice tea daily         Exercise: No         Family History:  Family History   Problem Relation Age of Onset   • Breast Cancer Maternal Grandmother         not known   • Breast Cancer Paternal Grandmother         not known uL   2.08   Monocytes Absolute      0.10 - 1.00 x10(3) uL   0.27   Eosinophils Absolute      0.00 - 0.70 x10(3) uL   0.21   Basophils Absolute      0.00 - 0.20 x10(3) uL   0.03   Immature Granulocyte Absolute      0.00 - 1.00 x10(3) uL   0.01   Neutrophils 31.0 - 37.0 g/dL    RDW      11.0 - 15.0 %    RDW-SD      35.1 - 46.3 fL    Prelim Neutrophil Abs      1.50 - 7.70 x10 (3) uL    Neutrophils Absolute      1.50 - 7.70 x10(3) uL    Lymphocytes Absolute      1.00 - 4.00 x10(3) uL    Monocytes Absolute some associated proteinuria) and replace w/ hydralazine 25 bid  - goal BP <140/90 - discussed w/ care giver (she is a nurse) and sees patient few times a week.; asked to call us for further instructions if BP not at goal  - discussed importance of salt res

## 2021-12-09 RX ORDER — IBUPROFEN 800 MG/1
TABLET ORAL
Qty: 45 TABLET | Refills: 1 | OUTPATIENT
Start: 2021-12-09

## 2021-12-09 NOTE — TELEPHONE ENCOUNTER
I have talked to Opal 1076 about taking too much motrin for a long time  Would like her to discontinue for 2-3 months

## 2021-12-09 NOTE — TELEPHONE ENCOUNTER
Last filled  Medication Quantity Refills Start End   IBUPROFEN 800 MG Oral Tab 45 tablet 1 9/12/2021    Sig: Americo Lux 1/2 TABLET BY MOUTH THREE TIMES A DAY

## 2021-12-10 ENCOUNTER — HOSPITAL ENCOUNTER (OUTPATIENT)
Age: 46
Discharge: HOME | End: 2021-12-10
Payer: MEDICARE

## 2021-12-10 ENCOUNTER — TELEPHONE (OUTPATIENT)
Dept: FAMILY MEDICINE CLINIC | Facility: CLINIC | Age: 46
End: 2021-12-10

## 2021-12-10 VITALS
TEMPERATURE: 97 F | DIASTOLIC BLOOD PRESSURE: 87 MMHG | HEART RATE: 84 BPM | SYSTOLIC BLOOD PRESSURE: 156 MMHG | OXYGEN SATURATION: 96 % | RESPIRATION RATE: 22 BRPM | WEIGHT: 285 LBS | BODY MASS INDEX: 47 KG/M2

## 2021-12-10 DIAGNOSIS — U07.1 LAB TEST POSITIVE FOR DETECTION OF COVID-19 VIRUS: Primary | ICD-10-CM

## 2021-12-10 DIAGNOSIS — R68.89 FLU-LIKE SYMPTOMS: ICD-10-CM

## 2021-12-10 PROCEDURE — 99213 OFFICE O/P EST LOW 20 MIN: CPT

## 2021-12-10 NOTE — ED INITIAL ASSESSMENT (HPI)
Nasal congestion , feeling tired, body aches, sneezing started yesterday. Denies fever.  Pt requesting covid test

## 2021-12-10 NOTE — ED PROVIDER NOTES
Patient Seen in: [facility]      History   Patient presents with:  Covid-19 Test  Upper Respiratory Infection    Stated Complaint: body aches,cough,sneezing    Subjective:   HPI    38-year-old female here with complaint of body aches coug presenting problem, except as indicated as above.     Past Surgical History:   Procedure Laterality Date   • BREAST LUMPECTOMY Left 6/11/2015    Procedure: BREAST LUMPECTOMY;  Surgeon: Jesus Echeverria MD;  Location: [facility] MAIN OR   • COLONOSCOPY N/A 11/23/2021 normal.      Pupils: Pupils are equal, round, and reactive to light. Cardiovascular:      Rate and Rhythm: Normal rate and regular rhythm. Heart sounds: Normal heart sounds.    Pulmonary:      Effort: Pulmonary effort is normal.      Breath sounds: N the plan of care with the patient, who expresses understanding. All questions and concerns are addressed to the patient's satisfaction prior to discharge today. Previous conversations with PCP and charts were reviewed.                                 Dispo

## 2021-12-10 NOTE — TELEPHONE ENCOUNTER
Symptoms started 12/9. She has sweating, feverish, fatigue, coughing, sneezing and stuffy and headaches, shortness of breath, loss of taste and smell. Took covid test 2 weeks ago for procedure, negative result.     She would like to go to  to be teste

## 2021-12-10 NOTE — TELEPHONE ENCOUNTER
Noted patient is at [facility] now. Covid test positive. Was advised she meets criteria for Polyclonal antibodies. HPI     49-year-old female here with complaint of body aches cough and sneezing. Patient is positive for Covid via rapid test today.   Coleen

## 2021-12-13 ENCOUNTER — TELEPHONE (OUTPATIENT)
Dept: FAMILY MEDICINE CLINIC | Facility: CLINIC | Age: 46
End: 2021-12-13

## 2021-12-13 NOTE — TELEPHONE ENCOUNTER
See other TE from 12-10-21    Spoke to patient. Received antibody infusion on 12-10-21. Fever of 101.4 taken several hours ago, has not taken since. Using Dayquil and Nyquil for symptoms.  Informed that Dayquil contains Acetaminophen, but only a very small

## 2021-12-13 NOTE — TELEPHONE ENCOUNTER
Pt called stating she was Covid positive on 12-10-21. Current symptoms:  Headaches, chills, & fever of 101.4. Please advise.

## 2021-12-14 NOTE — TELEPHONE ENCOUNTER
Spoke with pt regarding results. Informed per PCP indicated agrees with POC. Advised if pt is not feeling better or develops any CP, SOB, fever, to return to ER. No further questions or concerns. Pt verbalized understanding and agreed with POC.

## 2021-12-15 NOTE — TELEPHONE ENCOUNTER
Triage call transferred. Spoke with pt stating last night experienced severe HA, fever, and body aches. Difficulty breathing, wheezing, and O2 currently today (12/15) @89%. Advised to go to ER for further eval/ work up- pt agrees.  Informed pt to f/u afte

## 2021-12-15 NOTE — TELEPHONE ENCOUNTER
Pt calling stating she had a 100.9 fever last night, fighting a bad headache and body aches. Pt said she has been having some breathing issues and her finger monitor today has been reading 89-91 range.  Please advise

## 2022-01-05 RX ORDER — IBUPROFEN 800 MG/1
TABLET ORAL
Qty: 45 TABLET | Refills: 1 | Status: SHIPPED | OUTPATIENT
Start: 2022-01-05 | End: 2022-01-17

## 2022-01-17 ENCOUNTER — OFFICE VISIT (OUTPATIENT)
Dept: NEUROLOGY | Facility: CLINIC | Age: 47
End: 2022-01-17
Payer: MEDICARE

## 2022-01-17 VITALS
BODY MASS INDEX: 48.18 KG/M2 | DIASTOLIC BLOOD PRESSURE: 76 MMHG | WEIGHT: 289.19 LBS | HEART RATE: 85 BPM | SYSTOLIC BLOOD PRESSURE: 116 MMHG | RESPIRATION RATE: 18 BRPM | HEIGHT: 65 IN

## 2022-01-17 DIAGNOSIS — R41.3 SHORT-TERM MEMORY LOSS: Primary | ICD-10-CM

## 2022-01-17 DIAGNOSIS — G43.009 MIGRAINE WITHOUT AURA AND WITHOUT STATUS MIGRAINOSUS, NOT INTRACTABLE: ICD-10-CM

## 2022-01-17 DIAGNOSIS — R47.89 WORD FINDING DIFFICULTY: ICD-10-CM

## 2022-01-17 DIAGNOSIS — R26.81 UNSTEADINESS ON FEET: ICD-10-CM

## 2022-01-17 PROCEDURE — 99204 OFFICE O/P NEW MOD 45 MIN: CPT | Performed by: OTHER

## 2022-01-17 RX ORDER — AMLODIPINE BESYLATE 10 MG/1
TABLET ORAL
COMMUNITY
Start: 2021-12-24

## 2022-01-17 RX ORDER — TOPIRAMATE 25 MG/1
TABLET ORAL
Qty: 120 TABLET | Refills: 0 | Status: SHIPPED | OUTPATIENT
Start: 2022-01-17

## 2022-01-17 NOTE — PROGRESS NOTES
Latrice [address]   Neurology    [facility] Patient Status:  No patient class for patient encounter    1975 MRN [id number]   Location [address] [place] [address], [address] [place] [address], [address] [place] [address] PCP Anirudh Rea DO              HPI: Anxiety state, unspecified    • Asthma    • Back pain    • Back problem    • Bad breath    • Belching    • Bleeding nose    • Cancer [facility])     left breast   • Chest pain    • Chest pain on exertion    • Chronic cough    • Depression    • Diabetes mellitus Dementia in her mother; Diabetes in her brother, father, and mother; Heart Attack in her father; Other in her father; Stroke in her mother; Suicide History in an other family member.   Lewy body dementia in mother  Brother brain tumor    Social History:   babs CLOTRIMAZOLE-BETAMETHASONE 1-0.05 % External Cream, APPLY 1 APPLICATION TOPICALLY TWICE DAILY AS NEEDED, Disp: 45 g, Rfl: 3  •  VITAMIN E 180 MG Oral Cap, TAKE ONE (1) CAPSULE BY MOUTH TWICE DAILY, Disp: 60 capsule, Rfl: 10  •  Glucose Blood (ONETOUCH VERI were sharp. Pupils were round and reacted to light. Extraocular movements were full. There was no face, jaw, palate or tongue weakness or atrophy. Facial sensation was normal. Hearing was grossly intact.  Shoulder shrug was normal.   Motor exam revealed nor no screen time before bed she is already doing, discuss if sleep aid advisable with pulmonologist, start topamax nightly and increase to BID, observe for any side effects, counseled on medication overuse headache prevention, ES tylenol ok, but not more cody

## 2022-01-17 NOTE — PROGRESS NOTES
New patient for Headaches/Memory Loss- Patient present today with POA. Patient states she has been experiencing headaches for a few years. Patient states her headaches are 3-4 times weekly. Patient states the head pain alternates.  Patient states she has so

## 2022-01-27 ENCOUNTER — HOSPITAL ENCOUNTER (OUTPATIENT)
Dept: MRI IMAGING | Facility: HOSPITAL | Age: 47
Discharge: HOME | End: 2022-01-27
Attending: Other
Payer: MEDICARE

## 2022-01-27 ENCOUNTER — LAB ENCOUNTER (OUTPATIENT)
Dept: LAB | Facility: HOSPITAL | Age: 47
End: 2022-01-27
Attending: Other
Payer: MEDICARE

## 2022-01-27 DIAGNOSIS — R41.3 SHORT-TERM MEMORY LOSS: ICD-10-CM

## 2022-01-27 DIAGNOSIS — R80.9 PROTEINURIA, UNSPECIFIED TYPE: ICD-10-CM

## 2022-01-27 DIAGNOSIS — R47.89 WORD FINDING DIFFICULTY: ICD-10-CM

## 2022-01-27 LAB
ANION GAP SERPL CALC-SCNC: 7 MMOL/L (ref 0–18)
BILIRUB UR QL STRIP.AUTO: NEGATIVE
BUN BLD-MCNC: 11 MG/DL (ref 7–18)
CALCIUM BLD-MCNC: 9.9 MG/DL (ref 8.5–10.1)
CHLORIDE SERPL-SCNC: 109 MMOL/L (ref 98–112)
CO2 SERPL-SCNC: 24 MMOL/L (ref 21–32)
COLOR UR AUTO: YELLOW
CREAT BLD-MCNC: 0.66 MG/DL
CREAT UR-SCNC: 104 MG/DL
FASTING STATUS PATIENT QL REPORTED: NO
GLUCOSE BLD-MCNC: 88 MG/DL (ref 70–99)
GLUCOSE UR STRIP.AUTO-MCNC: NEGATIVE MG/DL
KETONES UR STRIP.AUTO-MCNC: NEGATIVE MG/DL
LEUKOCYTE ESTERASE UR QL STRIP.AUTO: NEGATIVE
MAGNESIUM SERPL-MCNC: 1.9 MG/DL (ref 1.6–2.6)
NITRITE UR QL STRIP.AUTO: NEGATIVE
OSMOLALITY SERPL CALC.SUM OF ELEC: 289 MOSM/KG (ref 275–295)
PH UR STRIP.AUTO: 5 [PH] (ref 5–8)
PHOSPHATE SERPL-MCNC: 3.3 MG/DL (ref 2.5–4.9)
POTASSIUM SERPL-SCNC: 4.1 MMOL/L (ref 3.5–5.1)
PROT UR STRIP.AUTO-MCNC: >=500 MG/DL
PROT UR-MCNC: 301.8 MG/DL
RBC UR QL AUTO: NEGATIVE
SODIUM SERPL-SCNC: 140 MMOL/L (ref 136–145)
SP GR UR STRIP.AUTO: 1.01 (ref 1–1.03)
TSI SER-ACNC: 2.15 MIU/ML (ref 0.36–3.74)
UROBILINOGEN UR STRIP.AUTO-MCNC: <2 MG/DL
VIT B12 SERPL-MCNC: 292 PG/ML (ref 193–986)

## 2022-01-27 PROCEDURE — 84156 ASSAY OF PROTEIN URINE: CPT

## 2022-01-27 PROCEDURE — 80048 BASIC METABOLIC PNL TOTAL CA: CPT

## 2022-01-27 PROCEDURE — 84443 ASSAY THYROID STIM HORMONE: CPT

## 2022-01-27 PROCEDURE — 83735 ASSAY OF MAGNESIUM: CPT

## 2022-01-27 PROCEDURE — 70551 MRI BRAIN STEM W/O DYE: CPT | Performed by: OTHER

## 2022-01-27 PROCEDURE — 82570 ASSAY OF URINE CREATININE: CPT

## 2022-01-27 PROCEDURE — 36415 COLL VENOUS BLD VENIPUNCTURE: CPT

## 2022-01-27 PROCEDURE — 81001 URINALYSIS AUTO W/SCOPE: CPT

## 2022-01-27 PROCEDURE — 82607 VITAMIN B-12: CPT

## 2022-01-27 PROCEDURE — 84100 ASSAY OF PHOSPHORUS: CPT

## 2022-02-10 ENCOUNTER — TELEPHONE (OUTPATIENT)
Dept: NEUROLOGY | Facility: CLINIC | Age: 47
End: 2022-02-10

## 2022-02-10 NOTE — TELEPHONE ENCOUNTER
Dr. Myra Jerez needs to leave early on 3/18, and would like patient to come in at 10:35 instead. LMTCB. Upon return call, please r/s patient to 10:35am (would need to have supervisor open schedule) if patient is able. If not, please r/s patient to different day/time. 11:35am appt time to be blocked.

## 2022-02-10 NOTE — TELEPHONE ENCOUNTER
Pt returned call and asked us to reach out to [facility], her POA and [facility] for appts; NINA with Karo to ask about rescheduling to 10:35 on 3/18.

## 2022-02-15 RX ORDER — HYDRALAZINE HYDROCHLORIDE 25 MG/1
25 TABLET, FILM COATED ORAL 2 TIMES DAILY
Qty: 180 TABLET | Refills: 1 | Status: SHIPPED | OUTPATIENT
Start: 2022-02-15

## 2022-02-16 ENCOUNTER — TELEPHONE (OUTPATIENT)
Dept: SURGERY | Facility: CLINIC | Age: 47
End: 2022-02-16

## 2022-02-16 NOTE — TELEPHONE ENCOUNTER
Pts guardian calling regarding medication topiramate (TOPAMAX) 25 MG Oral Tab. Pt';s guardian states she has stopped taking this medication. Please advise.

## 2022-02-16 NOTE — TELEPHONE ENCOUNTER
Attempted to call Kun Shaw (ok per HIPAA) back to gather more info. Did not  phone and could not leave message as mailbox was full    Will need to try again later. Per Epic review:  LOV 1/17/22, was placed on Topiramate for migraines.

## 2022-03-14 RX ORDER — IBUPROFEN 800 MG/1
TABLET ORAL
Qty: 45 TABLET | Refills: 1 | OUTPATIENT
Start: 2022-03-14

## 2022-03-14 RX ORDER — CLOTRIMAZOLE AND BETAMETHASONE DIPROPIONATE 10; .64 MG/G; MG/G
CREAM TOPICAL
Qty: 45 G | Refills: 3 | Status: SHIPPED | OUTPATIENT
Start: 2022-03-14

## 2022-03-18 ENCOUNTER — OFFICE VISIT (OUTPATIENT)
Dept: NEUROLOGY | Facility: CLINIC | Age: 47
End: 2022-03-18
Payer: MEDICARE

## 2022-03-18 VITALS
BODY MASS INDEX: 47 KG/M2 | WEIGHT: 285 LBS | SYSTOLIC BLOOD PRESSURE: 138 MMHG | HEART RATE: 76 BPM | RESPIRATION RATE: 20 BRPM | DIASTOLIC BLOOD PRESSURE: 86 MMHG

## 2022-03-18 DIAGNOSIS — R47.89 WORD FINDING DIFFICULTY: ICD-10-CM

## 2022-03-18 DIAGNOSIS — G43.009 MIGRAINE WITHOUT AURA AND WITHOUT STATUS MIGRAINOSUS, NOT INTRACTABLE: Primary | ICD-10-CM

## 2022-03-18 DIAGNOSIS — R41.3 SHORT-TERM MEMORY LOSS: ICD-10-CM

## 2022-03-18 DIAGNOSIS — T39.95XA ANALGESIC REBOUND HEADACHE: ICD-10-CM

## 2022-03-18 DIAGNOSIS — G44.40 ANALGESIC REBOUND HEADACHE: ICD-10-CM

## 2022-03-18 PROCEDURE — 99213 OFFICE O/P EST LOW 20 MIN: CPT | Performed by: OTHER

## 2022-03-18 RX ORDER — TOPIRAMATE 25 MG/1
TABLET ORAL
Qty: 120 TABLET | Refills: 1 | Status: SHIPPED | OUTPATIENT
Start: 2022-03-18

## 2022-03-18 RX ORDER — METHYLPREDNISOLONE 4 MG/1
TABLET ORAL
Qty: 1 EACH | Refills: 0 | Status: SHIPPED | OUTPATIENT
Start: 2022-03-18

## 2022-03-18 RX ORDER — IBUPROFEN 800 MG/1
1 TABLET ORAL EVERY 8 HOURS PRN
COMMUNITY
Start: 2022-02-23

## 2022-03-18 NOTE — PROGRESS NOTES
Patient reports headaches have been bad recently. Has had 4-5 headaches this week and is having difficulty sleeping. Memory loss and confusion has worsened.

## 2022-04-11 NOTE — TELEPHONE ENCOUNTER
Requesting ozempic 1mg dose   LOV: 10/28/21  RTC: 3 months   Last Relevant Labs: 11/29/21  Filled: 9ml on 10/28/21 with 1 refills    Future Appointments   Date Time Provider Tara Anne         My chart sent to patient to schedule an appointment with sanjiv

## 2022-04-20 RX ORDER — METHYLPREDNISOLONE 4 MG/1
TABLET ORAL
Qty: 21 TABLET | Refills: 10 | OUTPATIENT
Start: 2022-04-20

## 2022-04-21 RX ORDER — SEMAGLUTIDE 1.34 MG/ML
INJECTION, SOLUTION SUBCUTANEOUS
Qty: 3 ML | Refills: 10 | Status: SHIPPED | OUTPATIENT
Start: 2022-04-21

## 2022-05-12 RX ORDER — ATORVASTATIN CALCIUM 40 MG/1
TABLET, FILM COATED ORAL
Qty: 90 TABLET | Refills: 1 | Status: SHIPPED | OUTPATIENT
Start: 2022-05-12

## 2022-05-12 RX ORDER — METFORMIN HYDROCHLORIDE 750 MG/1
TABLET, EXTENDED RELEASE ORAL
Qty: 180 TABLET | Refills: 1 | Status: SHIPPED | OUTPATIENT
Start: 2022-05-12

## 2022-05-12 RX ORDER — GLIMEPIRIDE 2 MG/1
TABLET ORAL
Qty: 90 TABLET | Refills: 1 | Status: SHIPPED | OUTPATIENT
Start: 2022-05-12

## 2022-05-12 RX ORDER — TELMISARTAN 80 MG/1
TABLET ORAL
Qty: 90 TABLET | Refills: 1 | Status: SHIPPED | OUTPATIENT
Start: 2022-05-12

## 2022-05-12 RX ORDER — GEMFIBROZIL 600 MG/1
TABLET, FILM COATED ORAL
Qty: 180 TABLET | Refills: 1 | Status: SHIPPED | OUTPATIENT
Start: 2022-05-12

## 2022-05-12 RX ORDER — METOPROLOL SUCCINATE 200 MG/1
TABLET, EXTENDED RELEASE ORAL
Qty: 90 TABLET | Refills: 1 | Status: SHIPPED | OUTPATIENT
Start: 2022-05-12

## 2022-05-12 NOTE — TELEPHONE ENCOUNTER
Diabetic Medication Protocol Passed 05/12/2022 11:43 AM   Protocol Details  HgBA1C procedure resulted in past 6 months    Last HgBA1C < 7.5    Microalbumin procedure in past 12 months or taking ACE/ARB    Appointment in past 6 or next 3 months     Future Appointments   Date Time Provider Tara Anne   6/2/2022  7:30 PM WILLIS Green [facility]   6/3/2022 11:35 AM Kamaljit Bro DO [facility]   6/23/2022 11:00 AM Stephanie Davis DO [facility]

## 2022-05-13 RX ORDER — METHYLPREDNISOLONE 4 MG/1
TABLET ORAL
OUTPATIENT
Start: 2022-05-13

## 2022-05-13 RX ORDER — TOPIRAMATE 25 MG/1
50 TABLET ORAL 2 TIMES DAILY
Qty: 120 TABLET | Refills: 2 | Status: SHIPPED | OUTPATIENT
Start: 2022-05-13

## 2022-06-09 NOTE — TELEPHONE ENCOUNTER
Please obtain more information. Is patient asking for medrol dose pack due to current migraine? Missed appt a few days ago. Please make sure patient is taking topamax, and make sure not taking rescue medications (any combination) more than 2-3 times a week. Also needs to follow up about her CPAP machine. She should ask her medical advocate if needs assistance.

## 2022-06-23 ENCOUNTER — TELEPHONE (OUTPATIENT)
Dept: FAMILY MEDICINE CLINIC | Facility: CLINIC | Age: 47
End: 2022-06-23

## 2022-06-23 DIAGNOSIS — E66.9 DIABETES MELLITUS TYPE 2 IN OBESE (HCC): ICD-10-CM

## 2022-06-23 DIAGNOSIS — E11.69 DIABETES MELLITUS TYPE 2 IN OBESE (HCC): ICD-10-CM

## 2022-06-23 DIAGNOSIS — E11.9 TYPE 2 DIABETES MELLITUS WITHOUT COMPLICATION, WITHOUT LONG-TERM CURRENT USE OF INSULIN (HCC): ICD-10-CM

## 2022-06-23 DIAGNOSIS — E11.9 TYPE 2 DIABETES MELLITUS WITHOUT COMPLICATION (HCC): ICD-10-CM

## 2022-06-23 RX ORDER — BLOOD SUGAR DIAGNOSTIC
STRIP MISCELLANEOUS
Qty: 100 STRIP | Refills: 2 | Status: SHIPPED | OUTPATIENT
Start: 2022-06-23 | End: 2023-06-23

## 2022-06-23 RX ORDER — LANCETS 33 GAUGE
1 EACH MISCELLANEOUS DAILY
Qty: 100 EACH | Refills: 1 | Status: SHIPPED | OUTPATIENT
Start: 2022-06-23

## 2022-06-23 RX ORDER — TOPIRAMATE 25 MG/1
TABLET ORAL
Qty: 120 TABLET | Refills: 0 | OUTPATIENT
Start: 2022-06-23

## 2022-07-06 RX ORDER — CLOTRIMAZOLE AND BETAMETHASONE DIPROPIONATE 10; .64 MG/G; MG/G
CREAM TOPICAL
Qty: 45 G | Refills: 10 | Status: SHIPPED | OUTPATIENT
Start: 2022-07-06

## 2022-07-14 ENCOUNTER — TELEPHONE (OUTPATIENT)
Dept: SURGERY | Facility: CLINIC | Age: 47
End: 2022-07-14

## 2022-07-18 RX ORDER — METHYLPREDNISOLONE 4 MG/1
TABLET ORAL
Qty: 21 TABLET | Refills: 10 | OUTPATIENT
Start: 2022-07-18

## 2022-07-18 NOTE — TELEPHONE ENCOUNTER
Patient has been notified for clarification. Start steroid for 6 days, medrol dose pack  After completion of medrol dose pack, when headaches return, ok to take ibuprofen/Adivl, BUT not more than 2-3 times a week   Per last office visit patient was advised to take ibuprofen/advil for any headache. Refill of medrol dose armando is not appropriate.

## 2022-07-21 ENCOUNTER — TELEPHONE (OUTPATIENT)
Dept: FAMILY MEDICINE CLINIC | Facility: CLINIC | Age: 47
End: 2022-07-21

## 2022-07-21 NOTE — TELEPHONE ENCOUNTER
Pt came in for appt today, didn't realize she cancelled. Rescheduled and double booked spot PER ZQ. Future Appointments   Date Time Provider Tara Anne   7/27/2022  1:40 PM Jeromy Sharma,  [facility]   9/1/2022  6:30 PM WILLIS [facility]   12/20/2022  8:15 AM Boone Lang DO [facility]         Pt asking if Dr. Sofia Champion would like her to get bloodwork done before she comes in.  Please advise

## 2022-08-04 ENCOUNTER — OFFICE VISIT (OUTPATIENT)
Dept: NEUROLOGY | Facility: CLINIC | Age: 47
End: 2022-08-04
Payer: MEDICARE

## 2022-08-04 VITALS
BODY MASS INDEX: 46 KG/M2 | HEART RATE: 82 BPM | SYSTOLIC BLOOD PRESSURE: 138 MMHG | WEIGHT: 276 LBS | DIASTOLIC BLOOD PRESSURE: 82 MMHG | RESPIRATION RATE: 16 BRPM

## 2022-08-04 DIAGNOSIS — M54.9 OTHER CHRONIC BACK PAIN: ICD-10-CM

## 2022-08-04 DIAGNOSIS — G43.009 MIGRAINE WITHOUT AURA AND WITHOUT STATUS MIGRAINOSUS, NOT INTRACTABLE: Primary | ICD-10-CM

## 2022-08-04 DIAGNOSIS — G44.40 MEDICATION OVERUSE HEADACHE: ICD-10-CM

## 2022-08-04 DIAGNOSIS — R41.3 SHORT-TERM MEMORY LOSS: ICD-10-CM

## 2022-08-04 DIAGNOSIS — G89.29 OTHER CHRONIC BACK PAIN: ICD-10-CM

## 2022-08-04 PROCEDURE — 99213 OFFICE O/P EST LOW 20 MIN: CPT | Performed by: OTHER

## 2022-08-04 RX ORDER — METHYLPREDNISOLONE 4 MG/1
TABLET ORAL
Qty: 21 TABLET | Refills: 10 | OUTPATIENT
Start: 2022-08-04

## 2022-08-04 RX ORDER — TOPIRAMATE 25 MG/1
TABLET ORAL
Qty: 180 TABLET | Refills: 0 | Status: SHIPPED | OUTPATIENT
Start: 2022-08-04

## 2022-08-09 RX ORDER — TOPIRAMATE 25 MG/1
TABLET ORAL
Qty: 180 TABLET | Refills: 10 | OUTPATIENT
Start: 2022-08-09

## 2022-08-09 RX ORDER — HYDRALAZINE HYDROCHLORIDE 25 MG/1
25 TABLET, FILM COATED ORAL 2 TIMES DAILY
Qty: 180 TABLET | Refills: 0 | Status: SHIPPED | OUTPATIENT
Start: 2022-08-09

## 2022-08-11 ENCOUNTER — TELEPHONE (OUTPATIENT)
Dept: FAMILY MEDICINE CLINIC | Facility: CLINIC | Age: 47
End: 2022-08-11

## 2022-08-11 NOTE — TELEPHONE ENCOUNTER
Front- please call pt to schedule OV with PCP first available Thursday (8/25) @10:00am or can see Dr. Clinton Schultz sooner next Wednesday (8/17). Thank you.

## 2022-08-11 NOTE — TELEPHONE ENCOUNTER
Pt is scheduled for 9-15-22 - [facility] but will probably have to reschedule. (Her POA will call to reschedule)     She is asking for an earlier thomas for back pain. Please advise.

## 2022-08-18 RX ORDER — TOPIRAMATE 25 MG/1
TABLET ORAL
Qty: 180 TABLET | Refills: 10 | Status: SHIPPED | OUTPATIENT
Start: 2022-08-18

## 2022-08-25 ENCOUNTER — OFFICE VISIT (OUTPATIENT)
Dept: FAMILY MEDICINE CLINIC | Facility: CLINIC | Age: 47
End: 2022-08-25

## 2022-08-25 VITALS
BODY MASS INDEX: 45.57 KG/M2 | SYSTOLIC BLOOD PRESSURE: 120 MMHG | HEART RATE: 80 BPM | DIASTOLIC BLOOD PRESSURE: 82 MMHG | HEIGHT: 65 IN | RESPIRATION RATE: 16 BRPM | TEMPERATURE: 97 F | WEIGHT: 273.5 LBS | OXYGEN SATURATION: 97 %

## 2022-08-25 DIAGNOSIS — Z00.00 ENCOUNTER FOR ANNUAL HEALTH EXAMINATION: Primary | ICD-10-CM

## 2022-08-25 DIAGNOSIS — G47.33 OSA ON CPAP: ICD-10-CM

## 2022-08-25 DIAGNOSIS — E11.69 DIABETES MELLITUS TYPE 2 IN OBESE (HCC): ICD-10-CM

## 2022-08-25 DIAGNOSIS — M54.41 CHRONIC BILATERAL LOW BACK PAIN WITH BILATERAL SCIATICA: ICD-10-CM

## 2022-08-25 DIAGNOSIS — E55.9 VITAMIN D INSUFFICIENCY: ICD-10-CM

## 2022-08-25 DIAGNOSIS — F31.32 BIPOLAR AFFECTIVE DISORDER, CURRENTLY DEPRESSED, MODERATE (HCC): ICD-10-CM

## 2022-08-25 DIAGNOSIS — Z23 NEED FOR VACCINATION: ICD-10-CM

## 2022-08-25 DIAGNOSIS — Z13.31 DEPRESSION SCREENING: ICD-10-CM

## 2022-08-25 DIAGNOSIS — I10 ESSENTIAL HYPERTENSION: ICD-10-CM

## 2022-08-25 DIAGNOSIS — E78.2 MIXED HYPERLIPIDEMIA: ICD-10-CM

## 2022-08-25 DIAGNOSIS — E11.29 MICROALBUMINURIA DUE TO TYPE 2 DIABETES MELLITUS (HCC): ICD-10-CM

## 2022-08-25 DIAGNOSIS — Z12.31 VISIT FOR SCREENING MAMMOGRAM: ICD-10-CM

## 2022-08-25 DIAGNOSIS — E66.9 DIABETES MELLITUS TYPE 2 IN OBESE (HCC): ICD-10-CM

## 2022-08-25 DIAGNOSIS — M54.42 CHRONIC BILATERAL LOW BACK PAIN WITH BILATERAL SCIATICA: ICD-10-CM

## 2022-08-25 DIAGNOSIS — G89.29 CHRONIC BILATERAL LOW BACK PAIN WITH BILATERAL SCIATICA: ICD-10-CM

## 2022-08-25 DIAGNOSIS — Z13.6 SCREENING FOR CARDIOVASCULAR CONDITION: ICD-10-CM

## 2022-08-25 DIAGNOSIS — R80.9 MICROALBUMINURIA DUE TO TYPE 2 DIABETES MELLITUS (HCC): ICD-10-CM

## 2022-08-25 DIAGNOSIS — E11.9 TYPE 2 DIABETES MELLITUS WITHOUT COMPLICATION, WITHOUT LONG-TERM CURRENT USE OF INSULIN (HCC): ICD-10-CM

## 2022-08-25 DIAGNOSIS — Z99.89 OSA ON CPAP: ICD-10-CM

## 2022-08-25 DIAGNOSIS — E66.01 MORBID OBESITY WITH BMI OF 45.0-49.9, ADULT (HCC): ICD-10-CM

## 2022-08-25 PROCEDURE — G0009 ADMIN PNEUMOCOCCAL VACCINE: HCPCS | Performed by: FAMILY MEDICINE

## 2022-08-25 PROCEDURE — G0444 DEPRESSION SCREEN ANNUAL: HCPCS | Performed by: FAMILY MEDICINE

## 2022-08-25 PROCEDURE — 99214 OFFICE O/P EST MOD 30 MIN: CPT | Performed by: FAMILY MEDICINE

## 2022-08-25 PROCEDURE — 90677 PCV20 VACCINE IM: CPT | Performed by: FAMILY MEDICINE

## 2022-08-25 PROCEDURE — G0439 PPPS, SUBSEQ VISIT: HCPCS | Performed by: FAMILY MEDICINE

## 2022-08-26 ENCOUNTER — LAB ENCOUNTER (OUTPATIENT)
Dept: LAB | Age: 47
End: 2022-08-26
Attending: FAMILY MEDICINE
Payer: MEDICARE

## 2022-08-26 ENCOUNTER — HOSPITAL ENCOUNTER (OUTPATIENT)
Dept: GENERAL RADIOLOGY | Age: 47
Discharge: HOME | End: 2022-08-26
Attending: FAMILY MEDICINE
Payer: MEDICARE

## 2022-08-26 DIAGNOSIS — E55.9 VITAMIN D INSUFFICIENCY: ICD-10-CM

## 2022-08-26 DIAGNOSIS — G89.29 CHRONIC BILATERAL LOW BACK PAIN WITH BILATERAL SCIATICA: ICD-10-CM

## 2022-08-26 DIAGNOSIS — E11.9 TYPE 2 DIABETES MELLITUS WITHOUT COMPLICATION, WITHOUT LONG-TERM CURRENT USE OF INSULIN (HCC): ICD-10-CM

## 2022-08-26 DIAGNOSIS — Z13.1 SCREENING FOR DIABETES MELLITUS (DM): ICD-10-CM

## 2022-08-26 DIAGNOSIS — M54.41 CHRONIC BILATERAL LOW BACK PAIN WITH BILATERAL SCIATICA: ICD-10-CM

## 2022-08-26 DIAGNOSIS — Z13.6 SCREENING FOR CARDIOVASCULAR CONDITION: ICD-10-CM

## 2022-08-26 DIAGNOSIS — M54.42 CHRONIC BILATERAL LOW BACK PAIN WITH BILATERAL SCIATICA: ICD-10-CM

## 2022-08-26 LAB
ALBUMIN SERPL-MCNC: 4.3 G/DL (ref 3.4–5)
ALBUMIN/GLOB SERPL: 1.1 {RATIO} (ref 1–2)
ALP LIVER SERPL-CCNC: 68 U/L
ALT SERPL-CCNC: 40 U/L
ANION GAP SERPL CALC-SCNC: 6 MMOL/L (ref 0–18)
AST SERPL-CCNC: 33 U/L (ref 15–37)
BILIRUB SERPL-MCNC: 0.4 MG/DL (ref 0.1–2)
BUN BLD-MCNC: 16 MG/DL (ref 7–18)
BUN/CREAT SERPL: 17.6 (ref 10–20)
CALCIUM BLD-MCNC: 9.9 MG/DL (ref 8.5–10.1)
CHLORIDE SERPL-SCNC: 114 MMOL/L (ref 98–112)
CHOLEST SERPL-MCNC: 199 MG/DL (ref ?–200)
CO2 SERPL-SCNC: 20 MMOL/L (ref 21–32)
CREAT BLD-MCNC: 0.91 MG/DL
CREAT UR-SCNC: 104 MG/DL
EST. AVERAGE GLUCOSE BLD GHB EST-MCNC: 117 MG/DL (ref 68–126)
FASTING PATIENT LIPID ANSWER: YES
FASTING STATUS PATIENT QL REPORTED: YES
GFR SERPLBLD BASED ON 1.73 SQ M-ARVRAT: 77 ML/MIN/1.73M2 (ref 60–?)
GLOBULIN PLAS-MCNC: 3.8 G/DL (ref 2.8–4.4)
GLUCOSE BLD-MCNC: 102 MG/DL (ref 70–99)
HBA1C MFR BLD: 5.7 % (ref ?–5.7)
HDLC SERPL-MCNC: 47 MG/DL (ref 40–59)
LDLC SERPL CALC-MCNC: 118 MG/DL (ref ?–100)
MICROALBUMIN UR-MCNC: 107 MG/DL
MICROALBUMIN/CREAT 24H UR-RTO: 1028.8 UG/MG (ref ?–30)
NONHDLC SERPL-MCNC: 152 MG/DL (ref ?–130)
OSMOLALITY SERPL CALC.SUM OF ELEC: 291 MOSM/KG (ref 275–295)
POTASSIUM SERPL-SCNC: 4.9 MMOL/L (ref 3.5–5.1)
PROT SERPL-MCNC: 8.1 G/DL (ref 6.4–8.2)
SODIUM SERPL-SCNC: 140 MMOL/L (ref 136–145)
TRIGL SERPL-MCNC: 191 MG/DL (ref 30–149)
VIT D+METAB SERPL-MCNC: 52.5 NG/ML (ref 30–100)
VLDLC SERPL CALC-MCNC: 34 MG/DL (ref 0–30)

## 2022-08-26 PROCEDURE — 36415 COLL VENOUS BLD VENIPUNCTURE: CPT

## 2022-08-26 PROCEDURE — 80053 COMPREHEN METABOLIC PANEL: CPT

## 2022-08-26 PROCEDURE — 80061 LIPID PANEL: CPT

## 2022-08-26 PROCEDURE — 82570 ASSAY OF URINE CREATININE: CPT

## 2022-08-26 PROCEDURE — 82306 VITAMIN D 25 HYDROXY: CPT

## 2022-08-26 PROCEDURE — 82043 UR ALBUMIN QUANTITATIVE: CPT

## 2022-08-26 PROCEDURE — 83036 HEMOGLOBIN GLYCOSYLATED A1C: CPT

## 2022-08-26 PROCEDURE — 72110 X-RAY EXAM L-2 SPINE 4/>VWS: CPT | Performed by: FAMILY MEDICINE

## 2022-08-31 ENCOUNTER — HOSPITAL ENCOUNTER (OUTPATIENT)
Dept: MAMMOGRAPHY | Age: 47
Discharge: HOME | End: 2022-08-31
Attending: FAMILY MEDICINE
Payer: MEDICARE

## 2022-08-31 DIAGNOSIS — Z12.31 VISIT FOR SCREENING MAMMOGRAM: ICD-10-CM

## 2022-08-31 PROCEDURE — 77063 BREAST TOMOSYNTHESIS BI: CPT | Performed by: FAMILY MEDICINE

## 2022-08-31 PROCEDURE — 77067 SCR MAMMO BI INCL CAD: CPT | Performed by: FAMILY MEDICINE

## 2022-09-14 RX ORDER — IBUPROFEN 800 MG/1
TABLET ORAL
Qty: 45 TABLET | Refills: 11 | OUTPATIENT
Start: 2022-09-14

## 2022-09-27 ENCOUNTER — OFFICE VISIT (OUTPATIENT)
Dept: PHYSICAL THERAPY | Facility: HOSPITAL | Age: 47
End: 2022-09-27
Attending: FAMILY MEDICINE

## 2022-09-27 DIAGNOSIS — M54.42 CHRONIC BILATERAL LOW BACK PAIN WITH BILATERAL SCIATICA: ICD-10-CM

## 2022-09-27 DIAGNOSIS — M54.41 CHRONIC BILATERAL LOW BACK PAIN WITH BILATERAL SCIATICA: ICD-10-CM

## 2022-09-27 DIAGNOSIS — G89.29 CHRONIC BILATERAL LOW BACK PAIN WITH BILATERAL SCIATICA: ICD-10-CM

## 2022-09-27 PROCEDURE — 97162 PT EVAL MOD COMPLEX 30 MIN: CPT

## 2022-09-27 PROCEDURE — 97110 THERAPEUTIC EXERCISES: CPT

## 2022-09-29 ENCOUNTER — OFFICE VISIT (OUTPATIENT)
Dept: PHYSICAL THERAPY | Facility: HOSPITAL | Age: 47
End: 2022-09-29
Attending: FAMILY MEDICINE

## 2022-09-29 DIAGNOSIS — G89.29 CHRONIC BILATERAL LOW BACK PAIN WITH BILATERAL SCIATICA: ICD-10-CM

## 2022-09-29 DIAGNOSIS — M54.41 CHRONIC BILATERAL LOW BACK PAIN WITH BILATERAL SCIATICA: ICD-10-CM

## 2022-09-29 DIAGNOSIS — M54.42 CHRONIC BILATERAL LOW BACK PAIN WITH BILATERAL SCIATICA: ICD-10-CM

## 2022-09-29 PROCEDURE — 97110 THERAPEUTIC EXERCISES: CPT

## 2022-10-04 ENCOUNTER — OFFICE VISIT (OUTPATIENT)
Dept: PHYSICAL THERAPY | Facility: HOSPITAL | Age: 47
End: 2022-10-04
Attending: FAMILY MEDICINE
Payer: MEDICARE

## 2022-10-04 DIAGNOSIS — M54.41 CHRONIC BILATERAL LOW BACK PAIN WITH BILATERAL SCIATICA: ICD-10-CM

## 2022-10-04 DIAGNOSIS — M54.42 CHRONIC BILATERAL LOW BACK PAIN WITH BILATERAL SCIATICA: ICD-10-CM

## 2022-10-04 DIAGNOSIS — G89.29 CHRONIC BILATERAL LOW BACK PAIN WITH BILATERAL SCIATICA: ICD-10-CM

## 2022-10-04 PROCEDURE — 97110 THERAPEUTIC EXERCISES: CPT

## 2022-10-06 ENCOUNTER — APPOINTMENT (OUTPATIENT)
Dept: PHYSICAL THERAPY | Facility: HOSPITAL | Age: 47
End: 2022-10-06
Attending: FAMILY MEDICINE
Payer: MEDICARE

## 2022-10-11 ENCOUNTER — OFFICE VISIT (OUTPATIENT)
Dept: PHYSICAL THERAPY | Facility: HOSPITAL | Age: 47
End: 2022-10-11
Attending: FAMILY MEDICINE
Payer: MEDICARE

## 2022-10-11 DIAGNOSIS — M54.42 CHRONIC BILATERAL LOW BACK PAIN WITH BILATERAL SCIATICA: ICD-10-CM

## 2022-10-11 DIAGNOSIS — M54.41 CHRONIC BILATERAL LOW BACK PAIN WITH BILATERAL SCIATICA: ICD-10-CM

## 2022-10-11 DIAGNOSIS — G89.29 CHRONIC BILATERAL LOW BACK PAIN WITH BILATERAL SCIATICA: ICD-10-CM

## 2022-10-11 PROCEDURE — 97110 THERAPEUTIC EXERCISES: CPT

## 2022-10-13 ENCOUNTER — OFFICE VISIT (OUTPATIENT)
Dept: PHYSICAL THERAPY | Facility: HOSPITAL | Age: 47
End: 2022-10-13
Attending: FAMILY MEDICINE
Payer: MEDICARE

## 2022-10-13 DIAGNOSIS — M54.42 CHRONIC BILATERAL LOW BACK PAIN WITH BILATERAL SCIATICA: ICD-10-CM

## 2022-10-13 DIAGNOSIS — M54.41 CHRONIC BILATERAL LOW BACK PAIN WITH BILATERAL SCIATICA: ICD-10-CM

## 2022-10-13 DIAGNOSIS — G89.29 CHRONIC BILATERAL LOW BACK PAIN WITH BILATERAL SCIATICA: ICD-10-CM

## 2022-10-13 PROCEDURE — 97110 THERAPEUTIC EXERCISES: CPT

## 2022-10-18 ENCOUNTER — OFFICE VISIT (OUTPATIENT)
Dept: PHYSICAL THERAPY | Facility: HOSPITAL | Age: 47
End: 2022-10-18
Attending: FAMILY MEDICINE
Payer: MEDICARE

## 2022-10-18 DIAGNOSIS — M54.41 CHRONIC BILATERAL LOW BACK PAIN WITH BILATERAL SCIATICA: ICD-10-CM

## 2022-10-18 DIAGNOSIS — M54.42 CHRONIC BILATERAL LOW BACK PAIN WITH BILATERAL SCIATICA: ICD-10-CM

## 2022-10-18 DIAGNOSIS — G89.29 CHRONIC BILATERAL LOW BACK PAIN WITH BILATERAL SCIATICA: ICD-10-CM

## 2022-10-18 PROCEDURE — 97110 THERAPEUTIC EXERCISES: CPT

## 2022-10-20 ENCOUNTER — OFFICE VISIT (OUTPATIENT)
Dept: PHYSICAL THERAPY | Facility: HOSPITAL | Age: 47
End: 2022-10-20
Attending: FAMILY MEDICINE
Payer: MEDICARE

## 2022-10-20 DIAGNOSIS — E11.9 TYPE 2 DIABETES MELLITUS WITHOUT COMPLICATION (HCC): ICD-10-CM

## 2022-10-20 DIAGNOSIS — M54.41 CHRONIC BILATERAL LOW BACK PAIN WITH BILATERAL SCIATICA: ICD-10-CM

## 2022-10-20 DIAGNOSIS — R80.9 MICROALBUMINURIA DUE TO TYPE 2 DIABETES MELLITUS (HCC): ICD-10-CM

## 2022-10-20 DIAGNOSIS — I10 ESSENTIAL HYPERTENSION: ICD-10-CM

## 2022-10-20 DIAGNOSIS — E11.29 MICROALBUMINURIA DUE TO TYPE 2 DIABETES MELLITUS (HCC): ICD-10-CM

## 2022-10-20 DIAGNOSIS — E66.9 DIABETES MELLITUS TYPE 2 IN OBESE (HCC): ICD-10-CM

## 2022-10-20 DIAGNOSIS — G89.29 CHRONIC BILATERAL LOW BACK PAIN WITH BILATERAL SCIATICA: ICD-10-CM

## 2022-10-20 DIAGNOSIS — M54.42 CHRONIC BILATERAL LOW BACK PAIN WITH BILATERAL SCIATICA: ICD-10-CM

## 2022-10-20 DIAGNOSIS — E11.69 DIABETES MELLITUS TYPE 2 IN OBESE (HCC): ICD-10-CM

## 2022-10-20 DIAGNOSIS — E78.2 MIXED HYPERLIPIDEMIA: ICD-10-CM

## 2022-10-20 PROCEDURE — 97110 THERAPEUTIC EXERCISES: CPT

## 2022-10-21 RX ORDER — METOPROLOL SUCCINATE 200 MG/1
TABLET, EXTENDED RELEASE ORAL
Qty: 90 TABLET | Refills: 1 | Status: SHIPPED | OUTPATIENT
Start: 2022-10-21

## 2022-10-21 RX ORDER — TELMISARTAN 80 MG/1
TABLET ORAL
Qty: 90 TABLET | Refills: 1 | Status: SHIPPED | OUTPATIENT
Start: 2022-10-21

## 2022-10-21 RX ORDER — ATORVASTATIN CALCIUM 40 MG/1
TABLET, FILM COATED ORAL
Qty: 90 TABLET | Refills: 1 | Status: SHIPPED | OUTPATIENT
Start: 2022-10-21

## 2022-10-21 NOTE — TELEPHONE ENCOUNTER
Cholesterol Medication Protocol Passed 10/20/2022 06:16 PM   Protocol Details  ALT < 80    ALT resulted within past year    Lipid panel within past 12 months    Appointment within past 12 or next 3 months        LOV  8/25/22     LAST LAB 8/26/22     LAST RX  5/12/22 90 with 1     Next OV   Future Appointments   Date Time Provider Tara Anne   11/8/2022 12:30 PM Alba Julien, PT [address] [facility] [address]   11/11/2022 12:30 PM Alba Julien, PT [address] [facility] [address]   11/15/2022 12:30 PM Alba Julien, PT [address] [facility] [address]         PROTOCOL pass    Hypertension Medications Protocol Passed 10/21/2022 12:14 PM   Protocol Details  CMP or BMP in past 12 months    Last serum creatinine< 2.0    Appointment in past 6 or next 3 months        LOV 8/25/22     LAST LAB  8/26/22     LAST RX 5/12/22 90 with 1     Next OV   Future Appointments   Date Time Provider Tara Anne   11/8/2022 12:30 PM Alba Julien, PT [address] [facility] PH[facility] [address]   11/11/2022 12:30 PM Alba Julien, PT [address] [facility] PH[facility] [address]   11/15/2022 12:30 PM Alba Julien, PT [address] [facility] PH[facility] [address]         PROTOCOL pass

## 2022-10-23 DIAGNOSIS — E78.1 HYPERTRIGLYCERIDEMIA: ICD-10-CM

## 2022-10-24 RX ORDER — GEMFIBROZIL 600 MG/1
TABLET, FILM COATED ORAL
Qty: 180 TABLET | Refills: 0 | Status: SHIPPED | OUTPATIENT
Start: 2022-10-24

## 2022-10-24 NOTE — TELEPHONE ENCOUNTER
LOV 8/25/2022      LAST LAB 8/26/22    LAST RX   GEMFIBROZIL 600 MG Oral Tab 180 tablet 1 5/12/2022         Next OV   Future Appointments   Date Time Provider Tara Anne   11/8/2022 12:30 PM John Emmanuel, PT [address] [facility] PHYS [address]   11/11/2022 12:30 PM John Emmanuel, PT [address] [facility] PH[facility] [address]   11/15/2022 12:30 PM John Emmanuel, PT [facility]         PROTOCOL pass

## 2022-10-31 DIAGNOSIS — E11.9 TYPE 2 DIABETES MELLITUS WITHOUT COMPLICATION (HCC): ICD-10-CM

## 2022-10-31 DIAGNOSIS — E66.9 DIABETES MELLITUS TYPE 2 IN OBESE (HCC): ICD-10-CM

## 2022-10-31 DIAGNOSIS — E11.69 DIABETES MELLITUS TYPE 2 IN OBESE (HCC): ICD-10-CM

## 2022-10-31 RX ORDER — METFORMIN HYDROCHLORIDE 750 MG/1
TABLET, EXTENDED RELEASE ORAL
Qty: 180 TABLET | Refills: 1 | Status: SHIPPED | OUTPATIENT
Start: 2022-10-31

## 2022-10-31 RX ORDER — GLIMEPIRIDE 2 MG/1
TABLET ORAL
Qty: 90 TABLET | Refills: 1 | Status: SHIPPED | OUTPATIENT
Start: 2022-10-31

## 2022-10-31 NOTE — TELEPHONE ENCOUNTER
Diabetic Medication Protocol Passed 10/31/2022 10:31 AM   Protocol Details  HgBA1C procedure resulted in past 6 months    Last HgBA1C < 7.5    Microalbumin procedure in past 12 months or taking ACE/ARB    Appointment in past 6 or next 3 months

## 2022-11-01 RX ORDER — HYDRALAZINE HYDROCHLORIDE 25 MG/1
25 TABLET, FILM COATED ORAL 2 TIMES DAILY
Qty: 60 TABLET | Refills: 0 | Status: SHIPPED | OUTPATIENT
Start: 2022-11-01

## 2022-11-08 ENCOUNTER — OFFICE VISIT (OUTPATIENT)
Dept: PHYSICAL THERAPY | Facility: HOSPITAL | Age: 47
End: 2022-11-08
Attending: FAMILY MEDICINE
Payer: MEDICARE

## 2022-11-08 PROCEDURE — 97110 THERAPEUTIC EXERCISES: CPT

## 2022-11-11 ENCOUNTER — OFFICE VISIT (OUTPATIENT)
Dept: PHYSICAL THERAPY | Facility: HOSPITAL | Age: 47
End: 2022-11-11
Attending: FAMILY MEDICINE
Payer: MEDICARE

## 2022-11-11 PROCEDURE — 97110 THERAPEUTIC EXERCISES: CPT

## 2022-11-15 ENCOUNTER — TELEPHONE (OUTPATIENT)
Dept: PHYSICAL THERAPY | Facility: HOSPITAL | Age: 47
End: 2022-11-15

## 2022-11-15 ENCOUNTER — APPOINTMENT (OUTPATIENT)
Dept: PHYSICAL THERAPY | Facility: HOSPITAL | Age: 47
End: 2022-11-15
Attending: FAMILY MEDICINE
Payer: MEDICARE

## 2022-11-15 ENCOUNTER — TELEPHONE (OUTPATIENT)
Dept: FAMILY MEDICINE CLINIC | Facility: CLINIC | Age: 47
End: 2022-11-15

## 2022-11-15 DIAGNOSIS — M54.41 CHRONIC BILATERAL LOW BACK PAIN WITH BILATERAL SCIATICA: Primary | ICD-10-CM

## 2022-11-15 DIAGNOSIS — G89.29 CHRONIC BILATERAL LOW BACK PAIN WITH BILATERAL SCIATICA: Primary | ICD-10-CM

## 2022-11-15 DIAGNOSIS — M54.42 CHRONIC BILATERAL LOW BACK PAIN WITH BILATERAL SCIATICA: Primary | ICD-10-CM

## 2022-11-15 NOTE — TELEPHONE ENCOUNTER
LOV 8/25/22    Lumbar XR:   Paulina HoskinsorDO   8/28/2022 11:31 PM CDT       Pls inform and her POA, Karo that x-ray demonstrates a trace disc bulge of L4 on L5 and arthritic changes in her lower spine. Schedule PT as discussed at ov     No future appointments. To on call- Dr. Devi Marshall  F/u OV or ok to refer to Ortho? Please advise. Thank you.

## 2022-11-15 NOTE — TELEPHONE ENCOUNTER
Pt called stating she completed the Physical Therapy that  prescribed. Pt said she is still having back pain.   She said Dr Migdalia Wakefield told her if back issues still exist she would refer her to an Ortho Dr.

## 2022-11-15 NOTE — TELEPHONE ENCOUNTER
Called patient and informed of referral to [facility], Dr. Sumanth Nixon. She is lying down and unable to write down contact information. Advised will send her a [employer] message with the contact information.

## 2022-11-17 ENCOUNTER — TELEPHONE (OUTPATIENT)
Dept: FAMILY MEDICINE CLINIC | Facility: CLINIC | Age: 47
End: 2022-11-17

## 2022-11-17 NOTE — TELEPHONE ENCOUNTER
Pt called stating she has an thomas with Dr Marii Beach - 11-28-22 for period issues. Pt said she is currently bleeding heavily, going through a large amount of pads. Please advise.

## 2022-11-17 NOTE — TELEPHONE ENCOUNTER
Returned call to patient who states she is having heavier bleeding that started 2-3 days ago. She is saturating 4 pads per day and saturating through everything at night. Denies passing any clots. Denies any lightheadedness, nausea, SOB or CP. Thought maybe she had a little dizziness but not sure. States had already gone through menopause so she very concerned with this bleeding. Offered to move up appt from 11/28 to 11/22/22. Advised if she starts saturating 2-3 pads per hour or passing large clots or develops dizziness, SOB, CP, she should go to the ED. Appt changed to 11/22/22.     Future Appointments   Date Time Provider Tara Anne   11/22/2022  8:40 AM Rober Muñoz DO [facility]

## 2022-11-22 ENCOUNTER — OFFICE VISIT (OUTPATIENT)
Dept: FAMILY MEDICINE CLINIC | Facility: CLINIC | Age: 47
End: 2022-11-22
Payer: MEDICARE

## 2022-11-22 VITALS
WEIGHT: 266 LBS | RESPIRATION RATE: 16 BRPM | HEART RATE: 80 BPM | DIASTOLIC BLOOD PRESSURE: 80 MMHG | OXYGEN SATURATION: 98 % | TEMPERATURE: 97 F | HEIGHT: 65 IN | SYSTOLIC BLOOD PRESSURE: 110 MMHG | BODY MASS INDEX: 44.32 KG/M2

## 2022-11-22 DIAGNOSIS — G44.40 ANALGESIC REBOUND HEADACHE: ICD-10-CM

## 2022-11-22 DIAGNOSIS — M54.50 CHRONIC LOW BACK PAIN WITHOUT SCIATICA, UNSPECIFIED BACK PAIN LATERALITY: ICD-10-CM

## 2022-11-22 DIAGNOSIS — R62.50 MILD DEVELOPMENTAL DELAY: ICD-10-CM

## 2022-11-22 DIAGNOSIS — G89.29 CHRONIC LOW BACK PAIN WITHOUT SCIATICA, UNSPECIFIED BACK PAIN LATERALITY: ICD-10-CM

## 2022-11-22 DIAGNOSIS — F31.32 BIPOLAR AFFECTIVE DISORDER, CURRENTLY DEPRESSED, MODERATE (HCC): ICD-10-CM

## 2022-11-22 DIAGNOSIS — T39.95XA ANALGESIC REBOUND HEADACHE: ICD-10-CM

## 2022-11-22 DIAGNOSIS — R41.3 SHORT-TERM MEMORY LOSS: ICD-10-CM

## 2022-11-22 DIAGNOSIS — N95.0 POSTMENOPAUSAL BLEEDING: Primary | ICD-10-CM

## 2022-11-22 PROBLEM — J18.9 COMMUNITY ACQUIRED PNEUMONIA: Status: RESOLVED | Noted: 2019-04-11 | Resolved: 2022-11-22

## 2022-11-22 PROBLEM — R09.02 HYPOXIA: Status: RESOLVED | Noted: 2019-04-11 | Resolved: 2022-11-22

## 2022-11-22 PROBLEM — J18.9 COMMUNITY ACQUIRED PNEUMONIA, UNSPECIFIED LATERALITY: Status: RESOLVED | Noted: 2019-04-11 | Resolved: 2022-11-22
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
## 2022-11-22 PROCEDURE — 87660 TRICHOMONAS VAGIN DIR PROBE: CPT | Performed by: FAMILY MEDICINE

## 2022-11-22 PROCEDURE — 87480 CANDIDA DNA DIR PROBE: CPT | Performed by: FAMILY MEDICINE

## 2022-11-22 PROCEDURE — 87510 GARDNER VAG DNA DIR PROBE: CPT | Performed by: FAMILY MEDICINE

## 2022-11-22 PROCEDURE — 99215 OFFICE O/P EST HI 40 MIN: CPT | Performed by: FAMILY MEDICINE

## 2022-11-22 NOTE — PATIENT INSTRUCTIONS
Labs  Pelvic Ultrasound  Follow-up with  Neuro  Taper down Topamax  Follow-up with Ortho  Dilated Eye Exam

## 2022-12-06 NOTE — PROGRESS NOTES
11/29/2022: CT scan of the chest:  Evaluation is limited by artifact on current examination  Within those limitations there appears to be 2 new sub-6 mm groundglass nodules in the right upper lobe  Additional bilateral pulmonary nodules are stable  Previously seen groundglass nodules   in the right lower lobe which were new on prior examination appear to have resolved  Previously reported enlarging nodule in the right lower lobe measuring 9 mm appears stable       Recommend 3 month CT follow-up  Recommend to see pulmonologist regarding lung nodules multiple with couple of new but couple of previous are not better or resolved      Will schedule CT scan of the chest   Patient will call and set up appointment with pulmonologist Pt notified of results and information given. Left message on phone.

## 2022-12-07 ENCOUNTER — HOSPITAL ENCOUNTER (OUTPATIENT)
Dept: ULTRASOUND IMAGING | Facility: HOSPITAL | Age: 47
Discharge: HOME | End: 2022-12-07
Attending: FAMILY MEDICINE
Payer: MEDICARE

## 2022-12-07 ENCOUNTER — LAB ENCOUNTER (OUTPATIENT)
Dept: LAB | Facility: HOSPITAL | Age: 47
End: 2022-12-07
Attending: FAMILY MEDICINE
Payer: MEDICARE

## 2022-12-07 DIAGNOSIS — N95.0 POSTMENOPAUSAL BLEEDING: ICD-10-CM

## 2022-12-07 LAB
FSH SERPL-ACNC: 17.7 MIU/ML
LH SERPL-ACNC: 9.4 MIU/ML
PROLACTIN SERPL-MCNC: 6.8 NG/ML

## 2022-12-07 PROCEDURE — 84146 ASSAY OF PROLACTIN: CPT

## 2022-12-07 PROCEDURE — 83002 ASSAY OF GONADOTROPIN (LH): CPT

## 2022-12-07 PROCEDURE — 83001 ASSAY OF GONADOTROPIN (FSH): CPT

## 2022-12-07 PROCEDURE — 36415 COLL VENOUS BLD VENIPUNCTURE: CPT

## 2022-12-07 PROCEDURE — 76856 US EXAM PELVIC COMPLETE: CPT | Performed by: FAMILY MEDICINE

## 2022-12-09 NOTE — PROGRESS NOTES
Results reviewed. Pt notification as below   PMB, FSH/LH/Prolactin all WNL.    Transabdominal US with thin endometrial stripe

## 2022-12-21 RX ORDER — HYDRALAZINE HYDROCHLORIDE 25 MG/1
25 TABLET, FILM COATED ORAL 2 TIMES DAILY
Qty: 60 TABLET | Refills: 0 | Status: SHIPPED | OUTPATIENT
Start: 2022-12-21

## 2023-01-04 DIAGNOSIS — R80.9 PROTEINURIA, UNSPECIFIED TYPE: Primary | ICD-10-CM

## 2023-01-09 ENCOUNTER — LAB ENCOUNTER (OUTPATIENT)
Dept: LAB | Age: 48
End: 2023-01-09
Attending: INTERNAL MEDICINE
Payer: MEDICARE

## 2023-01-09 DIAGNOSIS — R80.9 PROTEINURIA, UNSPECIFIED TYPE: ICD-10-CM

## 2023-01-09 LAB
ANION GAP SERPL CALC-SCNC: 7 MMOL/L (ref 0–18)
BILIRUB UR QL STRIP.AUTO: NEGATIVE
BUN BLD-MCNC: 14 MG/DL (ref 7–18)
CALCIUM BLD-MCNC: 9.8 MG/DL (ref 8.5–10.1)
CHLORIDE SERPL-SCNC: 114 MMOL/L (ref 98–112)
CLARITY UR REFRACT.AUTO: CLEAR
CO2 SERPL-SCNC: 21 MMOL/L (ref 21–32)
COLOR UR AUTO: YELLOW
CREAT BLD-MCNC: 0.79 MG/DL
CREAT UR-SCNC: 134 MG/DL
FASTING STATUS PATIENT QL REPORTED: YES
GFR SERPLBLD BASED ON 1.73 SQ M-ARVRAT: 92 ML/MIN/1.73M2 (ref 60–?)
GLUCOSE BLD-MCNC: 88 MG/DL (ref 70–99)
GLUCOSE UR STRIP.AUTO-MCNC: NEGATIVE MG/DL
KETONES UR STRIP.AUTO-MCNC: NEGATIVE MG/DL
LEUKOCYTE ESTERASE UR QL STRIP.AUTO: NEGATIVE
MAGNESIUM SERPL-MCNC: 2.3 MG/DL (ref 1.6–2.6)
NITRITE UR QL STRIP.AUTO: NEGATIVE
OSMOLALITY SERPL CALC.SUM OF ELEC: 294 MOSM/KG (ref 275–295)
PH UR STRIP.AUTO: 6 [PH] (ref 5–8)
PHOSPHATE SERPL-MCNC: 2.8 MG/DL (ref 2.5–4.9)
POTASSIUM SERPL-SCNC: 4.4 MMOL/L (ref 3.5–5.1)
PROT UR STRIP.AUTO-MCNC: >=500 MG/DL
PROT UR-MCNC: 222.6 MG/DL
RBC UR QL AUTO: NEGATIVE
SODIUM SERPL-SCNC: 142 MMOL/L (ref 136–145)
SP GR UR STRIP.AUTO: 1.02 (ref 1–1.03)
UROBILINOGEN UR STRIP.AUTO-MCNC: <2 MG/DL

## 2023-01-09 PROCEDURE — 83735 ASSAY OF MAGNESIUM: CPT

## 2023-01-09 PROCEDURE — 81001 URINALYSIS AUTO W/SCOPE: CPT

## 2023-01-09 PROCEDURE — 84156 ASSAY OF PROTEIN URINE: CPT

## 2023-01-09 PROCEDURE — 36415 COLL VENOUS BLD VENIPUNCTURE: CPT

## 2023-01-09 PROCEDURE — 80048 BASIC METABOLIC PNL TOTAL CA: CPT

## 2023-01-09 PROCEDURE — 82570 ASSAY OF URINE CREATININE: CPT

## 2023-01-09 PROCEDURE — 84100 ASSAY OF PHOSPHORUS: CPT

## 2023-01-11 DIAGNOSIS — G43.009 MIGRAINE WITHOUT AURA AND WITHOUT STATUS MIGRAINOSUS, NOT INTRACTABLE: ICD-10-CM

## 2023-01-11 RX ORDER — TOPIRAMATE 25 MG/1
75 TABLET ORAL 2 TIMES DAILY
Qty: 180 TABLET | Refills: 2 | Status: SHIPPED | OUTPATIENT
Start: 2023-01-11

## 2023-01-11 NOTE — TELEPHONE ENCOUNTER
Please check if patient if headaches are more than 2-3 days per week, and if taking rescue medication not more than 2-3 days per week. Patient has memory issue.

## 2023-01-12 ENCOUNTER — OFFICE VISIT (OUTPATIENT)
Dept: NEPHROLOGY | Facility: CLINIC | Age: 48
End: 2023-01-12
Payer: MEDICARE

## 2023-01-12 VITALS — BODY MASS INDEX: 44 KG/M2 | DIASTOLIC BLOOD PRESSURE: 80 MMHG | WEIGHT: 267 LBS | SYSTOLIC BLOOD PRESSURE: 126 MMHG

## 2023-01-12 DIAGNOSIS — R80.9 PROTEINURIA, UNSPECIFIED TYPE: Primary | ICD-10-CM

## 2023-01-12 PROCEDURE — 99213 OFFICE O/P EST LOW 20 MIN: CPT | Performed by: INTERNAL MEDICINE

## 2023-01-18 DIAGNOSIS — E78.1 HYPERTRIGLYCERIDEMIA: ICD-10-CM

## 2023-01-19 RX ORDER — GEMFIBROZIL 600 MG/1
TABLET, FILM COATED ORAL
Qty: 180 TABLET | Refills: 1 | Status: SHIPPED | OUTPATIENT
Start: 2023-01-19

## 2023-01-19 NOTE — TELEPHONE ENCOUNTER
Cholesterol Medication Protocol Passed 01/18/2023 05:13 PM   Protocol Details  ALT < 80    ALT resulted within past year    Lipid panel within past 12 months    Appointment within past 12 or next 3 months     LOV 11/22/22 dr SPEARS    LAST LAB  8/26/22     LAST RX 10/24/22 180     Next OV   Future Appointments   Date Time Provider Tara Anne   1/11/2024  1:00 PM Karo Luo MD [facility] [place]         PROTOCOL pass

## 2023-01-26 ENCOUNTER — OFFICE VISIT (OUTPATIENT)
Dept: ORTHOPEDICS CLINIC | Facility: CLINIC | Age: 48
End: 2023-01-26
Payer: MEDICARE

## 2023-01-26 VITALS — WEIGHT: 260 LBS | HEIGHT: 65 IN | BODY MASS INDEX: 43.32 KG/M2

## 2023-01-26 DIAGNOSIS — M48.062 SPINAL STENOSIS OF LUMBAR REGION WITH NEUROGENIC CLAUDICATION: Primary | ICD-10-CM

## 2023-01-26 PROCEDURE — 99204 OFFICE O/P NEW MOD 45 MIN: CPT | Performed by: STUDENT IN AN ORGANIZED HEALTH CARE EDUCATION/TRAINING PROGRAM

## 2023-01-27 ENCOUNTER — TELEPHONE (OUTPATIENT)
Dept: INTERNAL MEDICINE CLINIC | Facility: CLINIC | Age: 48
End: 2023-01-27

## 2023-01-28 RX ORDER — HYDRALAZINE HYDROCHLORIDE 25 MG/1
25 TABLET, FILM COATED ORAL 2 TIMES DAILY
Qty: 180 TABLET | Refills: 1 | Status: SHIPPED | OUTPATIENT
Start: 2023-01-28

## 2023-02-09 ENCOUNTER — OFFICE VISIT (OUTPATIENT)
Dept: ORTHOPEDICS CLINIC | Facility: CLINIC | Age: 48
End: 2023-02-09
Payer: MEDICARE

## 2023-02-09 DIAGNOSIS — M48.061 SPINAL STENOSIS AT L4-L5 LEVEL: Primary | ICD-10-CM

## 2023-02-09 PROCEDURE — 99214 OFFICE O/P EST MOD 30 MIN: CPT | Performed by: STUDENT IN AN ORGANIZED HEALTH CARE EDUCATION/TRAINING PROGRAM

## 2023-02-16 ENCOUNTER — OFFICE VISIT (OUTPATIENT)
Dept: FAMILY MEDICINE CLINIC | Facility: CLINIC | Age: 48
End: 2023-02-16
Payer: MEDICARE

## 2023-02-16 VITALS
OXYGEN SATURATION: 98 % | TEMPERATURE: 98 F | RESPIRATION RATE: 16 BRPM | HEIGHT: 65 IN | DIASTOLIC BLOOD PRESSURE: 80 MMHG | BODY MASS INDEX: 45.98 KG/M2 | SYSTOLIC BLOOD PRESSURE: 130 MMHG | WEIGHT: 276 LBS | HEART RATE: 68 BPM

## 2023-02-16 DIAGNOSIS — G89.29 CHRONIC BILATERAL LOW BACK PAIN WITH BILATERAL SCIATICA: Primary | ICD-10-CM

## 2023-02-16 DIAGNOSIS — E66.9 DIABETES MELLITUS TYPE 2 IN OBESE (HCC): ICD-10-CM

## 2023-02-16 DIAGNOSIS — M48.061 SPINAL STENOSIS AT L4-L5 LEVEL: ICD-10-CM

## 2023-02-16 DIAGNOSIS — M54.42 CHRONIC BILATERAL LOW BACK PAIN WITH BILATERAL SCIATICA: Primary | ICD-10-CM

## 2023-02-16 DIAGNOSIS — K30 INDIGESTION: ICD-10-CM

## 2023-02-16 DIAGNOSIS — E11.69 DIABETES MELLITUS TYPE 2 IN OBESE (HCC): ICD-10-CM

## 2023-02-16 DIAGNOSIS — E78.1 HYPERTRIGLYCERIDEMIA: ICD-10-CM

## 2023-02-16 DIAGNOSIS — M54.41 CHRONIC BILATERAL LOW BACK PAIN WITH BILATERAL SCIATICA: Primary | ICD-10-CM

## 2023-02-16 PROCEDURE — 99214 OFFICE O/P EST MOD 30 MIN: CPT | Performed by: FAMILY MEDICINE

## 2023-02-16 RX ORDER — CLOTRIMAZOLE AND BETAMETHASONE DIPROPIONATE 10; .64 MG/G; MG/G
1 CREAM TOPICAL 2 TIMES DAILY PRN
COMMUNITY
Start: 2023-02-04

## 2023-02-22 ENCOUNTER — OFFICE VISIT (OUTPATIENT)
Dept: INTERNAL MEDICINE CLINIC | Facility: CLINIC | Age: 48
End: 2023-02-22
Payer: MEDICARE

## 2023-02-22 ENCOUNTER — LAB ENCOUNTER (OUTPATIENT)
Dept: LAB | Age: 48
End: 2023-02-22
Attending: INTERNAL MEDICINE
Payer: MEDICARE

## 2023-02-22 ENCOUNTER — TELEPHONE (OUTPATIENT)
Dept: FAMILY MEDICINE CLINIC | Facility: CLINIC | Age: 48
End: 2023-02-22

## 2023-02-22 VITALS
RESPIRATION RATE: 16 BRPM | OXYGEN SATURATION: 94 % | HEIGHT: 65 IN | HEART RATE: 82 BPM | DIASTOLIC BLOOD PRESSURE: 90 MMHG | WEIGHT: 276 LBS | BODY MASS INDEX: 45.98 KG/M2 | SYSTOLIC BLOOD PRESSURE: 150 MMHG

## 2023-02-22 DIAGNOSIS — Z99.89 OSA ON CPAP: ICD-10-CM

## 2023-02-22 DIAGNOSIS — R80.9 PROTEINURIA, UNSPECIFIED TYPE: ICD-10-CM

## 2023-02-22 DIAGNOSIS — I10 ESSENTIAL HYPERTENSION: ICD-10-CM

## 2023-02-22 DIAGNOSIS — Z51.81 ENCOUNTER FOR THERAPEUTIC DRUG MONITORING: Primary | ICD-10-CM

## 2023-02-22 DIAGNOSIS — E11.69 DIABETES MELLITUS TYPE 2 IN OBESE (HCC): ICD-10-CM

## 2023-02-22 DIAGNOSIS — E66.01 CLASS 3 SEVERE OBESITY WITH SERIOUS COMORBIDITY AND BODY MASS INDEX (BMI) OF 45.0 TO 49.9 IN ADULT, UNSPECIFIED OBESITY TYPE (HCC): ICD-10-CM

## 2023-02-22 DIAGNOSIS — E66.9 DIABETES MELLITUS TYPE 2 IN OBESE (HCC): ICD-10-CM

## 2023-02-22 DIAGNOSIS — G47.33 OSA ON CPAP: ICD-10-CM

## 2023-02-22 DIAGNOSIS — E78.2 MIXED HYPERLIPIDEMIA: ICD-10-CM

## 2023-02-22 PROBLEM — E66.813 CLASS 3 SEVERE OBESITY WITH SERIOUS COMORBIDITY AND BODY MASS INDEX (BMI) OF 45.0 TO 49.9 IN ADULT (HCC): Status: ACTIVE | Noted: 2019-07-19

## 2023-02-22 PROBLEM — E66.813 CLASS 3 SEVERE OBESITY WITH SERIOUS COMORBIDITY AND BODY MASS INDEX (BMI) OF 45.0 TO 49.9 IN ADULT: Status: ACTIVE | Noted: 2019-07-19

## 2023-02-22 LAB
ALBUMIN SERPL-MCNC: 4.2 G/DL (ref 3.4–5)
ALBUMIN/GLOB SERPL: 1.1 {RATIO} (ref 1–2)
ALP LIVER SERPL-CCNC: 54 U/L
ALT SERPL-CCNC: 25 U/L
ANION GAP SERPL CALC-SCNC: 8 MMOL/L (ref 0–18)
AST SERPL-CCNC: 22 U/L (ref 15–37)
BILIRUB SERPL-MCNC: 0.3 MG/DL (ref 0.1–2)
BUN BLD-MCNC: 16 MG/DL (ref 7–18)
CALCIUM BLD-MCNC: 9.8 MG/DL (ref 8.5–10.1)
CHLORIDE SERPL-SCNC: 110 MMOL/L (ref 98–112)
CHOLEST SERPL-MCNC: 200 MG/DL (ref ?–200)
CO2 SERPL-SCNC: 22 MMOL/L (ref 21–32)
CREAT BLD-MCNC: 0.73 MG/DL
EST. AVERAGE GLUCOSE BLD GHB EST-MCNC: 117 MG/DL (ref 68–126)
FASTING PATIENT LIPID ANSWER: YES
FASTING STATUS PATIENT QL REPORTED: YES
GFR SERPLBLD BASED ON 1.73 SQ M-ARVRAT: 101 ML/MIN/1.73M2 (ref 60–?)
GLOBULIN PLAS-MCNC: 3.8 G/DL (ref 2.8–4.4)
GLUCOSE BLD-MCNC: 95 MG/DL (ref 70–99)
HBA1C MFR BLD: 5.7 % (ref ?–5.7)
HDLC SERPL-MCNC: 50 MG/DL (ref 40–59)
LDLC SERPL CALC-MCNC: 109 MG/DL (ref ?–100)
NONHDLC SERPL-MCNC: 150 MG/DL (ref ?–130)
OSMOLALITY SERPL CALC.SUM OF ELEC: 291 MOSM/KG (ref 275–295)
POTASSIUM SERPL-SCNC: 4.5 MMOL/L (ref 3.5–5.1)
PROT SERPL-MCNC: 8 G/DL (ref 6.4–8.2)
SODIUM SERPL-SCNC: 140 MMOL/L (ref 136–145)
TRIGL SERPL-MCNC: 240 MG/DL (ref 30–149)
VLDLC SERPL CALC-MCNC: 41 MG/DL (ref 0–30)

## 2023-02-22 PROCEDURE — 36415 COLL VENOUS BLD VENIPUNCTURE: CPT

## 2023-02-22 PROCEDURE — 80061 LIPID PANEL: CPT

## 2023-02-22 PROCEDURE — 80053 COMPREHEN METABOLIC PANEL: CPT

## 2023-02-22 PROCEDURE — 83036 HEMOGLOBIN GLYCOSYLATED A1C: CPT

## 2023-02-22 PROCEDURE — 99214 OFFICE O/P EST MOD 30 MIN: CPT | Performed by: NURSE PRACTITIONER

## 2023-02-22 RX ORDER — BLOOD-GLUCOSE METER
KIT MISCELLANEOUS
Qty: 1 KIT | Refills: 0 | Status: CANCELLED | OUTPATIENT
Start: 2023-02-22

## 2023-02-22 RX ORDER — SEMAGLUTIDE 2.68 MG/ML
2 INJECTION, SOLUTION SUBCUTANEOUS WEEKLY
Qty: 9 ML | Refills: 1 | Status: SHIPPED | OUTPATIENT
Start: 2023-02-22

## 2023-02-22 NOTE — PATIENT INSTRUCTIONS
Continue making lifestyle changes that focus on good nutrition, regular exercise and stress management. Medication Plan: Increase Ozempic to 2 mg weekly. New script at pharmacy. Stop glimepiride. Next steps to work on before next office visit include: Great work on [employer] loss! If your glucose monitor is over 11years old I recommend requesting a new one to make sure it is working properly. Continue to monitor sugar levels and if <80 report. Goal is between . Recommend balanced eating with protein and produce at each meal and consider 3-4 small meals/day due to feeling full. Some snack ideas listed below.

## 2023-02-22 NOTE — TELEPHONE ENCOUNTER
Pt came over after appt next door and they told her to ask for a new glucose monitor and strips to go with it.  Please advise and send to [facility] pharmacy

## 2023-02-22 NOTE — TELEPHONE ENCOUNTER
Left message for patient to ask if patient would like glucometer sent to local pharmacy and refill on the strips to mail order.

## 2023-03-01 NOTE — TELEPHONE ENCOUNTER
2nd attempt to contact. [phone]   for pt (Dustin Delaney per HIPAA) to inform f/u on request for DM supplies and brand covered by insurance plan. If still needed, to call back at the office to further discuss or if any further questions.

## 2023-03-08 ENCOUNTER — OFFICE VISIT (OUTPATIENT)
Dept: PAIN CLINIC | Facility: CLINIC | Age: 48
End: 2023-03-08
Payer: MEDICARE

## 2023-03-08 ENCOUNTER — TELEPHONE (OUTPATIENT)
Dept: PAIN CLINIC | Facility: CLINIC | Age: 48
End: 2023-03-08

## 2023-03-08 VITALS — OXYGEN SATURATION: 96 % | HEART RATE: 85 BPM | SYSTOLIC BLOOD PRESSURE: 142 MMHG | DIASTOLIC BLOOD PRESSURE: 70 MMHG

## 2023-03-08 DIAGNOSIS — M54.50 CHRONIC MIDLINE LOW BACK PAIN WITHOUT SCIATICA: Primary | ICD-10-CM

## 2023-03-08 DIAGNOSIS — M51.36 DDD (DEGENERATIVE DISC DISEASE), LUMBAR: Primary | ICD-10-CM

## 2023-03-08 DIAGNOSIS — G89.29 CHRONIC MIDLINE LOW BACK PAIN WITHOUT SCIATICA: Primary | ICD-10-CM

## 2023-03-08 PROCEDURE — 99204 OFFICE O/P NEW MOD 45 MIN: CPT | Performed by: ANESTHESIOLOGY

## 2023-03-08 NOTE — TELEPHONE ENCOUNTER
Question Answer   Anesthesia Type Local   Provider [facility] Procedure Lab   Procedure Lumbar LENO   CPT (Hit enter after each entry) NJX INTERLAMINAR LMBR/SAC   Medical clearance requested (will send to Pain Navigator) No   Patient has Medicare coverage?  Yes   Comments (Please list entire procedure name here.) lumbar epidural steroid injection

## 2023-03-20 ENCOUNTER — TELEPHONE (OUTPATIENT)
Dept: FAMILY MEDICINE CLINIC | Facility: CLINIC | Age: 48
End: 2023-03-20

## 2023-03-20 ENCOUNTER — OFFICE VISIT (OUTPATIENT)
Dept: FAMILY MEDICINE CLINIC | Facility: CLINIC | Age: 48
End: 2023-03-20
Payer: MEDICARE

## 2023-03-20 VITALS
TEMPERATURE: 98 F | BODY MASS INDEX: 46.53 KG/M2 | DIASTOLIC BLOOD PRESSURE: 88 MMHG | HEART RATE: 86 BPM | HEIGHT: 65 IN | SYSTOLIC BLOOD PRESSURE: 140 MMHG | RESPIRATION RATE: 16 BRPM | OXYGEN SATURATION: 96 % | WEIGHT: 279.25 LBS

## 2023-03-20 DIAGNOSIS — J01.00 ACUTE NON-RECURRENT MAXILLARY SINUSITIS: Primary | ICD-10-CM

## 2023-03-20 DIAGNOSIS — I10 ESSENTIAL HYPERTENSION: ICD-10-CM

## 2023-03-20 PROCEDURE — 99214 OFFICE O/P EST MOD 30 MIN: CPT | Performed by: FAMILY MEDICINE

## 2023-03-20 RX ORDER — LORATADINE 10 MG/1
10 TABLET ORAL DAILY
Qty: 30 TABLET | Refills: 0 | Status: SHIPPED | OUTPATIENT
Start: 2023-03-20

## 2023-03-20 RX ORDER — AZITHROMYCIN 250 MG/1
TABLET, FILM COATED ORAL
Qty: 6 TABLET | Refills: 0 | Status: SHIPPED | OUTPATIENT
Start: 2023-03-20 | End: 2023-03-25

## 2023-03-20 RX ORDER — FLUTICASONE PROPIONATE 50 MCG
2 SPRAY, SUSPENSION (ML) NASAL DAILY
Qty: 1 EACH | Refills: 0 | Status: SHIPPED | OUTPATIENT
Start: 2023-03-20

## 2023-03-20 RX ORDER — ALBUTEROL SULFATE 90 UG/1
2 AEROSOL, METERED RESPIRATORY (INHALATION) EVERY 4 HOURS PRN
Qty: 1 EACH | Refills: 6 | Status: SHIPPED | OUTPATIENT
Start: 2023-03-20

## 2023-03-20 RX ORDER — GLIMEPIRIDE 2 MG/1
2 TABLET ORAL
COMMUNITY
Start: 2023-03-17

## 2023-03-20 NOTE — TELEPHONE ENCOUNTER
Called patient who states symptoms started early last week. They were getting better and she went back to work. On Friday symptoms returned and she had to take off work again. Did a covid test today that was negative. Denies fever. States has sinus pressure/pain and cheek pain. Has sneezing and congestion with phlegm that she can't cough out. Has been taking generic Dayquil/Nyquil. 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 Requesting to be seen today so she can get back to work.     Future Appointments   Date Time Provider Tara Anne   3/20/2023 12:00 PM Nicholas Chaudhry MD [facility]

## 2023-03-20 NOTE — TELEPHONE ENCOUNTER
Pt is complaining of sinus issues and has as appt next week with Dr Brooklyn Jarquin. Feels she cannot wait til today. Is taking a home covid test today. Please triage. A lot of congestion, sneezing. Patient is taking OTC cold medicines.

## 2023-03-28 ENCOUNTER — APPOINTMENT (OUTPATIENT)
Dept: GENERAL RADIOLOGY | Facility: HOSPITAL | Age: 48
End: 2023-03-28
Attending: ANESTHESIOLOGY
Payer: MEDICARE

## 2023-03-28 ENCOUNTER — HOSPITAL ENCOUNTER (OUTPATIENT)
Facility: HOSPITAL | Age: 48
Setting detail: HOSPITAL OUTPATIENT SURGERY
Discharge: HOME | End: 2023-03-28
Attending: ANESTHESIOLOGY | Admitting: ANESTHESIOLOGY
Payer: MEDICARE

## 2023-03-28 VITALS
RESPIRATION RATE: 18 BRPM | SYSTOLIC BLOOD PRESSURE: 108 MMHG | HEART RATE: 81 BPM | DIASTOLIC BLOOD PRESSURE: 73 MMHG | TEMPERATURE: 98 F | OXYGEN SATURATION: 94 %

## 2023-03-28 LAB — GLUCOSE BLD-MCNC: 128 MG/DL (ref 70–99)

## 2023-03-28 PROCEDURE — 3E0R33Z INTRODUCTION OF ANTI-INFLAMMATORY INTO SPINAL CANAL, PERCUTANEOUS APPROACH: ICD-10-PCS | Performed by: ANESTHESIOLOGY

## 2023-03-28 PROCEDURE — 62323 NJX INTERLAMINAR LMBR/SAC: CPT | Performed by: ANESTHESIOLOGY

## 2023-03-28 RX ORDER — NICOTINE POLACRILEX 4 MG
15 LOZENGE BUCCAL
Status: DISCONTINUED | OUTPATIENT
Start: 2023-03-28 | End: 2023-03-28

## 2023-03-28 RX ORDER — DEXTROSE MONOHYDRATE 25 G/50ML
50 INJECTION, SOLUTION INTRAVENOUS
Status: DISCONTINUED | OUTPATIENT
Start: 2023-03-28 | End: 2023-03-28

## 2023-03-28 RX ORDER — NICOTINE POLACRILEX 4 MG
30 LOZENGE BUCCAL
Status: DISCONTINUED | OUTPATIENT
Start: 2023-03-28 | End: 2023-03-28

## 2023-03-28 RX ORDER — SODIUM CHLORIDE 9 MG/ML
INJECTION INTRAVENOUS
Status: DISCONTINUED | OUTPATIENT
Start: 2023-03-28 | End: 2023-03-28

## 2023-03-28 RX ORDER — LIDOCAINE HYDROCHLORIDE 10 MG/ML
INJECTION, SOLUTION EPIDURAL; INFILTRATION; INTRACAUDAL; PERINEURAL
Status: DISCONTINUED | OUTPATIENT
Start: 2023-03-28 | End: 2023-03-28

## 2023-03-28 RX ORDER — DEXAMETHASONE SODIUM PHOSPHATE 10 MG/ML
INJECTION, SOLUTION INTRAMUSCULAR; INTRAVENOUS
Status: DISCONTINUED | OUTPATIENT
Start: 2023-03-28 | End: 2023-03-28

## 2023-03-28 RX ORDER — INSULIN ASPART 100 [IU]/ML
3 INJECTION, SOLUTION INTRAVENOUS; SUBCUTANEOUS ONCE
Status: DISCONTINUED | OUTPATIENT
Start: 2023-03-28 | End: 2023-03-28

## 2023-03-28 NOTE — OPERATIVE REPORT
[facility]  Operative Report  3/28/2023     [address] Patient Status:  Hospital Outpatient Surgery    1975 MRN [id number]   Location [address] [address] Attending Radha Cruz MD   Hosp Day # 0 PCP Aniruhd Rea DO     Indication: Baljeet Jameson is a 50year old female with lumbar degenerative disc disease    Preoperative Diagnosis:  DDD (degenerative disc disease), lumbar [M51.36]  Lumbar radiculitis    Postoperative Diagnosis: Same as above. Procedure performed: LUMBAR INTERLAMINAR EPIDURAL INJECTION with local     Anesthesia: Local     EBL: Less than 1 ml. Procedure Description:  After reviewing the patient's history and performing a focused physical examination, the diagnosis and positive previous diagnostic tests were confirmed and contraindications such as infection and coagulopathy were ruled out. Following review of allergies and potential side effects and complications, including but not necessarily limited to infection, allergic reaction, local tissue breakdown, nerve injury, post-dural puncture headache and paresis, the patient consented for the procedure. The patient was brought to the procedure room in prone position. After sterile prep lumbar spine, the L5-S1 interspace was identified with the help of fluoroscopy. Local anesthetic was given by a 25 gauge 1.5 inch needle with 1% lidocaine in that space level. Thereafter, a 20 gauge Tuohy needle was introduced and advanced under fluoroscopy. The epidural space was accessed by using loss of resistance to air technique. The needle position was confirmed with AP and lateral view under fluoroscopy. Omnipaque 180 was injected in 1 mL volume. A good epidurogram was obtained. Thereafter, dexamethasone 10 mg with normal saline of 5 mL in total volume of 6 mL was injected through the Tuohy needle. The needle was flushed with 1 mL lidocaine. The needle was withdrawn with the stylet intact in situ.   The needle's tip was intact. The patient tolerated the procedure very well without significant immediate complication. The patient's back was cleaned and sterile dressing was applied. The patient was discharged with an instruction to a responsible adult after discharge criteria were met. The patient was advised to contact us should any problems happen. The patient was also informed to go to the emergency room immediately if experiencing severe numbness or weakness in the extremities or experiencing bowel or bladder incontinence. Complications: None. Follow up: The patient was followed in the pain clinic as needed basis.           Kristin Gaines MD

## 2023-03-29 ENCOUNTER — TELEPHONE (OUTPATIENT)
Dept: FAMILY MEDICINE CLINIC | Facility: CLINIC | Age: 48
End: 2023-03-29

## 2023-03-29 ENCOUNTER — TELEPHONE (OUTPATIENT)
Dept: PAIN CLINIC | Facility: CLINIC | Age: 48
End: 2023-03-29

## 2023-03-29 NOTE — TELEPHONE ENCOUNTER
LOV  3/20/23 Sinusitis  +1  (Dr. Pan Holguin)    Acute non-recurrent maxillary sinusitis  Starting the patient on Zithromax, Claritin, Flonase  Patient can use Mucinex for cough  Stop DayQuil    Dr. Becky Pacheco,  Please advise if you can accommodate patient for OV or VV follow up or should she F/U with another provider.  You do not have open appt in the next two weeks

## 2023-03-29 NOTE — TELEPHONE ENCOUNTER
Called patient and informed Dr. Dangelo Seay can do a follow up Video Visit tomorrow at 9 am.  States she doesn't have a computer, can only do it on her phone. Appt scheduled.   Future Appointments   Date Time Provider Tara Anne   3/30/2023  9:00 AM Araceli Sharma, DO [facility]

## 2023-03-29 NOTE — TELEPHONE ENCOUNTER
Pt is still having symptoms and still not feel all there. She said she is taking her medications but doesn't feel 100% yet.

## 2023-03-29 NOTE — TELEPHONE ENCOUNTER
Rossana called placed to patient for post procedure follow up. Patient stated feels good just feeling a bit sore. Informed patient that soreness is to be expected after the procedure. Educated patient that it takes 3-5 days for the steroid to be effective and to allow adequate time for medication to work. Encouraged patient to alternate ice and heat and to take medications as prescribed. Pt verbalized understanding to call with any questions or concerns.       Procedure: LUMBAR INTERLAMINAR EPIDURAL INJECTION with local  Date: 03/28/23  Follow up Visit Scheduled: 04/12/23 Post-Care Instructions: Patient instructed to apply ice to reduce swelling.

## 2023-03-30 ENCOUNTER — TELEMEDICINE (OUTPATIENT)
Dept: FAMILY MEDICINE CLINIC | Facility: CLINIC | Age: 48
End: 2023-03-30
Payer: MEDICARE

## 2023-03-30 DIAGNOSIS — J01.40 ACUTE PANSINUSITIS, RECURRENCE NOT SPECIFIED: Primary | ICD-10-CM

## 2023-03-30 PROCEDURE — 99214 OFFICE O/P EST MOD 30 MIN: CPT | Performed by: FAMILY MEDICINE

## 2023-03-30 RX ORDER — PREDNISONE 20 MG/1
20 TABLET ORAL DAILY
Qty: 5 TABLET | Refills: 0 | Status: SHIPPED | OUTPATIENT
Start: 2023-03-30 | End: 2023-04-04

## 2023-03-30 RX ORDER — FEXOFENADINE HCL 180 MG/1
180 TABLET ORAL DAILY
COMMUNITY

## 2023-04-03 ENCOUNTER — MED REC SCAN ONLY (OUTPATIENT)
Dept: FAMILY MEDICINE CLINIC | Facility: CLINIC | Age: 48
End: 2023-04-03

## 2023-04-12 ENCOUNTER — TELEPHONE (OUTPATIENT)
Dept: PAIN CLINIC | Facility: CLINIC | Age: 48
End: 2023-04-12

## 2023-04-12 ENCOUNTER — OFFICE VISIT (OUTPATIENT)
Dept: PAIN CLINIC | Facility: CLINIC | Age: 48
End: 2023-04-12
Payer: MEDICARE

## 2023-04-12 VITALS
BODY MASS INDEX: 46 KG/M2 | SYSTOLIC BLOOD PRESSURE: 142 MMHG | HEART RATE: 86 BPM | DIASTOLIC BLOOD PRESSURE: 88 MMHG | WEIGHT: 279 LBS | OXYGEN SATURATION: 98 %

## 2023-04-12 DIAGNOSIS — G89.29 CHRONIC MIDLINE LOW BACK PAIN WITHOUT SCIATICA: Primary | ICD-10-CM

## 2023-04-12 DIAGNOSIS — M54.50 CHRONIC MIDLINE LOW BACK PAIN WITHOUT SCIATICA: Primary | ICD-10-CM

## 2023-04-12 DIAGNOSIS — M47.816 LUMBAR FACET ARTHROPATHY: Primary | ICD-10-CM

## 2023-04-12 PROCEDURE — 99214 OFFICE O/P EST MOD 30 MIN: CPT | Performed by: ANESTHESIOLOGY

## 2023-04-12 NOTE — PROGRESS NOTES
Last procedure: LUMBAR INTERLAMINAR EPIDURAL INJECTION with local  Date: 03/28/23  Percentage of relief obtained: 60%  Duration of relief: 1 day    Current Pain Score: 9

## 2023-04-12 NOTE — TELEPHONE ENCOUNTER
Question Answer   Anesthesia Type Sedation   Provider [facility] Procedure Lab   Procedure Facet   Laterality/Level bilateral L4, L5   CPT (Hit enter after each entry) INJ PARAVERT F JNT L/S 1 LEV    INJ PARAVERT F JNT L/S 2 LEV   Procedure Modifier ;BILATERAL PROCEDURE   Medical clearance requested (will send to Pain Navigator) No   Patient has Medicare coverage?  Yes   Comments (Please list entire procedure name here.) bilateral lumbar facet injections

## 2023-04-17 RX ORDER — LORATADINE 10 MG/1
TABLET ORAL
Qty: 30 TABLET | Refills: 0 | Status: SHIPPED | OUTPATIENT
Start: 2023-04-17

## 2023-04-17 RX ORDER — FLUTICASONE PROPIONATE 50 MCG
SPRAY, SUSPENSION (ML) NASAL
Qty: 16 ML | Refills: 0 | Status: SHIPPED | OUTPATIENT
Start: 2023-04-17

## 2023-04-17 NOTE — TELEPHONE ENCOUNTER
LOV 3/20/2023    Future Appointments   Date Time Provider Tara Anne   5/24/2023  1:00 PM WILLIS Freed [facility] [address]   1/11/2024  1:00 PM Karen Randall MD [facility] JEROME Mcdaniel

## 2023-04-19 DIAGNOSIS — E11.9 TYPE 2 DIABETES MELLITUS WITHOUT COMPLICATION (HCC): ICD-10-CM

## 2023-04-19 DIAGNOSIS — E66.9 DIABETES MELLITUS TYPE 2 IN OBESE (HCC): ICD-10-CM

## 2023-04-19 DIAGNOSIS — R80.9 MICROALBUMINURIA DUE TO TYPE 2 DIABETES MELLITUS (HCC): ICD-10-CM

## 2023-04-19 DIAGNOSIS — E78.2 MIXED HYPERLIPIDEMIA: ICD-10-CM

## 2023-04-19 DIAGNOSIS — E11.69 DIABETES MELLITUS TYPE 2 IN OBESE (HCC): ICD-10-CM

## 2023-04-19 DIAGNOSIS — E11.29 MICROALBUMINURIA DUE TO TYPE 2 DIABETES MELLITUS (HCC): ICD-10-CM

## 2023-04-19 DIAGNOSIS — I10 ESSENTIAL HYPERTENSION: ICD-10-CM

## 2023-04-19 RX ORDER — ATORVASTATIN CALCIUM 40 MG/1
TABLET, FILM COATED ORAL
Qty: 90 TABLET | Refills: 1 | Status: SHIPPED | OUTPATIENT
Start: 2023-04-19

## 2023-04-19 RX ORDER — TELMISARTAN 80 MG/1
TABLET ORAL
Qty: 90 TABLET | Refills: 1 | Status: SHIPPED | OUTPATIENT
Start: 2023-04-19

## 2023-04-19 RX ORDER — METFORMIN HYDROCHLORIDE 750 MG/1
TABLET, EXTENDED RELEASE ORAL
Qty: 180 TABLET | Refills: 1 | Status: SHIPPED | OUTPATIENT
Start: 2023-04-19

## 2023-04-19 RX ORDER — GLIMEPIRIDE 2 MG/1
TABLET ORAL
Qty: 90 TABLET | Refills: 0 | Status: SHIPPED | OUTPATIENT
Start: 2023-04-19

## 2023-04-25 ENCOUNTER — HOSPITAL ENCOUNTER (OUTPATIENT)
Facility: HOSPITAL | Age: 48
Setting detail: HOSPITAL OUTPATIENT SURGERY
Discharge: HOME | End: 2023-04-25
Attending: ANESTHESIOLOGY | Admitting: ANESTHESIOLOGY
Payer: MEDICARE

## 2023-04-25 ENCOUNTER — APPOINTMENT (OUTPATIENT)
Dept: GENERAL RADIOLOGY | Facility: HOSPITAL | Age: 48
End: 2023-04-25
Attending: ANESTHESIOLOGY
Payer: MEDICARE

## 2023-04-25 VITALS
SYSTOLIC BLOOD PRESSURE: 155 MMHG | RESPIRATION RATE: 18 BRPM | OXYGEN SATURATION: 95 % | TEMPERATURE: 98 F | WEIGHT: 279 LBS | DIASTOLIC BLOOD PRESSURE: 97 MMHG | HEART RATE: 75 BPM | BODY MASS INDEX: 46.48 KG/M2 | HEIGHT: 65 IN

## 2023-04-25 LAB
B-HCG UR QL: NEGATIVE
GLUCOSE BLD-MCNC: 78 MG/DL (ref 70–99)

## 2023-04-25 PROCEDURE — 64493 INJ PARAVERT F JNT L/S 1 LEV: CPT | Performed by: ANESTHESIOLOGY

## 2023-04-25 PROCEDURE — 64494 INJ PARAVERT F JNT L/S 2 LEV: CPT | Performed by: ANESTHESIOLOGY

## 2023-04-25 PROCEDURE — 3E0U33Z INTRODUCTION OF ANTI-INFLAMMATORY INTO JOINTS, PERCUTANEOUS APPROACH: ICD-10-PCS | Performed by: ANESTHESIOLOGY

## 2023-04-25 PROCEDURE — 3E0U3BZ INTRODUCTION OF ANESTHETIC AGENT INTO JOINTS, PERCUTANEOUS APPROACH: ICD-10-PCS | Performed by: ANESTHESIOLOGY

## 2023-04-25 RX ORDER — BUPIVACAINE HYDROCHLORIDE 5 MG/ML
INJECTION, SOLUTION EPIDURAL; INTRACAUDAL
Status: DISCONTINUED | OUTPATIENT
Start: 2023-04-25 | End: 2023-04-25

## 2023-04-25 RX ORDER — LIDOCAINE HYDROCHLORIDE 10 MG/ML
INJECTION, SOLUTION EPIDURAL; INFILTRATION; INTRACAUDAL; PERINEURAL
Status: DISCONTINUED | OUTPATIENT
Start: 2023-04-25 | End: 2023-04-25

## 2023-04-25 RX ORDER — METHYLPREDNISOLONE ACETATE 40 MG/ML
INJECTION, SUSPENSION INTRA-ARTICULAR; INTRALESIONAL; INTRAMUSCULAR; SOFT TISSUE
Status: DISCONTINUED | OUTPATIENT
Start: 2023-04-25 | End: 2023-04-25

## 2023-04-25 RX ORDER — SODIUM CHLORIDE, SODIUM LACTATE, POTASSIUM CHLORIDE, CALCIUM CHLORIDE 600; 310; 30; 20 MG/100ML; MG/100ML; MG/100ML; MG/100ML
100 INJECTION, SOLUTION INTRAVENOUS CONTINUOUS
Status: DISCONTINUED | OUTPATIENT
Start: 2023-04-25 | End: 2023-04-25

## 2023-04-25 RX ORDER — ONDANSETRON 2 MG/ML
4 INJECTION INTRAMUSCULAR; INTRAVENOUS ONCE AS NEEDED
OUTPATIENT
Start: 2023-04-25 | End: 2023-04-25

## 2023-04-25 RX ORDER — INSULIN ASPART 100 [IU]/ML
3 INJECTION, SOLUTION INTRAVENOUS; SUBCUTANEOUS ONCE
Status: DISCONTINUED | OUTPATIENT
Start: 2023-04-25 | End: 2023-04-25

## 2023-04-25 RX ORDER — DEXTROSE MONOHYDRATE 25 G/50ML
50 INJECTION, SOLUTION INTRAVENOUS
Status: DISCONTINUED | OUTPATIENT
Start: 2023-04-25 | End: 2023-04-25

## 2023-04-25 RX ORDER — NICOTINE POLACRILEX 4 MG
15 LOZENGE BUCCAL
Status: DISCONTINUED | OUTPATIENT
Start: 2023-04-25 | End: 2023-04-25

## 2023-04-25 RX ORDER — MIDAZOLAM HYDROCHLORIDE 1 MG/ML
INJECTION INTRAMUSCULAR; INTRAVENOUS
Status: DISCONTINUED | OUTPATIENT
Start: 2023-04-25 | End: 2023-04-25

## 2023-04-25 RX ORDER — NICOTINE POLACRILEX 4 MG
30 LOZENGE BUCCAL
Status: DISCONTINUED | OUTPATIENT
Start: 2023-04-25 | End: 2023-04-25

## 2023-04-25 RX ORDER — DIPHENHYDRAMINE HYDROCHLORIDE 50 MG/ML
50 INJECTION INTRAMUSCULAR; INTRAVENOUS ONCE AS NEEDED
OUTPATIENT
Start: 2023-04-25 | End: 2023-04-25

## 2023-04-25 RX ORDER — ONDANSETRON 2 MG/ML
4 INJECTION INTRAMUSCULAR; INTRAVENOUS ONCE AS NEEDED
Status: DISCONTINUED | OUTPATIENT
Start: 2023-04-25 | End: 2023-04-25

## 2023-04-25 NOTE — DISCHARGE INSTRUCTIONS
You have been given medication that makes you sleepy. This may have included both pain medication and sleeping medication. Most of the effects have worn off but you may still have some drowsiness for the next 6 to 8 hours. Home Care  Follow these guidelines when you get home:    --Don't drink any alcohol for the next 24 hours. --Don't drive, operate dangerous machinery, make important business or personal    decisions, or sign legal documents during the next 24 hours. Follow Up Care  Follow up with your healthcare provider if you are not alert and back to your usual level of activity within 12 hours    When to seek medical advice  Call your healthcare provider right away if any of these occur:    --Drowsiness that gets worse  --Weakness or dizziness that gets worse  --Repeated vomiting  --You can not be awakened    Home Care Instructions Following Your Pain Procedure     Rachell Presleyisaías,  It has been a pleasure to have you as our patient. To help you at home, you must follow these general discharge instructions. We will review these with you before you are discharged. It is our hope that you have a complete and uneventful recovery from our procedure. General Instructions:  What to Expect:  Bandages from your procedure today can be removed when you get home. Please avoid soaking and/or swimming for 24 hours. Showering is okay  It is normal to have increased pain symptoms and/or pain at injection site for up to 3-5 days after procedure, you can use heat or ice (20 minutes on 20 minutes off) for comfort. You may experience some temporary side effects which may include restlessness or insomnia, flushing of the face, or heart palpitations. Please contact the provider if these symptoms do not resolve within 3-4 days. Lightheadedness or nausea may occur and should resolve within 24 to 48 hours.   If you develop a headache after treatment, rest, drink fluids (with caffeine, if possible) and take mild over-the-counter pain medication. If the headache does not improve with the above treatment, contact the physician. Home Medications:  Resume all previously prescribed medication. Please avoid taking NSAIDs (Non-Steriodal Anti-Inflammatory Drugs) such as:  Ibuprofen ( Advil, Motrin) Aleve (Naproxen), Diclofenac, Meloxicam for 6 hours after procedure. If you are on Coumadin (Warfarin) or any other anti-coagulant (or \"blood thinning\") medication such as Plavix (Clopidogrel), Xarelto (Rivaroxaban), Eliquis (Apixaban), Effient (Prasugrel) etc., restart on the following day from the procedure unless otherwise directed by your provider. If you are a diabetic, please increase the frequency of your glucose monitoring after the procedure as steroids may cause a temporary (2-3 day) increase in your blood sugar. Contact your primary care physician if your blood sugar remains elevated as you may require some medication adjustment. Diet:  Resume your regular diet as tolerated. Activity: We recommend that you relax and rest during the rest of your procedure day. If you feel weakness in your arms or legs do not drive. Follow-up Appointment  Please schedule a follow-up visit within 3 to 4 weeks after your last procedure date. Question or Concerns:  Feel free to call our office with any questions or concerns at [phone] (option #2)    Roselia Damon  Thank you for coming to [facility] for your procedure. The nurses try very hard to make sure you receive the best care possible. Your trust in them as well as us is greatly appreciated.     Thanks so much,   Dr. Dorlene Dandy

## 2023-04-25 NOTE — OPERATIVE REPORT
[facility]  Operative Report  2023     [address] Patient Status:  Hospital Outpatient Surgery    1975 MRN [id number]   Location [address] [address] Attending Holly Head MD   Hosp Day # 0 PCP Ryan Barry DO     Indication: Marquis is a 50year old female with a history of lumbar facet arthropathy    Imaging: Lumbar MRI reviewed    Preoperative Diagnosis:  Lumbar facet arthropathy [M47.816]    Postoperative Diagnosis: Same as above. Procedure performed: LUMBAR FACET INJECTION BILATERAL with sedation   (L3-4, L4-5, L5-S1)    Anesthesia: Local and IV Sedation. EBL: Less than 1 ml. Procedure Description:   After reviewing the patient's history and performing a focused physical examination, the diagnosis was confirmed and contraindications such as infection and coagulopathy were ruled out. Following review of potential side effects and complications, including but not necessarily limited to infection, allergic reaction, local tissue breakdown, nerve injury, and paresis, the patient indicated they understood and agreed to proceed. After obtaining the informed consent, the patient was brought to the procedure room and monitored. Per my order and under my supervision, the patient was sedated with intermittent intravenous doses of versed and fentanyl. The vital signs were monitored and recorded by an experienced RN. The procedure started after the patient was adequately sedated. The moderate intravenous conscious sedation was provided for 11 minutes. In the prone position, following sterile prep and drape of the lumbar region, the corresponding facet joints were identified under fluoroscopy. The skin was anesthetized via 25-gauge 1.5\" needle with approximately 2 mL of 1% lidocaine. A 22-gauge 3.5\" Quincke spinal needle was introduced and advanced into the left L3, L4, L5 levels atraumatically under fluoroscopic guidance.   Following negative aspiration for CSF and blood, approximately 1 mL of 1% lidocaine with 5 mg of methylprednisolone was injected into each joint without complication. The needle was withdrawn with stylet in situ after being flushed with 0.5 mL lidocaine. The contralateral injections were performed in the similar fashion. The patient tolerated procedure very well. The patient was observed until discharge criteria met. Discharge instructions were given and patient was released to a responsible adult. Complications: None. Follow up: The patient was followed in the pain clinic as needed basis.       Ayde Stubbs MD

## 2023-04-26 ENCOUNTER — TELEPHONE (OUTPATIENT)
Dept: PAIN CLINIC | Facility: CLINIC | Age: 48
End: 2023-04-26

## 2023-04-26 DIAGNOSIS — I10 ESSENTIAL HYPERTENSION: ICD-10-CM

## 2023-04-26 RX ORDER — METOPROLOL SUCCINATE 200 MG/1
TABLET, EXTENDED RELEASE ORAL
Qty: 90 TABLET | Refills: 1 | Status: SHIPPED | OUTPATIENT
Start: 2023-04-26

## 2023-04-26 NOTE — TELEPHONE ENCOUNTER
Courtesy called placed to patient for post procedure follow up. Patient stated doing ok, just a little sore. Informed patient that soreness is to be expected after the procedure. Pt verbalized understanding to call with any questions or concerns.       Procedure: Lumbar Facets  Date: 4/25/23  Follow up Visit Scheduled: 5/10/23 @ 2140 phone visit w/ Dr Ronnie Borrego

## 2023-04-26 NOTE — TELEPHONE ENCOUNTER
Hypertension Medications Protocol Passed 04/26/2023 10:30 AM   Protocol Details  CMP or BMP in past 12 months    Last serum creatinine< 2.0    Appointment in past 6 or next 3 months        LOV 3/20/23 dr Aileen Jacques LAB  2/22/23    LAST RX 10/21/22 90 with 1     Next OV   Future Appointments   Date Time Provider Tara Anne   5/3/2023 12:20 PM Cheryl Sharma,  [facility]   5/24/2023  1:00 PM Maximus BajwautaWILLIS lackey [facility]   1/11/2024  1:00 PM Mai Moreira MD [facility] [place] [facility] [place]         PROTOCOL pass

## 2023-05-03 ENCOUNTER — OFFICE VISIT (OUTPATIENT)
Dept: FAMILY MEDICINE CLINIC | Facility: CLINIC | Age: 48
End: 2023-05-03
Payer: MEDICARE

## 2023-05-03 VITALS
HEART RATE: 74 BPM | WEIGHT: 276 LBS | BODY MASS INDEX: 45.98 KG/M2 | TEMPERATURE: 98 F | SYSTOLIC BLOOD PRESSURE: 136 MMHG | DIASTOLIC BLOOD PRESSURE: 80 MMHG | OXYGEN SATURATION: 98 % | RESPIRATION RATE: 16 BRPM | HEIGHT: 65 IN

## 2023-05-03 DIAGNOSIS — N64.4 BREAST PAIN, RIGHT: Primary | ICD-10-CM

## 2023-05-03 DIAGNOSIS — N95.0 POSTMENOPAUSAL BLEEDING: ICD-10-CM

## 2023-05-03 DIAGNOSIS — M79.601 PAIN OF RIGHT UPPER EXTREMITY: ICD-10-CM

## 2023-05-03 PROCEDURE — 99214 OFFICE O/P EST MOD 30 MIN: CPT | Performed by: FAMILY MEDICINE

## 2023-05-10 ENCOUNTER — VIRTUAL PHONE E/M (OUTPATIENT)
Dept: PAIN CLINIC | Facility: CLINIC | Age: 48
End: 2023-05-10
Payer: MEDICARE

## 2023-05-10 DIAGNOSIS — M47.816 LUMBAR FACET ARTHROPATHY: Primary | ICD-10-CM

## 2023-05-10 PROCEDURE — 99441 PHONE E/M BY PHYS 5-10 MIN: CPT | Performed by: ANESTHESIOLOGY

## 2023-05-10 NOTE — PROGRESS NOTES
Virtual Telephone Check-In    [address] verbally consents to a Virtual/Telephone Check-In visit on 05/10/23. Patient has been referred to the [facility] website at [email or web address] to review the yearly Consent to Treat document. Patient understands and accepts financial responsibility for any deductible, co-insurance and/or co-pays associated with this service. Duration of the service: 10 minutes      Summary of topics discussed:       Patient was seen lumbar degenerative disc disease and significant facet arthropathy following up after lumbar facet injection. Overall, did not notice improvement in pain. Also complains of claudication symptoms. Was recommended a different physician by a work friend who she will make an appointment to go see.     Savita Jones MD

## 2023-05-23 DIAGNOSIS — I10 ESSENTIAL HYPERTENSION: ICD-10-CM

## 2023-05-23 DIAGNOSIS — E78.2 MIXED HYPERLIPIDEMIA: ICD-10-CM

## 2023-05-23 DIAGNOSIS — Z51.81 ENCOUNTER FOR THERAPEUTIC DRUG MONITORING: ICD-10-CM

## 2023-05-23 DIAGNOSIS — E11.69 DIABETES MELLITUS TYPE 2 IN OBESE (HCC): ICD-10-CM

## 2023-05-23 DIAGNOSIS — E66.9 DIABETES MELLITUS TYPE 2 IN OBESE (HCC): ICD-10-CM

## 2023-05-23 DIAGNOSIS — E66.01 CLASS 3 SEVERE OBESITY WITH SERIOUS COMORBIDITY AND BODY MASS INDEX (BMI) OF 45.0 TO 49.9 IN ADULT, UNSPECIFIED OBESITY TYPE (HCC): ICD-10-CM

## 2023-05-24 ENCOUNTER — OFFICE VISIT (OUTPATIENT)
Dept: INTERNAL MEDICINE CLINIC | Facility: CLINIC | Age: 48
End: 2023-05-24
Payer: MEDICARE

## 2023-05-24 VITALS
RESPIRATION RATE: 16 BRPM | WEIGHT: 282 LBS | HEIGHT: 64.5 IN | HEART RATE: 78 BPM | BODY MASS INDEX: 47.56 KG/M2 | SYSTOLIC BLOOD PRESSURE: 140 MMHG | DIASTOLIC BLOOD PRESSURE: 70 MMHG

## 2023-05-24 DIAGNOSIS — E11.69 DIABETES MELLITUS TYPE 2 IN OBESE (HCC): ICD-10-CM

## 2023-05-24 DIAGNOSIS — Z51.81 ENCOUNTER FOR THERAPEUTIC DRUG MONITORING: Primary | ICD-10-CM

## 2023-05-24 DIAGNOSIS — E78.2 MIXED HYPERLIPIDEMIA: ICD-10-CM

## 2023-05-24 DIAGNOSIS — I10 ESSENTIAL HYPERTENSION: ICD-10-CM

## 2023-05-24 DIAGNOSIS — E66.01 CLASS 3 SEVERE OBESITY WITH SERIOUS COMORBIDITY AND BODY MASS INDEX (BMI) OF 45.0 TO 49.9 IN ADULT, UNSPECIFIED OBESITY TYPE (HCC): ICD-10-CM

## 2023-05-24 DIAGNOSIS — E66.9 DIABETES MELLITUS TYPE 2 IN OBESE (HCC): ICD-10-CM

## 2023-05-24 PROCEDURE — 99213 OFFICE O/P EST LOW 20 MIN: CPT | Performed by: NURSE PRACTITIONER

## 2023-05-26 ENCOUNTER — OFFICE VISIT (OUTPATIENT)
Dept: PAIN CLINIC | Facility: CLINIC | Age: 48
End: 2023-05-26
Payer: MEDICARE

## 2023-05-26 VITALS
WEIGHT: 282 LBS | BODY MASS INDEX: 48 KG/M2 | HEART RATE: 98 BPM | OXYGEN SATURATION: 98 % | SYSTOLIC BLOOD PRESSURE: 128 MMHG | DIASTOLIC BLOOD PRESSURE: 90 MMHG

## 2023-05-26 DIAGNOSIS — G89.29 CHRONIC MIDLINE LOW BACK PAIN WITHOUT SCIATICA: Primary | ICD-10-CM

## 2023-05-26 DIAGNOSIS — M54.50 CHRONIC MIDLINE LOW BACK PAIN WITHOUT SCIATICA: Primary | ICD-10-CM

## 2023-05-26 PROCEDURE — 99214 OFFICE O/P EST MOD 30 MIN: CPT | Performed by: ANESTHESIOLOGY

## 2023-05-26 RX ORDER — SEMAGLUTIDE 2.68 MG/ML
2 INJECTION, SOLUTION SUBCUTANEOUS WEEKLY
Qty: 9 ML | Refills: 1 | Status: SHIPPED | OUTPATIENT
Start: 2023-05-26

## 2023-05-26 NOTE — PROGRESS NOTES
Last procedure: LUMBAR FACET INJECTION BILATERAL with sedation  Date: 04/25/23  Percentage of relief obtained: 0  Duration of relief: na    Current Pain Score: 8-9

## 2023-06-13 ENCOUNTER — HOSPITAL ENCOUNTER (OUTPATIENT)
Dept: MAMMOGRAPHY | Facility: HOSPITAL | Age: 48
Discharge: HOME | End: 2023-06-13
Attending: FAMILY MEDICINE
Payer: MEDICARE

## 2023-06-13 DIAGNOSIS — N64.4 BREAST PAIN, RIGHT: ICD-10-CM

## 2023-06-13 PROCEDURE — 77062 BREAST TOMOSYNTHESIS BI: CPT | Performed by: FAMILY MEDICINE

## 2023-06-13 PROCEDURE — 77066 DX MAMMO INCL CAD BI: CPT | Performed by: FAMILY MEDICINE

## 2023-07-18 DIAGNOSIS — E78.1 HYPERTRIGLYCERIDEMIA: ICD-10-CM

## 2023-07-19 RX ORDER — GEMFIBROZIL 600 MG/1
TABLET, FILM COATED ORAL
Qty: 180 TABLET | Refills: 0 | Status: SHIPPED | OUTPATIENT
Start: 2023-07-19

## 2023-07-19 RX ORDER — GLIMEPIRIDE 2 MG/1
TABLET ORAL
Qty: 30 TABLET | Refills: 10 | OUTPATIENT
Start: 2023-07-19

## 2023-07-19 NOTE — TELEPHONE ENCOUNTER
Cholesterol Medication Protocol [id number]/18/2023 05:40 PM   Protocol Details ALT < 80    ALT resulted within past year    Lipid panel within past 12 months    Appointment within past 12 or next 3 month          LOV 2/16/23     LAST LAB      LAST RX  1/19/23 180 with 1     Next OV   Future Appointments   Date Time Provider Tara Anne   7/31/2023  1:20 PM Para Ding[place] Ferrykhanh Sharma, DO [facility]   8/21/2023  3:00 PM Kasia Griffith MD [facility] [place]   1/11/2024  1:00 PM Char Young MD [facility] [place]         PROTOCOL pass

## 2023-07-21 RX ORDER — HYDRALAZINE HYDROCHLORIDE 25 MG/1
25 TABLET, FILM COATED ORAL 2 TIMES DAILY
Qty: 180 TABLET | Refills: 1 | Status: SHIPPED | OUTPATIENT
Start: 2023-07-21

## 2023-07-25 RX ORDER — GLIMEPIRIDE 2 MG/1
TABLET ORAL
Qty: 30 TABLET | Refills: 10 | OUTPATIENT
Start: 2023-07-25

## 2023-07-27 ENCOUNTER — PATIENT MESSAGE (OUTPATIENT)
Dept: FAMILY MEDICINE CLINIC | Facility: CLINIC | Age: 48
End: 2023-07-27

## 2023-07-31 RX ORDER — GLIMEPIRIDE 2 MG/1
TABLET ORAL
Qty: 30 TABLET | Refills: 10 | OUTPATIENT
Start: 2023-07-31

## 2023-08-03 RX ORDER — GLIMEPIRIDE 2 MG/1
TABLET ORAL
Qty: 30 TABLET | Refills: 10 | OUTPATIENT
Start: 2023-08-03

## 2023-08-07 RX ORDER — GLIMEPIRIDE 2 MG/1
TABLET ORAL
Qty: 30 TABLET | Refills: 10 | OUTPATIENT
Start: 2023-08-07

## 2023-08-09 NOTE — TELEPHONE ENCOUNTER
Received call from [facility] asking for refill on glimepiride. Reviewed chart and noted medication has been discontinued by [facility] provider. Indu Arita Dr to remove from patient's current med profile. OV note 2/22/23:     Plan:  Patient has lost 24# since LOV in 10/2021 on Ozempic 1 mg weekly, Metformin  mg BID (PCP) with a total weight loss of 21# since initial consult on 7/18/2019 with initial weight of 292#. Weight loss goal: would like to get weight down to 250# in 6 months and ultimately 150#. BP elevated today, recommend home monitoring, eliminate caffeine and f/u with PCP. Reviewed labs in EMR. Increase Ozempic and stop glimepiride to reduce risk if hypoglycemia.

## 2023-08-21 ENCOUNTER — OFFICE VISIT (OUTPATIENT)
Dept: OBGYN CLINIC | Facility: CLINIC | Age: 48
End: 2023-08-21
Payer: COMMERCIAL

## 2023-08-21 VITALS
DIASTOLIC BLOOD PRESSURE: 76 MMHG | BODY MASS INDEX: 46.61 KG/M2 | SYSTOLIC BLOOD PRESSURE: 128 MMHG | HEIGHT: 65.5 IN | WEIGHT: 283.19 LBS

## 2023-08-21 DIAGNOSIS — Z12.4 SCREENING FOR CERVICAL CANCER: ICD-10-CM

## 2023-08-21 DIAGNOSIS — N76.0 VAGINITIS AND VULVOVAGINITIS: Primary | ICD-10-CM

## 2023-08-21 PROCEDURE — 87624 HPV HI-RISK TYP POOLED RSLT: CPT | Performed by: OBSTETRICS & GYNECOLOGY

## 2023-08-21 PROCEDURE — 88175 CYTOPATH C/V AUTO FLUID REDO: CPT | Performed by: OBSTETRICS & GYNECOLOGY

## 2023-08-21 RX ORDER — NYSTATIN AND TRIAMCINOLONE ACETONIDE 100000; 1 [USP'U]/G; MG/G
1 OINTMENT TOPICAL 2 TIMES DAILY
Qty: 60 G | Refills: 1 | Status: SHIPPED | OUTPATIENT
Start: 2023-08-21 | End: 2023-09-04

## 2023-08-22 RX ORDER — HYDRALAZINE HYDROCHLORIDE 25 MG/1
25 TABLET, FILM COATED ORAL 2 TIMES DAILY
Qty: 180 TABLET | Refills: 0 | Status: SHIPPED | OUTPATIENT
Start: 2023-08-22

## 2023-08-24 LAB — HPV I/H RISK 1 DNA SPEC QL NAA+PROBE: NEGATIVE

## 2023-08-25 ENCOUNTER — TELEPHONE (OUTPATIENT)
Dept: OBGYN CLINIC | Facility: CLINIC | Age: 48
End: 2023-08-25

## 2023-08-25 DIAGNOSIS — N76.0 VAGINITIS AND VULVOVAGINITIS: ICD-10-CM

## 2023-08-25 DIAGNOSIS — A49.8 ENTEROCOCCUS FAECALIS INFECTION: Primary | ICD-10-CM

## 2023-08-25 RX ORDER — AMOXICILLIN AND CLAVULANATE POTASSIUM 875; 125 MG/1; MG/1
1 TABLET, FILM COATED ORAL 2 TIMES DAILY
Qty: 14 TABLET | Refills: 0 | Status: SHIPPED | OUTPATIENT
Start: 2023-08-25 | End: 2023-09-01

## 2023-08-25 RX ORDER — CEFTRIAXONE 500 MG/1
1 INJECTION, POWDER, FOR SOLUTION INTRAMUSCULAR; INTRAVENOUS EVERY 24 HOURS
Status: SHIPPED | OUTPATIENT
Start: 2023-08-30 | End: 2023-08-31

## 2023-08-25 NOTE — TELEPHONE ENCOUNTER
Vaginal ID panel reviewed. Noted for E. coli infection and Enterococcus infection. Recommend treatment with Augmentin 875 over 125 mg twice daily x7 days plus ceftriaxone 1 g IM every 24 hours x 2 doses. Ceftriaxone added to overcome potential resistance. Recommend treatment. Please contact patient and provide prescription. Please arrange for patient to return to the office for medication administration. Thank you.

## 2023-08-28 ENCOUNTER — OFFICE VISIT (OUTPATIENT)
Dept: FAMILY MEDICINE CLINIC | Facility: CLINIC | Age: 48
End: 2023-08-28
Payer: COMMERCIAL

## 2023-08-28 VITALS
TEMPERATURE: 97 F | SYSTOLIC BLOOD PRESSURE: 124 MMHG | HEART RATE: 88 BPM | WEIGHT: 281.5 LBS | BODY MASS INDEX: 48.06 KG/M2 | OXYGEN SATURATION: 97 % | DIASTOLIC BLOOD PRESSURE: 80 MMHG | HEIGHT: 64.17 IN | RESPIRATION RATE: 16 BRPM

## 2023-08-28 DIAGNOSIS — E78.2 MIXED HYPERLIPIDEMIA: ICD-10-CM

## 2023-08-28 DIAGNOSIS — Z00.00 ENCOUNTER FOR ANNUAL HEALTH EXAMINATION: Primary | ICD-10-CM

## 2023-08-28 DIAGNOSIS — I10 ESSENTIAL HYPERTENSION: ICD-10-CM

## 2023-08-28 DIAGNOSIS — Z13.6 SCREENING FOR CARDIOVASCULAR CONDITION: ICD-10-CM

## 2023-08-28 DIAGNOSIS — G47.33 OSA ON CPAP: ICD-10-CM

## 2023-08-28 DIAGNOSIS — M54.50 CHRONIC MIDLINE LOW BACK PAIN WITHOUT SCIATICA: ICD-10-CM

## 2023-08-28 DIAGNOSIS — D69.6 THROMBOCYTOPENIA (HCC): ICD-10-CM

## 2023-08-28 DIAGNOSIS — R62.50 MILD DEVELOPMENTAL DELAY: ICD-10-CM

## 2023-08-28 DIAGNOSIS — G89.29 CHRONIC MIDLINE LOW BACK PAIN WITHOUT SCIATICA: ICD-10-CM

## 2023-08-28 DIAGNOSIS — E11.9 TYPE 2 DIABETES MELLITUS WITHOUT COMPLICATION, WITHOUT LONG-TERM CURRENT USE OF INSULIN (HCC): ICD-10-CM

## 2023-08-28 DIAGNOSIS — E66.01 MORBID OBESITY WITH BMI OF 45.0-49.9, ADULT (HCC): ICD-10-CM

## 2023-08-28 DIAGNOSIS — F31.32 BIPOLAR AFFECTIVE DISORDER, CURRENTLY DEPRESSED, MODERATE (HCC): ICD-10-CM

## 2023-08-28 DIAGNOSIS — M48.061 SPINAL STENOSIS AT L4-L5 LEVEL: ICD-10-CM

## 2023-08-28 LAB
APPEARANCE: CLEAR
BILIRUBIN: NEGATIVE
CREAT UR-SCNC: 86 MG/DL
GLUCOSE (URINE DIPSTICK): NEGATIVE MG/DL
KETONES (URINE DIPSTICK): NEGATIVE MG/DL
LEUKOCYTES: NEGATIVE
MICROALBUMIN UR-MCNC: 338 MG/DL
MICROALBUMIN/CREAT 24H UR-RTO: 3930.2 UG/MG (ref ?–30)
MULTISTIX LOT#: ABNORMAL NUMERIC
NITRITE, URINE: NEGATIVE
OCCULT BLOOD: NEGATIVE
PH, URINE: 5.5 (ref 4.5–8)
PROTEIN (URINE DIPSTICK): >=300 MG/DL
SPECIFIC GRAVITY: 1.03 (ref 1–1.03)
URINE-COLOR: YELLOW
UROBILINOGEN,SEMI-QN: 0.2 MG/DL (ref 0–1.9)

## 2023-08-28 PROCEDURE — 3008F BODY MASS INDEX DOCD: CPT | Performed by: FAMILY MEDICINE

## 2023-08-28 PROCEDURE — 99396 PREV VISIT EST AGE 40-64: CPT | Performed by: FAMILY MEDICINE

## 2023-08-28 PROCEDURE — 99214 OFFICE O/P EST MOD 30 MIN: CPT | Performed by: FAMILY MEDICINE

## 2023-08-28 PROCEDURE — 82570 ASSAY OF URINE CREATININE: CPT | Performed by: FAMILY MEDICINE

## 2023-08-28 PROCEDURE — 3074F SYST BP LT 130 MM HG: CPT | Performed by: FAMILY MEDICINE

## 2023-08-28 PROCEDURE — 3060F POS MICROALBUMINURIA REV: CPT | Performed by: FAMILY MEDICINE

## 2023-08-28 PROCEDURE — 81003 URINALYSIS AUTO W/O SCOPE: CPT | Performed by: FAMILY MEDICINE

## 2023-08-28 PROCEDURE — 3079F DIAST BP 80-89 MM HG: CPT | Performed by: FAMILY MEDICINE

## 2023-08-28 PROCEDURE — 82043 UR ALBUMIN QUANTITATIVE: CPT | Performed by: FAMILY MEDICINE

## 2023-08-28 RX ORDER — BLOOD-GLUCOSE METER
KIT MISCELLANEOUS
Qty: 100 STRIP | Refills: 2 | Status: SHIPPED | OUTPATIENT
Start: 2023-08-28 | End: 2023-09-14 | Stop reason: CLARIF

## 2023-08-28 RX ORDER — LANCETS 28 GAUGE
1 EACH MISCELLANEOUS DAILY
Qty: 100 EACH | Refills: 2 | Status: SHIPPED | OUTPATIENT
Start: 2023-08-28 | End: 2023-09-07 | Stop reason: CLARIF

## 2023-08-28 RX ORDER — BLOOD-GLUCOSE METER
1 KIT MISCELLANEOUS DAILY
Qty: 1 EACH | Refills: 0 | Status: SHIPPED | OUTPATIENT
Start: 2023-08-28 | End: 2023-09-07 | Stop reason: CLARIF

## 2023-08-30 ENCOUNTER — NURSE ONLY (OUTPATIENT)
Dept: OBGYN CLINIC | Facility: CLINIC | Age: 48
End: 2023-08-30
Payer: COMMERCIAL

## 2023-08-30 ENCOUNTER — MED REC SCAN ONLY (OUTPATIENT)
Dept: OBGYN CLINIC | Facility: CLINIC | Age: 48
End: 2023-08-30

## 2023-08-30 ENCOUNTER — LAB ENCOUNTER (OUTPATIENT)
Dept: LAB | Age: 48
End: 2023-08-30
Attending: FAMILY MEDICINE
Payer: MEDICARE

## 2023-08-30 VITALS — DIASTOLIC BLOOD PRESSURE: 90 MMHG | HEART RATE: 85 BPM | SYSTOLIC BLOOD PRESSURE: 142 MMHG

## 2023-08-30 DIAGNOSIS — Z13.1 SCREENING FOR DIABETES MELLITUS (DM): ICD-10-CM

## 2023-08-30 DIAGNOSIS — E11.9 TYPE 2 DIABETES MELLITUS WITHOUT COMPLICATION, WITHOUT LONG-TERM CURRENT USE OF INSULIN (HCC): ICD-10-CM

## 2023-08-30 DIAGNOSIS — R80.9 PROTEINURIA, UNSPECIFIED TYPE: ICD-10-CM

## 2023-08-30 DIAGNOSIS — Z13.6 SCREENING FOR CARDIOVASCULAR CONDITION: ICD-10-CM

## 2023-08-30 LAB
ALBUMIN SERPL-MCNC: 4.3 G/DL (ref 3.4–5)
ALBUMIN/GLOB SERPL: 1.1 {RATIO} (ref 1–2)
ALP LIVER SERPL-CCNC: 67 U/L
ALT SERPL-CCNC: 32 U/L
ANION GAP SERPL CALC-SCNC: 6 MMOL/L (ref 0–18)
AST SERPL-CCNC: 27 U/L (ref 15–37)
BASOPHILS # BLD AUTO: 0.03 X10(3) UL (ref 0–0.2)
BASOPHILS NFR BLD AUTO: 0.7 %
BILIRUB SERPL-MCNC: 0.3 MG/DL (ref 0.1–2)
BILIRUB UR QL STRIP.AUTO: NEGATIVE
BUN BLD-MCNC: 15 MG/DL (ref 7–18)
CALCIUM BLD-MCNC: 10.7 MG/DL (ref 8.5–10.1)
CHLORIDE SERPL-SCNC: 109 MMOL/L (ref 98–112)
CHOLEST SERPL-MCNC: 177 MG/DL (ref ?–200)
CLARITY UR REFRACT.AUTO: CLEAR
CO2 SERPL-SCNC: 23 MMOL/L (ref 21–32)
CREAT BLD-MCNC: 0.81 MG/DL
EGFRCR SERPLBLD CKD-EPI 2021: 88 ML/MIN/1.73M2 (ref 60–?)
EOSINOPHIL # BLD AUTO: 0.11 X10(3) UL (ref 0–0.7)
EOSINOPHIL NFR BLD AUTO: 2.7 %
ERYTHROCYTE [DISTWIDTH] IN BLOOD BY AUTOMATED COUNT: 13.1 %
EST. AVERAGE GLUCOSE BLD GHB EST-MCNC: 123 MG/DL (ref 68–126)
FASTING PATIENT LIPID ANSWER: YES
FASTING STATUS PATIENT QL REPORTED: YES
GLOBULIN PLAS-MCNC: 3.8 G/DL (ref 2.8–4.4)
GLUCOSE BLD-MCNC: 123 MG/DL (ref 70–99)
GLUCOSE UR STRIP.AUTO-MCNC: NORMAL MG/DL
HBA1C MFR BLD: 5.9 % (ref ?–5.7)
HCT VFR BLD AUTO: 39.6 %
HDLC SERPL-MCNC: 46 MG/DL (ref 40–59)
HGB BLD-MCNC: 13.3 G/DL
IMM GRANULOCYTES # BLD AUTO: 0.01 X10(3) UL (ref 0–1)
IMM GRANULOCYTES NFR BLD: 0.2 %
KETONES UR STRIP.AUTO-MCNC: NEGATIVE MG/DL
LDLC SERPL CALC-MCNC: 80 MG/DL (ref ?–100)
LEUKOCYTE ESTERASE UR QL STRIP.AUTO: NEGATIVE
LYMPHOCYTES # BLD AUTO: 1.59 X10(3) UL (ref 1–4)
LYMPHOCYTES NFR BLD AUTO: 38.3 %
MAGNESIUM SERPL-MCNC: 1.8 MG/DL (ref 1.6–2.6)
MCH RBC QN AUTO: 30.9 PG (ref 26–34)
MCHC RBC AUTO-ENTMCNC: 33.6 G/DL (ref 31–37)
MCV RBC AUTO: 91.9 FL
MONOCYTES # BLD AUTO: 0.29 X10(3) UL (ref 0.1–1)
MONOCYTES NFR BLD AUTO: 7 %
NEUTROPHILS # BLD AUTO: 2.12 X10 (3) UL (ref 1.5–7.7)
NEUTROPHILS # BLD AUTO: 2.12 X10(3) UL (ref 1.5–7.7)
NEUTROPHILS NFR BLD AUTO: 51.1 %
NITRITE UR QL STRIP.AUTO: NEGATIVE
NONHDLC SERPL-MCNC: 131 MG/DL (ref ?–130)
OSMOLALITY SERPL CALC.SUM OF ELEC: 288 MOSM/KG (ref 275–295)
PH UR STRIP.AUTO: 6 [PH] (ref 5–8)
PHOSPHATE SERPL-MCNC: 4.1 MG/DL (ref 2.5–4.9)
PLATELET # BLD AUTO: 328 10(3)UL (ref 150–450)
POTASSIUM SERPL-SCNC: 4.5 MMOL/L (ref 3.5–5.1)
PROT SERPL-MCNC: 8.1 G/DL (ref 6.4–8.2)
PROT UR STRIP.AUTO-MCNC: 300 MG/DL
PROT UR-MCNC: 381.2 MG/DL
PTH-INTACT SERPL-MCNC: 30.5 PG/ML (ref 18.5–88)
RBC # BLD AUTO: 4.31 X10(6)UL
RBC UR QL AUTO: NEGATIVE
SODIUM SERPL-SCNC: 138 MMOL/L (ref 136–145)
SP GR UR STRIP.AUTO: 1.02 (ref 1–1.03)
TRIGL SERPL-MCNC: 314 MG/DL (ref 30–149)
UROBILINOGEN UR STRIP.AUTO-MCNC: NORMAL MG/DL
VIT D+METAB SERPL-MCNC: 45.2 NG/ML (ref 30–100)
VLDLC SERPL CALC-MCNC: 50 MG/DL (ref 0–30)
WBC # BLD AUTO: 4.2 X10(3) UL (ref 4–11)

## 2023-08-30 PROCEDURE — 80061 LIPID PANEL: CPT

## 2023-08-30 PROCEDURE — 84156 ASSAY OF PROTEIN URINE: CPT

## 2023-08-30 PROCEDURE — 81001 URINALYSIS AUTO W/SCOPE: CPT

## 2023-08-30 PROCEDURE — 83970 ASSAY OF PARATHORMONE: CPT

## 2023-08-30 PROCEDURE — 3044F HG A1C LEVEL LT 7.0%: CPT | Performed by: FAMILY MEDICINE

## 2023-08-30 PROCEDURE — 85025 COMPLETE CBC W/AUTO DIFF WBC: CPT

## 2023-08-30 PROCEDURE — 3080F DIAST BP >= 90 MM HG: CPT | Performed by: OBSTETRICS & GYNECOLOGY

## 2023-08-30 PROCEDURE — 80053 COMPREHEN METABOLIC PANEL: CPT

## 2023-08-30 PROCEDURE — 96372 THER/PROPH/DIAG INJ SC/IM: CPT | Performed by: OBSTETRICS & GYNECOLOGY

## 2023-08-30 PROCEDURE — 84100 ASSAY OF PHOSPHORUS: CPT

## 2023-08-30 PROCEDURE — 82306 VITAMIN D 25 HYDROXY: CPT

## 2023-08-30 PROCEDURE — 83036 HEMOGLOBIN GLYCOSYLATED A1C: CPT

## 2023-08-30 PROCEDURE — 3077F SYST BP >= 140 MM HG: CPT | Performed by: OBSTETRICS & GYNECOLOGY

## 2023-08-30 PROCEDURE — 36415 COLL VENOUS BLD VENIPUNCTURE: CPT

## 2023-08-30 PROCEDURE — 83735 ASSAY OF MAGNESIUM: CPT

## 2023-08-30 RX ADMIN — CEFTRIAXONE 1 G: 500 INJECTION, POWDER, FOR SOLUTION INTRAMUSCULAR; INTRAVENOUS at 13:20:00

## 2023-08-31 ENCOUNTER — NURSE ONLY (OUTPATIENT)
Dept: OBGYN CLINIC | Facility: CLINIC | Age: 48
End: 2023-08-31
Payer: COMMERCIAL

## 2023-08-31 VITALS — DIASTOLIC BLOOD PRESSURE: 80 MMHG | WEIGHT: 280.19 LBS | BODY MASS INDEX: 48 KG/M2 | SYSTOLIC BLOOD PRESSURE: 139 MMHG

## 2023-08-31 PROCEDURE — 3079F DIAST BP 80-89 MM HG: CPT | Performed by: OBSTETRICS & GYNECOLOGY

## 2023-08-31 PROCEDURE — 96372 THER/PROPH/DIAG INJ SC/IM: CPT | Performed by: OBSTETRICS & GYNECOLOGY

## 2023-08-31 PROCEDURE — 3075F SYST BP GE 130 - 139MM HG: CPT | Performed by: OBSTETRICS & GYNECOLOGY

## 2023-08-31 RX ADMIN — CEFTRIAXONE 1 G: 500 INJECTION, POWDER, FOR SOLUTION INTRAMUSCULAR; INTRAVENOUS at 13:54:00

## 2023-09-05 ENCOUNTER — TELEPHONE (OUTPATIENT)
Dept: FAMILY MEDICINE CLINIC | Facility: CLINIC | Age: 48
End: 2023-09-05

## 2023-09-05 NOTE — TELEPHONE ENCOUNTER
Pa request received . Key: [id number]  Patient last name : pebbles .  1975. PA submitted for diabetic supplies . Awaiting for insurance response .

## 2023-09-06 DIAGNOSIS — E11.9 TYPE 2 DIABETES MELLITUS WITHOUT COMPLICATION (HCC): ICD-10-CM

## 2023-09-06 DIAGNOSIS — E78.2 MIXED HYPERLIPIDEMIA: ICD-10-CM

## 2023-09-06 DIAGNOSIS — E66.9 DIABETES MELLITUS TYPE 2 IN OBESE: ICD-10-CM

## 2023-09-06 DIAGNOSIS — E11.69 DIABETES MELLITUS TYPE 2 IN OBESE: ICD-10-CM

## 2023-09-06 RX ORDER — BLOOD SUGAR DIAGNOSTIC
STRIP MISCELLANEOUS
Qty: 100 STRIP | Refills: 2 | Status: SHIPPED | OUTPATIENT
Start: 2023-09-06

## 2023-09-06 RX ORDER — BLOOD-GLUCOSE METER
EACH MISCELLANEOUS
Qty: 1 KIT | Refills: 0 | Status: SHIPPED | OUTPATIENT
Start: 2023-09-06

## 2023-09-06 RX ORDER — LANCETS 30 GAUGE
1 EACH MISCELLANEOUS DAILY
Qty: 100 EACH | Refills: 2 | Status: SHIPPED | OUTPATIENT
Start: 2023-09-06

## 2023-09-06 NOTE — TELEPHONE ENCOUNTER
Charlotte's friend Reina Casas called and said the above patient is out of Atorvastatin 40 mg, Telmisartan 80 mg and Gemfibrozil 600 mg. [facility]eds [facility] be [facility]. Also, they had a question about the Glimepiride 2 mg. It shows discontinued but they are saying that she is still on this medication.

## 2023-09-06 NOTE — TELEPHONE ENCOUNTER
Patient called stating she ran out of the metformin. She no longer uses  Pharmacy the original rx was sent to . Refill cancelled at [facility] and sent to local [facility] in [place] per patient request.      [facility] to verify that preferred brand of Diabetic supplies is either OneTouch or Accu-Chek. They confirm the OneTouch Ultra would be the typical order. Diabetes supplies changed for Freestyle Lite (not preferred brand) to One Touch Ultra.   Rx sent to local [facility] in [place] per patient request.

## 2023-09-07 ENCOUNTER — TELEPHONE (OUTPATIENT)
Dept: FAMILY MEDICINE CLINIC | Facility: CLINIC | Age: 48
End: 2023-09-07

## 2023-09-07 DIAGNOSIS — E11.69 DIABETES MELLITUS TYPE 2 IN OBESE: ICD-10-CM

## 2023-09-07 DIAGNOSIS — I10 ESSENTIAL HYPERTENSION: ICD-10-CM

## 2023-09-07 DIAGNOSIS — E78.1 HYPERTRIGLYCERIDEMIA: ICD-10-CM

## 2023-09-07 DIAGNOSIS — R80.9 MICROALBUMINURIA DUE TO TYPE 2 DIABETES MELLITUS: ICD-10-CM

## 2023-09-07 DIAGNOSIS — E66.9 DIABETES MELLITUS TYPE 2 IN OBESE: ICD-10-CM

## 2023-09-07 DIAGNOSIS — E78.2 MIXED HYPERLIPIDEMIA: ICD-10-CM

## 2023-09-07 DIAGNOSIS — E11.29 MICROALBUMINURIA DUE TO TYPE 2 DIABETES MELLITUS: ICD-10-CM

## 2023-09-07 DIAGNOSIS — E11.9 TYPE 2 DIABETES MELLITUS WITHOUT COMPLICATION (HCC): ICD-10-CM

## 2023-09-07 RX ORDER — ATORVASTATIN CALCIUM 40 MG/1
40 TABLET, FILM COATED ORAL EVERY EVENING
Qty: 90 TABLET | Refills: 1 | Status: CANCELLED | OUTPATIENT
Start: 2023-09-07

## 2023-09-07 RX ORDER — TELMISARTAN 80 MG/1
80 TABLET ORAL DAILY
Qty: 90 TABLET | Refills: 0 | Status: SHIPPED | OUTPATIENT
Start: 2023-09-07

## 2023-09-07 RX ORDER — ATORVASTATIN CALCIUM 40 MG/1
40 TABLET, FILM COATED ORAL EVERY EVENING
Qty: 90 TABLET | Refills: 0 | Status: SHIPPED | OUTPATIENT
Start: 2023-09-07

## 2023-09-07 RX ORDER — GEMFIBROZIL 600 MG/1
600 TABLET, FILM COATED ORAL
Qty: 180 TABLET | Refills: 0 | Status: SHIPPED | OUTPATIENT
Start: 2023-09-07

## 2023-09-07 NOTE — TELEPHONE ENCOUNTER
Returned call to patient who states yesterday she started having breathing issues. States it was hard to catch her breath with normal walking and activity. Denies doing anything strenuous. Has also had pounding headaches. Has been taking ES tylenol. Breathing is a little better today. States does have seasonal allergies. Takes Allegra daily but doesn't use her inhalers. States they don't help. Has a hard time with her CPAP at night because she feels like her nose is all stuffed up. Patient is also upset about several of her prescriptions not being transferred to her new mail order Pharmacy, [address] [place] [address]. Reviewed meds. States she is out of her telmisartan. Advised will send the ref[facility].  Cancelled meds at [facility] and sent refills to [address] as requested. Patient is asking for appt with Dr. Dangelo Seay for her breathing problem and headaches. Appt scheduled.     Future Appointments   Date Time Provider Tara Anne   9/9/2023 12:20 PM Araceli Godwin,  [facility]

## 2023-09-07 NOTE — TELEPHONE ENCOUNTER
Dr. Sukhwinder Napoels,  please advise if patient should continue taking glimepiride. Apparently she is still taking. Per [facility] notes, she should not be takin/24/23    Patient Instructions   Continue making lifestyle changes that focus on good nutrition, regular exercise and stress management. Medication Plan: Continue Ozempic 2 mg weekly, stop glimepiride as previously advised. Next steps to work on before next office visit include:      LOV 23   Hyperlipid  +2    4. Screening for diabetes mellitus (DM)  -     Hemoglobin A1C; Future; Expected date: 2023  The patient indicates understanding of these issues and agrees to the plan.

## 2023-09-08 ENCOUNTER — HOSPITAL ENCOUNTER (EMERGENCY)
Facility: HOSPITAL | Age: 48
Discharge: HOME | End: 2023-09-08
Attending: EMERGENCY MEDICINE
Payer: MEDICARE

## 2023-09-08 VITALS
BODY MASS INDEX: 46.65 KG/M2 | RESPIRATION RATE: 18 BRPM | WEIGHT: 280 LBS | HEIGHT: 65 IN | SYSTOLIC BLOOD PRESSURE: 158 MMHG | HEART RATE: 83 BPM | OXYGEN SATURATION: 99 % | TEMPERATURE: 98 F | DIASTOLIC BLOOD PRESSURE: 90 MMHG

## 2023-09-08 DIAGNOSIS — J02.0 STREP PHARYNGITIS: Primary | ICD-10-CM

## 2023-09-08 DIAGNOSIS — J06.9 UPPER RESPIRATORY TRACT INFECTION, UNSPECIFIED TYPE: ICD-10-CM

## 2023-09-08 LAB
ALBUMIN SERPL-MCNC: 3.6 G/DL (ref 3.4–5)
ALBUMIN/GLOB SERPL: 0.9 {RATIO} (ref 1–2)
ALP LIVER SERPL-CCNC: 71 U/L
ALT SERPL-CCNC: 59 U/L
ANION GAP SERPL CALC-SCNC: 7 MMOL/L (ref 0–18)
AST SERPL-CCNC: 66 U/L (ref 15–37)
BASOPHILS # BLD AUTO: 0.03 X10(3) UL (ref 0–0.2)
BASOPHILS NFR BLD AUTO: 0.5 %
BILIRUB SERPL-MCNC: 0.2 MG/DL (ref 0.1–2)
BUN BLD-MCNC: 12 MG/DL (ref 7–18)
CALCIUM BLD-MCNC: 9.1 MG/DL (ref 8.5–10.1)
CHLORIDE SERPL-SCNC: 109 MMOL/L (ref 98–112)
CO2 SERPL-SCNC: 20 MMOL/L (ref 21–32)
CREAT BLD-MCNC: 0.8 MG/DL
EGFRCR SERPLBLD CKD-EPI 2021: 90 ML/MIN/1.73M2 (ref 60–?)
EOSINOPHIL # BLD AUTO: 0.18 X10(3) UL (ref 0–0.7)
EOSINOPHIL NFR BLD AUTO: 3.1 %
ERYTHROCYTE [DISTWIDTH] IN BLOOD BY AUTOMATED COUNT: 12.9 %
FLUAV + FLUBV RNA SPEC NAA+PROBE: NEGATIVE
FLUAV + FLUBV RNA SPEC NAA+PROBE: NEGATIVE
GLOBULIN PLAS-MCNC: 3.8 G/DL (ref 2.8–4.4)
GLUCOSE BLD-MCNC: 160 MG/DL (ref 70–99)
HCT VFR BLD AUTO: 35.9 %
HGB BLD-MCNC: 12.6 G/DL
IMM GRANULOCYTES # BLD AUTO: 0.01 X10(3) UL (ref 0–1)
IMM GRANULOCYTES NFR BLD: 0.2 %
LYMPHOCYTES # BLD AUTO: 1.69 X10(3) UL (ref 1–4)
LYMPHOCYTES NFR BLD AUTO: 28.8 %
MCH RBC QN AUTO: 30.4 PG (ref 26–34)
MCHC RBC AUTO-ENTMCNC: 35.1 G/DL (ref 31–37)
MCV RBC AUTO: 86.7 FL
MONOCYTES # BLD AUTO: 0.3 X10(3) UL (ref 0.1–1)
MONOCYTES NFR BLD AUTO: 5.1 %
NEUTROPHILS # BLD AUTO: 3.66 X10 (3) UL (ref 1.5–7.7)
NEUTROPHILS # BLD AUTO: 3.66 X10(3) UL (ref 1.5–7.7)
NEUTROPHILS NFR BLD AUTO: 62.3 %
OSMOLALITY SERPL CALC.SUM OF ELEC: 285 MOSM/KG (ref 275–295)
PLATELET # BLD AUTO: 148 10(3)UL (ref 150–450)
POTASSIUM SERPL-SCNC: 3.8 MMOL/L (ref 3.5–5.1)
PROT SERPL-MCNC: 7.4 G/DL (ref 6.4–8.2)
RBC # BLD AUTO: 4.14 X10(6)UL
RSV RNA SPEC NAA+PROBE: NEGATIVE
SARS-COV-2 RNA RESP QL NAA+PROBE: NOT DETECTED
SODIUM SERPL-SCNC: 136 MMOL/L (ref 136–145)
WBC # BLD AUTO: 5.9 X10(3) UL (ref 4–11)

## 2023-09-08 PROCEDURE — 36415 COLL VENOUS BLD VENIPUNCTURE: CPT

## 2023-09-08 PROCEDURE — 0241U SARS-COV-2/FLU A AND B/RSV BY PCR (GENEXPERT): CPT

## 2023-09-08 PROCEDURE — 93005 ELECTROCARDIOGRAM TRACING: CPT

## 2023-09-08 PROCEDURE — 85025 COMPLETE CBC W/AUTO DIFF WBC: CPT | Performed by: EMERGENCY MEDICINE

## 2023-09-08 PROCEDURE — 93010 ELECTROCARDIOGRAM REPORT: CPT

## 2023-09-08 PROCEDURE — 80053 COMPREHEN METABOLIC PANEL: CPT

## 2023-09-08 PROCEDURE — 0241U SARS-COV-2/FLU A AND B/RSV BY PCR (GENEXPERT): CPT | Performed by: EMERGENCY MEDICINE

## 2023-09-08 PROCEDURE — 99284 EMERGENCY DEPT VISIT MOD MDM: CPT

## 2023-09-08 PROCEDURE — 87430 STREP A AG IA: CPT

## 2023-09-08 PROCEDURE — 80053 COMPREHEN METABOLIC PANEL: CPT | Performed by: EMERGENCY MEDICINE

## 2023-09-08 PROCEDURE — 87430 STREP A AG IA: CPT | Performed by: EMERGENCY MEDICINE

## 2023-09-08 PROCEDURE — 85025 COMPLETE CBC W/AUTO DIFF WBC: CPT

## 2023-09-08 RX ORDER — AMOXICILLIN 875 MG/1
875 TABLET, COATED ORAL ONCE
Status: COMPLETED | OUTPATIENT
Start: 2023-09-08 | End: 2023-09-08

## 2023-09-08 RX ORDER — DEXAMETHASONE SODIUM PHOSPHATE 4 MG/ML
10 INJECTION, SOLUTION INTRA-ARTICULAR; INTRALESIONAL; INTRAMUSCULAR; INTRAVENOUS; SOFT TISSUE ONCE
Status: COMPLETED | OUTPATIENT
Start: 2023-09-08 | End: 2023-09-08

## 2023-09-08 RX ORDER — AMOXICILLIN 500 MG/1
500 TABLET, FILM COATED ORAL 2 TIMES DAILY
Qty: 20 TABLET | Refills: 0 | Status: SHIPPED | OUTPATIENT
Start: 2023-09-08 | End: 2023-09-18

## 2023-09-08 NOTE — ED INITIAL ASSESSMENT (HPI)
Headache, neck pain, feeling like she can't catch her breath, cough, sore throat, dizziness, abdominal pain x a few days. Denies fevers. A&ox4.

## 2023-09-08 NOTE — TELEPHONE ENCOUNTER
Noted patient cancelled appt for tomorrow per MyChart. Comments state symptoms are worsening and she is going to the ED.

## 2023-09-08 NOTE — TELEPHONE ENCOUNTER
Spoke with patient POA, informed her of Dr. Vandana Pratt note below. She agreed and stated patient is not taking it. She had questions about meds if it can wait to start until mail order pharmacy sent them. Informed to clarify tomorrow with Dr. Migdalia Wakefield at the appointment. No further questions.

## 2023-09-10 LAB
ATRIAL RATE: 82 BPM
P AXIS: 25 DEGREES
P-R INTERVAL: 186 MS
Q-T INTERVAL: 394 MS
QRS DURATION: 98 MS
QTC CALCULATION (BEZET): 460 MS
R AXIS: 12 DEGREES
T AXIS: 34 DEGREES
VENTRICULAR RATE: 82 BPM

## 2023-09-12 ENCOUNTER — TELEPHONE (OUTPATIENT)
Dept: FAMILY MEDICINE CLINIC | Facility: CLINIC | Age: 48
End: 2023-09-12

## 2023-09-12 ENCOUNTER — OFFICE VISIT (OUTPATIENT)
Dept: FAMILY MEDICINE CLINIC | Facility: CLINIC | Age: 48
End: 2023-09-12
Payer: COMMERCIAL

## 2023-09-12 VITALS
SYSTOLIC BLOOD PRESSURE: 160 MMHG | OXYGEN SATURATION: 96 % | RESPIRATION RATE: 20 BRPM | HEART RATE: 98 BPM | TEMPERATURE: 98 F | DIASTOLIC BLOOD PRESSURE: 104 MMHG | WEIGHT: 281 LBS | HEIGHT: 65 IN | BODY MASS INDEX: 46.82 KG/M2

## 2023-09-12 DIAGNOSIS — J02.0 STREP THROAT: Primary | ICD-10-CM

## 2023-09-12 DIAGNOSIS — R51.9 NONINTRACTABLE HEADACHE, UNSPECIFIED CHRONICITY PATTERN, UNSPECIFIED HEADACHE TYPE: ICD-10-CM

## 2023-09-12 DIAGNOSIS — I10 ESSENTIAL HYPERTENSION: ICD-10-CM

## 2023-09-12 PROBLEM — J44.9 ASTHMA WITH COPD (CHRONIC OBSTRUCTIVE PULMONARY DISEASE) (HCC): Chronic | Status: ACTIVE | Noted: 2023-09-12

## 2023-09-12 PROBLEM — J44.89 ASTHMA WITH COPD (CHRONIC OBSTRUCTIVE PULMONARY DISEASE): Chronic | Status: ACTIVE | Noted: 2023-09-12

## 2023-09-12 PROBLEM — D69.6 THROMBOCYTOPENIA (HCC): Chronic | Status: ACTIVE | Noted: 2023-09-12

## 2023-09-12 PROBLEM — J44.89 ASTHMA WITH COPD (CHRONIC OBSTRUCTIVE PULMONARY DISEASE) (HCC): Chronic | Status: ACTIVE | Noted: 2023-09-12

## 2023-09-12 PROBLEM — J44.9 ASTHMA WITH COPD (CHRONIC OBSTRUCTIVE PULMONARY DISEASE): Chronic | Status: ACTIVE | Noted: 2023-09-12

## 2023-09-12 PROBLEM — D69.6 THROMBOCYTOPENIA: Chronic | Status: ACTIVE | Noted: 2023-09-12

## 2023-09-12 PROCEDURE — 3077F SYST BP >= 140 MM HG: CPT | Performed by: FAMILY MEDICINE

## 2023-09-12 PROCEDURE — 3008F BODY MASS INDEX DOCD: CPT | Performed by: FAMILY MEDICINE

## 2023-09-12 PROCEDURE — 99214 OFFICE O/P EST MOD 30 MIN: CPT | Performed by: FAMILY MEDICINE

## 2023-09-12 PROCEDURE — 3080F DIAST BP >= 90 MM HG: CPT | Performed by: FAMILY MEDICINE

## 2023-09-12 RX ORDER — METFORMIN HYDROCHLORIDE 750 MG/1
750 TABLET, EXTENDED RELEASE ORAL 2 TIMES DAILY WITH MEALS
Qty: 180 TABLET | Refills: 0 | Status: SHIPPED | OUTPATIENT
Start: 2023-09-12

## 2023-09-13 ENCOUNTER — TELEPHONE (OUTPATIENT)
Dept: FAMILY MEDICINE CLINIC | Facility: CLINIC | Age: 48
End: 2023-09-13

## 2023-09-14 ENCOUNTER — TELEPHONE (OUTPATIENT)
Dept: FAMILY MEDICINE CLINIC | Facility: CLINIC | Age: 48
End: 2023-09-14

## 2023-09-14 DIAGNOSIS — I10 ESSENTIAL HYPERTENSION: ICD-10-CM

## 2023-09-14 DIAGNOSIS — E66.01 CLASS 3 SEVERE OBESITY WITH SERIOUS COMORBIDITY AND BODY MASS INDEX (BMI) OF 45.0 TO 49.9 IN ADULT, UNSPECIFIED OBESITY TYPE (HCC): ICD-10-CM

## 2023-09-14 DIAGNOSIS — E66.9 DIABETES MELLITUS TYPE 2 IN OBESE: ICD-10-CM

## 2023-09-14 DIAGNOSIS — E11.29 MICROALBUMINURIA DUE TO TYPE 2 DIABETES MELLITUS: ICD-10-CM

## 2023-09-14 DIAGNOSIS — R80.9 MICROALBUMINURIA DUE TO TYPE 2 DIABETES MELLITUS: ICD-10-CM

## 2023-09-14 DIAGNOSIS — E11.69 DIABETES MELLITUS TYPE 2 IN OBESE: ICD-10-CM

## 2023-09-14 DIAGNOSIS — Z51.81 ENCOUNTER FOR THERAPEUTIC DRUG MONITORING: ICD-10-CM

## 2023-09-14 DIAGNOSIS — E78.2 MIXED HYPERLIPIDEMIA: ICD-10-CM

## 2023-09-14 RX ORDER — BLOOD-GLUCOSE METER
1 EACH MISCELLANEOUS DAILY
Qty: 1 EACH | Refills: 0 | Status: SHIPPED | OUTPATIENT
Start: 2023-09-14 | End: 2024-09-13

## 2023-09-14 RX ORDER — BLOOD-GLUCOSE METER
1 EACH MISCELLANEOUS DAILY
Qty: 1 KIT | Refills: 0 | Status: SHIPPED | OUTPATIENT
Start: 2023-09-14 | End: 2023-09-14

## 2023-09-14 RX ORDER — BLOOD SUGAR DIAGNOSTIC
STRIP MISCELLANEOUS
Qty: 100 STRIP | Refills: 2 | Status: SHIPPED | OUTPATIENT
Start: 2023-09-14 | End: 2023-09-14 | Stop reason: CLARIF

## 2023-09-14 RX ORDER — CALCIUM CITRATE/VITAMIN D3 200MG-6.25
TABLET ORAL
Qty: 100 STRIP | Refills: 2 | Status: SHIPPED | OUTPATIENT
Start: 2023-09-14

## 2023-09-14 RX ORDER — CALCIUM CITRATE/VITAMIN D3 200MG-6.25
TABLET ORAL
Qty: 100 STRIP | Refills: 1 | Status: SHIPPED | OUTPATIENT
Start: 2023-09-14 | End: 2023-09-14 | Stop reason: CLARIF

## 2023-09-14 RX ORDER — LANCETS
EACH MISCELLANEOUS
Qty: 100 EACH | Refills: 2 | Status: SHIPPED | OUTPATIENT
Start: 2023-09-14

## 2023-09-14 RX ORDER — BLOOD-GLUCOSE METER
1 EACH MISCELLANEOUS DAILY
Qty: 1 EACH | Refills: 0 | COMMUNITY
Start: 2023-09-14 | End: 2023-09-14

## 2023-09-14 NOTE — TELEPHONE ENCOUNTER
Message left by Sachin Gonzalez to call her back regarding a refill needed for this patient for Ozempic. Need one month locally and mail order. Patient last seen in May  Was to f/u in August  No appt made    I called Wilfredo Blanco - she answers and hangs up.     Requesting Ozempic 2 mg  LOV: 5/24/23  RTC: 3 months  Last Relevant Labs: 8/30/23  Filled: 5/26/23 #9 ml with 1 refills sent to [facility]

## 2023-09-19 ENCOUNTER — OFFICE VISIT (OUTPATIENT)
Dept: FAMILY MEDICINE CLINIC | Facility: CLINIC | Age: 48
End: 2023-09-19
Payer: COMMERCIAL

## 2023-09-19 VITALS
WEIGHT: 281 LBS | SYSTOLIC BLOOD PRESSURE: 140 MMHG | TEMPERATURE: 98 F | BODY MASS INDEX: 46.82 KG/M2 | OXYGEN SATURATION: 96 % | DIASTOLIC BLOOD PRESSURE: 70 MMHG | HEIGHT: 65 IN | HEART RATE: 94 BPM | RESPIRATION RATE: 16 BRPM

## 2023-09-19 DIAGNOSIS — R05.1 ACUTE COUGH: ICD-10-CM

## 2023-09-19 DIAGNOSIS — J02.0 STREP THROAT: Primary | ICD-10-CM

## 2023-09-19 LAB
CONTROL LINE PRESENT WITH A CLEAR BACKGROUND (YES/NO): YES YES/NO
KIT LOT #: NORMAL NUMERIC
STREP GRP A CUL-SCR: NEGATIVE

## 2023-09-19 PROCEDURE — 99213 OFFICE O/P EST LOW 20 MIN: CPT | Performed by: FAMILY MEDICINE

## 2023-09-19 PROCEDURE — 3078F DIAST BP <80 MM HG: CPT | Performed by: FAMILY MEDICINE

## 2023-09-19 PROCEDURE — 87880 STREP A ASSAY W/OPTIC: CPT | Performed by: FAMILY MEDICINE

## 2023-09-19 PROCEDURE — 3008F BODY MASS INDEX DOCD: CPT | Performed by: FAMILY MEDICINE

## 2023-09-19 PROCEDURE — 90471 IMMUNIZATION ADMIN: CPT | Performed by: FAMILY MEDICINE

## 2023-09-19 PROCEDURE — 90686 IIV4 VACC NO PRSV 0.5 ML IM: CPT | Performed by: FAMILY MEDICINE

## 2023-09-19 PROCEDURE — 3077F SYST BP >= 140 MM HG: CPT | Performed by: FAMILY MEDICINE

## 2023-09-20 RX ORDER — SEMAGLUTIDE 2.68 MG/ML
2 INJECTION, SOLUTION SUBCUTANEOUS WEEKLY
Qty: 3 ML | Refills: 0 | Status: SHIPPED | OUTPATIENT
Start: 2023-09-20

## 2023-09-20 NOTE — TELEPHONE ENCOUNTER
I called again today and got Karo. Patient has been without ozempic for 2 doses. Needs an order sent locally and then mail order to [facility]. Maximus To will make sure viv get scheduled for a follow up in office as she was suppose to do in August.  I have pended the med locally and for mail order if you will sign? She is diabetic.
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
## 2023-10-01 PROBLEM — N95.0 POSTMENOPAUSAL BLEEDING: Status: RESOLVED | Noted: 2022-11-22 | Resolved: 2023-10-01

## 2023-10-01 PROBLEM — Z51.81 ENCOUNTER FOR THERAPEUTIC DRUG MONITORING: Status: RESOLVED | Noted: 2019-07-19 | Resolved: 2023-10-01

## 2023-10-03 ENCOUNTER — TELEMEDICINE (OUTPATIENT)
Dept: INTERNAL MEDICINE CLINIC | Facility: CLINIC | Age: 48
End: 2023-10-03
Payer: COMMERCIAL

## 2023-10-03 DIAGNOSIS — E11.69 DIABETES MELLITUS TYPE 2 IN OBESE: ICD-10-CM

## 2023-10-03 DIAGNOSIS — Z51.81 ENCOUNTER FOR THERAPEUTIC DRUG MONITORING: Primary | ICD-10-CM

## 2023-10-03 DIAGNOSIS — E66.9 DIABETES MELLITUS TYPE 2 IN OBESE: ICD-10-CM

## 2023-10-03 DIAGNOSIS — E66.01 CLASS 3 SEVERE OBESITY WITH SERIOUS COMORBIDITY AND BODY MASS INDEX (BMI) OF 45.0 TO 49.9 IN ADULT, UNSPECIFIED OBESITY TYPE (HCC): ICD-10-CM

## 2023-10-03 DIAGNOSIS — I10 ESSENTIAL HYPERTENSION: ICD-10-CM

## 2023-10-03 DIAGNOSIS — E78.2 MIXED HYPERLIPIDEMIA: ICD-10-CM

## 2023-10-03 PROCEDURE — 99213 OFFICE O/P EST LOW 20 MIN: CPT | Performed by: NURSE PRACTITIONER

## 2023-10-25 ENCOUNTER — TELEPHONE (OUTPATIENT)
Dept: FAMILY MEDICINE CLINIC | Facility: CLINIC | Age: 48
End: 2023-10-25

## 2023-10-25 NOTE — TELEPHONE ENCOUNTER
[phone]   Called pt with Marco Cordova, stating pt's BP has been high since LOV 9/12/23. Advised to take 2 tablets of Telmisartan 80mg in the AM and has been doing this visit. Along with hydralazine 25mg BID. Pt and POA are out, will recheck BP when returns home. Pt experiencing HA x1 week. Ongoing back pain- seen by [facility] today and was told BP @176/110. Chest discomfort- hx of gerd. No radiating pain from chest. No N/T of extremities. No SOB. No N/V. No dizziness. No blurry vision. No palpitations. No lightheadedness. No weakness. No availability this week. May need to go to [facility] for further work or [facility] to see Dr. Jenna Santana Thursday (10/25)? Informed will relay to PCP for further recommendations. No further questions or concerns. Pt verbalized understanding and agreed with POC. Please advise. Thank you.

## 2023-10-25 NOTE — TELEPHONE ENCOUNTER
Pt and POA calling stating that she went to chiropractor today and her Bp was 176/110. She states she has had a HA for a few days & severe back pain. Also said she has been taking 2 of the telmisartan's daily. Said BP has been running high since appt in September and that's why she's been taking the 2 as instructed. Wanting to know if she should continue that or what to do. Please advise. Pt & Beny are currently together, can call either phone.

## 2023-10-30 NOTE — TELEPHONE ENCOUNTER
Called patient for condition update. States her B/P was down today and is feeling better. Doesn't know exact reading. Karo FLORENTINO, took it this morning. Denies any HA, CP/tightness or other symptoms. Offered to make appt this week for B/P check. States she is booked up this week and would like appt next week. Offered appt Monday, but can't come in at time offered. Scheduled for Next Friday afternoon.     Future Appointments   Date Time Provider Tara Anne   11/9/2023  1:20 PM Lamonte Godwin DO [facility]

## 2023-11-03 RX ORDER — HYDRALAZINE HYDROCHLORIDE 25 MG/1
25 TABLET, FILM COATED ORAL 2 TIMES DAILY
Qty: 180 TABLET | Refills: 1 | Status: SHIPPED | OUTPATIENT
Start: 2023-11-03

## 2023-11-06 ENCOUNTER — OFFICE VISIT (OUTPATIENT)
Dept: FAMILY MEDICINE CLINIC | Facility: CLINIC | Age: 48
End: 2023-11-06
Payer: COMMERCIAL

## 2023-11-06 VITALS
HEART RATE: 95 BPM | BODY MASS INDEX: 45.15 KG/M2 | DIASTOLIC BLOOD PRESSURE: 80 MMHG | RESPIRATION RATE: 20 BRPM | HEIGHT: 65 IN | OXYGEN SATURATION: 96 % | TEMPERATURE: 98 F | SYSTOLIC BLOOD PRESSURE: 134 MMHG | WEIGHT: 271 LBS

## 2023-11-06 DIAGNOSIS — E11.29 MICROALBUMINURIA DUE TO TYPE 2 DIABETES MELLITUS: ICD-10-CM

## 2023-11-06 DIAGNOSIS — I10 ESSENTIAL HYPERTENSION: Primary | ICD-10-CM

## 2023-11-06 DIAGNOSIS — E11.69 DIABETES MELLITUS TYPE 2 IN OBESE: ICD-10-CM

## 2023-11-06 DIAGNOSIS — Z23 NEED FOR VACCINATION: ICD-10-CM

## 2023-11-06 DIAGNOSIS — E66.01 MORBID OBESITY WITH BMI OF 45.0-49.9, ADULT (HCC): ICD-10-CM

## 2023-11-06 DIAGNOSIS — E66.9 DIABETES MELLITUS TYPE 2 IN OBESE: ICD-10-CM

## 2023-11-06 DIAGNOSIS — R80.9 MICROALBUMINURIA DUE TO TYPE 2 DIABETES MELLITUS: ICD-10-CM

## 2023-11-06 PROCEDURE — 3075F SYST BP GE 130 - 139MM HG: CPT | Performed by: FAMILY MEDICINE

## 2023-11-06 PROCEDURE — 3079F DIAST BP 80-89 MM HG: CPT | Performed by: FAMILY MEDICINE

## 2023-11-06 PROCEDURE — 3008F BODY MASS INDEX DOCD: CPT | Performed by: FAMILY MEDICINE

## 2023-11-06 PROCEDURE — 99214 OFFICE O/P EST MOD 30 MIN: CPT | Performed by: FAMILY MEDICINE

## 2023-11-06 RX ORDER — TELMISARTAN 80 MG/1
80 TABLET ORAL DAILY
Qty: 90 TABLET | Refills: 0 | Status: SHIPPED | OUTPATIENT
Start: 2023-11-06

## 2023-11-06 RX ORDER — METFORMIN HYDROCHLORIDE 750 MG/1
750 TABLET, EXTENDED RELEASE ORAL 2 TIMES DAILY WITH MEALS
Qty: 180 TABLET | Refills: 0 | Status: SHIPPED | OUTPATIENT
Start: 2023-11-06

## 2023-11-14 DIAGNOSIS — E11.29 MICROALBUMINURIA DUE TO TYPE 2 DIABETES MELLITUS: ICD-10-CM

## 2023-11-14 DIAGNOSIS — R80.9 MICROALBUMINURIA DUE TO TYPE 2 DIABETES MELLITUS: ICD-10-CM

## 2023-11-14 DIAGNOSIS — E11.69 DIABETES MELLITUS TYPE 2 IN OBESE: ICD-10-CM

## 2023-11-14 DIAGNOSIS — I10 ESSENTIAL HYPERTENSION: ICD-10-CM

## 2023-11-14 DIAGNOSIS — E66.9 DIABETES MELLITUS TYPE 2 IN OBESE: ICD-10-CM

## 2023-11-14 RX ORDER — TELMISARTAN 80 MG/1
80 TABLET ORAL DAILY
Qty: 14 TABLET | Refills: 0 | Status: SHIPPED | OUTPATIENT
Start: 2023-11-14

## 2023-11-14 NOTE — TELEPHONE ENCOUNTER
Pt asking if Doctor can send a 7 or 14 day supply of telmisartan 80 MG Oral Tab to [facility] on file, the mail in service just processed her 90 day supply today and it will take a while for her to get it

## 2023-11-20 DIAGNOSIS — E11.69 DIABETES MELLITUS TYPE 2 IN OBESE: ICD-10-CM

## 2023-11-20 DIAGNOSIS — E78.1 HYPERTRIGLYCERIDEMIA: ICD-10-CM

## 2023-11-20 DIAGNOSIS — E78.2 MIXED HYPERLIPIDEMIA: ICD-10-CM

## 2023-11-20 DIAGNOSIS — E11.9 TYPE 2 DIABETES MELLITUS WITHOUT COMPLICATION (HCC): ICD-10-CM

## 2023-11-20 DIAGNOSIS — E66.9 DIABETES MELLITUS TYPE 2 IN OBESE: ICD-10-CM

## 2023-11-20 RX ORDER — GEMFIBROZIL 600 MG/1
600 TABLET, FILM COATED ORAL
Qty: 180 TABLET | Refills: 1 | Status: SHIPPED | OUTPATIENT
Start: 2023-11-20

## 2023-11-20 RX ORDER — ATORVASTATIN CALCIUM 40 MG/1
40 TABLET, FILM COATED ORAL EVERY EVENING
Qty: 90 TABLET | Refills: 1 | Status: SHIPPED | OUTPATIENT
Start: 2023-11-20

## 2023-11-20 NOTE — TELEPHONE ENCOUNTER
Cholesterol Medication Protocol Wldxta4011/20/2023 02:14 AM   Protocol Details ALT < 80    ALT resulted within past year    Lipid panel within past 12 months    Appointment within past 12 or next 3 months        LOV 11/6/23     LAST LAB  8/30/23    LAST RX  9/7/23 90     Next OV   Future Appointments   Date Time Provider Tara Anne   12/11/2023  9:00 AM Jess Harmon DO [facility]   1/11/2024  1:00 PM Julita Fiore MD [facility] [place]   1/22/2024 10:40 AM WILLIS Garcias [facility] [id number]   1/24/2024  3:20 PM WILLIS Garcias [facility]   1/25/2024  2:00 PM WILLIS Burrows [facility] [place]   2/7/2024 10:00 AM Jess Harmon DO [facility]         PROTOCOL pass

## 2023-11-23 DIAGNOSIS — E11.69 DIABETES MELLITUS TYPE 2 IN OBESE: ICD-10-CM

## 2023-11-23 DIAGNOSIS — I10 ESSENTIAL HYPERTENSION: ICD-10-CM

## 2023-11-23 DIAGNOSIS — R80.9 MICROALBUMINURIA DUE TO TYPE 2 DIABETES MELLITUS: ICD-10-CM

## 2023-11-23 DIAGNOSIS — E11.29 MICROALBUMINURIA DUE TO TYPE 2 DIABETES MELLITUS: ICD-10-CM

## 2023-11-23 DIAGNOSIS — E66.9 DIABETES MELLITUS TYPE 2 IN OBESE: ICD-10-CM

## 2023-11-24 RX ORDER — TELMISARTAN 80 MG/1
80 TABLET ORAL DAILY
Qty: 90 TABLET | Refills: 0 | Status: SHIPPED | OUTPATIENT
Start: 2023-11-24

## 2023-11-24 RX ORDER — TELMISARTAN 80 MG/1
80 TABLET ORAL DAILY
Qty: 90 TABLET | Refills: 0 | OUTPATIENT
Start: 2023-11-24

## 2023-12-01 DIAGNOSIS — E66.9 DIABETES MELLITUS TYPE 2 IN OBESE: ICD-10-CM

## 2023-12-01 DIAGNOSIS — E11.29 MICROALBUMINURIA DUE TO TYPE 2 DIABETES MELLITUS: ICD-10-CM

## 2023-12-01 DIAGNOSIS — R80.9 MICROALBUMINURIA DUE TO TYPE 2 DIABETES MELLITUS: ICD-10-CM

## 2023-12-01 DIAGNOSIS — E11.69 DIABETES MELLITUS TYPE 2 IN OBESE: ICD-10-CM

## 2023-12-01 DIAGNOSIS — I10 ESSENTIAL HYPERTENSION: ICD-10-CM

## 2023-12-01 RX ORDER — TELMISARTAN 80 MG/1
80 TABLET ORAL DAILY
Qty: 90 TABLET | Refills: 0 | OUTPATIENT
Start: 2023-12-01

## 2023-12-01 NOTE — TELEPHONE ENCOUNTER
Hypertension Medications Protocol Azobbq0812/01/2023 12:16 AM   Protocol Details CMP or BMP in past 12 months    Last serum creatinine< 2.0    Appointment in past 6 or next 3 months        LOV 11/6/23     LAST LAB  9/8/23     LAST RX  11/24/23 90     Next OV   Future Appointments   Date Time Provider Tara Anne   12/11/2023  9:00 AM Ebb Phan, DO [facility]   1/11/2024  1:00 PM Haven Good MD [facility] [place]   1/22/2024 10:40 AM WILLIS Goetz [id number]   1/24/2024  3:20 PM WILLIS Goetz [facility]   1/25/2024  2:00 PM WILLIS Torres [facility] [place]   2/7/2024 10:00 AM Ebb Phan, DO [facility]         PROTOCOL pass   Was sent to mail order pharmacy .

## 2023-12-12 NOTE — TELEPHONE ENCOUNTER
Requesting Ozempic 2 mg  LOV: 10/3/23  RTC: 2 to 3 months  Last Relevant Labs: 8/30/23  Filled: 9/20/23 #9ml with 0 refills    1/22/2024 10:40 AM WILLIS Roberts [id number]   1/24/2024  3:20 PM WILLIS Roberts [facility]

## 2023-12-15 RX ORDER — SEMAGLUTIDE 2.68 MG/ML
2 INJECTION, SOLUTION SUBCUTANEOUS WEEKLY
Qty: 9 EACH | Refills: 0 | Status: SHIPPED | OUTPATIENT
Start: 2023-12-15

## 2023-12-18 DIAGNOSIS — I10 ESSENTIAL HYPERTENSION: ICD-10-CM

## 2023-12-18 DIAGNOSIS — R80.9 MICROALBUMINURIA DUE TO TYPE 2 DIABETES MELLITUS  (HCC): ICD-10-CM

## 2023-12-18 DIAGNOSIS — E11.69 DIABETES MELLITUS TYPE 2 IN OBESE  (HCC): ICD-10-CM

## 2023-12-18 DIAGNOSIS — E66.9 DIABETES MELLITUS TYPE 2 IN OBESE  (HCC): ICD-10-CM

## 2023-12-18 DIAGNOSIS — E11.29 MICROALBUMINURIA DUE TO TYPE 2 DIABETES MELLITUS  (HCC): ICD-10-CM

## 2023-12-18 RX ORDER — TELMISARTAN 80 MG/1
80 TABLET ORAL DAILY
Qty: 90 TABLET | Refills: 0 | OUTPATIENT
Start: 2023-12-18

## 2023-12-18 NOTE — TELEPHONE ENCOUNTER
Hypertension Medications Protocol Cdefyo6712/18/2023 12:11 AM   Protocol Details CMP or BMP in past 12 months    Last serum creatinine< 2.0    Appointment in past 6 or next 3 months        LOV 11/6/23     LAST LAB  9/8/23    LAST RX 11/24/23 90     Next OV   Future Appointments   Date Time Provider Tara Anne   12/21/2023 12:00 PM Heather Model, DO [facility]   1/11/2024  1:00 PM Paulie Baez MD [facility] [place]   1/22/2024 10:40 AM WILLIS Bob [id number]   1/24/2024  3:20 PM WILLIS Bob [facility]   1/25/2024  2:00 PM WILLIS eRne [facility] [place]   2/7/2024 10:00 AM Heather Cabrera, DO [facility]         PROTOCOL pass   Refill sent to mail order pharmacy .

## 2024-01-11 ENCOUNTER — OFFICE VISIT (OUTPATIENT)
Dept: NEPHROLOGY | Facility: CLINIC | Age: 49
End: 2024-01-11
Payer: MEDICAID

## 2024-01-11 VITALS — DIASTOLIC BLOOD PRESSURE: 72 MMHG | WEIGHT: 260.38 LBS | BODY MASS INDEX: 43 KG/M2 | SYSTOLIC BLOOD PRESSURE: 136 MMHG

## 2024-01-11 DIAGNOSIS — R80.9 PROTEINURIA, UNSPECIFIED TYPE: Primary | ICD-10-CM

## 2024-01-11 PROCEDURE — 99213 OFFICE O/P EST LOW 20 MIN: CPT | Performed by: INTERNAL MEDICINE

## 2024-01-11 PROCEDURE — 3078F DIAST BP <80 MM HG: CPT | Performed by: INTERNAL MEDICINE

## 2024-01-11 PROCEDURE — 3075F SYST BP GE 130 - 139MM HG: CPT | Performed by: INTERNAL MEDICINE

## 2024-01-11 NOTE — PROGRESS NOTES
Nephrology Consult Note      REASON FOR CONSULT:  Proteinuria         HPI:   Radha Murguia is a 48 year old female with   Chief Complaint   Patient presents with    Hypertension    Proteinuria     Raphael Farley DO    48 y /o female with hx of DM/HTN, obesity who presents to clinic for follow up.   She is S/p kidney biopsy which showed findings c/w DKD/obesity related changes       She denies any LE edema  Has joined weight loss clinic  No cp/sob  No dysuria    NO hx of kidney stones      ROS:    See above; 12 systems reviewed and otherwise unremarkable     PMH:  Past Medical History:   Diagnosis Date    Allergic rhinitis 1990    Anxiety state, unspecified     Asthma     Bad breath     Belching     Bleeding nose     Breast CA (HCC) 04/2015    DCIS    Cancer (HCC)     left breast    Chest pain     Chronic cough     Community acquired pneumonia 04/11/2019    Coronavirus infection     Disorder of liver     fatty liver    Ductal carcinoma in situ of breast 06/2015    Esophageal reflux     Essential hypertension     Exposure to medical diagnostic radiation     finished in 2015    Headache, chronic daily     Hemorrhoids     Hyperlipidemia     Hypoxia 04/11/2019    Indigestion     Leaking of urine     Mild mental retardation VERY MILD    Mouth sores     Obstructive apnea     CPAP 14 Sarina's    Parainfluenza infection     Sleep apnea     Type II or unspecified type diabetes mellitus without mention of complication, not stated as uncontrolled          PSH:  Past Surgical History:   Procedure Laterality Date    BREAST LUMPECTOMY Left 06/11/2015    Procedure: BREAST LUMPECTOMY;  Surgeon: Paul Miller MD;  Location:  MAIN OR    COLONOSCOPY N/A 11/23/2021    Procedure: COLONOSCOPY with polypectomy, ESOPHAGOGASTRODUODENOSCOPY (EGD) with biopsies;  Surgeon: Akbar Perla MD;  Location:  ENDOSCOPY    HERNIA SURGERY      BABY    LUMPECTOMY LEFT Left 05/11/2015    DCIS;papilloma    SYLVAIN BIOPSY STEREO W/CALC 1 SITE LEFT  (CPT=19081) Left 04/2015    DCIS; papilloma    NEEDLE BIOPSY LIVER      ORTHOPEDIC SURG (PBP) Bilateral     MANDA CARPAL ISHAAN RELEASE    OTHER  LEFT FOOT SPUR REMOVAL    OTHER SURGICAL HISTORY      bilateral carpal tunnel release    RADIATION LEFT Left 2015    lump with rad    STEREOTACTIC BREAST BIOPSY Left 4/21/15 [facility]    Left Breast         Medications (Active prior to today's visit):  Current Outpatient Medications   Medication Sig Dispense Refill    semaglutide (OZEMPIC, 2 MG/DOSE,) 8 MG/3ML Subcutaneous Solution Pen-injector Inject 2 mg into the skin once a week. 9 each 0    telmisartan 80 MG Oral Tab Take 1 tablet (80 mg total) by mouth daily. 90 tablet 0    gemfibrozil 600 MG Oral Tab TAKE 1 TABLET TWICE DAILY BEFORE MEALS 180 tablet 1    atorvastatin 40 MG Oral Tab TAKE 1 TABLET EVERY EVENING 90 tablet 1    metFORMIN  MG Oral Tablet 24 Hr Take 1 tablet (750 mg total) by mouth 2 (two) times daily with meals. 180 tablet 0    hydrALAZINE 25 MG Oral Tab Take 1 tablet (25 mg total) by mouth 2 (two) times daily. 180 tablet 1    PARoxetine 20 MG Oral Tab Take 2.5 tablets (50 mg total) by mouth daily. 225 tablet 1    QUEtiapine 100 MG Oral Tab Take 1 tablet (100 mg total) by mouth nightly. 90 tablet 1    semaglutide (OZEMPIC, 2 MG/DOSE,) 8 MG/3ML Subcutaneous Solution Pen-injector Inject 2 mg into the skin once a week. 3 mL 0    Accu-Chek Softclix Lancets Does not apply Misc Use to check blood sugar daily 100 each 2    Blood Glucose Monitoring Suppl (TRUE METRIX METER) Does not apply Device 1 Device by Other route daily. To check blood sugar 1 each 0    Glucose Blood (TRUE METRIX BLOOD GLUCOSE TEST) In Vitro Strip Use to check blood sugar daily 100 strip 2    DM-APAP-CPM (CORICIDIN HBP MAX STRENGTH FLU) -2 MG Oral Tab Take 5 mL by mouth 3 (three) times daily as needed (cough). 30 tablet 0    OneTouch UltraSoft 2 Lancets Does not apply Misc 1 Lancet daily. Use to test blood sugar daily 100 each 2     LORATADINE 10 MG Oral Tab TAKE 1 TABLET BY MOUTH EVERY DAY 30 tablet 0    fexofenadine (ALLEGRA ALLERGY) 180 MG Oral Tab Take 1 tablet (180 mg total) by mouth daily.      OneTouch Delica Lancets 33G Does not apply Misc 1 lancet by Finger stick route daily. 100 each 1    Multiple Vitamins-Minerals (CENTRUM SILVER ULTRA WOMENS) Oral Tab Take 1 tablet by mouth daily.             Allergies:  Allergies   Allergen Reactions    Other Runny nose     Seasonal         Social History:  Social History     Socioeconomic History    Marital status: Single   Occupational History    Occupation: works part-time   Tobacco Use    Smoking status: Never    Smokeless tobacco: Never   Vaping Use    Vaping Use: Never used   Substance and Sexual Activity    Alcohol use: No    Drug use: No    Sexual activity: Never   Other Topics Concern    Caffeine Concern No    Exercise Yes     Comment: 5-7x week swimming    Seat Belt Yes    Special Diet No    Stress Concern No    Weight Concern Yes     Comment: Working on weight loss          Family History:  Family History   Problem Relation Age of Onset    Breast Cancer Maternal Grandmother         not known    Breast Cancer Paternal Grandmother         not known    Diabetes Father     Heart Attack Father     Other (Other) Father         heart attack-- at age 52    Cancer Brother         brain tumor    Diabetes Brother     Dementia Mother     Diabetes Mother     Stroke Mother     Bipolar Disorder Other     Suicide History Other     Breast Cancer Self 40            PHYSICAL EXAM:   /72 (BP Location: Left arm, Patient Position: Sitting)   Wt 260 lb 6 oz (118.1 kg)   BMI 43.33 kg/m²    Wt Readings from Last 6 Encounters:   24 260 lb 6 oz (118.1 kg)   23 271 lb (122.9 kg)   23 281 lb (127.5 kg)   23 281 lb (127.5 kg)   23 280 lb (127 kg)   23 280 lb 3.2 oz (127.1 kg)     General: Alert and oriented in no apparent distress.  HEENT: No scleral icterus,  MMM  Neck: Supple, no KINGA or thyromegaly  Cardiac: Regular rate and rhythm, S1, S2 normal, no murmur or rub  Lungs: Clear without wheezes, rales, rhonchi.    Abdomen: Soft, non-tender. + bowel sounds, no palpable organomegaly  Extremities: Without clubbing, cyanosis or edema.  Neurologic:  normal affect, cranial nerves grossly intact, moving all extremities  Skin: Warm and dry, no rashes    Component      Latest Ref Rng 8/30/2023 9/8/2023   WBC      4.0 - 11.0 x10(3) uL 4.2  5.9    RBC      3.80 - 5.30 x10(6)uL 4.31  4.14    Hemoglobin      12.0 - 16.0 g/dL 13.3  12.6    Hematocrit      35.0 - 48.0 % 39.6  35.9    Platelet Count      150.0 - 450.0 10(3)uL 328.0  148.0 (L)    MCV      80.0 - 100.0 fL 91.9  86.7    MCH      26.0 - 34.0 pg 30.9  30.4    MCHC      31.0 - 37.0 g/dL 33.6  35.1    RDW      % 13.1  12.9    Prelim Neutrophil Abs      1.50 - 7.70 x10 (3) uL 2.12  3.66    Neutrophils Absolute      1.50 - 7.70 x10(3) uL 2.12  3.66    Lymphocytes Absolute      1.00 - 4.00 x10(3) uL 1.59  1.69    Monocytes Absolute      0.10 - 1.00 x10(3) uL 0.29  0.30    Eosinophils Absolute      0.00 - 0.70 x10(3) uL 0.11  0.18    Basophils Absolute      0.00 - 0.20 x10(3) uL 0.03  0.03    Immature Granulocyte Absolute      0.00 - 1.00 x10(3) uL 0.01  0.01    Neutrophils %      % 51.1  62.3    Lymphocytes %      % 38.3  28.8    Monocytes %      % 7.0  5.1    Eosinophils %      % 2.7  3.1    Basophils %      % 0.7  0.5    Immature Granulocyte %      % 0.2  0.2    Glucose      70 - 99 mg/dL 123 (H)  160 (H)    Sodium      136 - 145 mmol/L 138  136    Potassium      3.5 - 5.1 mmol/L 4.5  3.8    Chloride      98 - 112 mmol/L 109  109    Carbon Dioxide, Total      21.0 - 32.0 mmol/L 23.0  20.0 (L)    ANION GAP      0 - 18 mmol/L 6  7    BUN      7 - 18 mg/dL 15  12    CREATININE      0.55 - 1.02 mg/dL 0.81  0.80    CALCIUM      8.5 - 10.1 mg/dL 10.7 (H)  9.1    CALCULATED OSMOLALITY      275 - 295 mOsm/kg 288  285    EGFR      >=60  mL/min/1.73m2 88  90    AST (SGOT)      15 - 37 U/L 27  66 (H)    ALT (SGPT)      13 - 56 U/L 32  59 (H)    ALKALINE PHOSPHATASE      39 - 100 U/L 67  71    Total Bilirubin      0.1 - 2.0 mg/dL 0.3  0.2    PROTEIN, TOTAL      6.4 - 8.2 g/dL 8.1  7.4    Albumin      3.4 - 5.0 g/dL 4.3  3.6    Globulin      2.8 - 4.4 g/dL 3.8  3.8    A/G Ratio      1.0 - 2.0  1.1  0.9 (L)    Patient Fasting for CMP? Yes     Color Urine      Yellow  Light-Yellow     Clarity Urine      Clear  Clear     Spec Gravity      1.005 - 1.030  1.016     Glucose Urine      Normal mg/dL Normal     Bilirubin Urine      Negative  Negative     Ketones, UA      Negative mg/dL Negative     Blood Urine      Negative  Negative     PH Urine      5.0 - 8.0  6.0     Protein Urine      Negative mg/dL 300 !     Urobilinogen Urine      Normal mg/dL Normal     Nitrite Urine      Negative  Negative     Leukocyte Esterase       Negative  Negative     WBC Urine      0 - 5 /HPF 1-5     RBC Urine      0 - 2 /HPF 0-2     Bacteria Urine      None Seen /HPF None Seen     SQUAM EPI CELLS UR      None Seen /HPF Few !     RENAL TUBULAR EPITHELIAL CELLS      None Seen /HPF None Seen     TRANSITIONAL EPI CELLS      None Seen /HPF None Seen     YEAST URINE      None Seen /HPF None Seen     HEMOGLOBIN A1c      <5.7 % 5.9 (H)     ESTIMATED AVERAGE GLUCOSE      68 - 126 mg/dL 123     PHOSPHORUS      2.5 - 4.9 mg/dL 4.1     PTH INTACT      18.5 - 88.0 pg/mL 30.5     Magnesium, Serum      1.6 - 2.6 mg/dL 1.8     TOTAL PROTEIN URINE RANDOM      mg/dL 381.2     VITAMIN D, 25-OH, TOTAL      30.0 - 100.0 ng/mL 45.2        Legend:  (H) High  ! Abnormal  (L) Low    CT scan 5/2019  right kidney have an essentially unremarkable appearance.  Left kidney contains hypodense lesions which statistically most likely represent cysts.     ASSESSMENT/PLAN:   There are no diagnoses linked to this encounter.    1. DM - well controlled ; A1C <6     2. HTN- controlled;on metoprolol, telmisartan;  hydralazine     - goal BP <140/90   - discussed importance of salt restriction    3. Proteinuria- s/p kidney biopsy which shows findings consistent with diabetic/obesity related kidney disease  - continue on ARB; has lost weight  (reports weight loss of about 80 lbs over past 3 years)  - as above    4. L renal cysts  US from 2/2017 showed some complex features and the size since then appears to be bit larger - have referred to urology, but pt has not seen urology (does not want to see)    5. Vit D def  -  Continue OTC vit D  (1000 international units daily)    F/U annually    Travis Mcrae MD  1/12/2023

## 2024-01-17 ENCOUNTER — OFFICE VISIT (OUTPATIENT)
Dept: FAMILY MEDICINE CLINIC | Facility: CLINIC | Age: 49
End: 2024-01-17
Payer: MEDICAID

## 2024-01-17 VITALS
SYSTOLIC BLOOD PRESSURE: 136 MMHG | HEART RATE: 81 BPM | OXYGEN SATURATION: 99 % | TEMPERATURE: 97 F | WEIGHT: 259.38 LBS | HEIGHT: 64.57 IN | RESPIRATION RATE: 16 BRPM | DIASTOLIC BLOOD PRESSURE: 86 MMHG | BODY MASS INDEX: 43.74 KG/M2

## 2024-01-17 DIAGNOSIS — E66.01 CLASS 3 SEVERE OBESITY DUE TO EXCESS CALORIES WITH SERIOUS COMORBIDITY AND BODY MASS INDEX (BMI) OF 45.0 TO 49.9 IN ADULT (HCC): ICD-10-CM

## 2024-01-17 DIAGNOSIS — E11.69 DIABETES MELLITUS TYPE 2 IN OBESE  (HCC): ICD-10-CM

## 2024-01-17 DIAGNOSIS — R62.50 MILD DEVELOPMENTAL DELAY: ICD-10-CM

## 2024-01-17 DIAGNOSIS — E66.9 DIABETES MELLITUS TYPE 2 IN OBESE  (HCC): ICD-10-CM

## 2024-01-17 DIAGNOSIS — E11.9 TYPE 2 DIABETES MELLITUS WITHOUT COMPLICATION, WITHOUT LONG-TERM CURRENT USE OF INSULIN (HCC): ICD-10-CM

## 2024-01-17 DIAGNOSIS — F31.32 BIPOLAR AFFECTIVE DISORDER, CURRENTLY DEPRESSED, MODERATE (HCC): ICD-10-CM

## 2024-01-17 DIAGNOSIS — E78.2 MIXED HYPERLIPIDEMIA: ICD-10-CM

## 2024-01-17 DIAGNOSIS — E11.29 MICROALBUMINURIA DUE TO TYPE 2 DIABETES MELLITUS  (HCC): ICD-10-CM

## 2024-01-17 DIAGNOSIS — Z00.00 ENCOUNTER FOR ANNUAL HEALTH EXAMINATION: Primary | ICD-10-CM

## 2024-01-17 DIAGNOSIS — I10 ESSENTIAL HYPERTENSION: ICD-10-CM

## 2024-01-17 DIAGNOSIS — R80.9 MICROALBUMINURIA DUE TO TYPE 2 DIABETES MELLITUS  (HCC): ICD-10-CM

## 2024-01-17 DIAGNOSIS — D69.6 THROMBOCYTOPENIA (HCC): ICD-10-CM

## 2024-01-17 DIAGNOSIS — G47.33 OSA ON CPAP: ICD-10-CM

## 2024-01-17 DIAGNOSIS — Z13.6 SCREENING FOR CARDIOVASCULAR CONDITION: ICD-10-CM

## 2024-01-17 NOTE — PROGRESS NOTES
Subjective:   Radha Murguia is a 48 year old female who presents for a Medicare Subsequent Annual Wellness visit (Pt already had Initial Annual Wellness) and scheduled follow up of multiple significant but stable problems.   DM2, taking meds as directed. Fasting blood glucose levels in the 120-160s. Had eye exam with Dr. Roman in 11/2023. Denies any paresthesias.   HTN and HL, compliant with meds. No SEs noted.   KEON on CPAP, stable  Bipolar disorder, compliant with medication. Stable.        History/Other:   Fall Risk Assessment:   She has been screened for Falls and is low risk.      Cognitive Assessment:   She had a completely normal cognitive assessment - see flowsheet entries     Functional Ability/Status:   Radha Murguia has some abnormal functions as listed below:  She has difficulties Managing Money/Bills based on screening of functional status.      Depression Screening (PHQ-2/PHQ-9): PHQ-2 SCORE: 0  , done 1/16/2024       Advanced Directives:   She does NOT have a Living Will. [Do you have a living will?: No]  She has a Power of  for Health Care on file in [employer].  Discussed Advance Care Planning with patient (and family/surrogate if present). Standard forms made available to patient in After Visit Summary.      Patient Active Problem List   Diagnosis    Essential hypertension    KEON on CPAP    Metabolic syndrome    L DCIS (ductal carcinoma in situ)    Mixed hyperlipidemia    Elevation of level of transaminase and lactic acid dehydrogenase (LDH)    Fatty liver    Other proteinuria    Anxiety state    Type 2 diabetes mellitus without complication, without long-term current use of insulin (HCC)    Class 3 severe obesity with serious comorbidity and body mass index (BMI) of 45.0 to 49.9 in adult (HCC)    Vitamin D deficiency    History of breast cancer    Episodic mood disorder (HCC)    Short-term memory loss    Analgesic rebound headache    Bipolar affective disorder, currently depressed,  moderate (HCC)    Mild developmental delay    Chronic low back pain without sciatica    Thrombocytopenia (HCC)    Asthma with COPD (chronic obstructive pulmonary disease)     Allergies:  She is allergic to other.    Current Medications:  Outpatient Medications Marked as Taking for the 1/17/24 encounter (Office Visit) with Raphael Farley, DO   Medication Sig    semaglutide (OZEMPIC, 2 MG/DOSE,) 8 MG/3ML Subcutaneous Solution Pen-injector Inject 2 mg into the skin once a week.    [DISCONTINUED] telmisartan 80 MG Oral Tab Take 1 tablet (80 mg total) by mouth daily.    gemfibrozil 600 MG Oral Tab TAKE 1 TABLET TWICE DAILY BEFORE MEALS    atorvastatin 40 MG Oral Tab TAKE 1 TABLET EVERY EVENING    metFORMIN  MG Oral Tablet 24 Hr Take 1 tablet (750 mg total) by mouth 2 (two) times daily with meals.    hydrALAZINE 25 MG Oral Tab Take 1 tablet (25 mg total) by mouth 2 (two) times daily.    [DISCONTINUED] PARoxetine 20 MG Oral Tab Take 2.5 tablets (50 mg total) by mouth daily.    [DISCONTINUED] QUEtiapine 100 MG Oral Tab Take 1 tablet (100 mg total) by mouth nightly.    semaglutide (OZEMPIC, 2 MG/DOSE,) 8 MG/3ML Subcutaneous Solution Pen-injector Inject 2 mg into the skin once a week.    Accu-Chek Softclix Lancets Does not apply Misc Use to check blood sugar daily    Blood Glucose Monitoring Suppl (TRUE METRIX METER) Does not apply Device 1 Device by Other route daily. To check blood sugar    Glucose Blood (TRUE METRIX BLOOD GLUCOSE TEST) In Vitro Strip Use to check blood sugar daily    DM-APAP-CPM (CORICIDIN HBP MAX STRENGTH FLU) -2 MG Oral Tab Take 5 mL by mouth 3 (three) times daily as needed (cough).    OneTouch UltraSoft 2 Lancets Does not apply Misc 1 Lancet daily. Use to test blood sugar daily    LORATADINE 10 MG Oral Tab TAKE 1 TABLET BY MOUTH EVERY DAY    fexofenadine (ALLEGRA ALLERGY) 180 MG Oral Tab Take 1 tablet (180 mg total) by mouth daily.    OneTouch Delica Lancets 33G Does not apply Misc 1  lancet by Finger stick route daily.    Multiple Vitamins-Minerals (CENTRUM SILVER ULTRA WOMENS) Oral Tab Take 1 tablet by mouth daily.         Medical History:  She  has a past medical history of Allergic rhinitis (1990), Anxiety state, unspecified, Asthma, Bad breath, Belching, Bleeding nose, Breast CA (HCC) (04/2015), Cancer (HCC), Chest pain, Chronic cough, Community acquired pneumonia (04/11/2019), Coronavirus infection, Disorder of liver, Ductal carcinoma in situ of breast (06/2015), Esophageal reflux, Essential hypertension, Exposure to medical diagnostic radiation, Headache, chronic daily, Hemorrhoids, Hyperlipidemia, Hypoxia (04/11/2019), Indigestion, Leaking of urine, Mild mental retardation (VERY MILD), Mouth sores, Obstructive apnea, Parainfluenza infection, Sleep apnea, and Type II or unspecified type diabetes mellitus without mention of complication, not stated as uncontrolled.  Surgical History:  She  has a past surgical history that includes orthopedic surg (pbp) (Bilateral); other (LEFT FOOT SPUR REMOVAL); hernia surgery; stereotactic breast biopsy (Left, 4/21/15 [facility]); Breast lumpectomy (Left, 06/11/2015); needle biopsy liver; other surgical history; colonoscopy (N/A, 11/23/2021); lumpectomy left (Left, 05/11/2015); radiation left (Left, 2015); and airam biopsy stereo w/calc 1 site left (cpt=19081) (Left, 04/2015).   Family History:  Her family history includes Bipolar Disorder in an other family member; Breast Cancer in her maternal grandmother and paternal grandmother; Breast Cancer (age of onset: 40) in her self; Cancer in her brother; Dementia in her mother; Diabetes in her brother, father, and mother; Heart Attack in her father; Other in her father; Stroke in her mother; Suicide History in an other family member.  Social History:  She  reports that she has never smoked. She has never been exposed to tobacco smoke. She has never used smokeless tobacco. She reports that she does not drink  alcohol and does not use drugs.    Tobacco:  She has never smoked tobacco.    CAGE Alcohol Screen:   CAGE screening score of 0 on 1/16/2024, showing low risk of alcohol abuse.      Patient Care Team:  Raphael Farley DO as PCP - General (Family Practice)  Nabila Davis MD (Radiation Oncology)  Torito Dey MD (GASTROENTEROLOGY)  Travis Mcrae MD (NEPHROLOGY)  Latonia Levy APRN (Nurse Practitioner)  Riana Leo PT as Physical Therapist  Cierra Styles DPM (PODIATRIST)  Lakshmi Anderson DO as Consulting Physician (NEUROLOGY)  Jeison Lay PT as Physical Therapist  Ludmila Merino MD as Obstetrician (OBSTETRICS & GYNECOLOGY)    Review of Systems  GENERAL: feels well otherwise  SKIN: denies any unusual skin lesions  EYES: denies blurred vision or double vision  HEENT: denies nasal congestion, sinus pain or ST  LUNGS: denies shortness of breath with exertion  CARDIOVASCULAR: denies chest pain on exertion  GI: denies abdominal pain, denies heartburn  : denies dysuria, vaginal discharge or itching, no complaint of urinary incontinence   MUSCULOSKELETAL: denies back pain  NEURO: denies headaches  PSYCHE: per hpi  HEMATOLOGIC: denies hx of anemia  ENDOCRINE: denies thyroid history  ALL/ASTHMA: denies hx of allergy or asthma    Objective:   Physical Exam  General Appearance:  Alert, cooperative, no distress, appears stated age, obese   Head:  Normocephalic, without obvious abnormality, atraumatic   Eyes:  PERRL, EOM's intact both eyes   Ears:  Normal TM's and external ear canals, both ears   Nose: Nares normal    Throat: MMM   Neck: Supple, symmetrical, trachea midline, no adenopathy    Back:   Symmetric, no curvature, ROM normal, no CVA tenderness   Lungs:   Clear to auscultation bilaterally, respirations unlabored   Heart:  Regular rate and rhythm, S1 and S2 normal    Abdomen:   Soft, obese non-tender, bowel sounds active all four quadrants, body habitus limits exam   Pelvic: Deferred    Extremities: Extremities normal, atraumatic, no cyanosis or edema   Pulses: 2+ and symmetric   Skin: Skin color, texture, turgor normal, no rashes or lesions   Lymph nodes: Cervical nodes normal   Neurologic: Normal       /86   Pulse 81   Temp 97 °F (36.1 °C) (Temporal)   Resp 16   Ht 5' 4.57\" (1.64 m)   Wt 259 lb 6 oz (117.7 kg)   SpO2 99%   BMI 43.74 kg/m²  Estimated body mass index is 43.74 kg/m² as calculated from the following:    Height as of this encounter: 5' 4.57\" (1.64 m).    Weight as of this encounter: 259 lb 6 oz (117.7 kg).    Medicare Hearing Assessment:       Visual Acuity:   Right Eye Visual Acuity: Uncorrected Right Eye Chart Acuity: 20/30   Left Eye Visual Acuity: Uncorrected Left Eye Chart Acuity: 20/30   Both Eyes Visual Acuity: Uncorrected Both Eyes Chart Acuity: 20/25   Able To Tolerate Visual Acuity: Yes        Assessment & Plan:   Radha Murguia is a 48 year old female who presents for a Medicare Assessment.   1. Encounter for annual health examination  - reviewed anticipatory guidance based on age    2. Screening for cardiovascular condition  - Lipid Panel; Future    3. Type 2 diabetes mellitus without complication, without long-term current use of insulin (HCC)  4. Diabetes mellitus type 2 in obese  (HCC)  - cpm  - check diabetic labs  - obtain diabetic eye exam from Dr. Roman's office  - check blood glucose levels regularly  - check feet daily  - follow low carb diet and regular exercise to promote wt loss  - Lipid Panel; Future  - CMP; Future  - Hemoglobin A1C; Future    5. Microalbuminuria due to type 2 diabetes mellitus  (HCC)  - following with nephrology, Dr. Mcrae  - Lipid Panel; Future  - CMP; Future  - Hemoglobin A1C; Future    6. Essential hypertension  - bp stable  - cpm  - Lipid Panel; Future  - CMP; Future    7. Mixed hyperlipidemia  - cont statin and gemfibrozil  - follow low chol/carb diet and regular exercise   - check labs  - Lipid Panel; Future  - CMP;  Future    8. Thrombocytopenia (HCC)  - cbc pending    9. KEON on CPAP  - cpm    10. Bipolar affective disorder, currently depressed, moderate (HCC)  - stable  - following with psychiatrist  - cpm    11. Mild developmental delay    12. Class 3 severe obesity due to excess calories with serious comorbidity and body mass index (BMI) of 45.0 to 49.9 in adult (HCC)  - lost 12lbs since last ov  - cpm    The patient indicates understanding of these issues and agrees to the plan.  Reinforced healthy diet, lifestyle, and exercise.      Return in 3 months (on 4/17/2024).     Raphael Farley DO, 1/17/2024     Supplementary Documentation:   General Health:  In the past six months, have you lost more than 10 pounds without trying?: 2 - No  Has your appetite been poor?: No  Type of Diet: Other  How does the patient maintain a good energy level?: Stretching  How would you describe your daily physical activity?: Moderate  How would you describe your current health state?: Good  How do you maintain positive mental well-being?: Visiting Friends  On a scale of 0 to 10, with 0 being no pain and 10 being severe pain, what is your pain level?: 5 - (Moderate)  In the past six months, have you experienced urine leakage?: 1-Yes  At any time do you feel concerned for the safety/well-being of yourself and/or your children, in your home or elsewhere?: No  Have you had any immunizations at another office such as Influenza, Hepatitis B, Tetanus, or Pneumococcal?: No         Radha Murguia's SCREENING SCHEDULE   Tests on this list are recommended by your physician but may not be covered, or covered at this frequency, by your insurer.   Please check with your insurance carrier before scheduling to verify coverage.   PREVENTATIVE SERVICES FREQUENCY &  COVERAGE DETAILS LAST COMPLETION DATE   Diabetes Screening    Fasting Blood Sugar /  Glucose    One screening every 12 months if never tested or if previously tested but not diagnosed with  pre-diabetes   One screening every 6 months if diagnosed with pre-diabetes Lab Results   Component Value Date     (H) 09/08/2023        Cardiovascular Disease Screening    Lipid Panel  Cholesterol  Lipoprotein (HDL)  Triglycerides Covered every 5 years for all Medicare beneficiaries without apparent signs or symptoms of cardiovascular disease Lab Results   Component Value Date    CHOLEST 177 08/30/2023    HDL 46 08/30/2023    LDL 80 08/30/2023    LDLD 87 05/21/2021    TRIG 314 (H) 08/30/2023         Electrocardiogram (EKG)   Covered if needed at Welcome to Medicare, and non-screening if indicated for medical reasons 09/10/2023      Ultrasound Screening for Abdominal Aortic Aneurysm (AAA) Covered once in a lifetime for one of the following risk factors    Men who are 65-75 years old and have ever smoked    Anyone with a family history -     Colorectal Cancer Screening  Covered for ages 50-85; only need ONE of the following:    Colonoscopy   Covered every 10 years    Covered every 2 years if patient is at high risk or previous colonoscopy was abnormal 11/23/2021    Health Maintenance   Topic Date Due    Colorectal Cancer Screening  11/23/2031       Flexible Sigmoidoscopy   Covered every 4 years -    Fecal Occult Blood Test Covered annually -   Bone Density Screening    Bone density screening    Covered every 2 years after age 65 if diagnosed with risk of osteoporosis or estrogen deficiency.    Covered yearly for long-term glucocorticoid medication use (Steroids) No results found for this or any previous visit.      No recommendations at this time   Pap and Pelvic    Pap   Covered every 2 years for women at normal risk; Annually if at high risk 08/21/2023  Health Maintenance   Topic Date Due    Pap Smear  08/21/2026       Chlamydia Annually if high risk -  No recommendations at this time   Screening Mammogram    Mammogram     Recommend annually for all female patients aged 40 and older    One baseline mammogram  covered for patients aged 35-39 06/13/2023    Health Maintenance   Topic Date Due    Mammogram  06/13/2024       Immunizations    Influenza Covered once per flu season  Please get every year 09/19/2023  No recommendations at this time    Pneumococcal Each vaccine ([id number] & [id number]) covered once after 65 Prevnar 13: -    [id number]: 02/08/2019     No recommendations at this time    Hepatitis B One screening covered for patients with certain risk factors   08/18/2018  No recommendations at this time    Tetanus Toxoid Not covered by Medicare Part B unless medically necessary (cut with metal); may be covered with your pharmacy prescription benefits -    Tetanus, Diptheria and Pertusis TD and TDaP Not covered by Medicare Part B -  DTaP,Tdap,and Td Vaccines(1 - Tdap) Never done    Zoster Not covered by Medicare Part B; may be covered with your pharmacy  prescription benefits -  Zoster Vaccines(1 of 2) Never done     Diabetes      Hemoglobin A1C Annually; if last result is elevated, may be asked to retest more frequently.    Medicare covers every 3 months Lab Results   Component Value Date     08/30/2023    A1C 5.9 (H) 08/30/2023       Hemoglobin A1C Test due on 02/29/2024    Creat/alb ratio Annually Lab Results   Component Value Date    MICROALBCREA 3,930.2 (H) 08/28/2023    MALBCRECALC 545.9 (H) 01/23/2012       LDL Annually Lab Results   Component Value Date    LDL 80 08/30/2023       Dilated Eye Exam Annually Last Diabetic Eye Exam:  Last Dilated Eye Exam: 11/13/23  Eye Exam shows Diabetic Retinopathy?: Yes       Annual Monitoring of Persistent Medications (ACE/ARB, digoxin diuretics, anticonvulsants)    Potassium Annually Lab Results   Component Value Date    K 3.8 09/08/2023         Creatinine   Annually Lab Results   Component Value Date    CREATSERUM 0.80 09/08/2023         BUN Annually Lab Results   Component Value Date    BUN 12 09/08/2023       Drug Serum Conc Annually No results found for:  \"DIGOXIN\", \"DIG\", \"VALP\"

## 2024-01-17 NOTE — PATIENT INSTRUCTIONS
Radha Murguia's SCREENING SCHEDULE   Tests on this list are recommended by your physician but may not be covered, or covered at this frequency, by your insurer.   Please check with your insurance carrier before scheduling to verify coverage.   PREVENTATIVE SERVICES FREQUENCY &  COVERAGE DETAILS LAST COMPLETION DATE   Diabetes Screening    Fasting Blood Sugar /  Glucose    One screening every 12 months if never tested or if previously tested but not diagnosed with pre-diabetes   One screening every 6 months if diagnosed with pre-diabetes Lab Results   Component Value Date     (H) 09/08/2023        Cardiovascular Disease Screening    Lipid Panel  Cholesterol  Lipoprotein (HDL)  Triglycerides Covered every 5 years for all Medicare beneficiaries without apparent signs or symptoms of cardiovascular disease Lab Results   Component Value Date    CHOLEST 177 08/30/2023    HDL 46 08/30/2023    LDL 80 08/30/2023    LDLD 87 05/21/2021    TRIG 314 (H) 08/30/2023         Electrocardiogram (EKG)   Covered if needed at Welcome to Medicare, and non-screening if indicated for medical reasons 09/10/2023      Ultrasound Screening for Abdominal Aortic Aneurysm (AAA) Covered once in a lifetime for one of the following risk factors   • Men who are 65-75 years old and have ever smoked   • Anyone with a family history -     Colorectal Cancer Screening  Covered for ages 50-85; only need ONE of the following:    Colonoscopy   Covered every 10 years    Covered every 2 years if patient is at high risk or previous colonoscopy was abnormal 11/23/2021    Health Maintenance   Topic Date Due   • Colorectal Cancer Screening  11/23/2031       Flexible Sigmoidoscopy   Covered every 4 years -    Fecal Occult Blood Test Covered annually -   Bone Density Screening    Bone density screening    Covered every 2 years after age 65 if diagnosed with risk of osteoporosis or estrogen deficiency.    Covered yearly for long-term glucocorticoid  medication use (Steroids) No results found for this or any previous visit.      No recommendations at this time   Pap and Pelvic    Pap   Covered every 2 years for women at normal risk; Annually if at high risk 08/21/2023  Health Maintenance   Topic Date Due   • Pap Smear  08/21/2026       Chlamydia Annually if high risk -  No recommendations at this time   Screening Mammogram    Mammogram     Recommend annually for all female patients aged 40 and older    One baseline mammogram covered for patients aged 35-39 06/13/2023    Health Maintenance   Topic Date Due   • Mammogram  06/13/2024       Immunizations    Influenza Covered once per flu season  Please get every year 09/19/2023  No recommendations at this time    Pneumococcal Each vaccine ([id number] & [id number]) covered once after 65 Prevnar 13: -    [id number]: 02/08/2019     No recommendations at this time    Hepatitis B One screening covered for patients with certain risk factors   08/18/2018  No recommendations at this time    Tetanus Toxoid Not covered by Medicare Part B unless medically necessary (cut with metal); may be covered with your pharmacy prescription benefits -    Tetanus, Diptheria and Pertusis TD and TDaP Not covered by Medicare Part B -  DTaP,Tdap,and Td Vaccines(1 - Tdap) Never done    Zoster Not covered by Medicare Part B; may be covered with your pharmacy  prescription benefits -  Zoster Vaccines(1 of 2) Never done     Diabetes      Hemoglobin A1C Annually; if last result is elevated, may be asked to retest more frequently.    Medicare covers every 3 months Lab Results   Component Value Date     08/30/2023    A1C 5.9 (H) 08/30/2023       No recommendations at this time    Creat/alb ratio Annually Lab Results   Component Value Date    MICROALBCREA 3,930.2 (H) 08/28/2023    MALBCRECALC 545.9 (H) 01/23/2012       LDL Annually Lab Results   Component Value Date    LDL 80 08/30/2023       Dilated Eye Exam Annually [unfilled]      Annual Monitoring of Persistent Medications (ACE/ARB, digoxin diuretics, anticonvulsants)    Potassium Annually Lab Results   Component Value Date    K 3.8 09/08/2023         Creatinine   Annually Lab Results   Component Value Date    CREATSERUM 0.80 09/08/2023         BUN Annually Lab Results   Component Value Date    BUN 12 09/08/2023       Drug Serum Conc Annually No results found for: \"DIGOXIN\", \"DIG\", \"VALP\"

## 2024-01-24 ENCOUNTER — TELEPHONE (OUTPATIENT)
Dept: FAMILY MEDICINE CLINIC | Facility: CLINIC | Age: 49
End: 2024-01-24

## 2024-01-24 NOTE — TELEPHONE ENCOUNTER
Spoke to pt who's requesting to speak to a nurse.  Pt has a rash on her chest -- pt doesn't think it's shingles.

## 2024-01-24 NOTE — TELEPHONE ENCOUNTER
Patient called again regarding rash on her chest.  States it's red and blotchy with some raised areas.  Started a week ago.  No drainage but it does itch a little.  Denies any rash around mouth or lips. Denies any difficulty swallowing or breathing. Denies any new medications, foods, soaps, lotions, detergent, clothing or sheets. Takes allegra daily and tried some cortisone cream.  Cancelled her appt with weight loss clinic today due to the rash.  Advised to continue allegra and cortisone cream.  We can see her in the office tomorrow morning.  If symptoms worsen or she develops rash around mouth or has any problem swallowing or breathing, go to IC/ED for urgent evaluation.    Future Appointments   Date Time Provider Department Center   1/25/2024 10:20 AM Raphael Farley DO [facility]   1/25/2024  2:00 PM Jazz Cruz APRN [facility]   3/13/2024 10:40 AM Raphael Farley DO [facility]

## 2024-01-25 ENCOUNTER — OFFICE VISIT (OUTPATIENT)
Dept: FAMILY MEDICINE CLINIC | Facility: CLINIC | Age: 49
End: 2024-01-25
Payer: MEDICAID

## 2024-01-25 VITALS
SYSTOLIC BLOOD PRESSURE: 126 MMHG | DIASTOLIC BLOOD PRESSURE: 80 MMHG | BODY MASS INDEX: 42.93 KG/M2 | RESPIRATION RATE: 16 BRPM | WEIGHT: 254.56 LBS | OXYGEN SATURATION: 99 % | HEIGHT: 64.57 IN | TEMPERATURE: 98 F | HEART RATE: 83 BPM

## 2024-01-25 DIAGNOSIS — I10 ESSENTIAL HYPERTENSION: ICD-10-CM

## 2024-01-25 DIAGNOSIS — R80.9 MICROALBUMINURIA DUE TO TYPE 2 DIABETES MELLITUS  (HCC): ICD-10-CM

## 2024-01-25 DIAGNOSIS — E11.29 MICROALBUMINURIA DUE TO TYPE 2 DIABETES MELLITUS  (HCC): ICD-10-CM

## 2024-01-25 DIAGNOSIS — B02.9 HERPES ZOSTER WITHOUT COMPLICATION: Primary | ICD-10-CM

## 2024-01-25 DIAGNOSIS — E11.69 DIABETES MELLITUS TYPE 2 IN OBESE  (HCC): ICD-10-CM

## 2024-01-25 DIAGNOSIS — E66.9 DIABETES MELLITUS TYPE 2 IN OBESE  (HCC): ICD-10-CM

## 2024-01-25 PROCEDURE — 3079F DIAST BP 80-89 MM HG: CPT | Performed by: FAMILY MEDICINE

## 2024-01-25 PROCEDURE — 3074F SYST BP LT 130 MM HG: CPT | Performed by: FAMILY MEDICINE

## 2024-01-25 PROCEDURE — 3008F BODY MASS INDEX DOCD: CPT | Performed by: FAMILY MEDICINE

## 2024-01-25 PROCEDURE — 99214 OFFICE O/P EST MOD 30 MIN: CPT | Performed by: FAMILY MEDICINE

## 2024-01-25 RX ORDER — VALACYCLOVIR HYDROCHLORIDE 1 G/1
1000 TABLET, FILM COATED ORAL 3 TIMES DAILY
Qty: 21 TABLET | Refills: 0 | Status: SHIPPED | OUTPATIENT
Start: 2024-01-25 | End: 2024-02-01

## 2024-01-25 RX ORDER — TELMISARTAN 80 MG/1
80 TABLET ORAL DAILY
Qty: 90 TABLET | Refills: 1 | Status: SHIPPED | OUTPATIENT
Start: 2024-01-25

## 2024-01-25 NOTE — PROGRESS NOTES
Subjective:   Patient ID: Radha Murguia is a 48 year old female.    HPI  48yr old obese, female presents for c/o rash around her neck that began about 1wk ago. States it started after she went to her \"gum specialist,\" last Thursday,  1/18. Denies any new products, foods or meds. +itchiness, no pain. Hx of shingles in the past.       History/Other:   Review of Systems   Constitutional:  Negative for chills and fever.   Gastrointestinal:  Negative for diarrhea, nausea and vomiting.   Skin:  Positive for rash.   Neurological:  Negative for weakness and numbness.     Current Outpatient Medications   Medication Sig Dispense Refill    telmisartan 80 MG Oral Tab Take 1 tablet (80 mg total) by mouth daily. 90 tablet 1    valACYclovir 1 G Oral Tab Take 1 tablet (1,000 mg total) by mouth in the morning, at noon, and at bedtime for 7 days. 21 tablet 0    semaglutide (OZEMPIC, 2 MG/DOSE,) 8 MG/3ML Subcutaneous Solution Pen-injector Inject 2 mg into the skin once a week. 9 each 0    gemfibrozil 600 MG Oral Tab TAKE 1 TABLET TWICE DAILY BEFORE MEALS 180 tablet 1    atorvastatin 40 MG Oral Tab TAKE 1 TABLET EVERY EVENING 90 tablet 1    metFORMIN  MG Oral Tablet 24 Hr Take 1 tablet (750 mg total) by mouth 2 (two) times daily with meals. 180 tablet 0    hydrALAZINE 25 MG Oral Tab Take 1 tablet (25 mg total) by mouth 2 (two) times daily. 180 tablet 1    PARoxetine 20 MG Oral Tab Take 2.5 tablets (50 mg total) by mouth daily. 225 tablet 1    QUEtiapine 100 MG Oral Tab Take 1 tablet (100 mg total) by mouth nightly. 90 tablet 1    semaglutide (OZEMPIC, 2 MG/DOSE,) 8 MG/3ML Subcutaneous Solution Pen-injector Inject 2 mg into the skin once a week. 3 mL 0    Accu-Chek Softclix Lancets Does not apply Misc Use to check blood sugar daily 100 each 2    Blood Glucose Monitoring Suppl (TRUE METRIX METER) Does not apply Device 1 Device by Other route daily. To check blood sugar 1 each 0    Glucose Blood (TRUE METRIX BLOOD GLUCOSE TEST)  In Vitro Strip Use to check blood sugar daily 100 strip 2    DM-APAP-CPM (CORICIDIN HBP MAX STRENGTH FLU) -2 MG Oral Tab Take 5 mL by mouth 3 (three) times daily as needed (cough). 30 tablet 0    OneTouch UltraSoft 2 Lancets Does not apply Misc 1 Lancet daily. Use to test blood sugar daily 100 each 2    LORATADINE 10 MG Oral Tab TAKE 1 TABLET BY MOUTH EVERY DAY 30 tablet 0    fexofenadine (ALLEGRA ALLERGY) 180 MG Oral Tab Take 1 tablet (180 mg total) by mouth daily.      OneTouch Delica Lancets 33G Does not apply Misc 1 lancet by Finger stick route daily. 100 each 1    Multiple Vitamins-Minerals (CENTRUM SILVER ULTRA WOMENS) Oral Tab Take 1 tablet by mouth daily.         Allergies:  Allergies   Allergen Reactions    Other Runny nose     Seasonal         Objective:   Physical Exam  Vitals and nursing note reviewed.   Constitutional:       Appearance: Normal appearance.   HENT:      Head: Normocephalic and atraumatic.   Cardiovascular:      Rate and Rhythm: Normal rate and regular rhythm.   Pulmonary:      Effort: Pulmonary effort is normal.      Breath sounds: Normal breath sounds. No wheezing, rhonchi or rales.   Skin:     General: Skin is warm and dry.      Findings: Rash present. Rash is vesicular (along C4 dermatome consistent with shingles).   Neurological:      Mental Status: She is alert.   Psychiatric:         Mood and Affect: Mood normal.         Behavior: Behavior normal.         Assessment & Plan:   1. Herpes zoster without complication  - clinically consistent with shingles, natural hx discussed including postherpetic neuralgia  - pt states she has seen new eruptions over the past 72hr, therefore will tx with antiviral  - start valtrex 1g po tid x 7d, reviewed indications, dosing and SEs  - may use tylenol/motrin prn and calamine lotion  - keep area clean/dry and avoid contact with pregnant women and children  - monitor  - valACYclovir 1 G Oral Tab; Take 1 tablet (1,000 mg total) by mouth in the  morning, at noon, and at bedtime for 7 days.  Dispense: 21 tablet; Refill: 0    2. Diabetes mellitus type 2 in obese  (HCC)  3. Microalbuminuria due to type 2 diabetes mellitus  (HCC)  4. Essential hypertension  - stable  - cpm  - telmisartan 80 MG Oral Tab; Take 1 tablet (80 mg total) by mouth daily.  Dispense: 90 tablet; Refill: 1    She understands and agrees with tx plan  RTC in 2wks, sooner if needed    Meds This Visit:  Requested Prescriptions     Signed Prescriptions Disp Refills    telmisartan 80 MG Oral Tab 90 tablet 1     Sig: Take 1 tablet (80 mg total) by mouth daily.    valACYclovir 1 G Oral Tab 21 tablet 0     Sig: Take 1 tablet (1,000 mg total) by mouth in the morning, at noon, and at bedtime for 7 days.       Imaging & Referrals:  None

## 2024-02-12 ENCOUNTER — TELEPHONE (OUTPATIENT)
Dept: OBGYN CLINIC | Facility: CLINIC | Age: 49
End: 2024-02-12

## 2024-02-12 DIAGNOSIS — N95.0 PMB (POSTMENOPAUSAL BLEEDING): Primary | ICD-10-CM

## 2024-02-12 NOTE — TELEPHONE ENCOUNTER
Spoke to patient and discussed EMB in detail; [employer] message sent with additional information. Discussed US instructions. No further questions.

## 2024-02-12 NOTE — TELEPHONE ENCOUNTER
Called and spoke with pt. And she started her period on Saturday 2/10/2024, moderate flow..with mild cramping...her last period was  11/2022, she is very concerned.  She made an appointment to see you 2/29/2024  Any recommendations in mean time or advise.  Thanks

## 2024-02-12 NOTE — TELEPHONE ENCOUNTER
Patient reports that although she is in menopause ,she has been bleeding like regular period for 2 days now since 2/10. She is really concerned because this happened again last year.Please advise  Future Appointments   Date Time Provider Department Center   2/29/2024 10:00 AM Ludmila Merino MD [facility]   3/13/2024 10:40 AM Raphael Farley DO [facility]   4/25/2024  2:00 PM Nancy, Jazz, APRN [facility]

## 2024-02-12 NOTE — TELEPHONE ENCOUNTER
Keep appointment. Recommend 30 minutes for plan for EMB. She may complete pelvic US in office prior to appointment. Thank you.

## 2024-02-22 ENCOUNTER — LAB ENCOUNTER (OUTPATIENT)
Dept: LAB | Age: 49
End: 2024-02-22
Attending: INTERNAL MEDICINE
Payer: MEDICARE

## 2024-02-22 DIAGNOSIS — R80.9 MICROALBUMINURIA DUE TO TYPE 2 DIABETES MELLITUS (HCC): ICD-10-CM

## 2024-02-22 DIAGNOSIS — I10 ESSENTIAL HYPERTENSION: ICD-10-CM

## 2024-02-22 DIAGNOSIS — E78.2 MIXED HYPERLIPIDEMIA: ICD-10-CM

## 2024-02-22 DIAGNOSIS — R80.9 PROTEINURIA, UNSPECIFIED TYPE: ICD-10-CM

## 2024-02-22 DIAGNOSIS — E11.9 TYPE 2 DIABETES MELLITUS WITHOUT COMPLICATION, WITHOUT LONG-TERM CURRENT USE OF INSULIN (HCC): ICD-10-CM

## 2024-02-22 DIAGNOSIS — E11.69 DIABETES MELLITUS TYPE 2 IN OBESE (HCC): ICD-10-CM

## 2024-02-22 DIAGNOSIS — Z13.6 SCREENING FOR CARDIOVASCULAR CONDITION: ICD-10-CM

## 2024-02-22 DIAGNOSIS — E11.29 MICROALBUMINURIA DUE TO TYPE 2 DIABETES MELLITUS (HCC): ICD-10-CM

## 2024-02-22 DIAGNOSIS — E66.9 DIABETES MELLITUS TYPE 2 IN OBESE (HCC): ICD-10-CM

## 2024-02-22 LAB
ALBUMIN SERPL-MCNC: 4.3 G/DL (ref 3.4–5)
ALBUMIN/GLOB SERPL: 1.1 {RATIO} (ref 1–2)
ALP LIVER SERPL-CCNC: 75 U/L
ALT SERPL-CCNC: 21 U/L
ANION GAP SERPL CALC-SCNC: 6 MMOL/L (ref 0–18)
AST SERPL-CCNC: 23 U/L (ref 15–37)
BASOPHILS # BLD AUTO: 0.03 X10(3) UL (ref 0–0.2)
BASOPHILS NFR BLD AUTO: 0.7 %
BILIRUB SERPL-MCNC: 0.4 MG/DL (ref 0.1–2)
BILIRUB UR QL STRIP.AUTO: NEGATIVE
BUN BLD-MCNC: 16 MG/DL (ref 9–23)
CALCIUM BLD-MCNC: 10 MG/DL (ref 8.5–10.1)
CHLORIDE SERPL-SCNC: 110 MMOL/L (ref 98–112)
CHOLEST SERPL-MCNC: 203 MG/DL (ref ?–200)
CLARITY UR REFRACT.AUTO: CLEAR
CO2 SERPL-SCNC: 22 MMOL/L (ref 21–32)
CREAT BLD-MCNC: 0.84 MG/DL
CREAT UR-SCNC: 129 MG/DL
EGFRCR SERPLBLD CKD-EPI 2021: 85 ML/MIN/1.73M2 (ref 60–?)
EOSINOPHIL # BLD AUTO: 0.11 X10(3) UL (ref 0–0.7)
EOSINOPHIL NFR BLD AUTO: 2.4 %
ERYTHROCYTE [DISTWIDTH] IN BLOOD BY AUTOMATED COUNT: 13.4 %
EST. AVERAGE GLUCOSE BLD GHB EST-MCNC: 137 MG/DL (ref 68–126)
FASTING PATIENT LIPID ANSWER: YES
FASTING STATUS PATIENT QL REPORTED: YES
GLOBULIN PLAS-MCNC: 4 G/DL (ref 2.8–4.4)
GLUCOSE BLD-MCNC: 118 MG/DL (ref 70–99)
GLUCOSE UR STRIP.AUTO-MCNC: NORMAL MG/DL
HBA1C MFR BLD: 6.4 % (ref ?–5.7)
HCT VFR BLD AUTO: 37.8 %
HDLC SERPL-MCNC: 49 MG/DL (ref 40–59)
HGB BLD-MCNC: 12.6 G/DL
IMM GRANULOCYTES # BLD AUTO: 0.01 X10(3) UL (ref 0–1)
IMM GRANULOCYTES NFR BLD: 0.2 %
KETONES UR STRIP.AUTO-MCNC: NEGATIVE MG/DL
LDLC SERPL CALC-MCNC: 113 MG/DL (ref ?–100)
LEUKOCYTE ESTERASE UR QL STRIP.AUTO: 75
LYMPHOCYTES # BLD AUTO: 1.94 X10(3) UL (ref 1–4)
LYMPHOCYTES NFR BLD AUTO: 42.7 %
MAGNESIUM SERPL-MCNC: 2 MG/DL (ref 1.6–2.6)
MCH RBC QN AUTO: 30.4 PG (ref 26–34)
MCHC RBC AUTO-ENTMCNC: 33.3 G/DL (ref 31–37)
MCV RBC AUTO: 91.3 FL
MONOCYTES # BLD AUTO: 0.22 X10(3) UL (ref 0.1–1)
MONOCYTES NFR BLD AUTO: 4.8 %
NEUTROPHILS # BLD AUTO: 2.23 X10 (3) UL (ref 1.5–7.7)
NEUTROPHILS # BLD AUTO: 2.23 X10(3) UL (ref 1.5–7.7)
NEUTROPHILS NFR BLD AUTO: 49.2 %
NITRITE UR QL STRIP.AUTO: NEGATIVE
NONHDLC SERPL-MCNC: 154 MG/DL (ref ?–130)
OSMOLALITY SERPL CALC.SUM OF ELEC: 288 MOSM/KG (ref 275–295)
PH UR STRIP.AUTO: 5.5 [PH] (ref 5–8)
PHOSPHATE SERPL-MCNC: 3.3 MG/DL (ref 2.5–4.9)
PLATELET # BLD AUTO: 349 10(3)UL (ref 150–450)
POTASSIUM SERPL-SCNC: 4.1 MMOL/L (ref 3.5–5.1)
PROT SERPL-MCNC: 8.3 G/DL (ref 6.4–8.2)
PROT UR STRIP.AUTO-MCNC: 200 MG/DL
PROT UR-MCNC: 516.3 MG/DL
RBC # BLD AUTO: 4.14 X10(6)UL
RBC UR QL AUTO: NEGATIVE
SODIUM SERPL-SCNC: 138 MMOL/L (ref 136–145)
SP GR UR STRIP.AUTO: 1.02 (ref 1–1.03)
TRIGL SERPL-MCNC: 235 MG/DL (ref 30–149)
UROBILINOGEN UR STRIP.AUTO-MCNC: NORMAL MG/DL
VLDLC SERPL CALC-MCNC: 41 MG/DL (ref 0–30)
WBC # BLD AUTO: 4.5 X10(3) UL (ref 4–11)

## 2024-02-22 PROCEDURE — 36415 COLL VENOUS BLD VENIPUNCTURE: CPT

## 2024-02-22 PROCEDURE — 84100 ASSAY OF PHOSPHORUS: CPT

## 2024-02-22 PROCEDURE — 3044F HG A1C LEVEL LT 7.0%: CPT | Performed by: FAMILY MEDICINE

## 2024-02-22 PROCEDURE — 82570 ASSAY OF URINE CREATININE: CPT

## 2024-02-22 PROCEDURE — 81001 URINALYSIS AUTO W/SCOPE: CPT

## 2024-02-22 PROCEDURE — 83735 ASSAY OF MAGNESIUM: CPT

## 2024-02-22 PROCEDURE — 85025 COMPLETE CBC W/AUTO DIFF WBC: CPT

## 2024-02-22 PROCEDURE — 80053 COMPREHEN METABOLIC PANEL: CPT

## 2024-02-22 PROCEDURE — 83036 HEMOGLOBIN GLYCOSYLATED A1C: CPT

## 2024-02-22 PROCEDURE — 84156 ASSAY OF PROTEIN URINE: CPT

## 2024-02-22 PROCEDURE — 80061 LIPID PANEL: CPT

## 2024-02-29 ENCOUNTER — OFFICE VISIT (OUTPATIENT)
Dept: OBGYN CLINIC | Facility: CLINIC | Age: 49
End: 2024-02-29
Payer: MEDICARE

## 2024-02-29 VITALS
BODY MASS INDEX: 43 KG/M2 | SYSTOLIC BLOOD PRESSURE: 140 MMHG | WEIGHT: 256.63 LBS | HEART RATE: 100 BPM | DIASTOLIC BLOOD PRESSURE: 84 MMHG

## 2024-02-29 DIAGNOSIS — N89.8 VAGINAL ITCHING: ICD-10-CM

## 2024-02-29 DIAGNOSIS — N95.0 PMB (POSTMENOPAUSAL BLEEDING): Primary | ICD-10-CM

## 2024-02-29 PROCEDURE — 99215 OFFICE O/P EST HI 40 MIN: CPT | Performed by: OBSTETRICS & GYNECOLOGY

## 2024-02-29 RX ORDER — NYSTATIN 100000 U/G
1 CREAM TOPICAL 2 TIMES DAILY PRN
Qty: 30 G | Refills: 1 | Status: SHIPPED | OUTPATIENT
Start: 2024-02-29

## 2024-02-29 RX ORDER — MISOPROSTOL 200 UG/1
200 TABLET ORAL ONCE
Qty: 1 TABLET | Refills: 0 | Status: SHIPPED | OUTPATIENT
Start: 2024-02-29 | End: 2024-02-29

## 2024-02-29 NOTE — PROGRESS NOTES
[facility]  Obstetrics and Gynecology  Follow Up Progress Note    Subjective:     Radha Murguia is a 48 year old  female who was last seen in office 2023 for vulvovaginitis and presents with c/o PMB. The patient reports PMB x 5 days at the beginning of February. Medium flow and use of pads that were changed daily. Passage of blood clots. Mild cramping. Sharp vaginal pain. No bleeding now. No pain today.  The patient attempted a ultrasound in the office but unable to complete due to body habitus and patient discomfort.  The patient reports occasional vaginal pruritus.  She is unable to tolerate the vaginal medications.    Of note, the patient reports that her friend Karo Acuña is her power of  for medical decisions.  The patient stated that the office may contact Karo Acuña and discussed her medical problems along with management options.  She also requested to contact Karo Acuña regarding scheduling surgical procedures.    Review of Systems:  General:  denies fevers, chills, fatigue and malaise.   Respiratory:  denies SOB, dyspnea, cough or wheezing  Cardiovascular:  denies chest pain, palpitations, exercise intolerance   GI: denies abdominal pain, diarrhea, constipation  :  denies dysuria, hematuria, increased urinary frequency.     OB History    Para Term  AB Living   0 0 0 0 0 0   SAB IAB Ectopic Multiple Live Births   0 0 0 0 0         Gyne History:  Menarche: 10  Period Cycle (Days): [place]  Period Duration (Days): N/A  Period Flow: N/A  Use of Birth Control (if yes, specify type): Postmenopausal  Hx Prior Abnormal Pap: No  Pap Date: 23  Pap Result Notes: WNL  No LMP recorded (lmp unknown). (Menstrual status: Menopause).      Meds:  Current Outpatient Medications on File Prior to Visit   Medication Sig Dispense Refill    telmisartan 80 MG Oral Tab Take 1 tablet (80 mg total) by mouth daily. 90 tablet 1    Multiple Vitamins-Minerals (MULTIVITAMIN WOMENS  50+ ADV) Oral Tab Take 1 tablet by mouth daily with food. 90 tablet 1    PARoxetine 30 MG Oral Tab Take 1 tablet (30 mg total) by mouth daily. Take with 20mg tab= 50mg daily 90 tablet 1    QUEtiapine 100 MG Oral Tab Take 1 tablet (100 mg total) by mouth nightly. 90 tablet 1    PARoxetine (PAXIL) 20 MG Oral Tab Take 1 tablet (20 mg total) by mouth daily. 30+20mg= 50mg daily 90 tablet 1    semaglutide (OZEMPIC, 2 MG/DOSE,) 8 MG/3ML Subcutaneous Solution Pen-injector Inject 2 mg into the skin once a week. 9 each 0    gemfibrozil 600 MG Oral Tab TAKE 1 TABLET TWICE DAILY BEFORE MEALS 180 tablet 1    atorvastatin 40 MG Oral Tab TAKE 1 TABLET EVERY EVENING 90 tablet 1    metFORMIN  MG Oral Tablet 24 Hr Take 1 tablet (750 mg total) by mouth 2 (two) times daily with meals. 180 tablet 0    hydrALAZINE 25 MG Oral Tab Take 1 tablet (25 mg total) by mouth 2 (two) times daily. 180 tablet 1    semaglutide (OZEMPIC, 2 MG/DOSE,) 8 MG/3ML Subcutaneous Solution Pen-injector Inject 2 mg into the skin once a week. 3 mL 0    Accu-Chek Softclix Lancets Does not apply Misc Use to check blood sugar daily 100 each 2    Blood Glucose Monitoring Suppl (TRUE METRIX METER) Does not apply Device 1 Device by Other route daily. To check blood sugar 1 each 0    Glucose Blood (TRUE METRIX BLOOD GLUCOSE TEST) In Vitro Strip Use to check blood sugar daily 100 strip 2    OneTouch UltraSoft 2 Lancets Does not apply Misc 1 Lancet daily. Use to test blood sugar daily 100 each 2    LORATADINE 10 MG Oral Tab TAKE 1 TABLET BY MOUTH EVERY DAY 30 tablet 0    fexofenadine (ALLEGRA ALLERGY) 180 MG Oral Tab Take 1 tablet (180 mg total) by mouth daily.      OneTouch Delica Lancets 33G Does not apply Misc 1 lancet by Finger stick route daily. 100 each 1    Multiple Vitamins-Minerals (CENTRUM SILVER ULTRA WOMENS) Oral Tab Take 1 tablet by mouth daily.        DM-APAP-CPM (CORICIDIN HBP MAX STRENGTH FLU) -2 MG Oral Tab Take 5 mL by mouth 3 (three) times  daily as needed (cough). (Patient not taking: Reported on 2/29/2024) 30 tablet 0     No current facility-administered medications on file prior to visit.       All:  Allergies   Allergen Reactions    Other Runny nose     Seasonal         PMH:  Past Medical History:   Diagnosis Date    Allergic rhinitis 1990    Anxiety state, unspecified     Asthma (HCC)     Bad breath     Belching     Bleeding nose     Breast CA (HCC) 04/2015    DCIS    Cancer (HCC)     left breast    Chest pain     Chronic cough     Community acquired pneumonia 04/11/2019    Coronavirus infection     Disorder of liver     fatty liver    Ductal carcinoma in situ of breast 06/2015    Esophageal reflux     Essential hypertension     Exposure to medical diagnostic radiation     finished in 2015    Headache, chronic daily     Hemorrhoids     Hyperlipidemia     Hypoxia 04/11/2019    Indigestion     Leaking of urine     Mild mental retardation VERY MILD    Mouth sores     Obstructive apnea     CPAP 14 Sarina's    Parainfluenza infection     Sleep apnea     Type II or unspecified type diabetes mellitus without mention of complication, not stated as uncontrolled        PSH:  Past Surgical History:   Procedure Laterality Date    BREAST LUMPECTOMY Left 06/11/2015    Procedure: BREAST LUMPECTOMY;  Surgeon: Paul Miller MD;  Location:  MAIN OR    COLONOSCOPY N/A 11/23/2021    Procedure: COLONOSCOPY with polypectomy, ESOPHAGOGASTRODUODENOSCOPY (EGD) with biopsies;  Surgeon: Akbar Perla MD;  Location:  ENDOSCOPY    HERNIA SURGERY      BABY    LUMPECTOMY LEFT Left 05/11/2015    DCIS;papilloma    SYLVAIN BIOPSY STEREO W/CALC 1 SITE LEFT (CPT=19081) Left 04/2015    DCIS; papilloma    NEEDLE BIOPSY LIVER      ORTHOPEDIC SURG (PBP) Bilateral     MANDA CARPAL ISHAAN RELEASE    OTHER  LEFT FOOT SPUR REMOVAL    OTHER SURGICAL HISTORY      bilateral carpal tunnel release    RADIATION LEFT Left 2015    lump with rad    STEREOTACTIC BREAST BIOPSY Left 4/21/15 [place]  [facility]    Left Breast         Objective:     Vitals:    24 0945   BP: 140/84   Pulse: 100   Weight: 256 lb 9.6 oz (116.4 kg)         Body mass index is 43.27 kg/m².    General: AAO.NAD.   CVS exam: normal peripheral perfusion  Chest: non-labored breathing, no tachypnea adenopathy  Abdominal exam: Deferred   Pelvic exam: Deferred        Assessment:     Radha Murguia is a 48 year old  female who presents for PMB        Plan:     Problem List Items Addressed This Visit    None  Visit Diagnoses       Vaginal itching    -  Primary    Relevant Medications    nystatin 100,000 Units/g External Cream    PMB (postmenopausal bleeding)        Relevant Medications    miSOPROStol 200 MCG Oral Tab              PMB  -Unable to complete pelvic ultrasound  -Recommend endometrial sampling  -Unable to offer endometrial biopsy in the office due to patient discomfort  -Therefore, recommend endometrial sampling under anesthesia  - pt offered outpatient procedure including dilation, hysteroscopy, hysteroscopic polypectomy, hysteroscopic directed endometrial sampling and possible curettage   - discussion held with the patient about risks, benefits and alternatives of procedure including but not limited to risk of infection, bleeding, injury and exploratory laparotomy   - pt agreeable and request to be scheduled for surgery   - However, attempted to reach out to patient's power of  for medical decisions per patient.  Left message.  We will need to discuss with patient's power of  prior to moving forward with scheduling.  - pt and her power of  will be contacted with date and time of scheduled surgery   - pre operative instructions including Cytotec Rx provided   - all questions and concerns were addressed   -ACOG postmenopausal bleeding and hysteroscopy pamphlet provided and reviewed with patient  -Precautions provided  -Patient will be scheduled for surgery  Vaginal itching   -Recommend nystatin cream  twice daily as needed  -Precautions provided    All of the findings and plan were discussed with the patient.  She notes understanding and agrees with the plan of care.  All questions were answered to the best of my ability at this time.    Of note, the patient's power of  for medical decisions Karo Acuña has return phone call.  Reviewed patient's symptoms and physical findings.  Discussed concern for postmenopausal bleeding.  Discussed limitations of physical exam.  Recommend exam under anesthesia including pelvic ultrasound and hysteroscopic endometrial sampling.  Discussed risks, benefits and alternatives to the procedure including but not limited to risk of infection, bleeding and injury.  Discussed risk of possible exploratory laparotomy.  All questions and concerns were addressed.  Patient's power of  has provided consent to schedule the patient for procedure as discussed.      Total patient time was 40 minutes in evaluation, consultation, and coordination of care. This included face to face and non-face to face actions. The patient's questions and concerns were addressed.       RTC in 2 weeks following surgery or sooner if needed     Ludmila Merino MD   [facility] O[facility]       Discussed with patient that there will not be further notification of normal or benign results other than receiving results on American Board of Addiction Medicine (ABAM). A American Board of Addiction Medicine (ABAM) message or telephone call will be placed by the physician and/or office staff if results are abnormal.     Note to patient and family   The 21st Century Cures Act makes medical notes available to patients in the interest of transparency.  However, please be advised that this is a medical document.  It is intended as [id number] communication.  It is written and medical language may contain abbreviations or verbiage that are technical and unfamiliar.  It may appear blunt or direct.  Medical documents are intended to carry relevant information, facts as evident, and the clinical  opinion of the practitioner.        This note could include assistance by Dragon voice recognition. Errors in content may be related to improper recognition by the system; efforts to review and correct have been done but errors may still exist.

## 2024-03-05 ENCOUNTER — TELEPHONE (OUTPATIENT)
Dept: OBGYN CLINIC | Facility: CLINIC | Age: 49
End: 2024-03-05

## 2024-03-05 DIAGNOSIS — R10.2 PELVIC PAIN: ICD-10-CM

## 2024-03-05 DIAGNOSIS — N95.0 POSTMENOPAUSAL BLEEDING: Primary | ICD-10-CM

## 2024-03-05 NOTE — TELEPHONE ENCOUNTER
Surgery scheduled for 4/3/24 at 130pm  Post op   Future Appointments   Date Time Provider Department Center   3/13/2024 10:40 AM Raphael Farley DO [facility]   4/18/2024 11:45 AM Ludmila Merino MD [facility] [place]   4/25/2024  2:00 PM Jazz Cruz APRN [facility]     Orders entered  Added to calendar  PA - pending   Reference Number  [id number]

## 2024-03-05 NOTE — TELEPHONE ENCOUNTER
----- Message from Ludmila Merino MD sent at 2/29/2024 10:38 AM CST -----  Regarding: Please schedule surgery  Surgeon: Dr. Ludmila Merino   Assistant: none     Type of Admit: Hospital outpatient, does not require admission following surgery   Procedure Location: Main OR    PreOp Dx: Postmenopausal bleeding, pelvic pain    Procedure: Exam under anesthesia, pelvic ultrasound (abdominal and vaginal), dilation, hysteroscopy, hysteroscopic endometrial sampling and curettage     Anesthesia: MAC    Special Equipment/Comments: Ultrasound with ultrasonographer for a pelvic ultrasound under anesthesia.  Endometrial pipette.  [employer] TRUCLEAR hysteroscopy with blades     Antibiotics: None  Pre Op Orders: initiate my Pre-op standing orders, if none exist please use [facility]'s  Standing Order. Pelvic ultrasound including abdominal and vaginal views.  Labs: per protocol     Medical clearance: none    #Please contact the patient's power of  Karo Acuña to schedule procedure for patient

## 2024-03-06 NOTE — TELEPHONE ENCOUNTER
Natalia from [facility] called and auth is not required    REF# [id number]    CPT 15193, [id number], [id number], [id number]

## 2024-03-11 ENCOUNTER — EKG ENCOUNTER (OUTPATIENT)
Dept: LAB | Facility: HOSPITAL | Age: 49
End: 2024-03-11
Attending: OBSTETRICS & GYNECOLOGY
Payer: MEDICARE

## 2024-03-11 DIAGNOSIS — Z01.818 PRE-OP TESTING: ICD-10-CM

## 2024-03-11 LAB
ANION GAP SERPL CALC-SCNC: 5 MMOL/L (ref 0–18)
BUN BLD-MCNC: 19 MG/DL (ref 9–23)
CALCIUM BLD-MCNC: 9.6 MG/DL (ref 8.5–10.1)
CHLORIDE SERPL-SCNC: 106 MMOL/L (ref 98–112)
CO2 SERPL-SCNC: 26 MMOL/L (ref 21–32)
CREAT BLD-MCNC: 0.64 MG/DL
EGFRCR SERPLBLD CKD-EPI 2021: 108 ML/MIN/1.73M2 (ref 60–?)
ERYTHROCYTE [DISTWIDTH] IN BLOOD BY AUTOMATED COUNT: 12.5 %
GLUCOSE BLD-MCNC: 135 MG/DL (ref 70–99)
HCT VFR BLD AUTO: 36.5 %
HGB BLD-MCNC: 12.2 G/DL
MCH RBC QN AUTO: 30.2 PG (ref 26–34)
MCHC RBC AUTO-ENTMCNC: 33.4 G/DL (ref 31–37)
MCV RBC AUTO: 90.3 FL
OSMOLALITY SERPL CALC.SUM OF ELEC: 288 MOSM/KG (ref 275–295)
PLATELET # BLD AUTO: 305 10(3)UL (ref 150–450)
POTASSIUM SERPL-SCNC: 4.2 MMOL/L (ref 3.5–5.1)
RBC # BLD AUTO: 4.04 X10(6)UL
SODIUM SERPL-SCNC: 137 MMOL/L (ref 136–145)
WBC # BLD AUTO: 5.6 X10(3) UL (ref 4–11)

## 2024-03-11 PROCEDURE — 80048 BASIC METABOLIC PNL TOTAL CA: CPT

## 2024-03-11 PROCEDURE — 36415 COLL VENOUS BLD VENIPUNCTURE: CPT

## 2024-03-11 PROCEDURE — 85027 COMPLETE CBC AUTOMATED: CPT

## 2024-03-12 LAB
ATRIAL RATE: 82 BPM
P AXIS: 23 DEGREES
P-R INTERVAL: 158 MS
Q-T INTERVAL: 392 MS
QRS DURATION: 94 MS
QTC CALCULATION (BEZET): 457 MS
R AXIS: 11 DEGREES
T AXIS: 11 DEGREES
VENTRICULAR RATE: 82 BPM

## 2024-03-13 ENCOUNTER — OFFICE VISIT (OUTPATIENT)
Dept: FAMILY MEDICINE CLINIC | Facility: CLINIC | Age: 49
End: 2024-03-13
Payer: MEDICARE

## 2024-03-13 VITALS
OXYGEN SATURATION: 98 % | HEART RATE: 92 BPM | WEIGHT: 256 LBS | SYSTOLIC BLOOD PRESSURE: 122 MMHG | RESPIRATION RATE: 16 BRPM | BODY MASS INDEX: 43.17 KG/M2 | DIASTOLIC BLOOD PRESSURE: 70 MMHG | HEIGHT: 64.5 IN | TEMPERATURE: 98 F

## 2024-03-13 DIAGNOSIS — E66.01 MORBID OBESITY WITH BMI OF 40.0-44.9, ADULT (HCC): ICD-10-CM

## 2024-03-13 DIAGNOSIS — E78.2 MIXED HYPERLIPIDEMIA: ICD-10-CM

## 2024-03-13 DIAGNOSIS — E66.9 TYPE 2 DIABETES MELLITUS WITH OBESITY (HCC): ICD-10-CM

## 2024-03-13 DIAGNOSIS — E11.69 TYPE 2 DIABETES MELLITUS WITH OBESITY (HCC): ICD-10-CM

## 2024-03-13 DIAGNOSIS — E78.1 HYPERTRIGLYCERIDEMIA: ICD-10-CM

## 2024-03-13 DIAGNOSIS — H66.001 ACUTE SUPPURATIVE OTITIS MEDIA OF RIGHT EAR WITHOUT SPONTANEOUS RUPTURE OF TYMPANIC MEMBRANE, RECURRENCE NOT SPECIFIED: Primary | ICD-10-CM

## 2024-03-13 PROCEDURE — 3008F BODY MASS INDEX DOCD: CPT | Performed by: FAMILY MEDICINE

## 2024-03-13 PROCEDURE — 3074F SYST BP LT 130 MM HG: CPT | Performed by: FAMILY MEDICINE

## 2024-03-13 PROCEDURE — 3078F DIAST BP <80 MM HG: CPT | Performed by: FAMILY MEDICINE

## 2024-03-13 PROCEDURE — 99214 OFFICE O/P EST MOD 30 MIN: CPT | Performed by: FAMILY MEDICINE

## 2024-03-13 RX ORDER — CHLORHEXIDINE GLUCONATE ORAL RINSE 1.2 MG/ML
SOLUTION DENTAL
COMMUNITY
Start: 2024-03-01

## 2024-03-13 RX ORDER — METFORMIN HYDROCHLORIDE 750 MG/1
750 TABLET, EXTENDED RELEASE ORAL 2 TIMES DAILY WITH MEALS
Qty: 180 TABLET | Refills: 1 | Status: CANCELLED | OUTPATIENT
Start: 2024-03-13

## 2024-03-13 RX ORDER — GEMFIBROZIL 600 MG/1
600 TABLET, FILM COATED ORAL
Qty: 180 TABLET | Refills: 1 | Status: SHIPPED | OUTPATIENT
Start: 2024-03-13

## 2024-03-13 RX ORDER — ATORVASTATIN CALCIUM 40 MG/1
40 TABLET, FILM COATED ORAL EVERY EVENING
Qty: 90 TABLET | Refills: 1 | Status: SHIPPED | OUTPATIENT
Start: 2024-03-13

## 2024-03-13 RX ORDER — MISOPROSTOL 200 UG/1
TABLET ORAL
COMMUNITY
Start: 2024-02-29

## 2024-03-13 RX ORDER — AMOXICILLIN 875 MG/1
875 TABLET, COATED ORAL 2 TIMES DAILY
Qty: 20 TABLET | Refills: 0 | Status: SHIPPED | OUTPATIENT
Start: 2024-03-13 | End: 2024-03-23

## 2024-03-13 NOTE — PROGRESS NOTES
HPI:   Radha Murguia is a 48 year old female who presents for c/o R ear pain, recheck of her chronic conditions and f/u on labs.  R ear pain ongoing for the past 2 nights. Denies any discharge, hearing loss or ringing of the ears. No recent URI symptoms. Taking allergra daily for her allergies.   DM2, taking medication as directed. Patient’s FBS have been wnl. Last visit with ophthalmologist was within the past year.  Pt has been checking her feet on a regular basis. Pt denies any tingling of the feet.   HL and hypertriglyceridemia, compliant with statin and gemfibrozil. Denies any SEs   Morbid obesity, had been following with [facility].        Current Outpatient Medications   Medication Sig Dispense Refill    miSOPROStol 200 MCG Oral Tab TAKE 1 TABLET (200 MCG TOTAL) BY MOUTH ONE TIME FOR 1 DOSE. TAKE THE NIGHT BEFORE SURGERY      chlorhexidine gluconate 0.12 % Mouth/Throat Solution RINSE 15 ML'S TWICE DAILY AFTER BREAKFAST/BEFORE BEDTIME FOLLOWING BRUSHING AND FLOSSING SPIT-OUT      PARoxetine 30 MG Oral Tab Take 1 tablet (30 mg total) by mouth daily. Take with 20mg tab= 50mg daily 90 tablet 1    PARoxetine (PAXIL) 20 MG Oral Tab Take 1 tablet (20 mg total) by mouth daily. 30+20mg= 50mg daily 90 tablet 1    metFORMIN  MG Oral Tablet 24 Hr Take 1 tablet (750 mg total) by mouth 2 (two) times daily with meals. 180 tablet 0    telmisartan 80 MG Oral Tab Take 1 tablet (80 mg total) by mouth daily. 90 tablet 1    Multiple Vitamins-Minerals (MULTIVITAMIN WOMENS 50+ ADV) Oral Tab Take 1 tablet by mouth daily with food. 90 tablet 1    QUEtiapine 100 MG Oral Tab Take 1 tablet (100 mg total) by mouth nightly. 90 tablet 1    gemfibrozil 600 MG Oral Tab TAKE 1 TABLET TWICE DAILY BEFORE MEALS 180 tablet 1    atorvastatin 40 MG Oral Tab TAKE 1 TABLET EVERY EVENING 90 tablet 1    hydrALAZINE 25 MG Oral Tab Take 1 tablet (25 mg total) by mouth 2 (two) times daily. 180 tablet 1    semaglutide (OZEMPIC, 2 MG/DOSE,) 8 MG/3ML  Subcutaneous Solution Pen-injector Inject 2 mg into the skin once a week. (Patient taking differently: Inject 2 mg into the skin once a week. On Wednesday ( took on 3/6/24)) 3 mL 0    Blood Glucose Monitoring Suppl (TRUE METRIX METER) Does not apply Device 1 Device by Other route daily. To check blood sugar 1 each 0    Glucose Blood (TRUE METRIX BLOOD GLUCOSE TEST) In Vitro Strip Use to check blood sugar daily 100 strip 2    OneTouch UltraSoft 2 Lancets Does not apply Misc 1 Lancet daily. Use to test blood sugar daily 100 each 2    fexofenadine (ALLEGRA ALLERGY) 180 MG Oral Tab Take 1 tablet (180 mg total) by mouth daily.         Wt Readings from Last 6 Encounters:   03/13/24 256 lb (116.1 kg)   03/06/24 256 lb (116.1 kg)   02/29/24 256 lb 9.6 oz (116.4 kg)   01/25/24 254 lb 9 oz (115.5 kg)   01/17/24 259 lb 6 oz (117.7 kg)   01/11/24 260 lb 6 oz (118.1 kg)      .  Body mass index is 43.26 kg/m².      Lab Results   Component Value Date    A1C 6.4 (H) 02/22/2024    A1C 5.9 (H) 08/30/2023    A1C 5.7 (H) 02/22/2023     (H) 02/22/2024     08/30/2023     02/22/2023     Lab Results   Component Value Date    CHOLEST 203 (H) 02/22/2024    CHOLEST 177 08/30/2023    CHOLEST 200 (H) 02/22/2023     Lab Results   Component Value Date    HDL 49 02/22/2024    HDL 46 08/30/2023    HDL 50 02/22/2023     Lab Results   Component Value Date     (H) 02/22/2024    LDL 80 08/30/2023     (H) 02/22/2023     Lab Results   Component Value Date    TRIG 235 (H) 02/22/2024    TRIG 314 (H) 08/30/2023    TRIG 240 (H) 02/22/2023     Lab Results   Component Value Date    AST 23 02/22/2024    AST 66 (H) 09/08/2023    AST 27 08/30/2023     Lab Results   Component Value Date    ALT 21 02/22/2024    ALT 59 (H) 09/08/2023    ALT 32 08/30/2023     [facility]    )  Past Medical History:   Diagnosis Date    Allergic rhinitis 1990    Anxiety state, unspecified     Asthma (HCC)     Back problem     lower back pain- sees  chiro    Bad breath     Belching     Bipolar affective (HCC)     patient denies    Bleeding nose     Breast CA (HCC) 04/2015    DCIS    Cancer (HCC)     left breast    Chest pain     Chronic cough     Community acquired pneumonia 04/11/2019    COPD (chronic obstructive pulmonary disease) (HCC)     Coronavirus infection     Disorder of liver     fatty liver    Ductal carcinoma in situ of breast 06/2015    Esophageal reflux     Essential hypertension     Exposure to medical diagnostic radiation     finished in 2015    Headache, chronic daily     Hemorrhoids     High blood pressure     High cholesterol     Hyperlipidemia     Hypoxia 04/11/2019    Indigestion     Leaking of urine     Mild mental retardation VERY MILD    Mouth sores     Obstructive apnea     CPAP 14 Sarina's    Parainfluenza infection     Sleep apnea     cpap    Type II or unspecified type diabetes mellitus without mention of complication, not stated as uncontrolled      Past Surgical History:   Procedure Laterality Date    BREAST LUMPECTOMY Left 06/11/2015    Procedure: BREAST LUMPECTOMY;  Surgeon: Paul Miller MD;  Location:  MAIN OR    CARPAL TUNNEL RELEASE Bilateral     COLONOSCOPY N/A 11/23/2021    Procedure: COLONOSCOPY with polypectomy, ESOPHAGOGASTRODUODENOSCOPY (EGD) with biopsies;  Surgeon: Akbar Perla MD;  Location:  ENDOSCOPY    HERNIA SURGERY      BABY    LUMPECTOMY LEFT Left 05/11/2015    DCIS;papilloma    SYLVAIN BIOPSY STEREO W/CALC 1 SITE LEFT (CPT=19081) Left 04/2015    DCIS; papilloma    NEEDLE BIOPSY LIVER      ORTHOPEDIC SURG (PBP) Bilateral     MANDA CARPAL ISHAAN RELEASE    OTHER  LEFT FOOT SPUR REMOVAL    OTHER SURGICAL HISTORY      bilateral carpal tunnel release    RADIATION LEFT Left 2015    lump with rad    STEREOTACTIC BREAST BIOPSY Left 4/21/15 Paul [facility]    Left Breast     Family History   Problem Relation Age of Onset    Breast Cancer Maternal Grandmother         not known    Breast Cancer Paternal Grandmother          not known    Diabetes Father     Heart Attack Father     Other (Other) Father         heart attack-- at age 52    Cancer Brother         brain tumor    Diabetes Brother     Dementia Mother     Diabetes Mother     Stroke Mother     Bipolar Disorder Other     Suicide History Other     Breast Cancer Self 40     Social History     Socioeconomic History    Marital status: Single     Spouse name: Not on file    Number of children: Not on file    Years of education: Not on file    Highest education level: Not on file   Occupational History    Occupation: works part-time   Tobacco Use    Smoking status: Never     Passive exposure: Never    Smokeless tobacco: Never   Vaping Use    Vaping Use: Never used   Substance and Sexual Activity    Alcohol use: No    Drug use: No    Sexual activity: Never   Other Topics Concern    Caffeine Concern Yes    Exercise Yes    Seat Belt Yes    Special Diet No    Stress Concern No    Weight Concern Yes     Comment: Working on weight loss     Service Not Asked    Blood Transfusions Not Asked    Occupational Exposure Not Asked    Hobby Hazards Not Asked    Sleep Concern Not Asked    Back Care Not Asked    Bike Helmet Not Asked    Self-Exams Not Asked   Social History Narrative    Lives  In [place] with mother     Social Determinants of Health     Financial Resource Strain: Not on file   Food Insecurity: Not on file   Transportation Needs: Not on file   Physical Activity: Not on file   Stress: Not on file   Social Connections: Not on file   Housing Stability: Not on file     History   Smoking Status    Never   Smokeless Tobacco    Never     History   Alcohol Use No       REVIEW OF SYSTEMS:   GENERAL HEALTH: feels well otherwise  SKIN: denies any unusual skin lesions or rashes  HEENT: per hpi  RESPIRATORY: denies shortness of breath with exertion  CARDIOVASCULAR: denies chest pain on exertion  GI: denies abdominal pain and denies heartburn  NEURO: denies headaches    EXAM:   /70    Pulse 92   Temp 97.6 °F (36.4 °C) (Temporal)   Resp 16   Ht 5' 4.5\" (1.638 m)   Wt 256 lb (116.1 kg)   LMP  (LMP Unknown)   SpO2 98%   BMI 43.26 kg/m²   GENERAL: well developed, well nourished,in no apparent distress, obese  SKIN: no rashes,no suspicious lesions  HEENT: NC/AT, EOMI, erythematous R TM, bulging  NECK: supple   LUNGS: clear to auscultation, no w/r/r  CARDIO: RRR without murmur   EXTREMITIES: no cyanosis, clubbing or edema     ASSESSMENT AND PLAN:   Radha Murguia is a 48 year old female who presents for:  1. Acute suppurative otitis media of right ear without spontaneous rupture of tympanic membrane, recurrence not specified  - advised symptomatic tx  - will tx with amoxicillin po bid x 10d, reviewed indications, dosing and SEs  - monitor  - amoxicillin 875 MG Oral Tab; Take 1 tablet (875 mg total) by mouth 2 (two) times daily for 10 days.  Dispense: 20 tablet; Refill: 0    2. Type 2 diabetes mellitus with obesity (HCC)  - A1c 6.4  - cpm  - follow low carb diet and regular exercise  - wt loss  - check blood glucose levels daily  - check feet daily  - eye exam utd  - atorvastatin 40 MG Oral Tab; Take 1 tablet (40 mg total) by mouth every evening.  Dispense: 90 tablet; Refill: 1    3. Mixed hyperlipidemia  4. Hypertriglyceridemia  - lipid panel had improved in TG but worsened in LDL  - cont statin and gemfibrozil  - low carb/chol diet and regular exercise  - gemfibrozil 600 MG Oral Tab; Take 1 tablet (600 mg total) by mouth 2 (two) times daily before meals.  Dispense: 180 tablet; Refill: 1    5. Morbid obesity with BMI of 40.0-44.9, adult (HCC)  - following with C    The patient indicates understanding of these issues and agrees to the plan.  The patient is asked to return in 3mo, sooner if needed.

## 2024-03-21 ENCOUNTER — TELEPHONE (OUTPATIENT)
Dept: OBGYN CLINIC | Facility: CLINIC | Age: 49
End: 2024-03-21

## 2024-03-21 NOTE — TELEPHONE ENCOUNTER
Pt called, states she needs a refresher in reg to her upcoming surgery thomas w Dr Merino.   04/03 @ 1:40pm.   Wants to  know what medication she should/shouldn't take. Please call her back and advise. Ty

## 2024-04-01 RX ORDER — HYDRALAZINE HYDROCHLORIDE 25 MG/1
25 TABLET, FILM COATED ORAL 2 TIMES DAILY
Qty: 180 TABLET | Refills: 3 | OUTPATIENT
Start: 2024-04-01

## 2024-04-02 NOTE — PAT NURSING NOTE
S/w Danette at Dr. Merino's office regarding ride home with someone to stay overnight with patient. Radha will refax response to PAT. Patient's CHADD Huddleston will take patient to & from  and stay overnight with patient.

## 2024-04-03 ENCOUNTER — APPOINTMENT (OUTPATIENT)
Dept: ULTRASOUND IMAGING | Facility: HOSPITAL | Age: 49
End: 2024-04-03
Attending: OBSTETRICS & GYNECOLOGY
Payer: MEDICARE

## 2024-04-03 ENCOUNTER — ANESTHESIA EVENT (OUTPATIENT)
Dept: SURGERY | Facility: HOSPITAL | Age: 49
End: 2024-04-03
Payer: MEDICARE

## 2024-04-03 ENCOUNTER — HOSPITAL ENCOUNTER (OUTPATIENT)
Facility: HOSPITAL | Age: 49
Setting detail: HOSPITAL OUTPATIENT SURGERY
Discharge: HOME | End: 2024-04-03
Attending: OBSTETRICS & GYNECOLOGY | Admitting: OBSTETRICS & GYNECOLOGY
Payer: MEDICARE

## 2024-04-03 ENCOUNTER — ANESTHESIA (OUTPATIENT)
Dept: SURGERY | Facility: HOSPITAL | Age: 49
End: 2024-04-03
Payer: MEDICARE

## 2024-04-03 VITALS
BODY MASS INDEX: 43.01 KG/M2 | RESPIRATION RATE: 18 BRPM | HEIGHT: 64.5 IN | DIASTOLIC BLOOD PRESSURE: 91 MMHG | WEIGHT: 255 LBS | OXYGEN SATURATION: 96 % | TEMPERATURE: 98 F | SYSTOLIC BLOOD PRESSURE: 155 MMHG | HEART RATE: 91 BPM

## 2024-04-03 DIAGNOSIS — Z01.818 PRE-OP TESTING: Primary | ICD-10-CM

## 2024-04-03 DIAGNOSIS — R10.2 PELVIC PAIN: ICD-10-CM

## 2024-04-03 DIAGNOSIS — I10 ESSENTIAL HYPERTENSION: ICD-10-CM

## 2024-04-03 DIAGNOSIS — E11.69 TYPE 2 DIABETES MELLITUS WITH OBESITY (HCC): ICD-10-CM

## 2024-04-03 DIAGNOSIS — N95.0 POSTMENOPAUSAL BLEEDING: ICD-10-CM

## 2024-04-03 DIAGNOSIS — E66.9 TYPE 2 DIABETES MELLITUS WITH OBESITY (HCC): ICD-10-CM

## 2024-04-03 DIAGNOSIS — E66.01 CLASS 3 SEVERE OBESITY WITH SERIOUS COMORBIDITY AND BODY MASS INDEX (BMI) OF 45.0 TO 49.9 IN ADULT, UNSPECIFIED OBESITY TYPE (HCC): ICD-10-CM

## 2024-04-03 DIAGNOSIS — Z51.81 ENCOUNTER FOR THERAPEUTIC DRUG MONITORING: ICD-10-CM

## 2024-04-03 DIAGNOSIS — E78.2 MIXED HYPERLIPIDEMIA: ICD-10-CM

## 2024-04-03 PROBLEM — N95.2 VAGINAL ATROPHY: Status: ACTIVE | Noted: 2024-04-03

## 2024-04-03 PROBLEM — S31.41XA NON-OBSTETRIC VAGINAL LACERATION WITHOUT FOREIGN BODY OR PERINEAL LACERATION: Status: ACTIVE | Noted: 2024-04-03

## 2024-04-03 LAB
B-HCG UR QL: NEGATIVE
GLUCOSE BLD-MCNC: 158 MG/DL (ref 70–99)
GLUCOSE BLD-MCNC: 160 MG/DL (ref 70–99)

## 2024-04-03 PROCEDURE — 0UDB8ZX EXTRACTION OF ENDOMETRIUM, VIA NATURAL OR ARTIFICIAL OPENING ENDOSCOPIC, DIAGNOSTIC: ICD-10-PCS | Performed by: OBSTETRICS & GYNECOLOGY

## 2024-04-03 PROCEDURE — 58558 HYSTEROSCOPY BIOPSY: CPT | Performed by: OBSTETRICS & GYNECOLOGY

## 2024-04-03 PROCEDURE — 0UQG7ZZ REPAIR VAGINA, VIA NATURAL OR ARTIFICIAL OPENING: ICD-10-PCS | Performed by: OBSTETRICS & GYNECOLOGY

## 2024-04-03 PROCEDURE — 76998 US GUIDE INTRAOP: CPT | Performed by: OBSTETRICS & GYNECOLOGY

## 2024-04-03 RX ORDER — SODIUM CHLORIDE, SODIUM LACTATE, POTASSIUM CHLORIDE, CALCIUM CHLORIDE 600; 310; 30; 20 MG/100ML; MG/100ML; MG/100ML; MG/100ML
INJECTION, SOLUTION INTRAVENOUS CONTINUOUS
Status: DISCONTINUED | OUTPATIENT
Start: 2024-04-03 | End: 2024-04-03

## 2024-04-03 RX ORDER — SEMAGLUTIDE 2.68 MG/ML
2 INJECTION, SOLUTION SUBCUTANEOUS WEEKLY
Status: SHIPPED | COMMUNITY
Start: 2024-04-03

## 2024-04-03 RX ORDER — ACETAMINOPHEN 500 MG
500 TABLET ORAL EVERY 6 HOURS PRN
Status: DISCONTINUED | OUTPATIENT
Start: 2024-04-03 | End: 2024-04-03

## 2024-04-03 RX ORDER — SCOLOPAMINE TRANSDERMAL SYSTEM 1 MG/1
1 PATCH, EXTENDED RELEASE TRANSDERMAL ONCE
Status: DISCONTINUED | OUTPATIENT
Start: 2024-04-03 | End: 2024-04-03 | Stop reason: HOSPADM

## 2024-04-03 RX ORDER — ACETAMINOPHEN 500 MG
1000 TABLET ORAL EVERY 6 HOURS PRN
Status: DISCONTINUED | OUTPATIENT
Start: 2024-04-03 | End: 2024-04-03

## 2024-04-03 RX ORDER — DEXTROSE MONOHYDRATE 25 G/50ML
50 INJECTION, SOLUTION INTRAVENOUS
Status: DISCONTINUED | OUTPATIENT
Start: 2024-04-03 | End: 2024-04-03 | Stop reason: HOSPADM

## 2024-04-03 RX ORDER — HYDROMORPHONE HYDROCHLORIDE 1 MG/ML
0.6 INJECTION, SOLUTION INTRAMUSCULAR; INTRAVENOUS; SUBCUTANEOUS EVERY 5 MIN PRN
Status: DISCONTINUED | OUTPATIENT
Start: 2024-04-03 | End: 2024-04-03

## 2024-04-03 RX ORDER — ONDANSETRON 2 MG/ML
INJECTION INTRAMUSCULAR; INTRAVENOUS AS NEEDED
Status: DISCONTINUED | OUTPATIENT
Start: 2024-04-03 | End: 2024-04-03 | Stop reason: SURG

## 2024-04-03 RX ORDER — ACETAMINOPHEN 500 MG
TABLET ORAL
Status: DISCONTINUED
Start: 2024-04-03 | End: 2024-04-03

## 2024-04-03 RX ORDER — ACETAMINOPHEN 500 MG
1000 TABLET ORAL ONCE
Status: DISCONTINUED | OUTPATIENT
Start: 2024-04-03 | End: 2024-04-03 | Stop reason: HOSPADM

## 2024-04-03 RX ORDER — HYDROMORPHONE HYDROCHLORIDE 1 MG/ML
0.2 INJECTION, SOLUTION INTRAMUSCULAR; INTRAVENOUS; SUBCUTANEOUS EVERY 5 MIN PRN
Status: DISCONTINUED | OUTPATIENT
Start: 2024-04-03 | End: 2024-04-03

## 2024-04-03 RX ORDER — NICOTINE POLACRILEX 4 MG
15 LOZENGE BUCCAL
Status: DISCONTINUED | OUTPATIENT
Start: 2024-04-03 | End: 2024-04-03 | Stop reason: HOSPADM

## 2024-04-03 RX ORDER — NALOXONE HYDROCHLORIDE 0.4 MG/ML
80 INJECTION, SOLUTION INTRAMUSCULAR; INTRAVENOUS; SUBCUTANEOUS AS NEEDED
Status: DISCONTINUED | OUTPATIENT
Start: 2024-04-03 | End: 2024-04-03

## 2024-04-03 RX ORDER — NICOTINE POLACRILEX 4 MG
30 LOZENGE BUCCAL
Status: DISCONTINUED | OUTPATIENT
Start: 2024-04-03 | End: 2024-04-03 | Stop reason: HOSPADM

## 2024-04-03 RX ORDER — LIDOCAINE HYDROCHLORIDE 10 MG/ML
INJECTION, SOLUTION EPIDURAL; INFILTRATION; INTRACAUDAL; PERINEURAL AS NEEDED
Status: DISCONTINUED | OUTPATIENT
Start: 2024-04-03 | End: 2024-04-03 | Stop reason: SURG

## 2024-04-03 RX ORDER — HYDROMORPHONE HYDROCHLORIDE 1 MG/ML
0.4 INJECTION, SOLUTION INTRAMUSCULAR; INTRAVENOUS; SUBCUTANEOUS EVERY 5 MIN PRN
Status: DISCONTINUED | OUTPATIENT
Start: 2024-04-03 | End: 2024-04-03

## 2024-04-03 RX ORDER — KETOROLAC TROMETHAMINE 30 MG/ML
INJECTION, SOLUTION INTRAMUSCULAR; INTRAVENOUS AS NEEDED
Status: DISCONTINUED | OUTPATIENT
Start: 2024-04-03 | End: 2024-04-03 | Stop reason: SURG

## 2024-04-03 RX ADMIN — KETOROLAC TROMETHAMINE 30 MG: 30 INJECTION, SOLUTION INTRAMUSCULAR; INTRAVENOUS at 15:38:00

## 2024-04-03 RX ADMIN — SODIUM CHLORIDE, SODIUM LACTATE, POTASSIUM CHLORIDE, CALCIUM CHLORIDE: 600; 310; 30; 20 INJECTION, SOLUTION INTRAVENOUS at 15:54:00

## 2024-04-03 RX ADMIN — LIDOCAINE HYDROCHLORIDE 50 MG: 10 INJECTION, SOLUTION EPIDURAL; INFILTRATION; INTRACAUDAL; PERINEURAL at 14:25:00

## 2024-04-03 RX ADMIN — ONDANSETRON 4 MG: 2 INJECTION INTRAMUSCULAR; INTRAVENOUS at 14:46:00

## 2024-04-03 NOTE — H&P
[facility]  Obstetrics and Gynecology  History & Physical    Radha Murguia Patient Status:  Hospital Outpatient Surgery    1975 MRN [id number]   Location [facility] PERIOPERATIVE SERVICE Attending Ludmila Merino MD   Hospital Day 0 PCP Raphael Farley DO     CC: Patient is here for exam under anesthesia, pelvic ultrasound, dilation, hysteroscopy, hysteroscopic polypectomy, hysteroscopic directed endometrial sampling and possible curettage     SUBJECTIVE:    Radha Murguia is a 48 year old  female who presents for exam under anesthesia, pelvic ultrasound, dilation, hysteroscopy, hysteroscopic polypectomy, hysteroscopic directed endometrial sampling and possible curettage 2/2 PMB and difficultly with pelvic exam. The patient was seen in office with complaint of PMB x 5 days at the beginning of February. Medium flow and use of pads that were changed daily. Passage of blood clots. Mild cramping. Sharp vaginal pain. Pelvic US was attempted but unsuccessful due to body habitus and difficulty with pelvic exam. A discussion was held with the patient regarding findings and recommendations. The patient was offered surgical management. The patient was agreeable with recommendations and presents today for scheduled procedure.     Obstetric History:   OB History    Para Term  AB Living   0 0 0 0 0 0   SAB IAB Ectopic Multiple Live Births   0 0 0 0 0     Past Medical History:   Past Medical History:   Diagnosis Date    Allergic rhinitis     Anxiety state, unspecified     Asthma (HCC)     Back problem     lower back pain- sees chiro    Bad breath     Belching     Bipolar affective (HCC)     patient denies    Bleeding nose     Breast CA (HCC) 2015    DCIS    Cancer (HCC)     left breast    Chest pain     Chronic cough     Community acquired pneumonia 2019    COPD (chronic obstructive pulmonary disease) ([facility])     Coronavirus infection     Disorder of liver     fatty  liver    Ductal carcinoma in situ of breast 2015    Esophageal reflux     Essential hypertension     Exposure to medical diagnostic radiation     finished in     Headache, chronic daily     Hemorrhoids     High blood pressure     High cholesterol     Hyperlipidemia     Hypoxia 2019    Indigestion     Leaking of urine     Mild mental retardation VERY MILD    Mouth sores     Obstructive apnea     CPAP 14 Sarina's    Parainfluenza infection     Sleep apnea     cpap    Type II or unspecified type diabetes mellitus without mention of complication, not stated as uncontrolled      Past Surgical History:   Past Surgical History:   Procedure Laterality Date    BREAST LUMPECTOMY Left 2015    Procedure: BREAST LUMPECTOMY;  Surgeon: Paul Miller MD;  Location:  MAIN OR    CARPAL TUNNEL RELEASE Bilateral     COLONOSCOPY N/A 2021    Procedure: COLONOSCOPY with polypectomy, ESOPHAGOGASTRODUODENOSCOPY (EGD) with biopsies;  Surgeon: Akbar Perla MD;  Location:  ENDOSCOPY    HERNIA SURGERY      BABY    LUMPECTOMY LEFT Left 2015    DCIS;papilloma    SYLVAIN BIOPSY STEREO W/CALC 1 SITE LEFT (CPT=19081) Left 2015    DCIS; papilloma    NEEDLE BIOPSY LIVER      ORTHOPEDIC SURG (PBP) Bilateral     MANDA CARPAL ISHAAN RELEASE    OTHER  LEFT FOOT SPUR REMOVAL    OTHER SURGICAL HISTORY      bilateral carpal tunnel release    RADIATION LEFT Left 2015    lump with rad    STEREOTACTIC BREAST BIOPSY Left 4/21/15 Paul [facility]    Left Breast     Family History:   Family History   Problem Relation Age of Onset    Breast Cancer Maternal Grandmother         not known    Breast Cancer Paternal Grandmother         not known    Diabetes Father     Heart Attack Father     Other (Other) Father         heart attack-- at age 52    Cancer Brother         brain tumor    Diabetes Brother     Dementia Mother     Diabetes Mother     Stroke Mother     Bipolar Disorder Other     Suicide History Other     Breast Cancer Self  40     Social History:   Social History     Tobacco Use    Smoking status: Never     Passive exposure: Never    Smokeless tobacco: Never   Substance Use Topics    Alcohol use: No       Home Meds:   Medications Prior to Admission   Medication Sig Dispense Refill Last Dose    gemfibrozil 600 MG Oral Tab Take 1 tablet (600 mg total) by mouth 2 (two) times daily before meals. 180 tablet 1 4/2/2024    atorvastatin 40 MG Oral Tab Take 1 tablet (40 mg total) by mouth every evening. 90 tablet 1 4/2/2024    telmisartan 80 MG Oral Tab Take 1 tablet (80 mg total) by mouth daily. 90 tablet 1 4/2/2024    Multiple Vitamins-Minerals (MULTIVITAMIN WOMENS 50+ ADV) Oral Tab Take 1 tablet by mouth daily with food. 90 tablet 1 Past Week    PARoxetine 30 MG Oral Tab Take 1 tablet (30 mg total) by mouth daily. Take with 20mg tab= 50mg daily 90 tablet 1 4/2/2024    QUEtiapine 100 MG Oral Tab Take 1 tablet (100 mg total) by mouth nightly. 90 tablet 1 4/2/2024    PARoxetine (PAXIL) 20 MG Oral Tab Take 1 tablet (20 mg total) by mouth daily. 30+20mg= 50mg daily 90 tablet 1 4/2/2024    metFORMIN  MG Oral Tablet 24 Hr Take 1 tablet (750 mg total) by mouth 2 (two) times daily with meals. 180 tablet 0 4/2/2024    hydrALAZINE 25 MG Oral Tab Take 1 tablet (25 mg total) by mouth 2 (two) times daily. 180 tablet 1 4/2/2024    semaglutide (OZEMPIC, 2 MG/DOSE,) 8 MG/3ML Subcutaneous Solution Pen-injector Inject 2 mg into the skin once a week. (Patient taking differently: Inject 2 mg into the skin once a week. On Wednesday ( took on 3/6/24)) 3 mL 0 Past Week    fexofenadine (ALLEGRA ALLERGY) 180 MG Oral Tab Take 1 tablet (180 mg total) by mouth daily.   4/2/2024    miSOPROStol 200 MCG Oral Tab TAKE 1 TABLET (200 MCG TOTAL) BY MOUTH ONE TIME FOR 1 DOSE. TAKE THE NIGHT BEFORE SURGERY       chlorhexidine gluconate 0.12 % Mouth/Throat Solution RINSE 15 ML'S TWICE DAILY AFTER BREAKFAST/BEFORE BEDTIME FOLLOWING BRUSHING AND FLOSSING SPIT-OUT        [] amoxicillin 875 MG Oral Tab Take 1 tablet (875 mg total) by mouth 2 (two) times daily for 10 days. 20 tablet 0     [] miSOPROStol 200 MCG Oral Tab Take 1 tablet (200 mcg total) by mouth one time for 1 dose. Take the night before surgery 1 tablet 0     Blood Glucose Monitoring Suppl (TRUE METRIX METER) Does not apply Device 1 Device by Other route daily. To check blood sugar 1 each 0     Glucose Blood (TRUE METRIX BLOOD GLUCOSE TEST) In Vitro Strip Use to check blood sugar daily 100 strip 2     OneTouch UltraSoft 2 Lancets Does not apply Misc 1 Lancet daily. Use to test blood sugar daily 100 each 2      Allergies:   Allergies   Allergen Reactions    Other Runny nose     Seasonal         OBJECTIVE:    Temp:  [98 °F (36.7 °C)] 98 °F (36.7 °C)  Pulse:  [88] 88  Resp:  [18] 18  BP: (108)/(58) 108/58  SpO2:  [95 %] 95 %  Body mass index is 43.09 kg/m².    General: AAO. NAD.   Lungs: CTAB.   CV: RRR.  Abdomen: soft, nontender, nondistended, +BS  Gu: deferred to OR  Extremities: negative edema bilaterally, negative calf tenderness bilaterally, Curt's sign negative bilaterally     Labs:  Component  Ref Range & Units 3/11/24  9:58 AM   WBC  4.0 - 11.0 x10(3) uL 5.6   RBC  3.80 - 5.30 x10(6)uL 4.04   HGB  12.0 - 16.0 g/dL 12.2   HCT  35.0 - 48.0 % 36.5   PLT  150.0 - 450.0 10(3)uL 305.0   MCV  80.0 - 100.0 fL 90.3   MCH  26.0 - 34.0 pg 30.2   MCHC  31.0 - 37.0 g/dL 33.4   RDW  % 12.5       ASSESSMENT/ PLAN:    Radha Murguia is a 48 year old  female who presents for exam under anesthesia, pelvic ultrasound, dilation, hysteroscopy, hysteroscopic polypectomy, hysteroscopic directed endometrial sampling and possible curettage    Problem List Items Addressed This Visit    None  Visit Diagnoses       Pre-op testing    -  Primary    Relevant Orders    Basic Metabolic Panel (8) (Completed)    CBC, Platelet; No Differential (Completed)    EKG 12 Lead (Completed)    CBC, Platelet; No Differential     Postmenopausal bleeding        Relevant Medications    lactated ringers infusion    Other Relevant Orders    US INTRAOPERATIVE GUIDANCE (CPT=76998)    Pelvic pain        Relevant Medications    scopolamine (Transderm-Scop) 1 MG/3DAYS patch 1 patch    Other Relevant Orders    US INTRAOPERATIVE GUIDANCE (CPT=76998)            - Admit for outpatient surgical procedure   - IVF: LR @ 100 cc/hr   - Diet: NPO  - Meds: none  - VTE prophylaxis: SCDs, per protocol   - Anesthesia to evaluate   - Consent obtained and placed in chart       Risks, benefits, alternatives and possible complications have been discussed in detail with the patient.  Pre-admission, admission, and post admission procedures and expectations were discussed in detail.  All questions answered, all appropriate consents will be signed at the Hospital. Previously stated procedure is planned for today and anticipated    Ludmila Merino MD   [facility] - OBGYN          Note to patient and family   The 21st Century Cures Act makes medical notes available to patients in the interest of transparency.  However, please be advised that this is a medical document.  It is intended as [id number]-oxen communication.  It is written and medical language may contain abbreviations or verbiage that are technical and unfamiliar.  It may appear blunt or direct.  Medical documents are intended to carry relevant information, facts as evident, and the clinical opinion of the practitioner.

## 2024-04-03 NOTE — ANESTHESIA PREPROCEDURE EVALUATION
PRE-OP EVALUATION    Patient Name: Radha Murguia    Admit Diagnosis: Postmenopausal bleeding [N95.0]  Pelvic pain [R10.2]    Pre-op Diagnosis: Postmenopausal bleeding [N95.0]  Pelvic pain [R10.2]    EXAMINATION UNDER ANESTHESIA, pelvic ultrasound (abdominal and vaginal), dilation, hysteroscopy with truclear, hysteroscopic endometrial sampling and curettage    Anesthesia Procedure: EXAMINATION UNDER ANESTHESIA, pelvic ultrasound (abdominal and vaginal), dilation, hysteroscopy with truclear, hysteroscopic endometrial sampling and curettage    Surgeon(s) and Role:     * Ludmila Merino MD - Primary    Pre-op vitals reviewed.  Temp: 98 °F (36.7 °C)  Pulse: 88  Resp: 18  BP: 108/58  SpO2: 95 %  Body mass index is 43.09 kg/m².    Current medications reviewed.  Hospital Medications:   acetaminophen (Tylenol Extra Strength) tab 1,000 mg  1,000 mg Oral Once    scopolamine (Transderm-Scop) 1 MG/3DAYS patch 1 patch  1 patch Transdermal Once    glucose (Dex4) 15 GM/59ML oral liquid 15 g  15 g Oral Q15 Min PRN    Or    glucose (Glutose) 40% oral gel 15 g  15 g Oral Q15 Min PRN    Or    glucose-vitamin C (Dex-4) chewable tab 4 tablet  4 tablet Oral Q15 Min PRN    Or    dextrose 50% injection 50 mL  50 mL Intravenous Q15 Min PRN    Or    glucose (Dex4) 15 GM/59ML oral liquid 30 g  30 g Oral Q15 Min PRN    Or    glucose (Glutose) 40% oral gel 30 g  30 g Oral Q15 Min PRN    Or    glucose-vitamin C (Dex-4) chewable tab 8 tablet  8 tablet Oral Q15 Min PRN    lactated ringers infusion   Intravenous Continuous       Outpatient Medications:     Medications Prior to Admission   Medication Sig Dispense Refill Last Dose    gemfibrozil 600 MG Oral Tab Take 1 tablet (600 mg total) by mouth 2 (two) times daily before meals. 180 tablet 1 4/2/2024    atorvastatin 40 MG Oral Tab Take 1 tablet (40 mg total) by mouth every evening. 90 tablet 1 4/2/2024    telmisartan 80 MG Oral Tab Take 1 tablet (80 mg total) by mouth daily. 90 tablet 1  2024    Multiple Vitamins-Minerals (MULTIVITAMIN WOMENS 50+ ADV) Oral Tab Take 1 tablet by mouth daily with food. 90 tablet 1 Past Week    PARoxetine 30 MG Oral Tab Take 1 tablet (30 mg total) by mouth daily. Take with 20mg tab= 50mg daily 90 tablet 1 2024    QUEtiapine 100 MG Oral Tab Take 1 tablet (100 mg total) by mouth nightly. 90 tablet 1 2024    PARoxetine (PAXIL) 20 MG Oral Tab Take 1 tablet (20 mg total) by mouth daily. 30+20mg= 50mg daily 90 tablet 1 2024    metFORMIN  MG Oral Tablet 24 Hr Take 1 tablet (750 mg total) by mouth 2 (two) times daily with meals. 180 tablet 0 2024    hydrALAZINE 25 MG Oral Tab Take 1 tablet (25 mg total) by mouth 2 (two) times daily. 180 tablet 1 2024    semaglutide (OZEMPIC, 2 MG/DOSE,) 8 MG/3ML Subcutaneous Solution Pen-injector Inject 2 mg into the skin once a week. (Patient taking differently: Inject 2 mg into the skin once a week. On Wednesday ( took on 3/6/24)) 3 mL 0 Past Week    fexofenadine (ALLEGRA ALLERGY) 180 MG Oral Tab Take 1 tablet (180 mg total) by mouth daily.   2024    miSOPROStol 200 MCG Oral Tab TAKE 1 TABLET (200 MCG TOTAL) BY MOUTH ONE TIME FOR 1 DOSE. TAKE THE NIGHT BEFORE SURGERY       chlorhexidine gluconate 0.12 % Mouth/Throat Solution RINSE 15 ML'S TWICE DAILY AFTER BREAKFAST/BEFORE BEDTIME FOLLOWING BRUSHING AND FLOSSING SPIT-OUT       [] amoxicillin 875 MG Oral Tab Take 1 tablet (875 mg total) by mouth 2 (two) times daily for 10 days. 20 tablet 0     [] miSOPROStol 200 MCG Oral Tab Take 1 tablet (200 mcg total) by mouth one time for 1 dose. Take the night before surgery 1 tablet 0     Blood Glucose Monitoring Suppl (TRUE METRIX METER) Does not apply Device 1 Device by Other route daily. To check blood sugar 1 each 0     Glucose Blood (TRUE METRIX BLOOD GLUCOSE TEST) In Vitro Strip Use to check blood sugar daily 100 strip 2     OneTouch UltraSoft 2 Lancets Does not apply Misc 1 Lancet daily. Use to test  blood sugar daily 100 each 2        Allergies: Other      Anesthesia Evaluation    Patient summary reviewed.    Anesthetic Complications  (-) history of anesthetic complications         GI/Hepatic/Renal      (+) GERD                           Cardiovascular                  (+) hypertension                                     Endo/Other      (+) diabetes  type 2,                          Pulmonary      (+) asthma              (+) sleep apnea       Neuro/Psych    Negative neuro/psych ROS.                                  Past Surgical History:   Procedure Laterality Date    BREAST LUMPECTOMY Left 06/11/2015    Procedure: BREAST LUMPECTOMY;  Surgeon: Paul Miller MD;  Location:  MAIN OR    CARPAL TUNNEL RELEASE Bilateral     COLONOSCOPY N/A 11/23/2021    Procedure: COLONOSCOPY with polypectomy, ESOPHAGOGASTRODUODENOSCOPY (EGD) with biopsies;  Surgeon: Akbar Perla MD;  Location:  ENDOSCOPY    HERNIA SURGERY      BABY    LUMPECTOMY LEFT Left 05/11/2015    DCIS;papilloma    SYLVAIN BIOPSY STEREO W/CALC 1 SITE LEFT (CPT=19081) Left 04/2015    DCIS; papilloma    NEEDLE BIOPSY LIVER      ORTHOPEDIC SURG (PBP) Bilateral     MANDA CARPAL ISHAAN RELEASE    OTHER  LEFT FOOT SPUR REMOVAL    OTHER SURGICAL HISTORY      bilateral carpal tunnel release    RADIATION LEFT Left 2015    lump with rad    STEREOTACTIC BREAST BIOPSY Left 4/21/15 Paul EDW    Left Breast     Social History     Socioeconomic History    Marital status: Single   Occupational History    Occupation: works part-time   Tobacco Use    Smoking status: Never     Passive exposure: Never    Smokeless tobacco: Never   Vaping Use    Vaping Use: Never used   Substance and Sexual Activity    Alcohol use: No    Drug use: No    Sexual activity: Never   Other Topics Concern    Caffeine Concern Yes    Exercise Yes    Seat Belt Yes    Special Diet No    Stress Concern No    Weight Concern Yes     Comment: Working on weight loss     History   Drug Use No     Available  pre-op labs reviewed.  Lab Results   Component Value Date    WBC 5.6 03/11/2024    RBC 4.04 03/11/2024    HGB 12.2 03/11/2024    HCT 36.5 03/11/2024    MCV 90.3 03/11/2024    MCH 30.2 03/11/2024    MCHC 33.4 03/11/2024    RDW 12.5 03/11/2024    .0 03/11/2024     Lab Results   Component Value Date     03/11/2024    K 4.2 03/11/2024     03/11/2024    CO2 26.0 03/11/2024    BUN 19 03/11/2024    CREATSERUM 0.64 03/11/2024     (H) 03/11/2024    CA 9.6 03/11/2024            Airway      Mallampati: I  Mouth opening: >3 FB  TM distance: > 6 cm  Neck ROM: full Cardiovascular    Cardiovascular exam normal.  Rhythm: regular  Rate: normal     Dental    Dentition appears grossly intact         Pulmonary    Pulmonary exam normal.                 Other findings  Poor dentition            ASA: 2   Plan: general  NPO status verified and patient meets guidelines.    Post-procedure pain management plan discussed with surgeon and patient.      Plan/risks discussed with: patient                Present on Admission:  **None**

## 2024-04-03 NOTE — OPERATIVE REPORT
OPERATIVE REPORT:   EXAM UNDER ANESTHESIA, PELVIC ULTRASOUND, DILATATION, ENDOMETRIAL BIOPSY, HYSTEROSCOPY, HYSTEROSCOPIC ENDOMETRIAL SAMPLING AND REPAIR OF VAGINAL LACERATION PROCEDURE NOTE    PATIENT: Radha Murguia  MRN: [id number]  DATE OF PROCEDURE: 24    INDICATIONS:   48 year old  female who presents for exam under anesthesia, pelvic ultrasound, dilation, hysteroscopy, hysteroscopic polypectomy, hysteroscopic directed endometrial sampling and possible curettage 2/2 PMB and difficultly with pelvic exam. The patient was seen in office with complaint of PMB x 5 days at the beginning of February. Medium flow and use of pads that were changed daily. Passage of blood clots. Mild cramping. Sharp vaginal pain. Pelvic US was attempted but unsuccessful due to body habitus and difficulty with pelvic exam. A discussion was held with the patient regarding findings and recommendations. The patient was offered surgical management. The patient was agreeable with recommendations and presents today for scheduled procedure.     PRE-OP DIAGNOSIS:   PMB     POST-OP DIAGNOSIS:   PMB  Vaginal atrophy   Vaginal laceration     PROCEDURE(S):   EXAM UNDER ANESTHESIA, PELVIC ULTRASOUND, DILATATION, ENDOMETRIAL BIOPSY, HYSTEROSCOPY, HYSTEROSCOPIC ENDOMETRIAL SAMPLING AND REPAIR OF VAGINAL LACERATION     ANESTHESIA: General     SURGEON(S): Dr. Ludmila Merino MD    ESTIMATED BLOOD LOSS: 50 mL           DRAINS: 200 mL    UTERINE DISTENSION MEDIUM: Normal Saline 0.9%  DEFICIT:455 mL     SPECIMENS: Endometrial biopsy, endometrial sampling             IMPLANTS: None           COMPLICATIONS:  None    FINDINGS: Normal external genitalia, no lesions. Normal nulliparous cervix, no lesions, small and slightly flushed. Retroverted uterus. Cervix dilated to 6 mm. The ostia were visualized bilaterally. Atrophic endometrium. No polyps or fibroids.  Specimens sent to pathology. Good hemostasis.     PROCEDURE: This procedure was fully  reviewed with the patient and written informed consent was obtained after discussing risks, benefits, indication and alternatives. All questions were answered.     The patient was taken to operative room and general anesthesia was initiated. She was placed in dorsal lithotomy position.  Pelvic ultrasound under general anesthesia was performed.  However, images were limited due to patient body habitus and retroverted position of the uterus.  Endometrial lining appeared to be approximately 3.6 mm but limited visualization.  No obvious pelvic or adnexal masses were noted.  The decision was made to proceed with endometrial sampling.    She was prepped and draped in normal sterile fashion. The bladder was emptied using a straight catheter. A sterile speculum was placed in the vagina. A single tooth tenaculum was used to grasp the anterior lip of the cervix. The cervix was gently dilated with hegar and hope dilators to 6 mm.  An endometrial biopsy with endometrial Pipelle was collected.  The endometrial Pipelle was passed 3 times and the uterus sounded to 8 cm.  The tissue was collected to be sent as a separate sample.  After the Hangtime hysteroscope system was calibrated, the hysteroscope was then gently advanced into the endometrial cavity. The findings are noted above. The TRUCLEAR hysteroscopic rotary blade was then advanced through the hysteroscope under direct visualization. The window lock was set to minimize fluid use and maintain uterine distention. The rotary blade was placed against the endometrial tissue and the system was activated by stepping on one pedal. The TRUCLEAR hysteroscopic rotary blade simultaneously aspirated and cut the endometrial tissue without any complications. This was repeat along the anterior, posterior and lateral surface of the endometrial cavity for a thorough sampling of the endometrium. This tissue was collected and sent to pathology as a separate sample.      The [employer]  hysteroscopic rotary blade and hysteroscope were then removed from the uterus. The single tooth tenaculum was removed and good hemostasis was noted.  However, a vaginal laceration was noted at the hymenal remnant along the inferior.  This is consistent with vaginal atrophy and is a speculum to visualize the cervix.  Bleeding noted.  Therefore, decision was made to reapproximate the vaginal laceration.  Interrupted sutures with 2-0 Vicryl were used to reapproximate the vaginal lacerations on either side.  The electrocautery pen was used to achieve good hemostasis.  After observation, good hemostasis noted.  Therefore, the procedure concluded.    Sponge, lap, needle, and instrument counts correct by two counts. The patient was taken to recovery room in stable condition. She was instructed to avoid anything in vagina for two weeks.       DISPOSITION:  To recovery room in stable condition        CONDITION: Stable        Note to patient and family   The 21st Century Cures Act makes medical notes available to patients in the interest of transparency.  However, please be advised that this is a medical document.  It is intended as [id number] communication.  It is written and medical language may contain abbreviations or verbiage that are technical and unfamiliar.  It may appear blunt or direct.  Medical documents are intended to carry relevant information, facts as evident, and the clinical opinion of the practitioner.          Ludmila Merino MD   [facility] - OBGYN

## 2024-04-03 NOTE — ANESTHESIA POSTPROCEDURE EVALUATION
[facility] Patient Status:  Hospital Outpatient Surgery   Age/Gender 48 year old female MRN [id number]   Location [facility] POST ANESTHESIA CARE UNIT Attending Ludmila Merino MD   [facility] Day # 0 PCP Raphael Farley DO       Anesthesia Post-op Note    EXAMINATION UNDER ANESTHESIA, pelvic ultrasound (abdominal and vaginal), dilation, hysteroscopy with truclear, hysteroscopic endometrial sampling, endometrial biopsy, repair of vaginal laceration    Procedure Summary       Date: 04/03/24 Room / Location:  MAIN OR 06 /  MAIN OR    Anesthesia Start: 1421 Anesthesia Stop: 1554    Procedure: EXAMINATION UNDER ANESTHESIA, pelvic ultrasound (abdominal and vaginal), dilation, hysteroscopy with truclear, hysteroscopic endometrial sampling, endometrial biopsy, repair of vaginal laceration Diagnosis:       Postmenopausal bleeding      Pelvic pain      (Postmenopausal bleeding [N95.0]Pelvic pain [R10.2])    Surgeons: Ludmila Merino MD Anesthesiologist: Eli Disla MD    Anesthesia Type: general ASA Status: 2            Anesthesia Type: general    Vitals Value Taken Time   /94 04/03/24 1553   Temp 98.1 04/03/24 1555   Pulse 101 04/03/24 1555   Resp 19 04/03/24 1555   SpO2 95 % 04/03/24 1555   Vitals shown include unfiled device data.    Patient Location: PACU    Anesthesia Type: general    Airway Patency: patent and extubated    Postop Pain Control: adequate    Mental Status: mildly sedated but able to meaningfully participate in the post-anesthesia evaluation    Nausea/Vomiting: none    Cardiopulmonary/Hydration status: stable euvolemic    Complications: no apparent anesthesia related complications    Postop vital signs: stable    Dental Exam: Unchanged from Preop

## 2024-04-03 NOTE — DISCHARGE INSTRUCTIONS
Home Care Instructions Following Your Hysteroscopy     Radha-  We hope you were pleased with your care at [facility].  We wish you the best outcome and overall experience with your operation.  These instructions will help to minimize pain, limit the risk for an infection, and improve the likelihood of a successful result.    What to Expect:  Expect some vaginal bleeding, watery vaginal discharge, or spotting for 1-2 weeks after your procedure  You might also experience abdominal cramping for 1-2 days  Call the office, if you experience heavy bleeding (saturating a pad every hour)    Over-The-Counter Medication  Non-prescription anti-inflammatory medications can also help to ease the pain.  You can take Aleve, Tylenol or Ibuprofen   Take as directed on the bottle  Drink a full glass of water with the medication    Bathing/Showers  You may resume showers in one day  No baths, swimming, hot tubs for two weeks    Home Medication  Resume your home medications as instructed    Diet  Resume your normal diet    Activity  Refrain from vaginal intercourse, vaginal suppositories, tampon use or douches until your first office appointment with the doctor  No strenuous activity or heavy lifting for two days  You may go up and down the stairs as tolerated    No physical exercise, sports, or gym workouts for two days    Return to Work or School  You may return to work in two days  Contact your gynecologist's office if you need a medical note  You may return to school in two days with no restrictions    Driving  You may resume driving tomorrow    Follow-up Appointment with Your Gynecologist  Call your gynecologist's office today for an appointment in two weeks    The number is [phone]  Verify your appointment date, day, time, and location  At your postoperative office appointment, your progress will be evaluated, findings reviewed, and any additional concerns and instructions will be discussed    Questions or  Concerns  Call the office if you experience severe pain not controlled by pain medication, swelling, heavy bleeding, foul smelling vaginal discharge, shortness of breath, chest pain, leg pain, fever (100.4 or greater), or other concerns  The number is: [phone]  If your call is made after office hours, the physician on call will be available to help you.  There is always a doctor from the group covering our gynecology patients, when your provider is unavailable    Radha  Thank you for coming to [facility] for your operation.  The nurses and the anesthesiologist try very hard to make sure you receive the best care possible.  Your trust in them as well as us is greatly appreciated.  Thanks so much,  The Gynecologists or [facility] Obstetrics and Gynecology    You received a drug called Toradol which is an Anti Inflammatory at: 3:45pm  If you are allowed to take Anti inflammatories:    Do not take any Anti Inflammatory like Motrin, Aleve or Ibuprofen until after: 9:45pm  Please report any suspected allergic reactions or bleeding issues to your doctor     Next dose of Tylenol due at 12am tonight as needed for pain

## 2024-04-03 NOTE — ANESTHESIA PROCEDURE NOTES
Airway  Date/Time: 4/3/2024 2:27 PM  Urgency: elective      General Information and Staff    Patient location during procedure: OR  Anesthesiologist: Eli Disla MD  Performed: anesthesiologist   Performed by: Eli Disla MD  Authorized by: Eli Disla MD      Indications and Patient Condition  Indications for airway management: anesthesia  Sedation level: deep  Preoxygenated: yes  Patient position: sniffing  Mask difficulty assessment: 1 - vent by mask    Final Airway Details  Final airway type: supraglottic airway      Successful airway: classic  Size 3       Number of attempts at approach: 1

## 2024-04-18 ENCOUNTER — OFFICE VISIT (OUTPATIENT)
Dept: OBGYN CLINIC | Facility: CLINIC | Age: 49
End: 2024-04-18
Payer: MEDICARE

## 2024-04-18 VITALS
DIASTOLIC BLOOD PRESSURE: 92 MMHG | SYSTOLIC BLOOD PRESSURE: 144 MMHG | BODY MASS INDEX: 43 KG/M2 | HEART RATE: 116 BPM | WEIGHT: 253.81 LBS

## 2024-04-18 DIAGNOSIS — Z98.890 STATUS POST HYSTEROSCOPY: Primary | ICD-10-CM

## 2024-04-18 DIAGNOSIS — R21 RASH: ICD-10-CM

## 2024-04-18 PROCEDURE — 99213 OFFICE O/P EST LOW 20 MIN: CPT | Performed by: OBSTETRICS & GYNECOLOGY

## 2024-04-18 PROCEDURE — 3080F DIAST BP >= 90 MM HG: CPT | Performed by: OBSTETRICS & GYNECOLOGY

## 2024-04-18 PROCEDURE — 3077F SYST BP >= 140 MM HG: CPT | Performed by: OBSTETRICS & GYNECOLOGY

## 2024-04-18 NOTE — PROGRESS NOTES
[facility]  Obstetrics and Gynecology  Follow Up Progress Note    Subjective:     Radha Murguia is a 48 year old  female who is s/p exam under anesthesia, pelvic ultrasound, dilation, hysteroscopy, hysteroscopic directed endometrial sampling and repair of vaginal laceration 2/2 PMB and difficultly with pelvic exam. The patient was recommended to return for follow up. The patient reports doing well.    Review of Systems:  General:  denies fevers, chills, fatigue and malaise.   Respiratory:  denies SOB, dyspnea, cough or wheezing  Cardiovascular:  denies chest pain, palpitations, exercise intolerance   GI: denies abdominal pain, diarrhea, constipation  :  denies dysuria, hematuria, increased urinary frequency. denies abnormal vaginal bleeding or vaginal discharge.       Objective:     Vitals:    24 1152   BP: (!) 144/92   Pulse: 116   Weight: 253 lb 12.8 oz (115.1 kg)         Body mass index is 42.89 kg/m².    General: AAO.NAD.   CVS exam: normal peripheral perfusion   Chest: no tachypnea, retractions or cyanosis   Abdominal exam: soft, nontender, nondistended  Pelvic exam:   VULVA: erythematous appearing vulva with no masses, tenderness or lesions but extensions towards bilateral thighs, maculopapular rash.   PERINEUM: erythematous appearing, no lesions   URETHRAL MEATUS: normal appearing, no lesions   VAGINA: deferred   CERVIX: deferred  UTERUS: deferred   ADNEXA: deferred   PERIRECTAL: deferred   Ext: non-tender, no edema     Pathology   Final Diagnosis:   A.  Endometrial sampling:  -Fragments of inactive endometrium and underlying benign smooth muscle.  -Negative for hyperplasia or malignancy.     B.  Endometrial biopsy:  -Fragments of inactive endometrium and underlying benign smooth muscle.  -Negative for hyperplasia or malignancy.   Electronically signed by Goldberg, Cathryn A, MD on 2024 at  3:02 PM          Assessment:     Rdaha Murguia is a 48 year old  female who  is s/p exam under anesthesia, pelvic ultrasound, dilation, hysteroscopy, hysteroscopic directed endometrial sampling and repair of vaginal laceration who presents for follow up     Patient Active Problem List   Diagnosis    Essential hypertension    KEON on CPAP    Metabolic syndrome    L DCIS (ductal carcinoma in situ)    Mixed hyperlipidemia    Elevation of level of transaminase and lactic acid dehydrogenase (LDH)    Fatty liver    Other proteinuria    Anxiety state    Type 2 diabetes mellitus without complication, without long-term current use of insulin ([facility])    Class 3 severe obesity with serious comorbidity and body mass index (BMI) of 45.0 to 49.9 in adult ([facility])    Vitamin D deficiency    History of breast cancer    Episodic mood disorder ([facility])    Short-term memory loss    Analgesic rebound headache    Bipolar affective disorder, currently depressed, moderate ([facility])    PMB (postmenopausal bleeding)    Mild developmental delay    Chronic low back pain without sciatica    Thrombocytopenia ([facility])    Asthma with COPD (chronic obstructive pulmonary disease) ([facility])    Non-obstetric vaginal laceration without foreign body or perineal laceration    Vaginal atrophy         Plan:     Post operative exam   - doing well post operatively   - physical exam within normal limits   - may return to normal activity   - reviewed and d/w patient pathology results  PMB  - s/p exam under anesthesia, pelvic ultrasound, dilation, hysteroscopy, hysteroscopic directed endometrial sampling and repair of vaginal laceration   - benign pathology   - precautions provided   Rash   - recommend vaseline as needed   - Rx provided     All of the findings and plan were discussed with the patient.  She notes understanding and agrees with the plan of care.  All questions were answered to the best of my ability at this time.    Total patient time was 22 minutes in evaluation, consultation, and coordination of care. This included face to face and  non-face to face actions. The patient's questions and concerns were addressed.     RTC in 08/2024 for well woman exam or sooner if needed     Ludmila Merino MD   [facility] - OBGYN     Discussed with patient that there will not be further notification of normal or benign results other than receiving results on mychart. A mychart message or telephone call will be placed by the physician and/or office staff if results are abnormal.     Note to patient and family   The 21st Century Cures Act makes medical notes available to patients in the interest of transparency.  However, please be advised that this is a medical document.  It is intended as [id number]-io-[id number] communication.  It is written and medical language may contain abbreviations or verbiage that are technical and unfamiliar.  It may appear blunt or direct.  Medical documents are intended to carry relevant information, facts as evident, and the clinical opinion of the practitioner.        This note could include assistance by Dragon voice recognition. Errors in content may be related to improper recognition by the system; efforts to review and correct have been done but errors may still exist.

## 2024-05-08 ENCOUNTER — TELEPHONE (OUTPATIENT)
Dept: OBGYN CLINIC | Facility: CLINIC | Age: 49
End: 2024-05-08

## 2024-05-08 NOTE — TELEPHONE ENCOUNTER
Called patient to follow up. No answer.   Voicemail left to call the office back.   Phone number provided.  
Patient called for follow up.   No answer, voicemail left with instructions to call back.   Office number provided.     
Patient describes rash located on bilateral upper thighs and buttocks. Rash is red, sore, and itchy. Denies rash being hot to touch, no additional symptoms.     Nystatin cream previously prescribed on 04/18, patient reports no improvement with cream and worsening symptoms.     Recommended patient be assessed at PCP or in urgent care for immediate treatment.     Patient agreeable to being tested and treated in urgent care setting. Central scheduling phone number provided for locations/hours information.     All questions answered, patient agreeable to plan.     
Pt was seen on 4/18/24 given a rx for rash. Pt states rash is not getting any better. Wondering if there is anything else she can be given.  
Render Post-Care Instructions In Note?: no
Post-Care Instructions: I reviewed with the patient in detail post-care instructions. Patient is to wear sunprotection, and avoid picking at any of the treated lesions. Pt may apply Vaseline to crusted or scabbing areas.
Medical Necessity Information: It is in your best interest to select a reason for this procedure from the list below. All of these items fulfill various CMS LCD requirements except the new and changing color options.
Detail Level: Zone
Medical Necessity Clause: This procedure was medically necessary because the lesions that were treated were:
Anesthesia Volume In Cc: 0
Consent: The patient's consent was obtained including but not limited to risks of crusting, scabbing, blistering, scarring, darker or lighter pigmentary change, recurrence, incomplete removal and infection.
Total Number Of Lesions Treated: 5

## 2024-05-09 ENCOUNTER — HOSPITAL ENCOUNTER (OUTPATIENT)
Age: 49
Discharge: HOME | End: 2024-05-09
Payer: MEDICARE

## 2024-05-09 VITALS
HEIGHT: 65 IN | TEMPERATURE: 97 F | SYSTOLIC BLOOD PRESSURE: 152 MMHG | RESPIRATION RATE: 18 BRPM | BODY MASS INDEX: 39.99 KG/M2 | WEIGHT: 240 LBS | HEART RATE: 83 BPM | DIASTOLIC BLOOD PRESSURE: 101 MMHG | OXYGEN SATURATION: 95 %

## 2024-05-09 DIAGNOSIS — B35.6 TINEA CRURIS: Primary | ICD-10-CM

## 2024-05-09 PROCEDURE — 99213 OFFICE O/P EST LOW 20 MIN: CPT | Performed by: NURSE PRACTITIONER

## 2024-05-09 RX ORDER — PRENATAL VIT 91/IRON/FOLIC/DHA 28-975-200
1 COMBINATION PACKAGE (EA) ORAL 2 TIMES DAILY
Qty: 42 G | Refills: 2 | Status: SHIPPED | OUTPATIENT
Start: 2024-05-09

## 2024-05-09 RX ORDER — CLOTRIMAZOLE AND BETAMETHASONE DIPROPIONATE 10; .64 MG/G; MG/G
1 CREAM TOPICAL DAILY
Qty: 60 G | Refills: 3 | Status: SHIPPED | OUTPATIENT
Start: 2024-05-09 | End: 2024-05-23

## 2024-05-09 NOTE — ED INITIAL ASSESSMENT (HPI)
Rash to inner thighs and buttocks for a couple of years. States it started worsening in January of this year. Patient has been seen by her doctors for this rash. Pt prescribed nystatin on 4/18. Used all the cream that was prescribed. No improvement in rash. Rash is itchy and burns.

## 2024-05-09 NOTE — ED PROVIDER NOTES
Patient Seen in: [facility]      History     Chief Complaint   Patient presents with    Rash Skin Problem     Stated Complaint: rash/vaginal area    Subjective:   HPI    Patient is a pleasant 49-year-old female with diabetes, hypertension and breast cancer here for evaluation of ongoing rash.  Rash is localized along the inner thighs, groin and buttock region.  Patient states it has been present for \"a couple of years.\"  Patient noticed it started worsening in January, 5 months ago.  Patient states her doctor prescribed nystatin cream on 4/18/2024.  She utilized this prescription and did not note improvement.  Rash is intermittently itchy and burns.    Denies contact with irritant.  Patient states sugars have been intermittently elevated.  Last blood sugar that she took at home within the past week was 147.    Objective:   Past Medical History:    Allergic rhinitis    Anxiety state, unspecified    Asthma (HCC)    Back problem    lower back pain- sees chiro    Bad breath    Belching    Bipolar affective (HCC)    patient denies    Bleeding nose    Breast CA (HCC)    DCIS    Cancer (HCC)    left breast    Chest pain    Chronic cough    Community acquired pneumonia    COPD (chronic obstructive pulmonary disease) (HCC)    Coronavirus infection    Disorder of liver    fatty liver    Ductal carcinoma in situ of breast    Esophageal reflux    Essential hypertension    Exposure to medical diagnostic radiation    finished in 2015    Headache, chronic daily    Hemorrhoids    High blood pressure    High cholesterol    Hyperlipidemia    Hypoxia    Indigestion    Leaking of urine    Mild mental retardation    Mouth sores    Obstructive apnea    CPAP 14 Sarina's    Parainfluenza infection    Sleep apnea    cpap    Type II or unspecified type diabetes mellitus without mention of complication, not stated as uncontrolled              Past Surgical History:   Procedure Laterality Date    Breast lumpectomy Left 06/11/2015     Procedure: BREAST LUMPECTOMY;  Surgeon: Paul Miller MD;  Location:  MAIN OR    Carpal tunnel release Bilateral     Colonoscopy N/A 11/23/2021    Procedure: COLONOSCOPY with polypectomy, ESOPHAGOGASTRODUODENOSCOPY (EGD) with biopsies;  Surgeon: Akbar Perla MD;  Location:  ENDOSCOPY    Hernia surgery      BABY    Lumpectomy left Left 05/11/2015    DCIS;papilloma    Joanie biopsy stereo w/calc 1 site left (cpt=19081) Left 04/2015    DCIS; papilloma    Needle biopsy liver      Orthopedic surg (pbp) Bilateral     MANDA CARPAL ISHAAN RELEASE    Other  LEFT FOOT SPUR REMOVAL    Other surgical history      bilateral carpal tunnel release    Radiation left Left 2015    lump with rad    Stereotactic breast biopsy Left 4/21/15 [facility]    Left Breast                Social History     Socioeconomic History    Marital status: Single   Occupational History    Occupation: works part-time   Tobacco Use    Smoking status: Never     Passive exposure: Never    Smokeless tobacco: Never   Vaping Use    Vaping status: Never Used   Substance and Sexual Activity    Alcohol use: No    Drug use: No    Sexual activity: Never   Other Topics Concern    Caffeine Concern Yes    Exercise Yes    Seat Belt Yes    Special Diet No    Stress Concern No    Weight Concern Yes     Comment: Working on weight loss   Social History Narrative    Lives  In [place] with mother              Review of Systems    Positive for stated complaint: rash/vaginal area  Other systems are as noted in HPI.  Constitutional and vital signs reviewed.      All other systems reviewed and negative except as noted above.    Physical Exam     ED Triage Vitals [05/09/24 1041]   BP (!) 152/101   Pulse 83   Resp 18   Temp 97.3 °F (36.3 °C)   Temp src Temporal   SpO2 95 %   O2 Device None (Room air)       Current Vitals:   Vital Signs  BP: (!) 152/101  Pulse: 83  Resp: 18  Temp: 97.3 °F (36.3 °C)  Temp src: Temporal    Oxygen Therapy  SpO2: 95 %  O2 Device: None (Room  air)            Physical Exam  Vitals and nursing note reviewed.   Constitutional:       General: She is not in acute distress.     Appearance: Normal appearance. She is obese. She is not ill-appearing, toxic-appearing or diaphoretic.   Eyes:      Conjunctiva/sclera: Conjunctivae normal.      Pupils: Pupils are equal, round, and reactive to light.   Cardiovascular:      Rate and Rhythm: Normal rate.      Pulses: Normal pulses.   Pulmonary:      Effort: Pulmonary effort is normal.   Skin:            Comments: Inner thighs and groin excoriation with candidal dermatitis, scattered satellite lesions   Neurological:      Mental Status: She is alert.             ED Course   Labs Reviewed - No data to display               MDM                                   Medical Decision Making  Differentials include but are not limited to tinea, cellulitis, folliculitis.  Presentation consistent with tinea.  Encouraged to wear white, cotton underwear, hygiene discussed, topical terbinafine and clotrimazole-betamethasone.  Encouraged use of Desitin maximum strength as skin protectant instead of Vaseline.  Close outpatient follow-up with PCP.  We discussed possible need of dermatology eval due to chronicity of rash.  Patient agrees with plan of care.  All questions answered to patient's satisfaction.        Disposition and Plan     Clinical Impression:  1. Tinea cruris         Disposition:  Discharge  5/9/2024 11:08 am    Follow-up:  Raphael Farley,   [address] [address]  [phone]    In 1 week            Medications Prescribed:  Discharge Medication List as of 5/9/2024 11:09 AM        START taking these medications    Details   terbinafine 1 % External Cream Apply 1 Application topically in the morning and 1 Application before bedtime., Normal, Disp-42 g, R-2      clotrimazole-betamethasone 1-0.05 % External Cream Apply 1 Application topically daily for 14 days., Normal, Disp-60 g, R-3

## 2024-05-09 NOTE — Clinical Note
Desitin Maximum strength as skin protectant  Wear cotton, white underwear  Loose clothes  Control sugars

## 2024-06-03 ENCOUNTER — OFFICE VISIT (OUTPATIENT)
Dept: FAMILY MEDICINE CLINIC | Facility: CLINIC | Age: 49
End: 2024-06-03
Payer: MEDICARE

## 2024-06-03 VITALS
OXYGEN SATURATION: 96 % | TEMPERATURE: 97 F | BODY MASS INDEX: 41.7 KG/M2 | HEART RATE: 93 BPM | RESPIRATION RATE: 16 BRPM | HEIGHT: 64.5 IN | WEIGHT: 247.25 LBS | SYSTOLIC BLOOD PRESSURE: 136 MMHG | DIASTOLIC BLOOD PRESSURE: 82 MMHG

## 2024-06-03 DIAGNOSIS — B35.6 TINEA CRURIS: Primary | ICD-10-CM

## 2024-06-03 DIAGNOSIS — M54.50 CHRONIC MIDLINE LOW BACK PAIN WITHOUT SCIATICA: ICD-10-CM

## 2024-06-03 DIAGNOSIS — G89.29 CHRONIC MIDLINE LOW BACK PAIN WITHOUT SCIATICA: ICD-10-CM

## 2024-06-03 PROCEDURE — 99214 OFFICE O/P EST MOD 30 MIN: CPT | Performed by: FAMILY MEDICINE

## 2024-06-03 PROCEDURE — 3079F DIAST BP 80-89 MM HG: CPT | Performed by: FAMILY MEDICINE

## 2024-06-03 PROCEDURE — 3075F SYST BP GE 130 - 139MM HG: CPT | Performed by: FAMILY MEDICINE

## 2024-06-03 PROCEDURE — 3008F BODY MASS INDEX DOCD: CPT | Performed by: FAMILY MEDICINE

## 2024-06-03 NOTE — PROGRESS NOTES
Subjective:   Patient ID: Radha Murguia is a 49 year old female.    HPI  49yr old morbidly, obese female presents with c/o persistent rash and chronic LBP.   C/o rash in her groin area that has been on and off for the past few years. Has worsened over the past few months. She was seen by her gyne and prescribed nystatin cream but states it did not help. Rash is sometimes itchy.   Chronic LBP, follows with pain management, Dr. Mari. States prolonged walking or standing causes her pain. Needs disability parking placard. Will be moving to a new facility and parking is a problem there.        History/Other:   Review of Systems   Musculoskeletal:  Positive for back pain.   Skin:  Positive for rash.   Neurological:  Negative for weakness and numbness.     Current Outpatient Medications   Medication Sig Dispense Refill    Ciclopirox Olamine 0.77 % External Cream Apply to affected area twice daily for 1-4wks 90 g 1    PARoxetine (PAXIL) 20 MG Oral Tab Take 1 tablet (20 mg total) by mouth daily. 30+20mg= 50mg daily 90 tablet 1    PARoxetine 30 MG Oral Tab Take 1 tablet (30 mg total) by mouth daily. Take with 20mg tab= 50mg daily 90 tablet 1    QUEtiapine 100 MG Oral Tab Take 1 tablet (100 mg total) by mouth nightly. 90 tablet 1    gemfibrozil 600 MG Oral Tab Take 1 tablet (600 mg total) by mouth 2 (two) times daily before meals. 180 tablet 1    atorvastatin 40 MG Oral Tab Take 1 tablet (40 mg total) by mouth every evening. 90 tablet 1    telmisartan 80 MG Oral Tab Take 1 tablet (80 mg total) by mouth daily. 90 tablet 1    Multiple Vitamins-Minerals (MULTIVITAMIN WOMENS 50+ ADV) Oral Tab Take 1 tablet by mouth daily with food. 90 tablet 1    metFORMIN  MG Oral Tablet 24 Hr Take 1 tablet (750 mg total) by mouth 2 (two) times daily with meals. 180 tablet 0    hydrALAZINE 25 MG Oral Tab Take 1 tablet (25 mg total) by mouth 2 (two) times daily. 180 tablet 1    Blood Glucose Monitoring Suppl (TRUE METRIX METER) Does not  apply Device 1 Device by Other route daily. To check blood sugar 1 each 0    Glucose Blood (TRUE METRIX BLOOD GLUCOSE TEST) In Vitro Strip Use to check blood sugar daily 100 strip 2    OneTouch UltraSoft 2 Lancets Does not apply Misc 1 Lancet daily. Use to test blood sugar daily 100 each 2    fexofenadine (ALLEGRA ALLERGY) 180 MG Oral Tab Take 1 tablet (180 mg total) by mouth daily.      semaglutide (OZEMPIC, 2 MG/DOSE,) 8 MG/3ML Subcutaneous Solution Pen-injector Inject 2 mg into the skin once a week. On Wednesday ( took on 3/6/24) (Patient not taking: Reported on 6/3/2024)      chlorhexidine gluconate 0.12 % Mouth/Throat Solution RINSE 15 ML'S TWICE DAILY AFTER BREAKFAST/BEFORE BEDTIME FOLLOWING BRUSHING AND FLOSSING SPIT-OUT (Patient not taking: Reported on 6/3/2024)       Allergies:  Allergies   Allergen Reactions    Other Runny nose     Seasonal         Objective:   Physical Exam  Vitals and nursing note reviewed.   Constitutional:       Appearance: Normal appearance. She is obese.   HENT:      Head: Normocephalic and atraumatic.   Cardiovascular:      Rate and Rhythm: Normal rate and regular rhythm.   Pulmonary:      Effort: Pulmonary effort is normal.      Breath sounds: Normal breath sounds. No wheezing, rhonchi or rales.   Musculoskeletal:      Lumbar back: Spasms and tenderness present. No bony tenderness. Negative right straight leg raise test and negative left straight leg raise test.   Skin:     General: Skin is warm and dry.      Comments: Erythematous rash noted in inner thighs and buttock region with scattered satellite lesions, consistent with candida   Neurological:      Mental Status: She is alert.         Assessment & Plan:   1. Tinea cruris  - advised symptomatic tx: keep area clean/dry, avoid tight fitting clothing, wear cotton underwear and monitor  - will tx with ciclopirox, reviewed indications, dosing and SEs  - refer to Dr. Vikas ledezma  - Ciclopirox Olamine 0.77 % External Cream; Apply to  affected area twice daily for 1-4wks  Dispense: 90 g; Refill: 1  - Derm Referral - In Network    2. Chronic midline low back pain without sciatica  - advised to cont symptomatic tx  - following with pain management  - provided disability parking placard   - monitor symptoms    She understands and agrees with tx plan  RTC as needed    No orders of the defined types were placed in this encounter.      Meds This Visit:  Requested Prescriptions     Signed Prescriptions Disp Refills    Ciclopirox Olamine 0.77 % External Cream 90 g 1     Sig: Apply to affected area twice daily for 1-4wks       Imaging & Referrals:  [facility] - INTERNAL

## 2024-06-07 DIAGNOSIS — I10 ESSENTIAL HYPERTENSION: ICD-10-CM

## 2024-06-07 DIAGNOSIS — Z51.81 ENCOUNTER FOR THERAPEUTIC DRUG MONITORING: ICD-10-CM

## 2024-06-07 DIAGNOSIS — E66.01 CLASS 3 SEVERE OBESITY WITH SERIOUS COMORBIDITY AND BODY MASS INDEX (BMI) OF 45.0 TO 49.9 IN ADULT, UNSPECIFIED OBESITY TYPE (HCC): ICD-10-CM

## 2024-06-07 DIAGNOSIS — E66.9 TYPE 2 DIABETES MELLITUS WITH OBESITY (HCC): ICD-10-CM

## 2024-06-07 DIAGNOSIS — E78.2 MIXED HYPERLIPIDEMIA: ICD-10-CM

## 2024-06-07 DIAGNOSIS — E11.69 TYPE 2 DIABETES MELLITUS WITH OBESITY (HCC): ICD-10-CM

## 2024-06-08 NOTE — TELEPHONE ENCOUNTER
Requesting   Requested Prescriptions     Pending Prescriptions Disp Refills    OZEMPIC, 2 MG/DOSE, 8 MG/3ML Subcutaneous Solution Pen-injector [Pharmacy Med Name: OZEMPIC 8 MG/3ML Solution Pen-injector] 9 each 3     Sig: INJECT 2MG UNDER THE SKIN ONE TIME WEEKLY     LOV: 10/3/23  RTC: 2-3 months  Filled: 12/15/23 #9 with 0 refills    Future Appointments   Date Time Provider Department Center   8/1/2024  2:00 PM Jazz Cruz APRN [facility]   9/26/2024 11:00 AM Latonia Levy APRN [facility]

## 2024-06-10 RX ORDER — METFORMIN HYDROCHLORIDE 750 MG/1
750 TABLET, EXTENDED RELEASE ORAL 2 TIMES DAILY WITH MEALS
Qty: 180 TABLET | Refills: 0 | OUTPATIENT
Start: 2024-06-10

## 2024-06-10 RX ORDER — METFORMIN HYDROCHLORIDE 750 MG/1
750 TABLET, EXTENDED RELEASE ORAL 2 TIMES DAILY WITH MEALS
Qty: 180 TABLET | Refills: 3 | Status: SHIPPED | OUTPATIENT
Start: 2024-06-10

## 2024-06-10 RX ORDER — SEMAGLUTIDE 2.68 MG/ML
INJECTION, SOLUTION SUBCUTANEOUS
Qty: 9 EACH | Refills: 3 | OUTPATIENT
Start: 2024-06-10

## 2024-06-10 NOTE — TELEPHONE ENCOUNTER
Patient is diabetic  She cancelled appts 1/22 and 1/24  Provider cancelled 12/20/24    She has not had ozempic for months.  She is taking metformin.  I have denied the 2 mg dose as she cannot have that.  Do you want her to wait until September to resume?

## 2024-06-10 NOTE — TELEPHONE ENCOUNTER
Refill passed per protocol.    Requested Prescriptions   Pending Prescriptions Disp Refills    METFORMIN  MG Oral Tablet 24 Hr [Pharmacy Med Name: METFORMIN HYDROCHLORIDE  MG Tablet Extended Release 24 Hour] 180 tablet 3     Sig: TAKE 1 TABLET TWICE DAILY WITH MEALS       Diabetes Medication Protocol Passed - 6/7/2024  2:13 PM        Passed - Last A1C < 7.5 and within past 6 months     Lab Results   Component Value Date    A1C 6.4 (H) 02/22/2024             Passed - In person appointment or virtual visit in the past 6 mos or appointment in next 3 mos     Recent Outpatient Visits              1 week ago Tinea cruris    [address] MiguelitoRaphael morales,     Office Visit    1 month ago Status post hysteroscopy    [facility] - OB/GYN Ludmila Merino MD    Office Visit    2 months ago Acute suppurative otitis media of right ear without spontaneous rupture of tympanic membrane, recurrence not specified    [address] Raphael Farley,     Office Visit    3 months ago PMB (postmenopausal bleeding)    [facility] - OB/Ludmila Gomez MD    Office Visit    4 months ago Herpes zoster without complication    [address] MiguelitoRaphael maier, DO    Office Visit          Future Appointments         Provider Department Appt Notes    In 3 months Latonia Levy APRN [address] I am out of medicine                    Passed - Microalbumin procedure in past 12 months or taking ACE/ARB        Passed - EGFRCR or GFRNAA > 50     GFR Evaluation  EGFRCR: 108 , resulted on 3/11/2024          Passed - GFR in the past 12 months             Future Appointments         Provider Department Appt Notes    In 3 months Latonia Levy APRN [facility], [address] [place] [address] I am out of medicine          Recent Outpatient Visits              1 week ago Tinrick cancholauris    [address]    Office Visit    1 month ago Status post hysteroscopy    [facility] - OB/GYN Ludmila Merino MD    Office Visit    2 months ago Acute suppurative otitis media of right ear without spontaneous rupture of tympanic membrane, recurrence not specified    [address]    Office Visit    3 months ago PMB (postmenopausal bleeding)    [facility] - OB/GYN Ludmila Merino MD    Office Visit    4 months ago Herpes zoster without complication    [facility], [address] [place] [address] MiguelitoRaphael, DO    Office Visit

## 2024-06-11 ENCOUNTER — LAB ENCOUNTER (OUTPATIENT)
Dept: LAB | Age: 49
End: 2024-06-11
Attending: STUDENT IN AN ORGANIZED HEALTH CARE EDUCATION/TRAINING PROGRAM
Payer: MEDICARE

## 2024-06-11 DIAGNOSIS — G71.038 LIMB-GIRDLE MUSCULAR DYSTROPHY R21 ASSOCIATED WITH MUTATION IN POGLUT1 GENE (HCC): ICD-10-CM

## 2024-06-11 DIAGNOSIS — Z79.899 NEED FOR PROPHYLACTIC CHEMOTHERAPY: Primary | ICD-10-CM

## 2024-06-11 DIAGNOSIS — B35.8: ICD-10-CM

## 2024-06-11 LAB
ALBUMIN SERPL-MCNC: 4.3 G/DL (ref 3.4–5)
ALBUMIN/GLOB SERPL: 1.1 {RATIO} (ref 1–2)
ALP LIVER SERPL-CCNC: 72 U/L
ALT SERPL-CCNC: 34 U/L
ANION GAP SERPL CALC-SCNC: 7 MMOL/L (ref 0–18)
AST SERPL-CCNC: 58 U/L (ref 15–37)
BILIRUB SERPL-MCNC: 0.3 MG/DL (ref 0.1–2)
BUN BLD-MCNC: 17 MG/DL (ref 9–23)
CALCIUM BLD-MCNC: 10.6 MG/DL (ref 8.5–10.1)
CHLORIDE SERPL-SCNC: 107 MMOL/L (ref 98–112)
CO2 SERPL-SCNC: 26 MMOL/L (ref 21–32)
CREAT BLD-MCNC: 0.82 MG/DL
EGFRCR SERPLBLD CKD-EPI 2021: 87 ML/MIN/1.73M2 (ref 60–?)
FASTING STATUS PATIENT QL REPORTED: YES
GLOBULIN PLAS-MCNC: 4 G/DL (ref 2.8–4.4)
GLUCOSE BLD-MCNC: 191 MG/DL (ref 70–99)
OSMOLALITY SERPL CALC.SUM OF ELEC: 297 MOSM/KG (ref 275–295)
POTASSIUM SERPL-SCNC: 4.6 MMOL/L (ref 3.5–5.1)
PROT SERPL-MCNC: 8.3 G/DL (ref 6.4–8.2)
SODIUM SERPL-SCNC: 140 MMOL/L (ref 136–145)

## 2024-06-11 PROCEDURE — 36415 COLL VENOUS BLD VENIPUNCTURE: CPT

## 2024-06-11 PROCEDURE — 80053 COMPREHEN METABOLIC PANEL: CPT

## 2024-06-14 ENCOUNTER — APPOINTMENT (OUTPATIENT)
Dept: CT IMAGING | Facility: HOSPITAL | Age: 49
End: 2024-06-14
Attending: EMERGENCY MEDICINE

## 2024-06-14 ENCOUNTER — HOSPITAL ENCOUNTER (EMERGENCY)
Facility: HOSPITAL | Age: 49
Discharge: HOME | End: 2024-06-14
Attending: EMERGENCY MEDICINE

## 2024-06-14 ENCOUNTER — NURSE TRIAGE (OUTPATIENT)
Dept: INTERNAL MEDICINE CLINIC | Facility: CLINIC | Age: 49
End: 2024-06-14

## 2024-06-14 VITALS
OXYGEN SATURATION: 99 % | RESPIRATION RATE: 16 BRPM | SYSTOLIC BLOOD PRESSURE: 141 MMHG | HEIGHT: 65 IN | BODY MASS INDEX: 41.15 KG/M2 | HEART RATE: 89 BPM | WEIGHT: 247 LBS | TEMPERATURE: 99 F | DIASTOLIC BLOOD PRESSURE: 89 MMHG

## 2024-06-14 DIAGNOSIS — M54.50 ACUTE LOW BACK PAIN, UNSPECIFIED BACK PAIN LATERALITY, UNSPECIFIED WHETHER SCIATICA PRESENT: ICD-10-CM

## 2024-06-14 DIAGNOSIS — R51.9 ACUTE NONINTRACTABLE HEADACHE, UNSPECIFIED HEADACHE TYPE: Primary | ICD-10-CM

## 2024-06-14 LAB
ALBUMIN SERPL-MCNC: 4.1 G/DL (ref 3.4–5)
ALBUMIN/GLOB SERPL: 0.9 {RATIO} (ref 1–2)
ALP LIVER SERPL-CCNC: 86 U/L
ALT SERPL-CCNC: 38 U/L
ANION GAP SERPL CALC-SCNC: 7 MMOL/L (ref 0–18)
AST SERPL-CCNC: 45 U/L (ref 15–37)
BASOPHILS # BLD AUTO: 0.03 X10(3) UL (ref 0–0.2)
BASOPHILS NFR BLD AUTO: 0.5 %
BILIRUB SERPL-MCNC: 0.4 MG/DL (ref 0.1–2)
BUN BLD-MCNC: 15 MG/DL (ref 9–23)
CALCIUM BLD-MCNC: 9.6 MG/DL (ref 8.5–10.1)
CHLORIDE SERPL-SCNC: 102 MMOL/L (ref 98–112)
CO2 SERPL-SCNC: 26 MMOL/L (ref 21–32)
CREAT BLD-MCNC: 0.9 MG/DL
EGFRCR SERPLBLD CKD-EPI 2021: 77 ML/MIN/1.73M2 (ref 60–?)
EOSINOPHIL # BLD AUTO: 0.12 X10(3) UL (ref 0–0.7)
EOSINOPHIL NFR BLD AUTO: 2 %
ERYTHROCYTE [DISTWIDTH] IN BLOOD BY AUTOMATED COUNT: 13.4 %
GLOBULIN PLAS-MCNC: 4.4 G/DL (ref 2.8–4.4)
GLUCOSE BLD-MCNC: 274 MG/DL (ref 70–99)
HCT VFR BLD AUTO: 36.5 %
HGB BLD-MCNC: 12.7 G/DL
IMM GRANULOCYTES # BLD AUTO: 0.03 X10(3) UL (ref 0–1)
IMM GRANULOCYTES NFR BLD: 0.5 %
LYMPHOCYTES # BLD AUTO: 2.1 X10(3) UL (ref 1–4)
LYMPHOCYTES NFR BLD AUTO: 35.8 %
MCH RBC QN AUTO: 31.6 PG (ref 26–34)
MCHC RBC AUTO-ENTMCNC: 34.8 G/DL (ref 31–37)
MCV RBC AUTO: 90.8 FL
MONOCYTES # BLD AUTO: 0.29 X10(3) UL (ref 0.1–1)
MONOCYTES NFR BLD AUTO: 4.9 %
NEUTROPHILS # BLD AUTO: 3.3 X10 (3) UL (ref 1.5–7.7)
NEUTROPHILS # BLD AUTO: 3.3 X10(3) UL (ref 1.5–7.7)
NEUTROPHILS NFR BLD AUTO: 56.3 %
OSMOLALITY SERPL CALC.SUM OF ELEC: 291 MOSM/KG (ref 275–295)
PLATELET # BLD AUTO: 317 10(3)UL (ref 150–450)
POTASSIUM SERPL-SCNC: 4.8 MMOL/L (ref 3.5–5.1)
PROT SERPL-MCNC: 8.5 G/DL (ref 6.4–8.2)
RBC # BLD AUTO: 4.02 X10(6)UL
SODIUM SERPL-SCNC: 135 MMOL/L (ref 136–145)
WBC # BLD AUTO: 5.9 X10(3) UL (ref 4–11)

## 2024-06-14 PROCEDURE — 70450 CT HEAD/BRAIN W/O DYE: CPT | Performed by: EMERGENCY MEDICINE

## 2024-06-14 PROCEDURE — 80053 COMPREHEN METABOLIC PANEL: CPT | Performed by: EMERGENCY MEDICINE

## 2024-06-14 PROCEDURE — 85025 COMPLETE CBC W/AUTO DIFF WBC: CPT | Performed by: EMERGENCY MEDICINE

## 2024-06-14 PROCEDURE — 99284 EMERGENCY DEPT VISIT MOD MDM: CPT

## 2024-06-14 PROCEDURE — 96375 TX/PRO/DX INJ NEW DRUG ADDON: CPT

## 2024-06-14 PROCEDURE — 96374 THER/PROPH/DIAG INJ IV PUSH: CPT

## 2024-06-14 RX ORDER — KETOROLAC TROMETHAMINE 15 MG/ML
15 INJECTION, SOLUTION INTRAMUSCULAR; INTRAVENOUS ONCE
Status: COMPLETED | OUTPATIENT
Start: 2024-06-14 | End: 2024-06-14

## 2024-06-14 RX ORDER — NAPROXEN 500 MG/1
500 TABLET ORAL 2 TIMES DAILY PRN
Qty: 20 TABLET | Refills: 0 | Status: SHIPPED | OUTPATIENT
Start: 2024-06-14 | End: 2024-06-21

## 2024-06-14 RX ORDER — METOCLOPRAMIDE HYDROCHLORIDE 5 MG/ML
10 INJECTION INTRAMUSCULAR; INTRAVENOUS ONCE
Status: COMPLETED | OUTPATIENT
Start: 2024-06-14 | End: 2024-06-14

## 2024-06-14 RX ORDER — KETOROLAC TROMETHAMINE 15 MG/ML
15 INJECTION, SOLUTION INTRAMUSCULAR; INTRAVENOUS ONCE
Status: DISCONTINUED | OUTPATIENT
Start: 2024-06-14 | End: 2024-06-14

## 2024-06-14 RX ORDER — DIPHENHYDRAMINE HYDROCHLORIDE 50 MG/ML
12.5 INJECTION INTRAMUSCULAR; INTRAVENOUS ONCE
Status: COMPLETED | OUTPATIENT
Start: 2024-06-14 | End: 2024-06-14

## 2024-06-14 NOTE — TELEPHONE ENCOUNTER
Action Requested: Summary for Provider     []  Critical Lab, Recommendations Needed  [] Need Additional Advice  [x]   FYI    []   Need Orders  [] Need Medications Sent to Pharmacy  []  Other     SUMMARY: Received call from pt. Per pt, since this morning she has had a sharp pain on the side of her head, stated it was difficult to wake up/get out of bet. Throughout the day, she said she has been stumbling as she walks. For example, was walking out of the kitchen and stumbled into the chair. Intermittent lightheadedness. Given sxs, advised to be seen in ER today. Advised to have family/friend drive her. Pt initially stated she does not need to go to the ER, but after explaining importance given sxs, she said she is going to call her friend to ask her to take her.     Reason for call: Sick Call  Onset: Data Unavailable                 Reason for Disposition   Nursing judgment    Protocols used: No Protocol [id number]

## 2024-06-14 NOTE — ED INITIAL ASSESSMENT (HPI)
To the ED with sharp pain to the left side of her head. Has been there for a few weeks. Hard to eat d/t the pain. Hx of same a year or so ago.

## 2024-06-15 NOTE — ED PROVIDER NOTES
Patient Seen in: [facility] Emergency Department      History     Chief Complaint   Patient presents with    Headache     Stated Complaint: pain head, diff walking    Subjective:   HPI    2 weeks of left-sided headache.  Wax and wanes.  Not present all the time.  Comes on slowly, reaches peak intensity over hours.  No photophobia, no nausea no vomiting.    Separately reports acute on chronic lower back pain.  Radiates down her buttock bilaterally.    No numbness or weakness.  She states that from the pain there are times when she walks unsteadily and has been bumping into things.      No vertigo, no numbness or weakness in her arms or legs.  No vision changes.    Objective:   Past Medical History:    Allergic rhinitis    Anxiety state, unspecified    Asthma (HCC)    Back problem    lower back pain- sees chiro    Bad breath    Belching    Bipolar affective (HCC)    patient denies    Bleeding nose    Breast CA (HCC)    DCIS    Cancer (HCC)    left breast    Chest pain    Chronic cough    Community acquired pneumonia    COPD (chronic obstructive pulmonary disease) (HCC)    Coronavirus infection    Disorder of liver    fatty liver    Ductal carcinoma in situ of breast    Esophageal reflux    Essential hypertension    Exposure to medical diagnostic radiation    finished in 2015    Headache, chronic daily    Hemorrhoids    High blood pressure    High cholesterol    Hyperlipidemia    Hypoxia    Indigestion    Leaking of urine    Mild mental retardation    Mouth sores    Obstructive apnea    CPAP 14 Sarina's    Parainfluenza infection    Sleep apnea    cpap    Type II or unspecified type diabetes mellitus without mention of complication, not stated as uncontrolled              Past Surgical History:   Procedure Laterality Date    Breast lumpectomy Left 06/11/2015    Procedure: BREAST LUMPECTOMY;  Surgeon: Paul Miller MD;  Location:  MAIN OR    Carpal tunnel release Bilateral     Colonoscopy N/A 11/23/2021     Procedure: COLONOSCOPY with polypectomy, ESOPHAGOGASTRODUODENOSCOPY (EGD) with biopsies;  Surgeon: Akbar Perla MD;  Location:  ENDOSCOPY    Hernia surgery      BABY    Lumpectomy left Left 05/11/2015    DCIS;papilloma    Joanie biopsy stereo w/calc 1 site left (cpt=19081) Left 04/2015    DCIS; papilloma    Needle biopsy liver      Orthopedic surg (pbp) Bilateral     MANDA CARPAL ISHAAN RELEASE    Other  LEFT FOOT SPUR REMOVAL    Other surgical history      bilateral carpal tunnel release    Radiation left Left 2015    lump with rad    Stereotactic breast biopsy Left 4/21/15 [facility]    Left Breast                Social History     Socioeconomic History    Marital status: Single   Occupational History    Occupation: works part-time   Tobacco Use    Smoking status: Never     Passive exposure: Never    Smokeless tobacco: Never   Vaping Use    Vaping status: Never Used   Substance and Sexual Activity    Alcohol use: No    Drug use: No    Sexual activity: Never   Other Topics Concern    Caffeine Concern No    Exercise No    Seat Belt No    Special Diet No    Stress Concern No    Weight Concern Yes     Comment: Working on weight loss   Social History Narrative    Lives  In [place] with mother              Review of Systems    Positive for stated complaint: pain head, diff walking  Other systems are as noted in HPI.  Constitutional and vital signs reviewed.      All other systems reviewed and negative except as noted above.    Physical Exam     ED Triage Vitals [06/14/24 1447]   BP (!) 147/93   Pulse 95   Resp 14   Temp 98.6 °F (37 °C)   Temp src Oral   SpO2 100 %   O2 Device None (Room air)       Current Vitals:   Vital Signs  BP: 141/89  Pulse: 89  Resp: 16  Temp: 98.6 °F (37 °C)  Temp src: Oral    Oxygen Therapy  SpO2: 99 %  O2 Device: None (Room air)            Physical Exam      Constitutional:  Appears well-developed and well-nourished. no Distress.  Head: Normocephalic and atraumatic.   Nose: Nose normal.    Eyes: EOM are normal. Pupils are equal, round, and reactive to light.   Neck: Normal range of motion. Neck supple. No JVD present.   Cardiovascular: Normal rate and regular rhythm.    Pulmonary/Chest: Effort normal and breath sounds normal. No stridor.   Abdominal: Soft. There is no tenderness. There is no guarding.   Musculoskeletal: Exhibits no edema or tenderness.     Neurological: Pt is oriented to person, place, and time.    There are no cranial nerve deficits.    There is no nystagmus.  Speech is fluent and not slurred.   There is no word finding difficulty.    No neglect. No gaze  No visual field deficit.    There is no pronator drift.    Strength is 5 out of 5 in the upper extremities bilaterally.    Sensation is symmetric in the upper extremities and not diminished.    There is no lower extremity drift  Sensation is normal in the lower extremities and not diminished.  It is intact to fine touch.    There is full strength with hip flexion, knee flexion and extension, ankle plantarflexion, and at the EHL.  This is true bilaterally.      Finger to nose intact.  Heel to shin intact.      Skin: Skin is warm and dry.   Psychiatric: Normal mood and affect. Thought content normal.       Patient able to get out of bed on her own and walks with a steady gait.  There is no midline cervical thoracic or lumbar spinal tenderness.    ED Course     Labs Reviewed   COMP METABOLIC PANEL (14) - Abnormal; Notable for the following components:       Result Value    Glucose 274 (*)     Sodium 135 (*)     AST 45 (*)     Total Protein 8.5 (*)     A/G Ratio 0.9 (*)     All other components within normal limits   CBC WITH DIFFERENTIAL WITH PLATELET    Narrative:     The following orders were created for panel order CBC With Differential With Platelet.  Procedure                               Abnormality         Status                     ---------                               -----------         ------                     CBC W/  DIFFERENTIAL[[id number]]                              Final result                 Please view results for these tests on the individual orders.   RAINBOW DRAW LAVENDER   RAINBOW DRAW LIGHT GREEN   RAINBOW DRAW BLUE   RAINBOW DRAW GOLD   CBC W/ DIFFERENTIAL             I reviewed neurology notes from 2022.  She had an MRI of her brain done that was normal, additionally was diagnosed with migraine headaches.  In 2023 she had an MRI of her lumbar spine done that that showed some mild degenerative changes.        Her exam today does not have any neurological deficits.    CT BRAIN OR HEAD (24142)    Result Date: 6/14/2024  CONCLUSION:  1. No acute process suggested. 2. Mild atrophy and white matter disease noted, consistent with chronic small vessel ischemic changes.    LOCATION:  [facility]   Dictated by (JEFFREY): Praveen Ramachandran DO on 6/14/2024 at 4:12 PM     Finalized by (JEFFREY): Praveen Ramachandran DO on 6/14/2024 at 4:14 PM      Basic labs acceptable         MDM          Differential diagnoses considered: Migraine headache, lumbar radiculopathy, antalgic gait.  Headache pattern not consistent with subarachnoid hemorrhage or tumor injection or meningitis.-  -No neurological deficit on exam ambulates with a steady gait.  -CT brain negative  -Do not suspect that her unsteady gait is related to her headache.  Suspect it might be more due to her back issues.    -Short course of naproxen      I visualized the radiology studies, my independent interpretation: No intracranial hemorrhage noted on CT scan of brain    *Discussion of ongoing management of this patient's care included: n/a  *Comorbidities contributing to the complexity of decision making: n/a  *External charts reviewed: Records from neurology in 2022 as well as spine surgery in 2023 as noted above  *Additional sources of history: n/a  *Prescription for naproxen provided    Shared decision making was done by: patient, myself.                                     Medical Decision  Making      Disposition and Plan     Clinical Impression:  1. Acute nonintractable headache, unspecified headache type    2. Acute low back pain, unspecified back pain laterality, unspecified whether sciatica present         Disposition:  Discharge  6/14/2024  4:44 pm    Follow-up:  Raphael Farley DO  [address] [facility]  [address]  [facility] [address]  [phone]    Follow up            Medications Prescribed:  Discharge Medication List as of 6/14/2024  5:04 PM        START taking these medications    Details   naproxen 500 MG Oral Tab Take 1 tablet (500 mg total) by mouth 2 (two) times daily as needed., Normal, Disp-20 tablet, R-0

## 2024-06-19 ENCOUNTER — MED REC SCAN ONLY (OUTPATIENT)
Dept: FAMILY MEDICINE CLINIC | Facility: CLINIC | Age: 49
End: 2024-06-19

## 2024-06-19 ENCOUNTER — PATIENT OUTREACH (OUTPATIENT)
Dept: CASE MANAGEMENT | Age: 49
End: 2024-06-19

## 2024-06-19 NOTE — PROGRESS NOTES
1st attempt ER f/up apt request    Raphael Farley  PCP  [address]  [address]  [facility] [address]  [phone]  Apt: June 20 @1pm    Confirmed w/ pt  Closing encounter

## 2024-06-20 ENCOUNTER — OFFICE VISIT (OUTPATIENT)
Dept: FAMILY MEDICINE CLINIC | Facility: CLINIC | Age: 49
End: 2024-06-20

## 2024-06-20 VITALS
WEIGHT: 254 LBS | DIASTOLIC BLOOD PRESSURE: 82 MMHG | BODY MASS INDEX: 42.32 KG/M2 | RESPIRATION RATE: 16 BRPM | TEMPERATURE: 98 F | SYSTOLIC BLOOD PRESSURE: 138 MMHG | OXYGEN SATURATION: 99 % | HEIGHT: 65 IN | HEART RATE: 94 BPM

## 2024-06-20 DIAGNOSIS — R51.9 CHRONIC NONINTRACTABLE HEADACHE, UNSPECIFIED HEADACHE TYPE: Primary | ICD-10-CM

## 2024-06-20 DIAGNOSIS — G89.29 CHRONIC NONINTRACTABLE HEADACHE, UNSPECIFIED HEADACHE TYPE: Primary | ICD-10-CM

## 2024-06-20 PROCEDURE — 3075F SYST BP GE 130 - 139MM HG: CPT | Performed by: FAMILY MEDICINE

## 2024-06-20 PROCEDURE — 3079F DIAST BP 80-89 MM HG: CPT | Performed by: FAMILY MEDICINE

## 2024-06-20 PROCEDURE — 3008F BODY MASS INDEX DOCD: CPT | Performed by: FAMILY MEDICINE

## 2024-06-20 PROCEDURE — 99214 OFFICE O/P EST MOD 30 MIN: CPT | Performed by: FAMILY MEDICINE

## 2024-06-20 RX ORDER — KETOCONAZOLE 20 MG/G
CREAM TOPICAL
COMMUNITY
Start: 2024-06-08

## 2024-06-20 RX ORDER — KETOCONAZOLE 20 MG/ML
SHAMPOO TOPICAL
COMMUNITY
Start: 2024-06-08

## 2024-06-20 RX ORDER — BUTALBITAL, ACETAMINOPHEN AND CAFFEINE 300; 40; 50 MG/1; MG/1; MG/1
1 CAPSULE ORAL EVERY 6 HOURS PRN
Qty: 30 CAPSULE | Refills: 0 | Status: SHIPPED | OUTPATIENT
Start: 2024-06-20

## 2024-06-20 RX ORDER — GRISEOFULVIN 250 MG/1
500 TABLET ORAL 2 TIMES DAILY WITH MEALS
COMMUNITY
Start: 2024-06-15

## 2024-06-20 NOTE — PROGRESS NOTES
Subjective:   Patient ID: Radha Murguia is a 49 year old female.    HPI  49yr old female presents for f/u after recent ER visit for headaches. States she has been having headaches on and off for the past couple months. Located in the frontal, temporal or all over her head. Denies any photophobia, phonophobia, nausea or vomiting. Tried naproxen but did not help with pain. Occurring daily. Requesting referral to neurology.       History/Other:   Review of Systems   Eyes:  Negative for photophobia and visual disturbance.   Gastrointestinal:  Negative for nausea and vomiting.   Neurological:  Positive for headaches. Negative for dizziness and light-headedness.   Psychiatric/Behavioral:  Negative for sleep disturbance.      Current Outpatient Medications   Medication Sig Dispense Refill    ketoconazole 2 % External Shampoo every 28 days. FOR 21 DAYS IN, THEN REMOVE FOR 7 DAYS      ketoconazole 2 % External Cream 1(ONE) APPLICATION(S) TOPICAL 2(TWO) TIMES A DAY      Griseofulvin Microsize 500 MG Oral Tab Take 1 tablet (500 mg total) by mouth 2 (two) times daily with meals.      Butalbital-APAP-Caffeine -40 MG Oral Cap Take 1 capsule by mouth every 6 (six) hours as needed for Pain or Headaches. 30 capsule 0    metFORMIN  MG Oral Tablet 24 Hr Take 1 tablet (750 mg total) by mouth 2 (two) times daily with meals. 180 tablet 3    Ciclopirox Olamine 0.77 % External Cream Apply to affected area twice daily for 1-4wks 90 g 1    PARoxetine (PAXIL) 20 MG Oral Tab Take 1 tablet (20 mg total) by mouth daily. 30+20mg= 50mg daily 90 tablet 1    PARoxetine 30 MG Oral Tab Take 1 tablet (30 mg total) by mouth daily. Take with 20mg tab= 50mg daily 90 tablet 1    QUEtiapine 100 MG Oral Tab Take 1 tablet (100 mg total) by mouth nightly. 90 tablet 1    gemfibrozil 600 MG Oral Tab Take 1 tablet (600 mg total) by mouth 2 (two) times daily before meals. 180 tablet 1    atorvastatin 40 MG Oral Tab Take 1 tablet (40 mg total) by  mouth every evening. 90 tablet 1    telmisartan 80 MG Oral Tab Take 1 tablet (80 mg total) by mouth daily. 90 tablet 1    Multiple Vitamins-Minerals (MULTIVITAMIN WOMENS 50+ ADV) Oral Tab Take 1 tablet by mouth daily with food. 90 tablet 1    hydrALAZINE 25 MG Oral Tab Take 1 tablet (25 mg total) by mouth 2 (two) times daily. 180 tablet 1    Blood Glucose Monitoring Suppl (TRUE METRIX METER) Does not apply Device 1 Device by Other route daily. To check blood sugar 1 each 0    Glucose Blood (TRUE METRIX BLOOD GLUCOSE TEST) In Vitro Strip Use to check blood sugar daily 100 strip 2    OneTouch UltraSoft 2 Lancets Does not apply Misc 1 Lancet daily. Use to test blood sugar daily 100 each 2    fexofenadine (ALLEGRA ALLERGY) 180 MG Oral Tab Take 1 tablet (180 mg total) by mouth daily.      semaglutide (OZEMPIC, 2 MG/DOSE,) 8 MG/3ML Subcutaneous Solution Pen-injector Inject 2 mg into the skin once a week. On Wednesday ( took on 3/6/24) (Patient not taking: Reported on 6/3/2024)      chlorhexidine gluconate 0.12 % Mouth/Throat Solution RINSE 15 ML'S TWICE DAILY AFTER BREAKFAST/BEFORE BEDTIME FOLLOWING BRUSHING AND FLOSSING SPIT-OUT (Patient not taking: Reported on 6/3/2024)       Allergies:  Allergies   Allergen Reactions    Other Runny nose     Seasonal         Objective:   Physical Exam  Vitals and nursing note reviewed.   Constitutional:       Appearance: Normal appearance. She is obese.   HENT:      Head: Normocephalic and atraumatic.      Right Ear: Tympanic membrane normal.      Left Ear: Tympanic membrane normal.   Eyes:      Extraocular Movements: Extraocular movements intact.      Pupils: Pupils are equal, round, and reactive to light.   Cardiovascular:      Rate and Rhythm: Normal rate and regular rhythm.   Pulmonary:      Effort: Pulmonary effort is normal.      Breath sounds: Normal breath sounds. No wheezing, rhonchi or rales.   Musculoskeletal:      Cervical back: Neck supple.   Skin:     General: Skin is warm  and dry.   Neurological:      General: No focal deficit present.      Mental Status: She is alert and oriented to person, place, and time.      Cranial Nerves: No cranial nerve deficit.   Psychiatric:         Mood and Affect: Mood normal.         Behavior: Behavior normal.         Assessment & Plan:   1. Chronic nonintractable headache, unspecified headache type  - neuro exam wnl  - advised to keep well hydrated, eat regularly, balanced meals and get proper sleep  - will provide rx for fioricet, reviewed indications, dosing and SEs  - refer to neuro, Dr. Echeverria for further eval and tx options  - monitor symptoms  - she understands and agrees with tx plan  - RTC as needed  - Neuro Referral - In Network  - Butalbital-APAP-Caffeine -40 MG Oral Cap; Take 1 capsule by mouth every 6 (six) hours as needed for Pain or Headaches.  Dispense: 30 capsule; Refill: 0      Meds This Visit:  Requested Prescriptions     Signed Prescriptions Disp Refills    Butalbital-APAP-Caffeine -40 MG Oral Cap 30 capsule 0     Sig: Take 1 capsule by mouth every 6 (six) hours as needed for Pain or Headaches.       Imaging & Referrals:  NEURO - INTERNAL

## 2024-06-27 ENCOUNTER — OFFICE VISIT (OUTPATIENT)
Dept: INTERNAL MEDICINE CLINIC | Facility: CLINIC | Age: 49
End: 2024-06-27

## 2024-06-27 DIAGNOSIS — E11.69 TYPE 2 DIABETES MELLITUS WITH OBESITY (HCC): ICD-10-CM

## 2024-06-27 DIAGNOSIS — Z51.81 ENCOUNTER FOR THERAPEUTIC DRUG MONITORING: Primary | ICD-10-CM

## 2024-06-27 DIAGNOSIS — I10 ESSENTIAL HYPERTENSION: ICD-10-CM

## 2024-06-27 DIAGNOSIS — E66.9 TYPE 2 DIABETES MELLITUS WITH OBESITY (HCC): ICD-10-CM

## 2024-06-27 DIAGNOSIS — E11.9 TYPE 2 DIABETES MELLITUS WITHOUT COMPLICATION, WITHOUT LONG-TERM CURRENT USE OF INSULIN (HCC): ICD-10-CM

## 2024-06-27 DIAGNOSIS — E78.2 MIXED HYPERLIPIDEMIA: ICD-10-CM

## 2024-06-27 DIAGNOSIS — E66.01 CLASS 3 SEVERE OBESITY WITH SERIOUS COMORBIDITY AND BODY MASS INDEX (BMI) OF 45.0 TO 49.9 IN ADULT, UNSPECIFIED OBESITY TYPE (HCC): ICD-10-CM

## 2024-06-27 PROCEDURE — 3080F DIAST BP >= 90 MM HG: CPT | Performed by: NURSE PRACTITIONER

## 2024-06-27 PROCEDURE — 3077F SYST BP >= 140 MM HG: CPT | Performed by: NURSE PRACTITIONER

## 2024-06-27 PROCEDURE — 3008F BODY MASS INDEX DOCD: CPT | Performed by: NURSE PRACTITIONER

## 2024-06-27 PROCEDURE — 99214 OFFICE O/P EST MOD 30 MIN: CPT | Performed by: NURSE PRACTITIONER

## 2024-06-27 NOTE — PROGRESS NOTES
Radha Murguia is a 49 year old female presents today for follow-up on medical weight loss program for the treatment of overweight, obesity, or morbid obesity with associated Type 2 Diabetes, HTN, hyperlipidemia, KEON, fatty liver.    S:  Current weight   Wt Readings from Last 6 Encounters:   06/27/24 255 lb (115.7 kg)   06/20/24 254 lb (115.2 kg)   06/14/24 247 lb (112 kg)   06/03/24 247 lb 4 oz (112.2 kg)   05/09/24 240 lb (108.9 kg)   04/18/24 253 lb 12.8 oz (115.1 kg)    AND BMI Body mass index is 43.09 kg/m²..    Patient has lost 27# since LOV via VV in 10/2023 and in clinic in 5/2023. She has been off Ozempic x2 months d/t lapse in f/u. She will be moving to [facility] and planning to find to provider closer to home likely with [facility]. She no longer has her dog, had return him to the shelter d/t behavior issues. Reviewed labs in EMR with noted elevated trigs, HgbA1c controlled back in 2/2024. Pt does not report s/s of hypoglycemia or track her BS regularly. Lifestyle log reviewed: stress 2/10, sleeping 7-8 hours/night, Exercise: walking, Nutrition: predominantly processed foods, no produce and minimal protein.    Testing/consult completed since LOV: Dietician: Estimated caloric needs for weight loss: 0421-2188 cals/d for 1-2 pounds/week weight loss    Social hx and PMH reviewed. Employed part time as a . Single, has POA.    REVIEW OF SYSTEMS:  GENERAL: feels well otherwise  LUNGS: denies shortness of breath with exertion  CARDIOVASCULAR: denies chest pain on exertion, denies palpitations or pedal edema  GI: denies abdominal pain.  No N/V/D/C  MUSCULOSKELETAL: no acute pain reported  NEURO: denies headaches or dizziness  PSYCH: denies change in behavior or mood, denies feeling sad or depressed    EXAM:  /90   Pulse 98   Resp 16   Ht 5' 4.5\" (1.638 m)   Wt 255 lb (115.7 kg)   LMP  (LMP Unknown)   BMI 43.09 kg/m²   GENERAL: well developed, well nourished, in no apparent  distress, morbidly obese  EYES: conjunctiva pink, sclera non icteric, PERRLA  LUNGS: CTA in all fields  CARDIO: RRR without murmur, normal S1 and S2 without clicks or gallops, no pedal edema.  GI: +BS  NEURO/MS: motor and sensory grossly intact  PSYCH: pleasant, cooperative, normal mood and affect    ASSESSMENT AND PLAN:  Encounter Diagnoses   Name Primary?    Encounter for therapeutic drug monitoring Yes    Class 3 severe obesity with serious comorbidity and body mass index (BMI) of 45.0 to 49.9 in adult, unspecified obesity type (HCC)     Type 2 diabetes mellitus without complication, without long-term current use of insulin (HCC)     Type 2 diabetes mellitus with obesity (HCC)     Mixed hyperlipidemia     Essential hypertension        No orders of the defined types were placed in this encounter.      Meds & Refills for this Visit:  Requested Prescriptions     Signed Prescriptions Disp Refills    semaglutide 4 MG/3ML Subcutaneous Solution Pen-injector 9 each 0     Sig: Inject 1 mg into the skin once a week. Start this dose after completing Ozempic .5 mg weekly x4 weeks.       Imaging & Consults:  None      Plan:  Patient has lost -27# since LOV in 5/2023 on off Ozempic 2 mg weekly, Metformin  mg BID (PCP) with a total weight loss of 42# since initial consult on 7/18/2019 with initial weight of 292#. Weight loss goal: would like to get weight down to 250# in 6 months and ultimately 150#.  BP elevated today- advise home monitoring and f/u with PCP. Restart Ozempic via sample start as directed and then increase to 1 mg weekly. Avoid Topamax d/t SEs.  on setting up a healthy food environment, meal prep/planning. Anticipated future f/u with new PCP with move coming in July 2024. See patient instructions below for additional plans and patient counseling.      Patient Instructions   Continue making lifestyle changes that focus on good nutrition, regular exercise and stress management.    Medication Plan:  Start sample pen of Ozempic at: .25 mg weekly for 4 weeks, then increase to .5 mg weekly until pen complete, likely x2 weeks, and then increase to Ozempic 1 mg weekly. Follow up with new PCP for increase in dosing and monitoring.    Tips while taking an injectable weight loss medication:    Be an intuitive eater. Listen to your hunger and fullness signals, stopping when you are full.  Consume protein and produce in your day, striving for a rainbow of color of produce.  Reduce portions to staring size of 1 cup size and check in with your gut to see if you are full. Set the timer to slow down your eating pace to allow for 15-20 minutes to complete a meal.  Reduce refined sugars and high fat foods, as they may contribute to greater side effects of nausea and heartburn.  Stop eating 3 hours before bedtime to allow your food to digest.  Remain hydrated with water or non caloric and non caffeine beverages.  Use over the counter diana lozenge/supplement to help reduce nausea if needed.  If you have been off your medication for more than 2 weeks please notify our office to determine next dosing, as return to previous dose may not be appropriate or tolerated.    Next steps to work on before next office visit include:     Your blood pressure was elevated today at 150/90. Please monitor at home and follow up with your primary care provider.    What is high blood pressure?  High blood pressure, (also referred to as hypertension), is when your blood pressure, the force of blood flowing through your blood vessels, is consistently too high. Learn more about high blood pressure at https://www.heart.org/en/health-topics/high-blood-pressure.    If you have high blood pressure, you are not alone.  Nearly half of American adults have high blood pressure. (Many don’t even know they have it.)  The best way to know if you have high blood pressure it is to have your blood pressure checked.    Know your numbers.  Learn about your blood  pressure numbers and what they mean.    BLOOD PRESSURE (BP) CATEGORY SYSTOLIC mm Hg (upper number) And/or DIASTOLIC mm Hg (lower number)   Normal Less than 120 and Less than 80   Elevated 120-129 and Less than 80   High BP: HTN Stage 1 130-139 or 80-89   High BP: HTN Stage 2 140 or higher or 90 or higher   Hypertensive Crisis Higher than 180 And/or Higher than 120     High blood pressure is a 'silent killer.'  Most of the time there are no obvious symptoms.  Certain physical traits and lifestyle choices can put you at a greater risk for high blood pressure.  When left untreated, the damage that high blood pressure does to your circulatory system is a significant contributing factor to heart attack, stroke and other health threats.    Set your food environment up for success with tips listed below to help support your health journey and provide less temptations!  How to Create a Healthy Food Environment in Your Home  December 28, 2020  Posted in Blog, Nutrition  By Your [employer] Mat[employer] Campaign    Why create a healthy food environment in your home? Every day, decisions we make about food are influenced by hundreds of factors, many of them subconscious. For example, an open box of cookies on the counter is more tempting than an apple in your crisper.  Having a healthy food environment at home will make it easier to choose healthy options and stay on track with habits that support your health goals.  Here are a few steps you can take to set up your home for success.  How to Build a Healthy Food Environment  1 - Clear Your Home of Less-healthy Food  These foods are okay in moderation, but they can be enjoyed outside of the house in a fun and empowering way. Go through your kitchen fridge, freezer, pantry, cabinets and counters to toss out foods that aren't helping you meet your health goals.  2 - Put Healthy Foods in Plain Sight  Keep grab-n-go fruit on the counter instead of snack foods so they are easily accessible.  Consider moving your refrigerated fruit to a shelf at eye-level instead of keeping it in the crisper. Vice versa, move less healthy foods to the crisper so they aren't always in your line of sight.  3 - Find Healthy Substitutions  Feeling deprived of your favorite foods can make you miserable and resentful. Instead, experiment with healthier alternatives. Here are some examples:  Calorie-controlled frozen yogurt bars like [place] vs high-calorie ice cream novelties  Sparkling water like Bubbly vs soda or fruit juice  Skinny popcorn vs chips and crackers  Dessert hummus vs pudding cups or ice cream  4 - Clean and Organize  A messy kitchen can make it hard to find healthy choices when you are hungry or short on time. It also makes it hard to locate whether or not you need something from the grocery store. Give your kitchen a new year makeover and organize your food storage areas so you can always locate what you need without distraction.  5 - Eat at Your Kitchen Table  Eating at your kitchen table without distractions is a way to eat more mindfully and enjoy time with your family. Eating on the couch while watching shows or movies can encourage mindless eating and consuming more calories than intended.  6 - Keep Healthy Foods Readily Available  When you are hungry and in a pinch, it's important to have healthy foods on-hand so you don't make hunger-controlled choices. This means keeping ready-to-go-snack foods in your kitchen such as fruit paired with nut butter, hummus and carrots, lunch meat and cheese roll-ups, etc. It may also help to keep pre-made freezer meals on-hand (something you've cooked in advance) for busy weeknights when you don't have time to prepare food.  Turning your home into a healthy food environment can set you up for success and confident decision-making. Plus, it feels good to be organized and empowered    What’s for Lunch? Planning and Prepping the Basics  March 4, 2022  Posted in Blog,  Nutrition  By Your [employer] Campaign    Whether you work from home or in an office, lunch can be a challenge. Finding time mid-day for a nutrition-packed lunch isn’t easy. Remember, lunch is a time to refuel for the second part of the day. Packing food with a lot of nutrition can make your afternoon more successful! Instead of being pulled to the nearest fast food restaurant, look for new options to add to your day.    Lunch Basics  First, start with these basic tips to eat more power-packed lunches.    Make Your Lunch Balanced  Your lunch should include protein, fruit, vegetables, dairy and whole grains. Adding all the food groups can give you maximum nutrition. Ham and cheese on a whole wheat tortilla with yogurt, apple slices and carrot sticks can be a great meal. It provides protein, complex carbohydrates and fiber. You could also consider a peanut butter and banana sandwich on whole wheat bread with green peppers and low-fat dip, string cheese and strawberries.    Plan Ahead  There is nothing worse than not having a plan for lunch and resorting to a greasy burger with fries. Spend some prep time to get settled for the week by chopping and bagging vegetables, slicing fruit in containers, and portioning out whole grain crackers. If you prep on Sunday, you can grab and go all week. You could also consider packing the entire lunch the night before.    Cook a Little Extra  Having grilled chicken on Sunday night? [place] a couple extra chicken breasts to chop up for a salad or put inside a whole grain tortilla. Think of this often. Last night’s dinner can be tomorrow’s lunch! An extra serving of chili or leftover pasta are other great options.    Ideas to Get Started with the Main Dish    Find a thermos and have different sizes of containers on hand. This is a great way to use your leftovers!    Chicken, pork or steak with a side of barbecue sauce  Soups and chili  Last night’s dinner -stir aviles or casserole  (higher caloric density, choose a smaller portion like 1 cup)    [place] or Wrap: Use low carb or skip the bread all together and use a richardson lettuce leaf as your base  Whole grain bread with lunch meat  Whole grain tortilla with peanut butter and banana (or any fruit--try strawberry!)  Cheese quesadilla    Salads to Go:  Lettuce, veggies and protein (use last night’s protein)  Cold pasta salad with grilled chicken  Tuna or chicken salad    Add Sensible Sides:  [place]  Baked chips  Peppers and hummus  Low-fat yogurt  Blueberries  Whole grain crackers  Pasta salad  Dried fruit  Berries and fruit dip  Cheese cubes  Veggies and dip  Avocado slices  Cottage cheese  Last night’s vegetables    What about Lunch Out?  On a busy day, takeout can be an option. You can still make your health a priority while grabbing takeout or eating out. Check out the menu for nutrition-packed proteins and sides. Choose salads and grilled meat and opt for smaller portions. Many restaurants are adding more and more healthy options as time goes on, so making healthy choices keeps getting easier.    Take it one step at a time on your way to a healthy lunch! Every bite counts.    For practical tips on meal prep, planning, shopping and dining please watch the Utube video presented at the [id number] conference by [employer] with the following link:    https://www.obesityaction.org/oac-videos/planning-shopping-and-dining-practical-tactics-for-good-nutrition/#:~:text=Planning%2C%20Shopping%20and%20Dining%3A%20Practical%20Tactics%20for%20Good%20Nutrition        Medication use and SEs reviewed with patient.    Return for none at this time due to move and plans to establish with new health care provider.    Patient verbalizes understanding.    DOCUMENTATION OF TIME SPENT: Code selection for this visit was based on time spent : 30 minutes on date of service in preparing to see the patient, obtaining and/or reviewing separately obtained  history, performing a medically appropriate examination, counseling and educating the patient/family/caregiver, ordering medications or testing, referring and communicating with other healthcare providers, documenting clinical information in the electronic medical record, independently interpreting results and communicating results to the patient/family/caregiver and care coordination with the patient's other providers.

## 2024-06-28 VITALS
BODY MASS INDEX: 43.01 KG/M2 | RESPIRATION RATE: 16 BRPM | SYSTOLIC BLOOD PRESSURE: 150 MMHG | HEART RATE: 98 BPM | WEIGHT: 255 LBS | HEIGHT: 64.5 IN | DIASTOLIC BLOOD PRESSURE: 90 MMHG

## 2024-06-28 PROBLEM — E66.9 TYPE 2 DIABETES MELLITUS WITH OBESITY (HCC): Status: ACTIVE | Noted: 2024-06-28

## 2024-06-28 PROBLEM — Z51.81 ENCOUNTER FOR THERAPEUTIC DRUG MONITORING: Status: ACTIVE | Noted: 2024-06-28

## 2024-06-28 PROBLEM — E11.69 TYPE 2 DIABETES MELLITUS WITH OBESITY (HCC): Status: ACTIVE | Noted: 2024-06-28

## 2024-06-28 NOTE — PATIENT INSTRUCTIONS
Continue making lifestyle changes that focus on good nutrition, regular exercise and stress management.    Medication Plan: Start sample pen of Ozempic at: .25 mg weekly for 4 weeks, then increase to .5 mg weekly until pen complete, likely x2 weeks, and then increase to Ozempic 1 mg weekly. Follow up with new PCP for increase in dosing and monitoring.    Tips while taking an injectable weight loss medication:    Be an intuitive eater. Listen to your hunger and fullness signals, stopping when you are full.  Consume protein and produce in your day, striving for a rainbow of color of produce.  Reduce portions to staring size of 1 cup size and check in with your gut to see if you are full. Set the timer to slow down your eating pace to allow for 15-20 minutes to complete a meal.  Reduce refined sugars and high fat foods, as they may contribute to greater side effects of nausea and heartburn.  Stop eating 3 hours before bedtime to allow your food to digest.  Remain hydrated with water or non caloric and non caffeine beverages.  Use over the counter diana lozenge/supplement to help reduce nausea if needed.  If you have been off your medication for more than 2 weeks please notify our office to determine next dosing, as return to previous dose may not be appropriate or tolerated.    Next steps to work on before next office visit include:     Your blood pressure was elevated today at 150/90. Please monitor at home and follow up with your primary care provider.    What is high blood pressure?  High blood pressure, (also referred to as hypertension), is when your blood pressure, the force of blood flowing through your blood vessels, is consistently too high. Learn more about high blood pressure at https://www.heart.org/en/health-topics/high-blood-pressure.    If you have high blood pressure, you are not alone.  Nearly half of American adults have high blood pressure. (Many don’t even know they have it.)  The best way to know  if you have high blood pressure it is to have your blood pressure checked.    Know your numbers.  Learn about your blood pressure numbers and what they mean.    BLOOD PRESSURE (BP) CATEGORY SYSTOLIC mm Hg (upper number) And/or DIASTOLIC mm Hg (lower number)   Normal Less than 120 and Less than 80   Elevated 120-129 and Less than 80   High BP: HTN Stage 1 130-139 or 80-89   High BP: HTN Stage 2 140 or higher or 90 or higher   Hypertensive Crisis Higher than 180 And/or Higher than 120     High blood pressure is a 'silent killer.'  Most of the time there are no obvious symptoms.  Certain physical traits and lifestyle choices can put you at a greater risk for high blood pressure.  When left untreated, the damage that high blood pressure does to your circulatory system is a significant contributing factor to heart attack, stroke and other health threats.    Set your food environment up for success with tips listed below to help support your health journey and provide less temptations!  How to Create a Healthy Food Environment in Your Home  December 28, 2020  Posted in Blog, Nutrition  By Your Weight Mat[employer] Campaign    Why create a healthy food environment in your home? Every day, decisions we make about food are influenced by hundreds of factors, many of them subconscious. For example, an open box of cookies on the counter is more tempting than an apple in your crisper.  Having a healthy food environment at home will make it easier to choose healthy options and stay on track with habits that support your health goals.  Here are a few steps you can take to set up your home for success.  How to Build a Healthy Food Environment  1 - Clear Your Home of Less-healthy Food  These foods are okay in moderation, but they can be enjoyed outside of the house in a fun and empowering way. Go through your kitchen fridge, freezer, pantry, cabinets and counters to toss out foods that aren't helping you meet your health goals.  2 - Put  Healthy Foods in Plain Sight  Keep grab-n-go fruit on the counter instead of snack foods so they are easily accessible. Consider moving your refrigerated fruit to a shelf at eye-level instead of keeping it in the crisper. Vice versa, move less healthy foods to the crisper so they aren't always in your line of sight.  3 - Find Healthy Substitutions  Feeling deprived of your favorite foods can make you miserable and resentful. Instead, experiment with healthier alternatives. Here are some examples:  Calorie-controlled frozen yogurt bars like [place] vs high-calorie ice cream novelties  Sparkling water like Bubbly vs soda or fruit juice  Skinny popcorn vs chips and crackers  Dessert hummus vs pudding cups or ice cream  4 - Clean and Organize  A messy kitchen can make it hard to find healthy choices when you are hungry or short on time. It also makes it hard to locate whether or not you need something from the grocery store. Give your kitchen a new year makeover and organize your food storage areas so you can always locate what you need without distraction.  5 - Eat at Your Kitchen Table  Eating at your kitchen table without distractions is a way to eat more mindfully and enjoy time with your family. Eating on the couch while watching shows or movies can encourage mindless eating and consuming more calories than intended.  6 - Keep Healthy Foods Readily Available  When you are hungry and in a pinch, it's important to have healthy foods on-hand so you don't make hunger-controlled choices. This means keeping ready-to-go-snack foods in your kitchen such as fruit paired with nut butter, hummus and carrots, lunch meat and cheese roll-ups, etc. It may also help to keep pre-made freezer meals on-hand (something you've cooked in advance) for busy weeknights when you don't have time to prepare food.  Turning your home into a healthy food environment can set you up for success and confident decision-making. Plus, it feels good to  be organized and empowered    What’s for Lunch? Planning and Prepping the Basics  March 4, 2022  Posted in Blog, Nutrition  By Your Weight Mat[employer] Campaign    Whether you work from home or in an office, lunch can be a challenge. Finding time mid-day for a nutrition-packed lunch isn’t easy. Remember, lunch is a time to refuel for the second part of the day. Packing food with a lot of nutrition can make your afternoon more successful! Instead of being pulled to the nearest fast food restaurant, look for new options to add to your day.    Lunch Basics  First, start with these basic tips to eat more power-packed lunches.    Make Your Lunch Balanced  Your lunch should include protein, fruit, vegetables, dairy and whole grains. Adding all the food groups can give you maximum nutrition. Ham and cheese on a whole wheat tortilla with yogurt, apple slices and carrot sticks can be a great meal. It provides protein, complex carbohydrates and fiber. You could also consider a peanut butter and banana sandwich on whole wheat bread with green peppers and low-fat dip, string cheese and strawberries.    Plan Ahead  There is nothing worse than not having a plan for lunch and resorting to a greasy burger with fries. Spend some prep time to get settled for the week by chopping and bagging vegetables, slicing fruit in containers, and portioning out whole grain crackers. If you prep on Sunday, you can grab and go all week. You could also consider packing the entire lunch the night before.    Cook a Little Extra  Having grilled chicken on Sunday night? [place] a couple extra chicken breasts to chop up for a salad or put inside a whole grain tortilla. Think of this often. Last night’s dinner can be tomorrow’s lunch! An extra serving of chili or leftover pasta are other great options.    Ideas to Get Started with the Main Dish    Find a thermos and have different sizes of containers on hand. This is a great way to use your  leftovers!    Chicken, pork or steak with a side of barbecue sauce  Soups and chili  Last night’s dinner -stir aviles or casserole (higher caloric density, choose a smaller portion like 1 cup)    [place] or Wrap: Use low carb or skip the bread all together and use a richardson lettuce leaf as your base  Whole grain bread with lunch meat  Whole grain tortilla with peanut butter and banana (or any fruit--try strawberry!)  Cheese quesadilla    Salads to Go:  Lettuce, veggies and protein (use last night’s protein)  Cold pasta salad with grilled chicken  Tuna or chicken salad    Add Sensible Sides:  [place]  Baked chips  Peppers and hummus  Low-fat yogurt  Blueberries  Whole grain crackers  Pasta salad  Dried fruit  Berries and fruit dip  Cheese cubes  Veggies and dip  Avocado slices  Cottage cheese  Last night’s vegetables    What about Lunch Out?  On a busy day, takeout can be an option. You can still make your health a priority while grabbing takeout or eating out. Check out the menu for nutrition-packed proteins and sides. Choose salads and grilled meat and opt for smaller portions. Many restaurants are adding more and more healthy options as time goes on, so making healthy choices keeps getting easier.    Take it one step at a time on your way to a healthy lunch! Every bite counts.    For practical tips on meal prep, planning, shopping and dining please watch the Utube video presented at the [id number] conference by [employer] with the following link:    https://www.obesityaction.org/oac-videos/planning-shopping-and-dining-practical-tactics-for-good-nutrition/#:~:text=Planning%2C%20Shopping%20and%20Dining%3A%20Practical%20Tactics%20for%20Good%20Nutrition

## 2024-07-09 ENCOUNTER — TELEPHONE (OUTPATIENT)
Dept: INTERNAL MEDICINE CLINIC | Facility: CLINIC | Age: 49
End: 2024-07-09

## 2024-07-09 NOTE — TELEPHONE ENCOUNTER
[facility] left a vm to call back to clarify an order for Ozempic.  I called this am at 6:00 and they are closed until 8 am  Maybe problem is that it shows 9 each and not 9 ml for 3 month.  Outpatient Medication Detail     Disp Refills Start End    semaglutide 4 MG/3ML Subcutaneous Solution Pen-injector 9 each 0 6/28/2024 --    Sig - Route: Inject 1 mg into the skin once a week. Start this dose after completing Ozempic .5 mg weekly x4 weeks. - Subcutaneous    Sent to pharmacy as: Semaglutide (1 MG/DOSE) 4 MG/3ML Subcutaneous Solution Pen-injector (Ozempic)    E-Prescribing Status: Receipt confirmed by pharmacy (6/28/2024  5:29 AM CDT)        I called back at 11:15 and the tech could not hear me... said the line was breaking up

## 2024-07-11 ENCOUNTER — TELEPHONE (OUTPATIENT)
Dept: INTERNAL MEDICINE CLINIC | Facility: CLINIC | Age: 49
End: 2024-07-11

## 2024-07-11 NOTE — TELEPHONE ENCOUNTER
[facility] pharmacy called and needed to know if the pt will also need the 0.5 mg pen injection as well. Their callback number is [phone].

## 2024-07-11 NOTE — TELEPHONE ENCOUNTER
I called [facility] and spoke to Gretchen  They are asking if patient needs order for 0.5 mg Ozempic as well as 1 mg since directions state to start at 0.5 mg and we gave no order for that.  She was given a sample per chart note - sample not recorded  I called back and have to give that information to the pharmacist Angeline and they will dispense the 1 mg dose as ordered.

## 2024-07-11 NOTE — TELEPHONE ENCOUNTER
See other message.  Patient was given sample of 0.25/0.5 mg pen and only needs 1 mg ozempic filled.

## 2024-08-09 ENCOUNTER — HOSPITAL ENCOUNTER (EMERGENCY)
Facility: HOSPITAL | Age: 49
Discharge: LEFT BEFORE DISCHARGE | End: 2024-08-09
Payer: MEDICARE

## 2024-08-09 VITALS
DIASTOLIC BLOOD PRESSURE: 88 MMHG | SYSTOLIC BLOOD PRESSURE: 145 MMHG | WEIGHT: 240 LBS | RESPIRATION RATE: 20 BRPM | HEART RATE: 86 BPM | OXYGEN SATURATION: 97 % | HEIGHT: 65 IN | BODY MASS INDEX: 39.99 KG/M2 | TEMPERATURE: 97 F

## 2024-08-09 NOTE — ED INITIAL ASSESSMENT (HPI)
Patient has been out of her telmisartan for a \"few days\" due to forgetting to reorder from mail order pharmacy. Friend brought her to [facility] to fill for a few days and was given four pills today to get her through but her BP was 186/116 at the machine in [facility]. Friend/healthcare POA accompanies patient in triage and states the pharmacist instructed them to come to ED due to elevated reading. Reports intermittent headaches and hot flashes, states she is going through menopause.

## 2024-08-15 DIAGNOSIS — I10 ESSENTIAL HYPERTENSION: Primary | ICD-10-CM

## 2024-08-15 RX ORDER — HYDRALAZINE HYDROCHLORIDE 25 MG/1
25 TABLET, FILM COATED ORAL 2 TIMES DAILY
Qty: 180 TABLET | Refills: 1 | Status: SHIPPED | OUTPATIENT
Start: 2024-08-15

## 2024-08-28 ENCOUNTER — OFFICE VISIT (OUTPATIENT)
Dept: FAMILY MEDICINE CLINIC | Facility: CLINIC | Age: 49
End: 2024-08-28
Payer: MEDICARE

## 2024-08-28 VITALS
WEIGHT: 252.13 LBS | RESPIRATION RATE: 16 BRPM | BODY MASS INDEX: 42.52 KG/M2 | OXYGEN SATURATION: 99 % | DIASTOLIC BLOOD PRESSURE: 98 MMHG | HEIGHT: 64.5 IN | HEART RATE: 100 BPM | TEMPERATURE: 98 F | SYSTOLIC BLOOD PRESSURE: 148 MMHG

## 2024-08-28 DIAGNOSIS — M48.061 SPINAL STENOSIS AT L4-L5 LEVEL: ICD-10-CM

## 2024-08-28 DIAGNOSIS — E11.69 TYPE 2 DIABETES MELLITUS WITH OBESITY (HCC): ICD-10-CM

## 2024-08-28 DIAGNOSIS — E66.9 TYPE 2 DIABETES MELLITUS WITH OBESITY (HCC): ICD-10-CM

## 2024-08-28 DIAGNOSIS — N95.1 HOT FLASHES DUE TO MENOPAUSE: ICD-10-CM

## 2024-08-28 DIAGNOSIS — I10 ELEVATED BLOOD PRESSURE READING WITH DIAGNOSIS OF HYPERTENSION: Primary | ICD-10-CM

## 2024-08-28 DIAGNOSIS — G47.33 OSA (OBSTRUCTIVE SLEEP APNEA): ICD-10-CM

## 2024-08-28 DIAGNOSIS — E78.1 HYPERTRIGLYCERIDEMIA: ICD-10-CM

## 2024-08-28 DIAGNOSIS — E78.2 MIXED HYPERLIPIDEMIA: ICD-10-CM

## 2024-08-28 DIAGNOSIS — G89.29 CHRONIC MIDLINE LOW BACK PAIN WITHOUT SCIATICA: ICD-10-CM

## 2024-08-28 DIAGNOSIS — Z12.31 VISIT FOR SCREENING MAMMOGRAM: ICD-10-CM

## 2024-08-28 DIAGNOSIS — M54.50 CHRONIC MIDLINE LOW BACK PAIN WITHOUT SCIATICA: ICD-10-CM

## 2024-08-28 PROCEDURE — 3077F SYST BP >= 140 MM HG: CPT | Performed by: FAMILY MEDICINE

## 2024-08-28 PROCEDURE — 99215 OFFICE O/P EST HI 40 MIN: CPT | Performed by: FAMILY MEDICINE

## 2024-08-28 PROCEDURE — 3080F DIAST BP >= 90 MM HG: CPT | Performed by: FAMILY MEDICINE

## 2024-08-28 PROCEDURE — 3008F BODY MASS INDEX DOCD: CPT | Performed by: FAMILY MEDICINE

## 2024-08-28 RX ORDER — METOPROLOL SUCCINATE 200 MG/1
200 TABLET, EXTENDED RELEASE ORAL DAILY
Qty: 30 TABLET | Refills: 2 | Status: SHIPPED | OUTPATIENT
Start: 2024-08-28

## 2024-08-28 RX ORDER — FEZOLINETANT 45 MG/1
45 TABLET, FILM COATED ORAL DAILY
Qty: 30 TABLET | Refills: 2 | Status: SHIPPED | OUTPATIENT
Start: 2024-08-28
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
## 2024-08-29 NOTE — TELEPHONE ENCOUNTER
Please review pended refill request as unable to refill due to high/very high interaction warning copied here:    Very High  Drug-Drug: gemfibrozil and atorvastatinThe risk of myopathy and rhabdomyolysis may be increased by co-administration of statin therapy (eg, Statins) and gemfibrozil. Specifically, co-administration of lovastatin or simvastatin with gemfibrozil should be avoided or is contraindicated in official package labeling. Reduced maximum doses of statins (other than lovastatin or simvastatin) may be recommended for patients receiving gemfibrozil in official package labeling for the other statin products.        Requested Prescriptions   Pending Prescriptions Disp Refills    GEMFIBROZIL 600 MG Oral Tab [Pharmacy Med Name: GEMFIBROZIL 600 MG Tablet] 180 tablet 3     Sig: TAKE 1 TABLET TWICE DAILY BEFORE MEALS       Cholesterol Medication Protocol Passed - 8/28/2024  1:41 AM        Passed - ALT < 80     Lab Results   Component Value Date    ALT 38 06/14/2024             Passed - ALT resulted within past year        Passed - Lipid panel within past 12 months     Lab Results   Component Value Date    CHOLEST 203 (H) 02/22/2024    TRIG 235 (H) 02/22/2024    HDL 49 02/22/2024     (H) 02/22/2024    VLDL 41 (H) 02/22/2024    TCHDLRATIO 6.23 (H) 05/11/2018    NONHDLC 154 (H) 02/22/2024             Passed - In person appointment or virtual visit in the past 12 mos or appointment in next 3 mos     Recent Outpatient Visits              Yesterday Elevated blood pressure reading with diagnosis of hypertension    [facility], [address] [place] [address] Raphael Farley DO    Office Visit    2 months ago Encounter for therapeutic drug monitoring    [facility], [address] [place], [place] [address]Latonia Hoffmann APRN    Office Visit    2 months ago Chronic nonintractable headache, unspecified headache type    [address] Raphael Farley  DO [place]    Office Visit    2 months ago Tinea cruris    [facility], [address] [place], [place] [address]Raphael quesada DO    Office Visit    4 months ago Status post hysteroscopy    [facility], [facility] - OB/GYN Ludmila Merino MD    Office Visit          Future Appointments         Provider Department Appt Notes    In 2 weeks Jacqueline Crow APRN [facility], [facility], [place] - OB/GYN severe hot flashes    In 4 weeks Latonia Levy APRN [address] I am out of medicine    In 1 month Raphael Farley DO [address] One month f/u as per Dr RIOS                      ATORVASTATIN 40 MG Oral Tab [Pharmacy Med Name: ATORVASTATIN CALCIUM 40 MG Tablet] 90 tablet 3     Sig: TAKE 1 TABLET EVERY EVENING       Cholesterol Medication Protocol Passed - 8/28/2024  1:41 AM        Passed - ALT < 80     Lab Results   Component Value Date    ALT 38 06/14/2024             Passed - ALT resulted within past year        Passed - Lipid panel within past 12 months     Lab Results   Component Value Date    CHOLEST 203 (H) 02/22/2024    TRIG 235 (H) 02/22/2024    HDL 49 02/22/2024     (H) 02/22/2024    VLDL 41 (H) 02/22/2024    TCHDLRATIO 6.23 (H) 05/11/2018    NONHDLC 154 (H) 02/22/2024             Passed - In person appointment or virtual visit in the past 12 mos or appointment in next 3 mos     Recent Outpatient Visits              Yesterday Elevated blood pressure reading with diagnosis of hypertension    [address] Raphael Farley DO    Office Visit    2 months ago Encounter for therapeutic drug monitoring    [address] Latonia Levy APRN    Office Visit    2 months ago Chronic nonintractable headache, unspecified headache type    [facility],  [address] [place] [address] Miguelito, Raphael Dhillona, DO    Office Visit    2 months ago Tinea cruris    [facility], [address] [place] [address], [place] Miguelito, Raphael Dhillona, DO    Office Visit    4 months ago Status post hysteroscopy    [facility], [facility] - OB/GYN Ludmila Merino MD    Office Visit          Future Appointments         Provider Department Appt Notes    In 2 weeks Jacqueline Crow APRN [facility], [facility], [place] - OB/GYN severe hot flashes    In 4 weeks Latonia Levy APRN [facility], [address] [place] [address] I am out of medicine    In 1 month Raphael Farley, DO [facility] [address] One month f/u as per Dr RIOS                           Future Appointments         Provider Department Appt Notes    In 2 weeks Jacqueline Crow APRN [facility], [facility], [place] - OB/GYN severe hot flashes    In 4 weeks Latonia Levy APRN [facility] [address] I am out of medicine    In 1 month Miguelito, Raphael Godwin, DO [facility] [address] One month f/u as per Dr RIOS          Recent Outpatient Visits              Yesterday Elevated blood pressure reading with diagnosis of hypertension    [facility], [address] [place] [address] Miguelito, Raphael Godwin, DO    Office Visit    2 months ago Encounter for therapeutic drug monitoring    [facility], [address] [place] [address] Latonia Levy APRN    Office Visit    2 months ago Chronic nonintractable headache, unspecified headache type    [facility], [address] [place] [address] Miguelito, Raphael Godwin, DO    Office Visit    2 months ago Tinea cruris    [facility], [address] [place] [address], [place] Miguelito, Tinainenicki Dhillona, DO    Office Visit    4 months ago Status post hysteroscopy    [facility]  , [place] - OB/GYN Ludmila Merino MD    Office Visit

## 2024-09-10 ENCOUNTER — LAB ENCOUNTER (OUTPATIENT)
Dept: LAB | Age: 49
End: 2024-09-10
Attending: FAMILY MEDICINE
Payer: MEDICARE

## 2024-09-10 DIAGNOSIS — E66.9 TYPE 2 DIABETES MELLITUS WITH OBESITY (HCC): ICD-10-CM

## 2024-09-10 DIAGNOSIS — E78.2 MIXED HYPERLIPIDEMIA: ICD-10-CM

## 2024-09-10 DIAGNOSIS — E11.69 TYPE 2 DIABETES MELLITUS WITH OBESITY (HCC): ICD-10-CM

## 2024-09-10 LAB
ALBUMIN SERPL-MCNC: 4.8 G/DL (ref 3.2–4.8)
ALBUMIN/GLOB SERPL: 1.6 {RATIO} (ref 1–2)
ALP LIVER SERPL-CCNC: 78 U/L
ALT SERPL-CCNC: 26 U/L
ANION GAP SERPL CALC-SCNC: 5 MMOL/L (ref 0–18)
AST SERPL-CCNC: 30 U/L (ref ?–34)
BILIRUB SERPL-MCNC: 0.2 MG/DL (ref 0.3–1.2)
BUN BLD-MCNC: 18 MG/DL (ref 9–23)
CALCIUM BLD-MCNC: 10.4 MG/DL (ref 8.7–10.4)
CHLORIDE SERPL-SCNC: 106 MMOL/L (ref 98–112)
CHOLEST SERPL-MCNC: 249 MG/DL (ref ?–200)
CO2 SERPL-SCNC: 28 MMOL/L (ref 21–32)
CREAT BLD-MCNC: 0.86 MG/DL
CREAT UR-SCNC: 78.1 MG/DL
EGFRCR SERPLBLD CKD-EPI 2021: 82 ML/MIN/1.73M2 (ref 60–?)
EST. AVERAGE GLUCOSE BLD GHB EST-MCNC: 169 MG/DL (ref 68–126)
FASTING PATIENT LIPID ANSWER: YES
FASTING STATUS PATIENT QL REPORTED: YES
GLOBULIN PLAS-MCNC: 3 G/DL (ref 2–3.5)
GLUCOSE BLD-MCNC: 137 MG/DL (ref 70–99)
HBA1C MFR BLD: 7.5 % (ref ?–5.7)
HDLC SERPL-MCNC: 54 MG/DL (ref 40–59)
LDLC SERPL CALC-MCNC: 130 MG/DL (ref ?–100)
MICROALBUMIN UR-MCNC: 222.6 MG/DL
MICROALBUMIN/CREAT 24H UR-RTO: 2850.2 UG/MG (ref ?–30)
NONHDLC SERPL-MCNC: 195 MG/DL (ref ?–130)
OSMOLALITY SERPL CALC.SUM OF ELEC: 292 MOSM/KG (ref 275–295)
POTASSIUM SERPL-SCNC: 4.4 MMOL/L (ref 3.5–5.1)
PROT SERPL-MCNC: 7.8 G/DL (ref 5.7–8.2)
SODIUM SERPL-SCNC: 139 MMOL/L (ref 136–145)
TRIGL SERPL-MCNC: 366 MG/DL (ref 30–149)
VLDLC SERPL CALC-MCNC: 68 MG/DL (ref 0–30)

## 2024-09-10 PROCEDURE — 36415 COLL VENOUS BLD VENIPUNCTURE: CPT

## 2024-09-10 PROCEDURE — 80053 COMPREHEN METABOLIC PANEL: CPT

## 2024-09-10 PROCEDURE — 82570 ASSAY OF URINE CREATININE: CPT

## 2024-09-10 PROCEDURE — 80061 LIPID PANEL: CPT

## 2024-09-10 PROCEDURE — 83036 HEMOGLOBIN GLYCOSYLATED A1C: CPT

## 2024-09-10 PROCEDURE — 82043 UR ALBUMIN QUANTITATIVE: CPT

## 2024-09-12 ENCOUNTER — OFFICE VISIT (OUTPATIENT)
Dept: OBGYN CLINIC | Facility: CLINIC | Age: 49
End: 2024-09-12
Payer: MEDICARE

## 2024-09-12 VITALS
WEIGHT: 255.25 LBS | BODY MASS INDEX: 43.05 KG/M2 | HEIGHT: 64.5 IN | DIASTOLIC BLOOD PRESSURE: 98 MMHG | SYSTOLIC BLOOD PRESSURE: 154 MMHG | HEART RATE: 105 BPM

## 2024-09-12 DIAGNOSIS — N95.1 HOT FLASH, MENOPAUSAL: Primary | ICD-10-CM

## 2024-09-12 PROCEDURE — 3077F SYST BP >= 140 MM HG: CPT | Performed by: NURSE PRACTITIONER

## 2024-09-12 PROCEDURE — 3080F DIAST BP >= 90 MM HG: CPT | Performed by: NURSE PRACTITIONER

## 2024-09-12 PROCEDURE — 3008F BODY MASS INDEX DOCD: CPT | Performed by: NURSE PRACTITIONER

## 2024-09-12 PROCEDURE — 99214 OFFICE O/P EST MOD 30 MIN: CPT | Performed by: NURSE PRACTITIONER

## 2024-09-12 NOTE — PROGRESS NOTES
Subjective:  49 year old    Chief Complaint   Patient presents with    Menopause     Patient is having a lot more hot flashes this year, and extreme headaches     Pt here today with her healthcare POA with complaints of worsening hot flashes over the past year  Is very uncomfortable with the hot flashes and also sweats  She was last seen 2014 for post op visit for hysteroscopy 2/2 PMB  Pt was prescribed Veozah by her PCP but prescription was not cost effective    Of note pt is also having trouble with headaches and controlling BP    Review of Systems:  Pertinent items are noted in the HPI.    Objective:  BP (!) 154/98   Pulse 105   Ht 64.5\"   Wt 255 lb 4 oz (115.8 kg)   LMP  (LMP Unknown)       Physical Examination:  General appearance: Well dressed, well nourished in no apparent distress  Neurologic/Psychiatric: Alert and oriented to person, place and time, mood normal, affect appropriate      Assessment/Plan:      Diagnoses and all orders for this visit:    Hot flash, menopausal  - we discussed hot flashes and the menopausal transition  - we discussed diet and lifestyle changes that may provide some symptom relief  - discussed OTC treatment with Estroven  - we also discussed HRT - however with uncontrolled BP and headaches I advised against estrogen products  - we also discussed medications like SSRI and gabapentin that can relieve hot flashes  - Discussed Veozah and pt is most interested in Veozah, I was able to give POA a savings card for Veozah and hopefully this will allow pt to see if the medication will work for her      Return if symptoms worsen or fail to improve.    Total face to face time was 35, more than 50% of the time was spent in counseling and/or coordination of care related to hot flashes.

## 2024-09-24 ENCOUNTER — TELEPHONE (OUTPATIENT)
Dept: ORTHOPEDICS CLINIC | Facility: CLINIC | Age: 49
End: 2024-09-24

## 2024-09-24 DIAGNOSIS — M54.41 LOW BACK PAIN WITH BILATERAL SCIATICA, UNSPECIFIED BACK PAIN LATERALITY, UNSPECIFIED CHRONICITY: Primary | ICD-10-CM

## 2024-09-24 DIAGNOSIS — M54.42 LOW BACK PAIN WITH BILATERAL SCIATICA, UNSPECIFIED BACK PAIN LATERALITY, UNSPECIFIED CHRONICITY: Primary | ICD-10-CM

## 2024-09-24 NOTE — TELEPHONE ENCOUNTER
Patient schedule with dr Carrera Constant and severe back pain My pain is in the middle of the back and goes down both legs when I walk or stand. Please aadvise for imaging     Future Appointments   Date Time Provider Department Center   9/26/2024 11:00 AM Latonia Levy APRN [facility]   9/30/2024  3:00 PM Raphael Farley DO [facility]   10/4/2024 10:40 AM [facility]   10/7/2024 10:40 AM David Carrera MD [facility]   11/1/2024 12:00 PM Jazz Cruz APRN [facility]

## 2024-09-26 ENCOUNTER — OFFICE VISIT (OUTPATIENT)
Dept: INTERNAL MEDICINE CLINIC | Facility: CLINIC | Age: 49
End: 2024-09-26
Payer: MEDICARE

## 2024-09-26 VITALS
BODY MASS INDEX: 42.67 KG/M2 | HEIGHT: 64.5 IN | RESPIRATION RATE: 16 BRPM | HEART RATE: 82 BPM | WEIGHT: 253 LBS | SYSTOLIC BLOOD PRESSURE: 130 MMHG | DIASTOLIC BLOOD PRESSURE: 82 MMHG

## 2024-09-26 DIAGNOSIS — E78.2 MIXED HYPERLIPIDEMIA: ICD-10-CM

## 2024-09-26 DIAGNOSIS — E66.01 CLASS 3 SEVERE OBESITY WITH SERIOUS COMORBIDITY AND BODY MASS INDEX (BMI) OF 45.0 TO 49.9 IN ADULT, UNSPECIFIED OBESITY TYPE (HCC): ICD-10-CM

## 2024-09-26 DIAGNOSIS — Z51.81 ENCOUNTER FOR THERAPEUTIC DRUG MONITORING: Primary | ICD-10-CM

## 2024-09-26 DIAGNOSIS — I10 ESSENTIAL HYPERTENSION: ICD-10-CM

## 2024-09-26 DIAGNOSIS — E11.9 TYPE 2 DIABETES MELLITUS WITHOUT COMPLICATION, WITHOUT LONG-TERM CURRENT USE OF INSULIN (HCC): ICD-10-CM

## 2024-09-26 PROCEDURE — 3008F BODY MASS INDEX DOCD: CPT | Performed by: NURSE PRACTITIONER

## 2024-09-26 PROCEDURE — 99213 OFFICE O/P EST LOW 20 MIN: CPT | Performed by: NURSE PRACTITIONER

## 2024-09-26 PROCEDURE — 3079F DIAST BP 80-89 MM HG: CPT | Performed by: NURSE PRACTITIONER

## 2024-09-26 PROCEDURE — 3075F SYST BP GE 130 - 139MM HG: CPT | Performed by: NURSE PRACTITIONER

## 2024-09-26 RX ORDER — SEMAGLUTIDE 2.68 MG/ML
2 INJECTION, SOLUTION SUBCUTANEOUS WEEKLY
Qty: 9 ML | Refills: 1 | Status: SHIPPED | OUTPATIENT
Start: 2024-09-26

## 2024-09-26 NOTE — PROGRESS NOTES
Radha Murguia is a 49 year old female presents today for follow-up on medical weight loss program for the treatment of overweight, obesity, or morbid obesity with associated Type 2 Diabetes, HTN, hyperlipidemia, KEON, fatty liver.    S:  Current weight   Wt Readings from Last 6 Encounters:   09/26/24 253 lb (114.8 kg)   09/12/24 255 lb 4 oz (115.8 kg)   08/28/24 252 lb 2 oz (114.4 kg)   06/27/24 255 lb (115.7 kg)   06/20/24 254 lb (115.2 kg)   06/14/24 247 lb (112 kg)    AND BMI Body mass index is 42.76 kg/m²..    Patient has lost 2# since LOV 3 months ago. She has been consistent with medication. Decided to remain with [facility] after her move. Recent labs completed by PCP- reviewed in EMR with increased HgbA1c. Denies any s/s of hypoglycemia. Reports chronic, intermittent low back pain.    Testing/consult completed since LOV: Dietician: Estimated caloric needs for weight loss: 4630-3904 cals/d for 1-2 pounds/week weight loss    [facility] Follow Up      Question 9/23/2024 10:11 AM CDT - Filed by Patient   Please describe a success moment: Lost 80   Please describe a challenging moment/needs for improvement: Back pain  to be gone   Please complete this 24 hour food journal, listing everything you had to eat in the past day. Include the average time of day you ate these meals at    List foods, qty and prep for breakfast: Cheerios   List foods, qty and prep for lunch. Soup   List foods, qty and prep for dinner.    List foods, qty and prep for snacks.    List the types and qty of fluids consumed    On average, how many meals did you eat out per week?    Exercise    How many days per week are you active or exercise    On average, how many days were anaerobic (strength/resistance) exercises performed?    On average, how many days were aerobic (cardio) exercises performed?    Perceived level of exertion on a scale of 1-5, with 5 being very intense: 1   Stress    Average stress level on a scale of  1-10, with 10 being extremely stressed: 1   If greater than 5/1O how would you grade your coping mechanisms?    Sleep hours and integrity    How many hours of uninterrupted sleep do you get a night: 6   Do you feel rested in the morning: No   If no, what may have been disrupting your sleep? Waking  up   Please list any goal(s) for your next visit      Social hx and PMH reviewed. Unemployed at present. Single, has POA. She lives independently.    REVIEW OF SYSTEMS:  GENERAL: feels well otherwise  LUNGS: denies shortness of breath with exertion  CARDIOVASCULAR: denies chest pain on exertion, denies palpitations or pedal edema  GI: denies abdominal pain.  No N/V/D/C  MUSCULOSKELETAL: no acute pain reported  NEURO: denies headaches or dizziness  PSYCH: denies change in behavior or mood, denies feeling sad or depressed    EXAM:  /82   Pulse 82   Resp 16   Ht 5' 4.5\" (1.638 m)   Wt 253 lb (114.8 kg)   LMP  (LMP Unknown)   BMI 42.76 kg/m²   GENERAL: well developed, well nourished, in no apparent distress, morbidly obese  EYES: conjunctiva pink, sclera non icteric, PERRLA  LUNGS: CTA in all fields  CARDIO: RRR without murmur, normal S1 and S2 without clicks or gallops, no pedal edema.  GI: +BS  NEURO/MS: motor and sensory grossly intact  PSYCH: pleasant, cooperative, normal mood and affect    ASSESSMENT AND PLAN:  Encounter Diagnoses   Name Primary?    Encounter for therapeutic drug monitoring Yes    Class 3 severe obesity with serious comorbidity and body mass index (BMI) of 45.0 to 49.9 in adult, unspecified obesity type (HCC)     Type 2 diabetes mellitus without complication, without long-term current use of insulin (HCC)     Mixed hyperlipidemia     Essential hypertension        No orders of the defined types were placed in this encounter.      Meds & Refills for this Visit:  Requested Prescriptions     Signed Prescriptions Disp Refills    semaglutide (OZEMPIC, 2 MG/DOSE,) 8 MG/3ML Subcutaneous Solution  Pen-injector 9 mL 1     Sig: Inject 2 mg into the skin once a week.       Imaging & Consults:  None      Plan:  Patient has lost 2# since LOV in 5/2023 on Ozempic 1 mg weekly, Metformin  mg BID (PCP) with a total weight loss of 44# since initial consult on 7/18/2019 with initial weight of 292#. Weight loss goal: would like to get weight down to 250# in 6 months and ultimately 150#.  BP controlled. Increase Ozempic. Avoid Topamax d/t SEs.  on holiday tips. See patient instructions below for additional plans and patient counseling.      Patient Instructions   Continue making lifestyle changes that focus on good nutrition, regular exercise and stress management.    Medication Plan: Increase Ozempic to 2 mg weekly.    Tips while taking an injectable weight loss medication:    Be an intuitive eater. Listen to your hunger and fullness signals, stopping when you are full.  Consume protein and produce in your day, striving for a rainbow of color of produce.  Reduce portions to staring size of 1 cup size and check in with your gut to see if you are full. Set the timer to slow down your eating pace to allow for 15-20 minutes to complete a meal.  Reduce refined sugars and high fat foods, as they may contribute to greater side effects of nausea and heartburn.  Stop eating 3 hours before bedtime to allow your food to digest.  Remain hydrated with water or non caloric and non caffeine beverages.  Use over the counter diana lozenge/supplement to help reduce nausea if needed.  If you have been off your medication for more than 2 weeks please notify our office to determine next dosing, as return to previous dose may not be appropriate or tolerated.    Next steps to work on before next office visit include:     Holiday Weight and How to Avoid It    [employer] by Sheryl Montoya, PhD  https://www.obesityaction.org/resources/holiday-weight-and-ayi-qg-xouts-it/      When we think about the holidays many things  come to mind - gifts, shopping, parties, family, decorating, long to-do lists --and delicious holiday treats.    Strategies for Avoiding Holiday Weight Gain  The holiday season is a busy time, and there are eating opportunities everywhere we go, such as family gatherings, office parties with trays of home-baked treats in the lunchroom, holiday and arj-wt-qrvuznav programs at our kids’ schools, treat samples being given away as we make our way through the stores to do our holiday shopping and catalogs in our mailboxes with mouth-watering photo spreads on every page. We’re really busy, perhaps too busy to prepare the healthy meals we might otherwise prepare.    Here are a few tips that will help you negotiate this joyful time with minimum risk to your weight management goals:    Focus on maintaining your current weight. Challenging yourself to lose weight over the holidays is setting yourself up for failure.  Don’t gorge on any special holiday food because you only get to eat it once a year. With luck, you’ll still be around to enjoy it next year. On the other hand, don’t deprive yourself of anything you want to taste. Instead, take a mindful bite, savoring the sight, taste, aroma, mouth feel and sound of each special holiday treat. Eating like this leads to increased pleasure, quicker satisfaction and decreased risk for weight gain.  Avoid the trap of thinking you can eat what you want because you can just start over in the New Year. It doesn’t get any easier just because it’s January - there are always other reasons to indulge and to celebrate.  Keep up your exercise routine. This will also help reduce holiday stress.  Keep tabs on yourself. Write down what you eat, weigh yourself if you want to or try on your favorite clothes to make sure they still fit.  Create meaning beyond the food by creating new traditions that have nothing to do with food. For example, change “in our family we always have chocolate cinnamon  bread with whipped cream on [place] morning” into “in our family we always play in the snow (or on the beach), or go for a long walk/take food and gifts to the homeless shelter on [place] morning.”  Sometimes, eating a particular food is our way of remembering a lost loved one. If that applies to you, find another way to remember them, like sharing memories with family members.  Remember all the reasons why reaching and maintaining a healthy weight is important to you.  Remember, unless you’re an elite athlete, you’re unlikely to be able to “exercise off” weeks of overindulgence.  Strategies for Holiday Parties  Most of us love holiday parties and look forward to them all year. We get to dress up and go to nice places, spend time with our nearest and dearest, enjoy our favorite holiday music and engage in the traditions that are meaningful to us. Despite all of the excitement, parties can also be minefields when it comes to honoring our healthy lifestyle goals. When we love parties, we may over-indulge as a way of intensifying the positive emotions we’re already feeling, and when we dislike parties, we may over-indulge in an effort to distract ourselves from the emotional discomfort that we’re feeling.    Here are a few tips that will help you get through holiday parties without sabotaging your goals:    Avoid wearing baggy clothing that allows you to expand as you eat.  After you’ve eaten, stay away from the food tables at the party.  Keep your hands busy by finding a way to help out. It’s the best way to distract yourself from the food.  Avoid alcohol. When we drink, we’re more likely to abandon healthy eating.  Fill up with water and other low-calorie drinks.  Take a healthy dish for the pot luck - something you can eat: consider salad, fruit, raw vegetables and a healthy dip.  Focus on your relationships, not on the food - learn to focus on enjoying the people and the special holiday experiences, on building  special memories for yourself and your family.  Meeting new people is another good way of distracting yourself from the food. If you’re shy, simply be a good listener.  Plan ahead. The best kind of plan, when it comes to food, is about what you are going to eat - not about what you’re not going to eat. If we focus on what we can’t eat (or what we think we shouldn’t eat), this kind of thinking can set us up for failure because it simply leaves us feeling deprived.  Don’t arrive completely famished - you’ll be more likely to eat in a way you’ll later regret. Plan to eat on the light side both before and after the event. Think about your meal plan for the day, and leave yourself some room to eat at the party.  Coping with Holiday Stress  As you know, the holiday season can be joyful and stressful at the same time. There’s so much to do, being around family can sometimes be difficult and often, we set ourselves the goal of creating the “perfect” holiday. Being stressed puts us at risk for stress-based eating in an effort to cope.    Here are some strategies you can use to reduce your stress levels:    Focus on what you’re grateful for.  Practice deep breathing whenever you feel overwhelmed.  Keep up your exercise routine.  Remind yourself to do just one thing at a time.  Remember -- you cannot do more than your best.  Be willing to say “no” to some events, tasks or requests. Sometimes this is the best way we can take care of ourselves.  Create a holiday season schedule for yourself. Schedule and prioritize everything you need to get done.  Reduce your expectations - aim for “good enough,” not “perfect.”  If you’re alone during the holidays, pamper yourself and find a way to help others who are less fortunate. This will help reduce your loneliness.  If your relationships with family members are strained, remember that over-indulging in your favorite holiday comfort foods is not going to change how they behave towards  you!  Create fun times for yourself. Having fun is a great way of reducing stress!  I hope these tips will help you not just get through the holidays, but that they’ll allow you to feel reassured that you can still have a fun and meaningful time without having to sacrifice your weight management goals. Wishing you a happy, healthy and meaningful holiday season!          Medication use and SEs reviewed with patient.    Return in about 5 months (around 2/26/2025) for weight management via clinic.    Patient verbalizes understanding.    DOCUMENTATION OF TIME SPENT: Code selection for this visit was based on time spent : 25 minutes on date of service in preparing to see the patient, obtaining and/or reviewing separately obtained history, performing a medically appropriate examination, counseling and educating the patient/family/caregiver, ordering medications or testing, referring and communicating with other healthcare providers, documenting clinical information in the electronic medical record, independently interpreting results and communicating results to the patient/family/caregiver and care coordination with the patient's other providers.

## 2024-09-26 NOTE — PATIENT INSTRUCTIONS
Continue making lifestyle changes that focus on good nutrition, regular exercise and stress management.    Medication Plan: Increase Ozempic to 2 mg weekly.    Tips while taking an injectable weight loss medication:    Be an intuitive eater. Listen to your hunger and fullness signals, stopping when you are full.  Consume protein and produce in your day, striving for a rainbow of color of produce.  Reduce portions to staring size of 1 cup size and check in with your gut to see if you are full. Set the timer to slow down your eating pace to allow for 15-20 minutes to complete a meal.  Reduce refined sugars and high fat foods, as they may contribute to greater side effects of nausea and heartburn.  Stop eating 3 hours before bedtime to allow your food to digest.  Remain hydrated with water or non caloric and non caffeine beverages.  Use over the counter diana lozenge/supplement to help reduce nausea if needed.  If you have been off your medication for more than 2 weeks please notify our office to determine next dosing, as return to previous dose may not be appropriate or tolerated.    Next steps to work on before next office visit include:     Holiday Weight and How to Avoid It    Ob[employer] by Sheryl Montoya, PhD  https://www.obesityaction.org/resources/holiday-weight-and-jqr-dk-kvhqb-it/      When we think about the holidays many things come to mind - gifts, shopping, parties, family, decorating, long to-do lists --and delicious holiday treats.    Strategies for Avoiding Holiday Weight Gain  The holiday season is a busy time, and there are eating opportunities everywhere we go, such as family gatherings, office parties with trays of home-baked treats in the lunchroom, holiday and rin-mv-wfppelsd programs at our kids’ schools, treat samples being given away as we make our way through the stores to do our holiday shopping and catalogs in our mailboxes with mouth-watering photo spreads on every page. We’re  really busy, perhaps too busy to prepare the healthy meals we might otherwise prepare.    Here are a few tips that will help you negotiate this joyful time with minimum risk to your weight management goals:    Focus on maintaining your current weight. Challenging yourself to lose weight over the holidays is setting yourself up for failure.  Don’t gorge on any special holiday food because you only get to eat it once a year. With luck, you’ll still be around to enjoy it next year. On the other hand, don’t deprive yourself of anything you want to taste. Instead, take a mindful bite, savoring the sight, taste, aroma, mouth feel and sound of each special holiday treat. Eating like this leads to increased pleasure, quicker satisfaction and decreased risk for weight gain.  Avoid the trap of thinking you can eat what you want because you can just start over in the New Year. It doesn’t get any easier just because it’s January - there are always other reasons to indulge and to celebrate.  Keep up your exercise routine. This will also help reduce holiday stress.  Keep tabs on yourself. Write down what you eat, weigh yourself if you want to or try on your favorite clothes to make sure they still fit.  Create meaning beyond the food by creating new traditions that have nothing to do with food. For example, change “in our family we always have chocolate cinnamon bread with whipped cream on [place] morning” into “in our family we always play in the snow (or on the beach), or go for a long walk/take food and gifts to the homeless shelter on [place] morning.”  Sometimes, eating a particular food is our way of remembering a lost loved one. If that applies to you, find another way to remember them, like sharing memories with family members.  Remember all the reasons why reaching and maintaining a healthy weight is important to you.  Remember, unless you’re an elite athlete, you’re unlikely to be able to “exercise off” weeks of  overindulgence.  Strategies for Holiday Parties  Most of us love holiday parties and look forward to them all year. We get to dress up and go to nice places, spend time with our nearest and dearest, enjoy our favorite holiday music and engage in the traditions that are meaningful to us. Despite all of the excitement, parties can also be minefields when it comes to honoring our healthy lifestyle goals. When we love parties, we may over-indulge as a way of intensifying the positive emotions we’re already feeling, and when we dislike parties, we may over-indulge in an effort to distract ourselves from the emotional discomfort that we’re feeling.    Here are a few tips that will help you get through holiday parties without sabotaging your goals:    Avoid wearing baggy clothing that allows you to expand as you eat.  After you’ve eaten, stay away from the food tables at the party.  Keep your hands busy by finding a way to help out. It’s the best way to distract yourself from the food.  Avoid alcohol. When we drink, we’re more likely to abandon healthy eating.  Fill up with water and other low-calorie drinks.  Take a healthy dish for the pot luck - something you can eat: consider salad, fruit, raw vegetables and a healthy dip.  Focus on your relationships, not on the food - learn to focus on enjoying the people and the special holiday experiences, on building special memories for yourself and your family.  Meeting new people is another good way of distracting yourself from the food. If you’re shy, simply be a good listener.  Plan ahead. The best kind of plan, when it comes to food, is about what you are going to eat - not about what you’re not going to eat. If we focus on what we can’t eat (or what we think we shouldn’t eat), this kind of thinking can set us up for failure because it simply leaves us feeling deprived.  Don’t arrive completely famished - you’ll be more likely to eat in a way you’ll later regret. Plan to eat  on the light side both before and after the event. Think about your meal plan for the day, and leave yourself some room to eat at the party.  Coping with Holiday Stress  As you know, the holiday season can be joyful and stressful at the same time. There’s so much to do, being around family can sometimes be difficult and often, we set ourselves the goal of creating the “perfect” holiday. Being stressed puts us at risk for stress-based eating in an effort to cope.    Here are some strategies you can use to reduce your stress levels:    Focus on what you’re grateful for.  Practice deep breathing whenever you feel overwhelmed.  Keep up your exercise routine.  Remind yourself to do just one thing at a time.  Remember -- you cannot do more than your best.  Be willing to say “no” to some events, tasks or requests. Sometimes this is the best way we can take care of ourselves.  Create a holiday season schedule for yourself. Schedule and prioritize everything you need to get done.  Reduce your expectations - aim for “good enough,” not “perfect.”  If you’re alone during the holidays, pamper yourself and find a way to help others who are less fortunate. This will help reduce your loneliness.  If your relationships with family members are strained, remember that over-indulging in your favorite holiday comfort foods is not going to change how they behave towards you!  Create fun times for yourself. Having fun is a great way of reducing stress!  I hope these tips will help you not just get through the holidays, but that they’ll allow you to feel reassured that you can still have a fun and meaningful time without having to sacrifice your weight management goals. Wishing you a happy, healthy and meaningful holiday season!

## 2024-09-30 ENCOUNTER — OFFICE VISIT (OUTPATIENT)
Dept: FAMILY MEDICINE CLINIC | Facility: CLINIC | Age: 49
End: 2024-09-30
Payer: MEDICARE

## 2024-09-30 VITALS
WEIGHT: 254 LBS | OXYGEN SATURATION: 96 % | HEART RATE: 80 BPM | BODY MASS INDEX: 42.84 KG/M2 | DIASTOLIC BLOOD PRESSURE: 76 MMHG | SYSTOLIC BLOOD PRESSURE: 130 MMHG | TEMPERATURE: 98 F | RESPIRATION RATE: 16 BRPM | HEIGHT: 64.5 IN

## 2024-09-30 DIAGNOSIS — E66.9 TYPE 2 DIABETES MELLITUS WITH OBESITY (HCC): ICD-10-CM

## 2024-09-30 DIAGNOSIS — Z23 NEED FOR VACCINATION: ICD-10-CM

## 2024-09-30 DIAGNOSIS — E78.2 MIXED HYPERLIPIDEMIA: ICD-10-CM

## 2024-09-30 DIAGNOSIS — N95.1 HOT FLASHES DUE TO MENOPAUSE: ICD-10-CM

## 2024-09-30 DIAGNOSIS — I10 ESSENTIAL HYPERTENSION: Primary | ICD-10-CM

## 2024-09-30 DIAGNOSIS — E11.69 TYPE 2 DIABETES MELLITUS WITH OBESITY (HCC): ICD-10-CM

## 2024-09-30 RX ORDER — ATORVASTATIN CALCIUM 80 MG/1
80 TABLET, FILM COATED ORAL NIGHTLY
Qty: 90 TABLET | Refills: 0 | Status: SHIPPED | OUTPATIENT
Start: 2024-09-30

## 2024-09-30 RX ORDER — ATORVASTATIN CALCIUM 40 MG/1
40 TABLET, FILM COATED ORAL EVERY EVENING
Qty: 90 TABLET | Refills: 3 | OUTPATIENT
Start: 2024-09-30

## 2024-09-30 RX ORDER — METOPROLOL SUCCINATE 200 MG/1
200 TABLET, EXTENDED RELEASE ORAL DAILY
Qty: 90 TABLET | Refills: 1 | Status: SHIPPED | OUTPATIENT
Start: 2024-09-30

## 2024-09-30 NOTE — PROGRESS NOTES
Radha Murguia is a 49 year old female.     HPI:   Patient presents for recheck of her hypertension and recent labs.  HTN, pt has been taking medications as instructed, no medication side effects. Does not have a home bp monitor therefore has not been checking her bp at home. However, states she is feeling better overall. No longer having any headaches.  DM2, taking meds as directed. Following with [facility]. Does not check her blood glucose levels regularly. Denies any paresthesias.   HL, compliant with statin and gemfibrozil. States she has been exercising regularly and watching her diet.  Hot flashes due to menopause, I had given rx for veozah but not cost effective. Has seen gyne and recommended to start estroven otc. Started estroven a couple days ago.     Wt Readings from Last 6 Encounters:   09/30/24 254 lb (115.2 kg)   09/26/24 253 lb (114.8 kg)   09/12/24 255 lb 4 oz (115.8 kg)   08/28/24 252 lb 2 oz (114.4 kg)   06/27/24 255 lb (115.7 kg)   06/20/24 254 lb (115.2 kg)     Body mass index is 42.93 kg/m².    Lab Results   Component Value Date    CHOLEST 249 (H) 09/10/2024    CHOLEST 203 (H) 02/22/2024    CHOLEST 177 08/30/2023     Lab Results   Component Value Date    HDL 54 09/10/2024    HDL 49 02/22/2024    HDL 46 08/30/2023     Lab Results   Component Value Date    TRIG 366 (H) 09/10/2024    TRIG 235 (H) 02/22/2024    TRIG 314 (H) 08/30/2023     Lab Results   Component Value Date     (H) 09/10/2024     (H) 02/22/2024    LDL 80 08/30/2023     Lab Results   Component Value Date    AST 30 09/10/2024    AST 45 (H) 06/14/2024    AST 58 (H) 06/11/2024     Lab Results   Component Value Date    ALT 26 09/10/2024    ALT 38 06/14/2024    ALT 34 06/11/2024     Lab Results   Component Value Date     (H) 09/10/2024     (H) 06/14/2024     (H) 06/11/2024       Current Outpatient Medications   Medication Sig Dispense Refill    atorvastatin 80 MG Oral Tab Take 1 tablet (80 mg total) by mouth  nightly. 90 tablet 0    Metoprolol Succinate  MG Oral Tablet 24 Hr Take 1 tablet (200 mg total) by mouth daily. 90 tablet 1    semaglutide (OZEMPIC, 2 MG/DOSE,) 8 MG/3ML Subcutaneous Solution Pen-injector Inject 2 mg into the skin once a week. 9 mL 1    Fezolinetant (VEOZAH) 45 MG Oral Tab Take 45 mg by mouth daily. 30 tablet 2    hydrALAZINE 25 MG Oral Tab Take 1 tablet (25 mg total) by mouth 2 (two) times daily. 180 tablet 1    PARoxetine (PAXIL) 20 MG Oral Tab Take 1 tablet (20 mg total) by mouth daily. 30+20mg= 50mg daily 90 tablet 1    PARoxetine 30 MG Oral Tab Take 1 tablet (30 mg total) by mouth daily. Take with 20mg tab= 50mg daily 90 tablet 1    QUEtiapine 100 MG Oral Tab Take 1 tablet (100 mg total) by mouth nightly. 90 tablet 1    metFORMIN  MG Oral Tablet 24 Hr Take 1 tablet (750 mg total) by mouth 2 (two) times daily with meals. 180 tablet 3    Ciclopirox Olamine 0.77 % External Cream Apply to affected area twice daily for 1-4wks 90 g 1    telmisartan 80 MG Oral Tab Take 1 tablet (80 mg total) by mouth daily. 90 tablet 1    Multiple Vitamins-Minerals (MULTIVITAMIN WOMENS 50+ ADV) Oral Tab Take 1 tablet by mouth daily with food. 90 tablet 1    Glucose Blood (TRUE METRIX BLOOD GLUCOSE TEST) In Vitro Strip Use to check blood sugar daily 100 strip 2    OneTouch UltraSoft 2 Lancets Does not apply Misc 1 Lancet daily. Use to test blood sugar daily 100 each 2    fexofenadine (ALLEGRA ALLERGY) 180 MG Oral Tab Take 1 tablet (180 mg total) by mouth daily.      chlorhexidine gluconate 0.12 % Mouth/Throat Solution  (Patient not taking: Reported on 9/30/2024)        Past Medical History:    Allergic rhinitis    Anxiety state, unspecified    Asthma (HCC)    Back problem    lower back pain- sees chiro    Bad breath    Belching    Bipolar affective (HCC)    patient denies    Bleeding nose    Breast CA (HCC)    DCIS    Cancer (HCC)    left breast    Chest pain    Chronic cough    Community acquired pneumonia     COPD (chronic obstructive pulmonary disease) (HCC)    Coronavirus infection    Disorder of liver    fatty liver    Ductal carcinoma in situ of breast    Esophageal reflux    Essential hypertension    Exposure to medical diagnostic radiation    finished in 2015    Headache, chronic daily    Hemorrhoids    High blood pressure    High cholesterol    Hyperlipidemia    Hypoxia    Indigestion    Leaking of urine    Mild mental retardation    Mouth sores    Obstructive apnea    CPAP 14 Sarina's    Parainfluenza infection    Sleep apnea    cpap    Type II or unspecified type diabetes mellitus without mention of complication, not stated as uncontrolled      Past Surgical History:   Procedure Laterality Date    Breast lumpectomy Left 06/11/2015    Procedure: BREAST LUMPECTOMY;  Surgeon: Paul Miller MD;  Location:  MAIN OR    Carpal tunnel release Bilateral     Colonoscopy N/A 11/23/2021    Procedure: COLONOSCOPY with polypectomy, ESOPHAGOGASTRODUODENOSCOPY (EGD) with biopsies;  Surgeon: Akbar Perla MD;  Location:  ENDOSCOPY    Hernia surgery      BABY    Lumpectomy left Left 05/11/2015    DCIS;papilloma    Joanie biopsy stereo w/calc 1 site left (cpt=19081) Left 04/2015    DCIS; papilloma    Needle biopsy liver      Orthopedic surg (pbp) Bilateral     MANDA CARPAL ISHAAN RELEASE    Other  LEFT FOOT SPUR REMOVAL    Other surgical history      bilateral carpal tunnel release    Radiation left Left 2015    lump with rad    Stereotactic breast biopsy Left 4/21/15 Paul [facility]W    Left Breast      Social History:    Social History     Socioeconomic History    Marital status: Single   Occupational History    Occupation: works part-time   Tobacco Use    Smoking status: Never     Passive exposure: Never    Smokeless tobacco: Never   Vaping Use    Vaping status: Never Used   Substance and Sexual Activity    Alcohol use: No    Drug use: No    Sexual activity: Never   Other Topics Concern    Caffeine Concern No    Exercise No     Seat Belt No    Special Diet No    Stress Concern No    Weight Concern Yes     Comment: Working on weight loss   Social History Narrative    Lives  In [place] with mother         REVIEW OF SYSTEMS:   GENERAL HEALTH: feels well otherwise  SKIN: denies any unusual skin lesions or rashes  RESPIRATORY: denies shortness of breath with exertion  CARDIOVASCULAR: denies chest pain on exertion  GI: denies abdominal pain and denies heartburn  NEURO: denies headaches    EXAM:   /76   Pulse 80   Temp 97.6 °F (36.4 °C) (Temporal)   Resp 16   Ht 5' 4.5\" (1.638 m)   Wt 254 lb (115.2 kg)   LMP  (LMP Unknown)   SpO2 96%   BMI 42.93 kg/m²   GENERAL: well developed, well nourished,in no apparent distress, obese  SKIN: no rashes,no suspicious lesions  HEENT: atraumatic, normocephalic    NECK: supple   LUNGS: clear to auscultation, no w/r/r  CARDIO: RRR without murmur   EXTREMITIES: no cyanosis, clubbing or edema    ASSESSMENT AND PLAN:   Pt presents for:  1. Essential hypertension  - bp improved from previous  - cpm  - refills provided  - compliance with the medication is advised  - limit the amount of sodium (salt) in diet <1.5g/d  - eat a heart-healthy diet, potassium, fiber, and drink plenty of water  - advised to lose weight and exercise regularly -- at least 30 minutes a day of moderate aerobic exercise  - obtain automatic bp monitor with large sized cuff  - monitor bp at home with goal of <130/80  - Metoprolol Succinate  MG Oral Tablet 24 Hr; Take 1 tablet (200 mg total) by mouth daily.  Dispense: 90 tablet; Refill: 1  - CMP in 3 months; Future  - Lipid in 3 months; Future    2. Type 2 diabetes mellitus with obesity (HCC)  - A1c 7.5, fbs 137  - following with [facility] and taking Ozempic weekly  - advised low carb diet and regular exercise  - check diabetic labs in 3mo  - eye exam utd  - atorvastatin 80 MG Oral Tab; Take 1 tablet (80 mg total) by mouth nightly.  Dispense: 90 tablet; Refill: 0  - CMP in 3 months;  Future  - Lipid in 3 months; Future  - Hemoglobin A1C in 3 months; Future    3. Mixed hyperlipidemia  - lipid panel: , HDL 54,  and   - will dc gemfibrozil  - will increase atorvastatin 80mg po at bedtime, rx sent  - low chol diet and regular exercise  - recheck labs in 3mo  - atorvastatin 80 MG Oral Tab; Take 1 tablet (80 mg total) by mouth nightly.  Dispense: 90 tablet; Refill: 0  - CMP in 3 months; Future  - Lipid in 3 months; Future    4. Hot flashes due to menopause  - advised to reach out to gyne for Veozah rx  - may cont estroven for now  - monitor    5. Need for vaccination  - INFLUENZA VACCINE, TRI, PRESERV FREE, 0.5 ML    The patient indicates understanding of these issues and agrees to the plan.  The patient is asked to return in 3mo, sooner if needed.

## 2024-10-01 RX ORDER — GEMFIBROZIL 600 MG/1
600 TABLET, FILM COATED ORAL
Qty: 180 TABLET | Refills: 3 | OUTPATIENT
Start: 2024-10-01

## 2024-10-04 ENCOUNTER — HOSPITAL ENCOUNTER (OUTPATIENT)
Dept: MAMMOGRAPHY | Age: 49
Discharge: HOME | End: 2024-10-04
Attending: FAMILY MEDICINE
Payer: MEDICARE

## 2024-10-04 ENCOUNTER — NURSE TRIAGE (OUTPATIENT)
Dept: FAMILY MEDICINE CLINIC | Facility: CLINIC | Age: 49
End: 2024-10-04

## 2024-10-04 DIAGNOSIS — Z12.31 VISIT FOR SCREENING MAMMOGRAM: ICD-10-CM

## 2024-10-04 PROCEDURE — 77063 BREAST TOMOSYNTHESIS BI: CPT | Performed by: FAMILY MEDICINE

## 2024-10-04 PROCEDURE — 77067 SCR MAMMO BI INCL CAD: CPT | Performed by: FAMILY MEDICINE

## 2024-10-04 NOTE — TELEPHONE ENCOUNTER
Action Requested: Summary for Provider     []  Critical Lab, Recommendations Needed  [] Need Additional Advice  [x]   FYI    []   Need Orders  [] Need Medications Sent to Pharmacy  []  Other     SUMMARY: pt c/o difficulty breathing only at night. Does not have sleep apea machine at home. Next appt for pulm is not until March 2025. Appt scheduled for 10/07     Reason for call: Shortness Of Breath  Onset: 2 nights ago      Pt and POA on call, pt stating she started with difficulty breathing only at night  Symptoms started 2 nights ago   Pt has dx of KEON and was referred to Dr. [place], pulmonology-see last OV on 08/24/24  Pt called to schedule an appt with Dr. Skaggs but was informed he is not accepting new pt's.   Was offered an appt with another provider but the next available pulm appt is not until March 2025. Pt did not schedule this appt-advised to call back and schedule appt and to be placed on wait list. Pt and POA states will call back to schedule appt   Pt then stated she does not have a sleep apnea machine   Requesting for alternative pulmonologist referral and for a sleep study order to be placed  Denies fevers   ER precautions provided   Appt scheduled for 10/07  Future Appointments   Date Time Provider Department Center   10/7/2024 10:10 AM NAP XR RM1 [facility] XRAY [facility] [place]   10/7/2024 10:40 AM David Carrera MD [facility]   10/7/2024  1:20 PM Raphael Farley DO [facility]   11/1/2024 12:00 PM Jazz Cruz APRN [facility]   2/5/2025 11:40 AM Latonia Levy APRN [facility]      Reason for Disposition   Patient wants to be seen    Protocols used: Breathing Difficulty-A-OH

## 2024-10-07 ENCOUNTER — OFFICE VISIT (OUTPATIENT)
Dept: FAMILY MEDICINE CLINIC | Facility: CLINIC | Age: 49
End: 2024-10-07
Payer: MEDICARE

## 2024-10-07 ENCOUNTER — OFFICE VISIT (OUTPATIENT)
Dept: ORTHOPEDICS CLINIC | Facility: CLINIC | Age: 49
End: 2024-10-07
Payer: MEDICARE

## 2024-10-07 ENCOUNTER — HOSPITAL ENCOUNTER (OUTPATIENT)
Dept: GENERAL RADIOLOGY | Age: 49
Discharge: HOME | End: 2024-10-07
Attending: STUDENT IN AN ORGANIZED HEALTH CARE EDUCATION/TRAINING PROGRAM
Payer: MEDICARE

## 2024-10-07 VITALS
TEMPERATURE: 98 F | RESPIRATION RATE: 16 BRPM | WEIGHT: 254 LBS | HEART RATE: 86 BPM | BODY MASS INDEX: 42.84 KG/M2 | DIASTOLIC BLOOD PRESSURE: 88 MMHG | HEIGHT: 64.5 IN | SYSTOLIC BLOOD PRESSURE: 138 MMHG | OXYGEN SATURATION: 97 %

## 2024-10-07 DIAGNOSIS — M43.16 SPONDYLOLISTHESIS AT L4-L5 LEVEL: ICD-10-CM

## 2024-10-07 DIAGNOSIS — E66.01 MORBID OBESITY WITH BMI OF 40.0-44.9, ADULT (HCC): ICD-10-CM

## 2024-10-07 DIAGNOSIS — E66.9 TYPE 2 DIABETES MELLITUS WITH OBESITY (HCC): ICD-10-CM

## 2024-10-07 DIAGNOSIS — M54.42 LOW BACK PAIN WITH BILATERAL SCIATICA, UNSPECIFIED BACK PAIN LATERALITY, UNSPECIFIED CHRONICITY: ICD-10-CM

## 2024-10-07 DIAGNOSIS — M54.41 LOW BACK PAIN WITH BILATERAL SCIATICA, UNSPECIFIED BACK PAIN LATERALITY, UNSPECIFIED CHRONICITY: ICD-10-CM

## 2024-10-07 DIAGNOSIS — E11.69 TYPE 2 DIABETES MELLITUS WITH OBESITY (HCC): ICD-10-CM

## 2024-10-07 DIAGNOSIS — M43.16 SPONDYLOLISTHESIS AT L4-L5 LEVEL: Primary | ICD-10-CM

## 2024-10-07 DIAGNOSIS — G47.33 OSA (OBSTRUCTIVE SLEEP APNEA): Primary | ICD-10-CM

## 2024-10-07 PROCEDURE — 99214 OFFICE O/P EST MOD 30 MIN: CPT | Performed by: FAMILY MEDICINE

## 2024-10-07 PROCEDURE — 3075F SYST BP GE 130 - 139MM HG: CPT | Performed by: FAMILY MEDICINE

## 2024-10-07 PROCEDURE — 3079F DIAST BP 80-89 MM HG: CPT | Performed by: FAMILY MEDICINE

## 2024-10-07 PROCEDURE — 3008F BODY MASS INDEX DOCD: CPT | Performed by: FAMILY MEDICINE

## 2024-10-07 PROCEDURE — 72100 X-RAY EXAM L-S SPINE 2/3 VWS: CPT | Performed by: STUDENT IN AN ORGANIZED HEALTH CARE EDUCATION/TRAINING PROGRAM

## 2024-10-07 PROCEDURE — 99215 OFFICE O/P EST HI 40 MIN: CPT | Performed by: STUDENT IN AN ORGANIZED HEALTH CARE EDUCATION/TRAINING PROGRAM

## 2024-10-07 NOTE — PROGRESS NOTES
Subjective:   Patient ID: Radha Murguia is a 49 year old female.    HPI  49yr old obese, female presents with healthcare Karo FLORENTINO for follow-up ov.  States she had trouble sleeping last Wednesday and Thursday night due to difficulty breathing. Felt as if someone was suffocating her. Denies any coughing, choking or gasping for air episodes. Uses 2-3 pillows at nighttime. Has KEON but has not had CPAP in over 1yr. Has not had luck getting in to a new sleep medicine physician. First available appt 3/2025.   Chronic LBP radiating to BLE. Following with ortho spine and just seen him today. Recommended for surgery but needs to have better glycemic control and improved wt loss.  DM2, taking meds as directed. Fasting glucose levels have been stable. Denies any paresthesias. Eye exam utd.  Obesity, working with C. Taking meds as directed. Karo states she does not adhere to her diet.    History/Other:   Review of Systems   Constitutional:  Negative for appetite change.   Respiratory:  Positive for shortness of breath. Negative for cough and choking.    Cardiovascular:  Negative for chest pain.   Musculoskeletal:  Positive for back pain.     Current Outpatient Medications   Medication Sig Dispense Refill    atorvastatin 80 MG Oral Tab Take 1 tablet (80 mg total) by mouth nightly. 90 tablet 0    Metoprolol Succinate  MG Oral Tablet 24 Hr Take 1 tablet (200 mg total) by mouth daily. 90 tablet 1    semaglutide (OZEMPIC, 2 MG/DOSE,) 8 MG/3ML Subcutaneous Solution Pen-injector Inject 2 mg into the skin once a week. 9 mL 1    Fezolinetant (VEOZAH) 45 MG Oral Tab Take 45 mg by mouth daily. 30 tablet 2    hydrALAZINE 25 MG Oral Tab Take 1 tablet (25 mg total) by mouth 2 (two) times daily. 180 tablet 1    PARoxetine (PAXIL) 20 MG Oral Tab Take 1 tablet (20 mg total) by mouth daily. 30+20mg= 50mg daily 90 tablet 1    PARoxetine 30 MG Oral Tab Take 1 tablet (30 mg total) by mouth daily. Take with 20mg tab= 50mg daily 90  tablet 1    QUEtiapine 100 MG Oral Tab Take 1 tablet (100 mg total) by mouth nightly. 90 tablet 1    metFORMIN  MG Oral Tablet 24 Hr Take 1 tablet (750 mg total) by mouth 2 (two) times daily with meals. 180 tablet 3    Ciclopirox Olamine 0.77 % External Cream Apply to affected area twice daily for 1-4wks 90 g 1    telmisartan 80 MG Oral Tab Take 1 tablet (80 mg total) by mouth daily. 90 tablet 1    Multiple Vitamins-Minerals (MULTIVITAMIN WOMENS 50+ ADV) Oral Tab Take 1 tablet by mouth daily with food. 90 tablet 1    Glucose Blood (TRUE METRIX BLOOD GLUCOSE TEST) In Vitro Strip Use to check blood sugar daily 100 strip 2    OneTouch UltraSoft 2 Lancets Does not apply Misc 1 Lancet daily. Use to test blood sugar daily 100 each 2    fexofenadine (ALLEGRA ALLERGY) 180 MG Oral Tab Take 1 tablet (180 mg total) by mouth daily.      chlorhexidine gluconate 0.12 % Mouth/Throat Solution  (Patient not taking: Reported on 9/30/2024)       Allergies:  Allergies   Allergen Reactions    Other Runny nose     Seasonal         Objective:   Physical Exam  Vitals and nursing note reviewed.   Constitutional:       Appearance: Normal appearance. She is obese.   HENT:      Head: Normocephalic and atraumatic.   Cardiovascular:      Rate and Rhythm: Normal rate and regular rhythm.   Pulmonary:      Effort: Pulmonary effort is normal.      Breath sounds: Normal breath sounds. No wheezing, rhonchi or rales.   Skin:     General: Skin is warm and dry.   Neurological:      Mental Status: She is alert.   Psychiatric:         Mood and Affect: Mood normal.         Behavior: Behavior normal.         Assessment & Plan:   1. KEON (obstructive sleep apnea)  - discussed importance of tx for KEON, unable to get sooner appt with local sleep medicine physicians  - will refer to Dr. Hall at [facility]  - given educational material on sleep apnea  - Pulmonary Referral - In Network    2. Spondylolisthesis at L4-L5 level  - following with ortho spine  - has  pending imaging for l-spine  - recommendation for surgery in near future     3. Type 2 diabetes mellitus with obesity (HCC)  - cpm  - check blood glucose levels daily  - check feet daily  - follow low carb diet and regular exercise to promote further weight loss  - will refer again to diabetic education/dietician for further recs  - eye exam utd  - EDUCATION-OP REFERRAL TO DIAB NUTRITION MANAGEMENT 3 HRS    4. Morbid obesity with BMI of 40.0-44.9, adult (HCC)  - following with [facility]  - cpm  - referral to nutritionist   - EDUCATION-OP REFERRAL TO DIAB NUTRITION MANAGEMENT 3 HRS    She understands and agrees with tx plan  RTC in mid Dec, sooner if needed    No orders of the defined types were placed in this encounter.      Meds This Visit:  Requested Prescriptions      No prescriptions requested or ordered in this encounter       Imaging & Referrals:  OP REFERRAL TO DIAB NUTRITION MANAGEMENT 3 HRS  PULMONARY - INTERNAL

## 2024-10-07 NOTE — PROGRESS NOTES
[facility] - ORTHOPEDICS  [address] [place] [address], [address], [place] [address]  [address] [place] [id number]  [phone]     FOLLOW-UP PATIENT VISIT    Name: Radha Murguia   MRN: [id number]  Date: 10/7/2024     CC: lumbar stenosis with neurogenic claudication    INTERVAL HISTORY:   Radha Murguia is a 49 year old female  follow-up patient whom I have been treating conservatively. Patient returns today for reevaluation of lumbar stenosis.      Back pain with radiation down bilateral lower extremities including posterior lateral buttocks, thigh.  Numbness worsens with walking.  Patient completed 6 weeks of physical therapy in Nov 2022 without relief of symptoms.     Last seen in clinic in February 2023.  MRI of the time demonstrated moderate stenosis at L4-5 secondary to spondylolisthesis.  Patient was referred for epidural steroid injection.  Patient has had lumbar epidural steroid injections without sustained relief.    Bowel and bladder symptoms: absent.    The patient has not had issues with balance and/or hand dexterity problems such as changes in penmanship or the use of buttons or zippers.    We have tried the following interventions thus far: PT, LENO    ROS: No fever/chills or other constitutional issues.    PE:   There were no vitals filed for this visit.  Estimated body mass index is 42.93 kg/m² as calculated from the following:    Height as of an earlier encounter on 10/7/24: 5' 4.5\" (1.638 m).    Weight as of an earlier encounter on 10/7/24: 254 lb (115.2 kg).    On physical examination, she is awake, alert and oriented x 3 and in no acute distress. Mood, affect and language are normal. She appears well developed and well nourished.  She walks without a nonantalgic, nonmyelopathic, non-Trendelenburg gait. Motor strength testing of the lower extremities shows 5/5 strength in hip flexors, knee extensors, ankle dorsiflexors, toe extensor, and gastroc-soleus complex.    Sensation is intact to light touch L2-S1 distributions bilaterally. Reflexes normal.     Radiographic Examination/Diagnostics:  XR and MRI personally viewed, independently interpreted and radiology report was reviewed.  X-ray of the lumbar spine demonstrates significant progression of spondylolisthesis L4-5      IMPRESSION: Radha Murguia is a 49 year old female with progression of L4-5 spondylolisthesis and likely spinal stenosis    PLAN:   We had a detailed discussion outlining the etiology, anatomy, pathophysiology, and natural history of lumbar stenosis.  - Discussed w/ patient surgery option including L4-5 MIS TLIF  - Ordered repeat Mri and CT for operative planning  - Patient can call to schedule  - Discussed glycemic control and arden-operative optimization    David Carrera MD  Orthopedic Spine Surgeon  [facility]   [address] [place] [address], [address] [place] [address].[email or web address]  [email or web address]  t: [phone]   f: [phone]        This note was dictated using Dragon software.  While it was briefly proofread prior to completion, some grammatical, spelling, and word choice errors due to dictation may still occur.

## 2024-10-28 DIAGNOSIS — R80.9 MICROALBUMINURIA DUE TO TYPE 2 DIABETES MELLITUS (HCC): ICD-10-CM

## 2024-10-28 DIAGNOSIS — E66.9 TYPE 2 DIABETES MELLITUS WITH OBESITY (HCC): ICD-10-CM

## 2024-10-28 DIAGNOSIS — I10 ESSENTIAL HYPERTENSION: ICD-10-CM

## 2024-10-28 DIAGNOSIS — E11.69 TYPE 2 DIABETES MELLITUS WITH OBESITY (HCC): ICD-10-CM

## 2024-10-28 DIAGNOSIS — E11.29 MICROALBUMINURIA DUE TO TYPE 2 DIABETES MELLITUS (HCC): ICD-10-CM

## 2024-10-31 RX ORDER — TELMISARTAN 80 MG/1
80 TABLET ORAL DAILY
Qty: 90 TABLET | Refills: 3 | Status: SHIPPED | OUTPATIENT
Start: 2024-10-31

## 2024-10-31 NOTE — TELEPHONE ENCOUNTER
REFILL PASSED PER [facility] PROTOCOLS    Requested Prescriptions   Pending Prescriptions Disp Refills    TELMISARTAN 80 MG Oral Tab [Pharmacy Med Name: Telmisartan Oral Tablet 80 MG] 90 tablet 3     Sig: TAKE 1 TABLET EVERY DAY       Hypertension Medications Protocol Passed - 10/31/2024 10:03 AM        Passed - CMP or BMP in past 12 months        Passed - Last BP reading less than 140/90     BP Readings from Last 1 Encounters:   10/07/24 138/88               Passed - In person appointment or virtual visit in the past 12 mos or appointment in next 3 mos     Recent Outpatient Visits              3 weeks ago KEON (obstructive sleep apnea)    [facility], [address] [place] [address], [place] Raphael Farley DO    Office Visit    3 weeks ago Spondylolisthesis at L4-L5 level    [address], [place] David Carrera MD    Office Visit    1 month ago Essential hypertension    [address] Raphael Farley DO    Office Visit    1 month ago Encounter for therapeutic drug monitoring    [facility], [address] [place] [address] Latonia Levy APRN    Office Visit    1 month ago Hot flash, menopausal    [facility], [facility], [place] - OB/GYN Jacqueline Crow APRN    Office Visit          Future Appointments         Provider Department Appt Notes    In 1 month Jose Maria DO [facility] [place] Sleep disburbance  (Policy informed)    In 3 months Latonia Levy APRN [address] 5 mo f/u                    Passed - EGFRCR or GFRNAA > 50     GFR Evaluation  EGFRCR: 82 , resulted on 9/10/2024               Future Appointments         Provider Department Appt Notes    In 1 month Jose Maria DO [facility], [facility] [place] Sleep disburbance  (Policy informed)    In 3 months Mildred  WILLIS [facility], [address] [place] [address] 5 mo f/u          Recent Outpatient Visits              3 weeks ago KEON (obstructive sleep apnea)    [facility], [address] [place] [address] Raphael Farley DO    Office Visit    3 weeks ago Spondylolisthesis at L4-L5 level    [facility], [address] [place] [address] David Carrera MD    Office Visit    1 month ago Essential hypertension    [facility], [address] [place] [address] Raphael Farley DO    Office Visit    1 month ago Encounter for therapeutic drug monitoring    [facility], [address] [place] [address] Latonia Levy APRN    Office Visit    1 month ago Hot flash, menopausal    [facility], [facility], [place] - OB/GYN Jacqueline Crow APRN    Office Visit

## 2024-11-04 ENCOUNTER — NURSE TRIAGE (OUTPATIENT)
Dept: FAMILY MEDICINE CLINIC | Facility: CLINIC | Age: 49
End: 2024-11-04

## 2024-11-04 NOTE — TELEPHONE ENCOUNTER
Action Requested: Summary for Provider     []  Critical Lab, Recommendations Needed  [] Need Additional Advice  [x]   FYI    []   Need Orders  [] Need Medications Sent to Pharmacy  []  Other     SUMMARY: Advised pt for Urgent Care evaluation for reported breathing symptoms, as office schedule is full.  Assisted pt in finding nearest location to her house.    Reason for call: Difficulty Breathing  Onset: A few weeks    Notes:   - Wheezing on/off throughout day.  Frequently feels short of breath with and without movement.  - Has not used CPAP supplies x1 year, is unhappy with prior Pulmonologist.  Is scheduled to see new provider but couldn't get in until December.  - Wet non-productive cough, PND.  - Using Benadryl BID for management.  - Advised to go to , assisted pt in finding location closest to her house.        Reason for Disposition  • MILD difficulty breathing (e.g., minimal/no SOB at rest, SOB with walking, pulse < 100) of new-onset or worse than normal    Protocols used: Breathing Difficulty-A-OH

## 2024-11-05 ENCOUNTER — APPOINTMENT (OUTPATIENT)
Dept: GENERAL RADIOLOGY | Age: 49
End: 2024-11-05
Attending: NURSE PRACTITIONER
Payer: MEDICARE

## 2024-11-05 ENCOUNTER — HOSPITAL ENCOUNTER (OUTPATIENT)
Age: 49
Discharge: HOME | End: 2024-11-05
Payer: MEDICARE

## 2024-11-05 VITALS
SYSTOLIC BLOOD PRESSURE: 112 MMHG | DIASTOLIC BLOOD PRESSURE: 99 MMHG | TEMPERATURE: 97 F | OXYGEN SATURATION: 96 % | HEART RATE: 89 BPM | RESPIRATION RATE: 16 BRPM

## 2024-11-05 DIAGNOSIS — J06.9 VIRAL URI WITH COUGH: Primary | ICD-10-CM

## 2024-11-05 PROCEDURE — 71046 X-RAY EXAM CHEST 2 VIEWS: CPT | Performed by: NURSE PRACTITIONER

## 2024-11-05 PROCEDURE — 99214 OFFICE O/P EST MOD 30 MIN: CPT | Performed by: NURSE PRACTITIONER

## 2024-11-05 RX ORDER — ALBUTEROL SULFATE 90 UG/1
2 INHALANT RESPIRATORY (INHALATION) EVERY 4 HOURS PRN
Qty: 1 EACH | Refills: 0 | Status: SHIPPED | OUTPATIENT
Start: 2024-11-05 | End: 2024-11-05

## 2024-11-05 RX ORDER — ALBUTEROL SULFATE 90 UG/1
2 INHALANT RESPIRATORY (INHALATION) EVERY 4 HOURS PRN
Qty: 1 EACH | Refills: 0 | Status: SHIPPED | OUTPATIENT
Start: 2024-11-05 | End: 2024-12-05

## 2024-11-05 RX ORDER — PREDNISONE 20 MG/1
40 TABLET ORAL DAILY
Qty: 10 TABLET | Refills: 0 | Status: SHIPPED | OUTPATIENT
Start: 2024-11-05 | End: 2024-11-10

## 2024-11-05 RX ORDER — BENZONATATE 100 MG/1
100 CAPSULE ORAL 3 TIMES DAILY PRN
Qty: 30 CAPSULE | Refills: 0 | Status: SHIPPED | OUTPATIENT
Start: 2024-11-05 | End: 2024-12-05

## 2024-11-05 RX ORDER — PREDNISONE 20 MG/1
40 TABLET ORAL DAILY
Qty: 10 TABLET | Refills: 0 | Status: SHIPPED | OUTPATIENT
Start: 2024-11-05 | End: 2024-11-05

## 2024-11-05 RX ORDER — BENZONATATE 100 MG/1
100 CAPSULE ORAL 3 TIMES DAILY PRN
Qty: 30 CAPSULE | Refills: 0 | Status: SHIPPED | OUTPATIENT
Start: 2024-11-05 | End: 2024-11-05

## 2024-11-05 NOTE — ED INITIAL ASSESSMENT (HPI)
Pt c/o feeling sob and needing to catch her breath more often over the last month. No fever, aches, or chills. Hx of apnea.

## 2024-11-05 NOTE — DISCHARGE INSTRUCTIONS
Please take steroids daily.  Albuterol inhaler 2 puffs every 4 hours.  Tessalon Perles for coughing.  Have close follow-up with your primary.

## 2024-11-05 NOTE — ED PROVIDER NOTES
Patient Seen in: [facility]      History     Chief Complaint   Patient presents with    Cough/URI     Stated Complaint: breathing issues in chest, needing to take deep breaths    Subjective:   This is a 49-year-old female with below stated medical history.  Presents to immediate care for cough.  Reports she has been coughing with intermittent wheezing over last few weeks.  States at times she does feel short of breath.  She denies any fevers, chest pain, or swelling of the lower legs.  She has been taking allergy medicine daily with no relief.  She is post wear CPAP at night but has not worn this device for more than 1 year.  She has scheduled follow-up with pulmonology to replace her CPAP equipment in December.    The history is provided by the patient.             Objective:     Past Medical History:    Allergic rhinitis    Anxiety state, unspecified    Asthma (HCC)    Back problem    lower back pain- sees chiro    Bad breath    Belching    Bipolar affective (HCC)    patient denies    Bleeding nose    Breast CA (HCC)    DCIS    Cancer (HCC)    left breast    Chest pain    Chronic cough    Community acquired pneumonia    COPD (chronic obstructive pulmonary disease) (HCC)    Coronavirus infection    Disorder of liver    fatty liver    Ductal carcinoma in situ of breast    Esophageal reflux    Essential hypertension    Exposure to medical diagnostic radiation    finished in 2015    Headache, chronic daily    Hemorrhoids    High blood pressure    High cholesterol    Hyperlipidemia    Hypoxia    Indigestion    Leaking of urine    Mild mental retardation    Mouth sores    Obstructive apnea    CPAP 14 Sarina's    Parainfluenza infection    Sleep apnea    cpap    Type II or unspecified type diabetes mellitus without mention of complication, not stated as uncontrolled              Past Surgical History:   Procedure Laterality Date    Breast lumpectomy Left 06/11/2015    Procedure: BREAST LUMPECTOMY;  Surgeon:  Paul Miller MD;  Location:  MAIN OR    Carpal tunnel release Bilateral     Colonoscopy N/A 11/23/2021    Procedure: COLONOSCOPY with polypectomy, ESOPHAGOGASTRODUODENOSCOPY (EGD) with biopsies;  Surgeon: Akbar Perla MD;  Location:  ENDOSCOPY    Hernia surgery      BABY    Lumpectomy left Left 05/11/2015    DCIS;papilloma    Joanie biopsy stereo w/calc 1 site left (cpt=19081) Left 04/2015    DCIS; papilloma    Needle biopsy liver      Orthopedic surg (pbp) Bilateral     MANDA CARPAL ISHAAN RELEASE    Other  LEFT FOOT SPUR REMOVAL    Other surgical history      bilateral carpal tunnel release    Radiation left Left 2015    lump with rad    Stereotactic breast biopsy Left 4/21/15 [facility]    Left Breast                Social History     Socioeconomic History    Marital status: Single   Occupational History    Occupation: works part-time   Tobacco Use    Smoking status: Never     Passive exposure: Never    Smokeless tobacco: Never   Vaping Use    Vaping status: Never Used   Substance and Sexual Activity    Alcohol use: No    Drug use: No    Sexual activity: Never   Other Topics Concern    Caffeine Concern No    Exercise No    Seat Belt No    Special Diet No    Stress Concern No    Weight Concern Yes     Comment: Working on weight loss   Social History Narrative    Lives  In [place] with mother              Review of Systems   Constitutional:  Negative for chills and fever.   HENT:  Positive for congestion, sinus pressure and sinus pain. Negative for ear discharge, ear pain and sore throat.    Respiratory:  Positive for cough, shortness of breath and wheezing. Negative for stridor.    Cardiovascular:  Negative for chest pain, palpitations and leg swelling.   Gastrointestinal:  Negative for abdominal pain.   Genitourinary:  Negative for dysuria.   Musculoskeletal:  Negative for back pain, neck pain and neck stiffness.   Skin:  Negative for rash.   Allergic/Immunologic: Positive for environmental allergies.    Neurological:  Negative for headaches.       Positive for stated complaint: breathing issues in chest, needing to take deep breaths  Other systems are as noted in HPI.  Constitutional and vital signs reviewed.      All other systems reviewed and negative except as noted above.    Physical Exam     ED Triage Vitals [11/05/24 1456]   BP (!) 112/99   Pulse 89   Resp 16   Temp 97 °F (36.1 °C)   Temp src    SpO2 96 %   O2 Device        Current Vitals:   Vital Signs  BP: (!) 112/99  Pulse: 89  Resp: 16  Temp: 97 °F (36.1 °C)    Oxygen Therapy  SpO2: 96 %        Physical Exam  Vitals and nursing note reviewed.   Constitutional:       General: She is not in acute distress.     Appearance: Normal appearance. She is obese. She is not ill-appearing, toxic-appearing or diaphoretic.   HENT:      Head: Normocephalic and atraumatic.      Right Ear: Tympanic membrane, ear canal and external ear normal. There is no impacted cerumen.      Left Ear: Tympanic membrane, ear canal and external ear normal. There is no impacted cerumen.      Nose: Congestion present. No rhinorrhea.      Mouth/Throat:      Mouth: Mucous membranes are moist.      Pharynx: Oropharynx is clear. No oropharyngeal exudate or posterior oropharyngeal erythema.   Eyes:      General:         Right eye: No discharge.         Left eye: No discharge.      Extraocular Movements: Extraocular movements intact.      Conjunctiva/sclera: Conjunctivae normal.   Cardiovascular:      Rate and Rhythm: Normal rate.      Heart sounds: Normal heart sounds. No murmur heard.  Pulmonary:      Effort: Pulmonary effort is normal. No respiratory distress.      Breath sounds: Normal breath sounds. No stridor. No wheezing, rhonchi or rales.   Musculoskeletal:      Cervical back: Neck supple.      Right lower leg: No edema.      Left lower leg: No edema.   Skin:     General: Skin is warm and dry.      Capillary Refill: Capillary refill takes less than 2 seconds.      Findings: No rash.    Neurological:      Mental Status: She is alert and oriented to person, place, and time.   Psychiatric:         Mood and Affect: Mood normal.         Behavior: Behavior normal.             ED Course   Labs Reviewed - No data to display         Chest x-ray       MDM      Vital signs stable. Patient is well-appearing and nontoxic looking. Presents to immediate care for upper respiratory symptoms.    Differential diagnosis include but not limited to COVID, viral sinusitis, influenza, other viral URI, COPD exacerbation, pneumonia, less likely CHF    Out of the window for viral testing.  Lung sounds clear bilaterally. No respiratory distress or hypoxia.  Chest x-ray films interpreted and reviewed myself.  Results show no acute findings.       Clinical impression is viral URI with cough    DC home. Recommended over-the-counter continue antihistamines.  Short burst of steroids, albuterol inhaler, Tessalon Perles prescribed.  She was advised that steroids may elevate her blood sugars mildly during treatment.  Tylenol and/or ibuprofen for pain or fever. The importance of oral hydration and close follow-up with primary provider in 1 week discussed. Reasons to seek emergent treatment reinforced. Patient verbalized understanding, and agreed with plan of care. All questions answered.          Medical Decision Making      Disposition and Plan     Clinical Impression:  1. Viral URI with cough         Disposition:  Discharge  11/5/2024  3:34 pm    Follow-up:  Raphael Farley DO  [facility] [address] [address]  [phone]    In 1 week            Medications Prescribed:  Current Discharge Medication List        START taking these medications    Details   predniSONE 20 MG Oral Tab Take 2 tablets (40 mg total) by mouth daily for 5 days.  Qty: 10 tablet, Refills: 0      benzonatate 100 MG Oral Cap Take 1 capsule (100 mg total) by mouth 3 (three) times daily as needed for cough.  Qty: 30 capsule, Refills: 0       albuterol 108 (90 Base) MCG/ACT Inhalation Aero Soln Inhale 2 puffs into the lungs every 4 (four) hours as needed for Wheezing.  Qty: 1 each, Refills: 0                 Supplementary Documentation:

## 2024-11-06 ENCOUNTER — TELEPHONE (OUTPATIENT)
Facility: CLINIC | Age: 49
End: 2024-11-06

## 2024-11-06 NOTE — TELEPHONE ENCOUNTER
Patient called office- she advised that she had gotten a phone call from our [facility] phone [facility] ([facility]) attempted to check chart to see if I could find outreach - nothing from our office- advised patient I did not see anything from our office- nothing else noted related to visit related to respiratory issues- advised patient not sure exactly who called and not sure exactly who to direct her to. Ended call.     Saw after call that patient had referral for Diabetes Nutrition Education- spoke with Jazz- advised she had reached out to patient to try to set up MNT appointment with office- advised she will call patient back to set up appointment

## 2024-11-14 ENCOUNTER — OFFICE VISIT (OUTPATIENT)
Dept: FAMILY MEDICINE CLINIC | Facility: CLINIC | Age: 49
End: 2024-11-14
Payer: MEDICARE

## 2024-11-14 ENCOUNTER — TELEPHONE (OUTPATIENT)
Dept: INTERNAL MEDICINE CLINIC | Facility: CLINIC | Age: 49
End: 2024-11-14

## 2024-11-14 VITALS
RESPIRATION RATE: 16 BRPM | OXYGEN SATURATION: 96 % | HEART RATE: 93 BPM | BODY MASS INDEX: 39.63 KG/M2 | DIASTOLIC BLOOD PRESSURE: 80 MMHG | SYSTOLIC BLOOD PRESSURE: 124 MMHG | HEIGHT: 64.5 IN | WEIGHT: 235 LBS | TEMPERATURE: 98 F

## 2024-11-14 DIAGNOSIS — R05.1 ACUTE COUGH: ICD-10-CM

## 2024-11-14 DIAGNOSIS — J01.00 ACUTE MAXILLARY SINUSITIS, RECURRENCE NOT SPECIFIED: Primary | ICD-10-CM

## 2024-11-14 PROCEDURE — 3079F DIAST BP 80-89 MM HG: CPT | Performed by: FAMILY MEDICINE

## 2024-11-14 PROCEDURE — 99213 OFFICE O/P EST LOW 20 MIN: CPT | Performed by: FAMILY MEDICINE

## 2024-11-14 PROCEDURE — 3008F BODY MASS INDEX DOCD: CPT | Performed by: FAMILY MEDICINE

## 2024-11-14 PROCEDURE — 3074F SYST BP LT 130 MM HG: CPT | Performed by: FAMILY MEDICINE

## 2024-11-14 NOTE — PROGRESS NOTES
HPI:   Radha Murguia is a 49 year old female who presents for upper respiratory symptoms for >10d.  States she has been having coughing with wheezing and shortness of breath. Cough is sometimes productive. Occasionally R ear pain. Denies any fevers or chills. Has finished steroid burst and taking tessalon and albuterol inhaler that was given in the  on 11/5, without any relief.       Current Outpatient Medications   Medication Sig Dispense Refill    albuterol 108 (90 Base) MCG/ACT Inhalation Aero Soln Inhale 2 puffs into the lungs every 4 (four) hours as needed for Wheezing. 1 each 0    benzonatate 100 MG Oral Cap Take 1 capsule (100 mg total) by mouth 3 (three) times daily as needed for cough. 30 capsule 0    telmisartan 80 MG Oral Tab Take 1 tablet (80 mg total) by mouth daily. 90 tablet 3    atorvastatin 80 MG Oral Tab Take 1 tablet (80 mg total) by mouth nightly. 90 tablet 0    Metoprolol Succinate  MG Oral Tablet 24 Hr Take 1 tablet (200 mg total) by mouth daily. 90 tablet 1    semaglutide (OZEMPIC, 2 MG/DOSE,) 8 MG/3ML Subcutaneous Solution Pen-injector Inject 2 mg into the skin once a week. 9 mL 1    Fezolinetant (VEOZAH) 45 MG Oral Tab Take 45 mg by mouth daily. 30 tablet 2    hydrALAZINE 25 MG Oral Tab Take 1 tablet (25 mg total) by mouth 2 (two) times daily. 180 tablet 1    PARoxetine (PAXIL) 20 MG Oral Tab Take 1 tablet (20 mg total) by mouth daily. 30+20mg= 50mg daily 90 tablet 1    PARoxetine 30 MG Oral Tab Take 1 tablet (30 mg total) by mouth daily. Take with 20mg tab= 50mg daily 90 tablet 1    QUEtiapine 100 MG Oral Tab Take 1 tablet (100 mg total) by mouth nightly. 90 tablet 1    metFORMIN  MG Oral Tablet 24 Hr Take 1 tablet (750 mg total) by mouth 2 (two) times daily with meals. 180 tablet 3    Ciclopirox Olamine 0.77 % External Cream Apply to affected area twice daily for 1-4wks 90 g 1    Multiple Vitamins-Minerals (MULTIVITAMIN WOMENS 50+ ADV) Oral Tab Take 1 tablet by mouth  daily with food. 90 tablet 1    Glucose Blood (TRUE METRIX BLOOD GLUCOSE TEST) In Vitro Strip Use to check blood sugar daily 100 strip 2    OneTouch UltraSoft 2 Lancets Does not apply Misc 1 Lancet daily. Use to test blood sugar daily 100 each 2    fexofenadine (ALLEGRA ALLERGY) 180 MG Oral Tab Take 1 tablet (180 mg total) by mouth daily.        Past Medical History:    Allergic rhinitis    Anxiety state, unspecified    Asthma (HCC)    Back problem    lower back pain- sees chiro    Bad breath    Belching    Bipolar affective (HCC)    patient denies    Bleeding nose    Breast CA (HCC)    DCIS    Cancer (HCC)    left breast    Chest pain    Chronic cough    Community acquired pneumonia    COPD (chronic obstructive pulmonary disease) (HCC)    Coronavirus infection    Disorder of liver    fatty liver    Ductal carcinoma in situ of breast    Esophageal reflux    Essential hypertension    Exposure to medical diagnostic radiation    finished in 2015    Headache, chronic daily    Hemorrhoids    High blood pressure    High cholesterol    Hyperlipidemia    Hypoxia    Indigestion    Leaking of urine    Mild mental retardation    Mouth sores    Obstructive apnea    CPAP 14 Sarina's    Parainfluenza infection    Sleep apnea    cpap    Type II or unspecified type diabetes mellitus without mention of complication, not stated as uncontrolled      Past Surgical History:   Procedure Laterality Date    Breast lumpectomy Left 06/11/2015    Procedure: BREAST LUMPECTOMY;  Surgeon: Paul Miller MD;  Location:  MAIN OR    Carpal tunnel release Bilateral     Colonoscopy N/A 11/23/2021    Procedure: COLONOSCOPY with polypectomy, ESOPHAGOGASTRODUODENOSCOPY (EGD) with biopsies;  Surgeon: Akbar Perla MD;  Location:  ENDOSCOPY    Hernia surgery      BABY    Lumpectomy left Left 05/11/2015    DCIS;papilloma    Joanie biopsy stereo w/calc 1 site left (cpt=19081) Left 04/2015    DCIS; papilloma    Needle biopsy liver      Orthopedic surg  (pbp) Bilateral     MANDA CARPAL ISHAAN RELEASE    Other  LEFT FOOT SPUR REMOVAL    Other surgical history      bilateral carpal tunnel release    Radiation left Left     lump with rad    Stereotactic breast biopsy Left 4/21/15 [facility]    Left Breast      Family History   Problem Relation Age of Onset    Breast Cancer Maternal Grandmother         not known    Breast Cancer Paternal Grandmother         not known    Diabetes Father     Heart Attack Father     Other (Other) Father         heart attack-- at age 52    Cancer Brother         brain tumor    Diabetes Brother     Dementia Mother     Diabetes Mother     Stroke Mother     Bipolar Disorder Other     Suicide History Other     Breast Cancer Self 40      Social History     Socioeconomic History    Marital status: Single   Occupational History    Occupation: works part-time   Tobacco Use    Smoking status: Never     Passive exposure: Never    Smokeless tobacco: Never   Vaping Use    Vaping status: Never Used   Substance and Sexual Activity    Alcohol use: No    Drug use: No    Sexual activity: Never   Other Topics Concern    Caffeine Concern No    Exercise No    Seat Belt No    Special Diet No    Stress Concern No    Weight Concern Yes     Comment: Working on weight loss   Social History Narrative    Lives  In [place] with mother         REVIEW OF SYSTEMS:   GENERAL: feels well otherwise  SKIN: no rashes   HEENT: congested  LUNGS: +cough, occ wheezing, denies shortness of breath with exertion  CARDIOVASCULAR: denies chest pain on exertion  GI: no nausea or abdominal pain  NEURO: denies headaches    EXAM:   /80   Pulse 93   Temp 97.6 °F (36.4 °C) (Temporal)   Resp 16   Ht 5' 4.5\" (1.638 m)   Wt 235 lb (106.6 kg)   LMP  (LMP Unknown)   SpO2 96%   BMI 39.71 kg/m²   GENERAL: well developed, well nourished,in no apparent distress, obese  SKIN: no rashes,no suspicious lesions  EYES:PERRLA, EOMI   HEENT: atraumatic, normocephalic,ears and throat are  clear, +tenderness of maxillary sinuses  NECK: supple,no adenopathy   LUNGS: clear to auscultation, no w/r/r  CARDIO: RRR without murmur     ASSESSMENT AND PLAN:   Radha Murguia is a 49 year old female who presents with:  1. Acute maxillary sinusitis, recurrence not specified  2. Acute cough  - advised to cont symptomatic tx  - will provide rx for augmentin, reviewed indications, dosing and SEs  - may cont albuterol inhaler prn cough/wheezing  - monitor   -  patient indicates understanding of these issues and agrees to the plan.  - patient is asked to return if sx's persist or worsen.  - amoxicillin clavulanate 875-125 MG Oral Tab; Take 1 tablet by mouth 2 (two) times daily for 10 days.  Dispense: 20 tablet; Refill: 0

## 2024-11-14 NOTE — TELEPHONE ENCOUNTER
Pt seen at urgent care on 11/5/24 for cough/URI. She was prescribed steroids, benzonatate, and albuterol. She completed steroids, has been using benzonatate and albuterol but they are not helping. Pt still having cough and wheezing. Short of breath at times, even at rest. Was instructed by urgent care to follow-up with PCP if not improving.     Dr. Farley - are you able to see pt today? If not, any further recommendations?

## 2024-11-22 ENCOUNTER — PATIENT MESSAGE (OUTPATIENT)
Dept: ORTHOPEDICS CLINIC | Facility: CLINIC | Age: 49
End: 2024-11-22

## 2024-11-22 NOTE — TELEPHONE ENCOUNTER
Patient wants to get surgery scheduled. I dont see a surgery scheduling form.  Is this an option?    LOV: 10/7/24  Per LOV: \"PLAN:   We had a detailed discussion outlining the etiology, anatomy, pathophysiology, and natural history of lumbar stenosis.  - Discussed w/ patient surgery option including L4-5 MIS TLIF  - Ordered repeat Mri and CT for operative planning  - Patient can call to schedule  - Discussed glycemic control and arden-operative optimization\"

## 2024-11-25 NOTE — TELEPHONE ENCOUNTER
SURGERY SCHEDULING SHEET    Radha Murguia  5/6/1975  [id number]    Procedure: L4-5 MIS TLIF    L4-5 Combined transforaminal lumbar interbody fusion and posterior fusion (42960)  L4-5 Interbody cage placement (13398)  L4-5 Posterior spinal instrumentation (35035)  L4-5 Posterior column osteotomy (22214)  Use of allograft or demineralized bone matrix (20930)  Use of Local autograft (20936)    Diagnosis: spondylolisthesis L4-5    Anesthesia: General    Length of Surgery: 2 hrs    Disposition: Inpatient procedure    Assist: ОЛЕГ Leonard    OR Table: Surendra    Position: Prone    Implant:  Atec [id number] InVictus MIS Screws, [id number] Calibrate expandable PSX , and [id number] Alphagraft DBM allograft     C-Arm: Yes    Special Equipment: None    Neuromonitoring: Yes    Pre-op Testing: PER ANESTHESIA GUIDELINES    Clearance: OTHER:  PCP    Post op: 2 weeks post op    Home health: Yes    Prehab: Yes    David Carrera MD  [facility]  Phone: [phone]  Fax: [phone]

## 2024-11-26 ENCOUNTER — TELEPHONE (OUTPATIENT)
Dept: ORTHOPEDICS CLINIC | Facility: CLINIC | Age: 49
End: 2024-11-26

## 2024-11-26 DIAGNOSIS — M43.16 SPONDYLOLISTHESIS AT L4-L5 LEVEL: Primary | ICD-10-CM

## 2024-11-26 NOTE — TELEPHONE ENCOUNTER
Date of Surgery: 1/21/25       Post Op Appt:      Case ID:     Notes:       SURGERY SCHEDULING SHEET     Radha Murguia  5/6/1975  [id number]     Procedure: L4-5 MIS TLIF     L4-5 Combined transforaminal lumbar interbody fusion and posterior fusion (14356)  L4-5 Interbody cage placement (01764)  L4-5 Posterior spinal instrumentation (59861)  L4-5 Posterior column osteotomy (22214)  Use of allograft or demineralized bone matrix (20930)  Use of Local autograft (20936)     Diagnosis: spondylolisthesis L4-5     Anesthesia: General     Length of Surgery: 2 hrs     Disposition: Inpatient procedure     Assist: ОЛЕГ Loenard     OR Table: Surendra     Position: Prone     Implant:  Atec [id number] InVictus MIS Screws, [id number] Calibrate expandable PSX , and [id number] Alphagraft DBM allograft      C-Arm: Yes     Special Equipment: None     Neuromonitoring: Yes     Pre-op Testing: PER ANESTHESIA GUIDELINES     Clearance: OTHER:  PCP     Post op: 2 weeks post op     Home health: Yes     Prehab: Yes     David Carrera MD  [facility]  Phone: [phone]  Fax: [phone]      Detail Level: Simple Additional Notes: Patient consent was obtained to proceed with the visit and recommended plan of care after discussion of all risks and benefits, including the risks of COVID-19 exposure. no

## 2024-11-26 NOTE — TELEPHONE ENCOUNTER
Called and left a message for Charlotte in regards to getting her scheduled for surgery. Per dr. Carrera he would like to offer her a surgical date of 12/13/24.

## 2024-11-29 NOTE — TELEPHONE ENCOUNTER
Returned call and patient stated she doesn't want to do it on 12/13 because its to close to [place]. She would prefer to do it sometime in January. I let patient know we santana let Dr. Carrera know and someone will reach out to her with a new surgical date

## 2024-12-04 ENCOUNTER — TELEPHONE (OUTPATIENT)
Facility: CLINIC | Age: 49
End: 2024-12-04

## 2024-12-04 NOTE — TELEPHONE ENCOUNTER
Callled and spoke with Charlotte in regards to getting her scheduled for surgery. We did discuss optional surgical dates as she has decided to proceed with surgery on 1/21/25.    I proceeded to discuss the surgical protocol with her, however she states that this will need to be discussed with her power of  Karo Foss. I did inform her that I would give her a call to discuss the surgical protocol with her. She states understanding with no further questions or concerns.

## 2024-12-04 NOTE — TELEPHONE ENCOUNTER
Pt called to scheduled appt for surgery and was unable to get through because of technical issues. I called surgery scheduling and had the same results. Please advise. Pt contact is [phone]  Future Appointments  12/6/2024  2:00 PM    OS CT RM 1                 OS CT               [place]  12/9/2024  11:00 AM   Jose Maria,        [email or web address]           [facility]  12/20/2024 1:40 PM    Jazz Cruz APRN       [facility]              [facility]  2/5/2025   11:40 AM   Latonia Levy APRN     [facility]              [facility]

## 2024-12-06 ENCOUNTER — HOSPITAL ENCOUNTER (OUTPATIENT)
Dept: CT IMAGING | Age: 49
Discharge: HOME | End: 2024-12-06
Attending: STUDENT IN AN ORGANIZED HEALTH CARE EDUCATION/TRAINING PROGRAM
Payer: MEDICARE

## 2024-12-06 DIAGNOSIS — M43.16 SPONDYLOLISTHESIS AT L4-L5 LEVEL: ICD-10-CM

## 2024-12-06 PROCEDURE — 72131 CT LUMBAR SPINE W/O DYE: CPT | Performed by: STUDENT IN AN ORGANIZED HEALTH CARE EDUCATION/TRAINING PROGRAM

## 2024-12-08 NOTE — PROGRESS NOTES
Initial Pulmonary Medicine Outpatient Consultation           Reason for Consultation and CC: sleep issues     Referring Physician: Dr. Farley       HPI: I had the pleasure of seeing Radha Murguia for a Pulmonary Medicine consultation on 24.  50 yo woman with hx of KEON, allergic rhinitis, bipolar do, HTN, DM , GERD being seen for sleep apnea.  States she can't breath. Started one month ago. Occurs all the time-even at rest and has difficulty breathing in. + coughing and feels something in her throat. + wheezing.   Denies runny nose or postnasal drip. Admits to significant acid reflux. Symptoms are worse after eating. Takes Omeprazole but not regularly.   Denies previous hx of asthma or COPD.    Went to an immediate care recently and had a CXR that was unremarkable. Was given Prednisone, Tessalon Perles, and Albuterol which didn't help.   Was seeing Dr. Saldana in the past or KEON. Has a machine but doesn't have the supplies.  Reports her machine is > 5 yrs old and stopped using the machine >1 yr ago b/c she couldn't get supplies.   Estimates her last sleep study was 3-4 yrs ago.    CPAP compliance:  Time frame: 2022-2023  % used overall: 100%  % used > 4 hours: 100%  Avg hours/night: 9 hrs 26 min  AHI: 2.3  CPAP settin cmH20      Pt was [facility] Last saw him in 3/7/22:  \"HPI: I had the pleasure of seeing Radha Murguia for evaluation of KEON. As you know Ms. Murguia is a 46 year old female with initial complaints of snoring, witnessed apneas, unrefreshing sleep, daytime sleepiness. The patient denies driving while sleepy, restless legs, cataplexy, dyspnea.  - pt was initially diagnosed with KEON many years ago and has been on CPAP since . However, despite CPAP use, pt reports persistent difficulty sleeping at night, frequent awakenings and doesn't feel refreshed in am and throughout the day. Pt admits that she naps regularly for  several hours per day. Works as .  - uses CPAP nightly; it is old- used to smell like smoke. Needs to be replaced.  - follows with neurology and psychiatry the latter has been prescribing her medicines for depression, anxiety and insomnia.   ASSESSMENT AND PLAN:  KEON: Previously diagnosed. Pt is verbally compliant with CPAP therapy; however, having breakthrough symptoms.  1. No need for PSG/titration at this point but does need new machine bc it is apparently malfunctioning.  - need compliance data downloaded  - DME: Mike  - will have CPAP coordinator submit order for repair/replace.  - encouraged to clean regularly and use distilled water with humidifier  2. Absolutely no driving while sleepy.  3. Avoid alcohol and caffeine  4. Weight loss and exercise encouraged  5. Good sleep hygiene discussed at length  6. Education provided.  Snoring- resolved  Hypersomnia-- persists; multifactorial- contributing factors include poor sleep hygiene and also SE of current meds  Insomnia- sleep maintenace in nature- KEON seems well controlled. Due to above; poor sleep hygiene- needs to avoid naps during the daytime.  - already on seroquel and melatonin- follows with psychiatrist. Will defer pharmacology to her.  Obesity with BMI of 47.4- encouraged weight loss through diet and exercise. Pt is not a candidate for Inspire device bc her BMI is >36\".       REVIEW OF SYSTEMS:  Positives and negatives as stated in HPI. Remainder of 12 pt review of systems otherwise are negative.       PAST MEDICAL HISTORY:  Past Medical History:    Allergic rhinitis    Anxiety state, unspecified    Asthma ([facility])    Back problem    lower back pain- sees chiro    Bad breath    Belching    Bipolar affective ([facility])    patient denies    Bleeding nose    Breast CA ([facility])    DCIS    Cancer ([facility])    left breast    Chest pain    Chronic cough    Community acquired pneumonia    COPD (chronic obstructive pulmonary disease) ([facility])    Coronavirus  infection    Disorder of liver    fatty liver    Ductal carcinoma in situ of breast    Esophageal reflux    Essential hypertension    Exposure to medical diagnostic radiation    finished in     Headache, chronic daily    Hemorrhoids    High blood pressure    High cholesterol    Hyperlipidemia    Hypoxia    Indigestion    Leaking of urine    Mild mental retardation    Mouth sores    Obstructive apnea    CPAP 14 Sarina's    Parainfluenza infection    Sleep apnea    cpap    Type II or unspecified type diabetes mellitus without mention of complication, not stated as uncontrolled        PAST SURGICAL HISTORY:  Past Surgical History:   Procedure Laterality Date    Breast lumpectomy Left 2015    Procedure: BREAST LUMPECTOMY;  Surgeon: Paul Miller MD;  Location:  MAIN OR    Carpal tunnel release Bilateral     Colonoscopy N/A 2021    Procedure: COLONOSCOPY with polypectomy, ESOPHAGOGASTRODUODENOSCOPY (EGD) with biopsies;  Surgeon: Akbar Perla MD;  Location:  ENDOSCOPY    Hernia surgery      BABY    Lumpectomy left Left 2015    DCIS;papilloma    Joanie biopsy stereo w/calc 1 site left (cpt=19081) Left 2015    DCIS; papilloma    Needle biopsy liver      Orthopedic surg (pbp) Bilateral     MANDA CARPAL ISHAAN RELEASE    Other  LEFT FOOT SPUR REMOVAL    Other surgical history      bilateral carpal tunnel release    Radiation left Left 2015    lump with rad    Stereotactic breast biopsy Left 4/21/15 Paul EDW    Left Breast        PAST FAMILY HISTORY:  Family History   Problem Relation Age of Onset    Breast Cancer Maternal Grandmother         not known    Breast Cancer Paternal Grandmother         not known    Diabetes Father     Heart Attack Father     Other (Other) Father         heart attack-- at age 52    Cancer Brother         brain tumor    Diabetes Brother     Dementia Mother     Diabetes Mother     Stroke Mother     Bipolar Disorder Other     Suicide History Other     Breast Cancer  Self 40        PAST SOCIAL HISTORY:  Social History     Socioeconomic History    Marital status: Single   Occupational History    Occupation: works part-time   Tobacco Use    Smoking status: Never     Passive exposure: Never    Smokeless tobacco: Never   Vaping Use    Vaping status: Never Used   Substance and Sexual Activity    Alcohol use: No    Drug use: No    Sexual activity: Never   Other Topics Concern    Caffeine Concern No    Exercise No    Seat Belt No    Special Diet No    Stress Concern No    Weight Concern Yes     Comment: Working on weight loss   Social History Narrative    Lives  In [place] with mother       ALLERGIES:  Allergies[1]       MEDS:  Medications Ordered Prior to Encounter[2]       PHYSICAL EXAM:  BP (!) 148/92 (BP Location: Right arm)   Pulse 86   Resp 20   Ht 5' 5\" (1.651 m)   Wt 259 lb (117.5 kg)   LMP  (LMP Unknown)   SpO2 96%   BMI 43.10 kg/m²   CONSTITUTIONAL: alert, oriented, no apparent distress  HEENT: atraumatic normocephalic  MOUTH: mucous membranes are moist. No OP exudates  NECK/THROAT: no JVD. Trachea midline. No obvious thyromegaly  LUNG: clear b/l no wheezing, crackles. Chest symmetric with respiratory motion  HEART: regular rate and rhythm, no obvious murmers or gallops note  ABD: soft non tender. + bowel sounds. No organomegaly noted  EXT: no clubbing, cyanosis, or edema noted. Pulses intact grossly  NEURO/MUSCULOSKELETAL: no gross deficits  SKIN: warm, dry. No obvious lesions noted  LYMPH: no obvious LAD       IMAGES:  CXR 11/5/24  FINDINGS:    LUNGS:  No focal consolidation.  Normal vascularity.   CARDIAC:  Normal size cardiac silhouette.   MEDIASTINUM:  Normal.   PLEURA:  Normal.  No pleural effusions.   BONES:  Normal for age.   CONCLUSION:  There is no evidence of active cardiopulmonary disease.        LABS:  Lab Results   Component Value Date    WBC 5.9 06/14/2024    RBC 4.02 06/14/2024    HGB 12.7 06/14/2024    HCT 36.5 06/14/2024    MCV 90.8 06/14/2024    [facility]  31.6 2024    [facility] 34.8 2024     No results for input(s): \"GLU\", \"BUN\", \"CREATSERUM\", \"GFRAA\", \"GFRNAA\", \"EGFRCR\", \"CA\", \"ALB\", \"NA\", \"K\", \"CL\", \"CO2\", \"ALKPHO\", \"AST\", \"ALT\", \"BILT\", \"TP\" in the last 168 hours.       Outside PFTs done at [facility]  PFT's 3/7/22:  FEV1:2.48L (89%)  FVC: 2.69L (77%)  Ratio: 92%  T%  RV/T%  DLCO:104%  DLCO/VA: 127%  Bronchodilator challenge: no sig response       PSG/CPAP titration results (care everywhere 2017 done at [facility])  \"IMPRESSION:    1.   Successful CPAP titration with resolution of all obstructive apneic and hypopneic episodes as well as oxyhemoglobin desaturations.   2.   Moderately elevated periodic limb movement arousal index may have been due to lab effect or could be due to secondary sleep disorder such as periodic limb movement disorder.  Further clinical correlation will be necessary.   RECOMMENDATIONS:    1.   The patient should be initiated on CPAP at 14 cm of water pressure with EPR level 0, humidity level 5.  During this study, the patient used a ResMed AirFit N10, size small mask.   2.   The patient should avoid alcohol and sedative medications as these may worsen severity of KEON.  The patient should avoid drowsy driving.   3.   The patient will follow up in the Pulmonary Sleep Clinic after initiation of CPAP for ongoing clinical care.  Thank you for allowing me to participate in this patient's care.  Please do not hesitate to call us with any additional questions.   Dictated By Kleber Saldana M.D.   d: 2017 11:32:49\".        ASSESSMENT/PLAN:  Shortness of breath  -Atypiical for pulmonary etiology. Likely d/t her uncontrolled GERD  -Start OTC omeprazole regularly  -See GI  -Check PFTS as well    KEON no longer on CPAP b/c of old machine and doesn't have supplies  --Schedule a SPLIT NIGHT sleep study.  Contact the number on the After Visit Summary (AVS):  -Strongly advise absolutely NO driving when sleepy  -Recommend good sleep hygiene as  discussed   -Avoid alcohol and caffeine before going to bed     RTC in 2 months    Thank you for the opportunity to care for Radha Murguia.    I spent a total of 45 minutes in direct contact with the patient and reviewing pertinent outside records on the day of the encounter.     KARTIK Maria DO, MPH  Pulmonary and Critical Care Medicine  [facility]          [1]   Allergies  Allergen Reactions    Other Runny nose     Seasonal     [2]   Current Outpatient Medications on File Prior to Visit   Medication Sig Dispense Refill    PARoxetine (PAXIL) 20 MG Oral Tab Take 1 tablet (20 mg total) by mouth daily. 30+20mg= 50mg daily 90 tablet 1    PARoxetine 30 MG Oral Tab Take 1 tablet (30 mg total) by mouth daily. Take with 20mg tab= 50mg daily 90 tablet 1    QUEtiapine 150 MG Oral Tab Take 150 mg by mouth nightly. This is an increase. 90 tablet 1    telmisartan 80 MG Oral Tab Take 1 tablet (80 mg total) by mouth daily. 90 tablet 3    atorvastatin 80 MG Oral Tab Take 1 tablet (80 mg total) by mouth nightly. 90 tablet 0    Metoprolol Succinate  MG Oral Tablet 24 Hr Take 1 tablet (200 mg total) by mouth daily. 90 tablet 1    semaglutide (OZEMPIC, 2 MG/DOSE,) 8 MG/3ML Subcutaneous Solution Pen-injector Inject 2 mg into the skin once a week. 9 mL 1    Fezolinetant (VEOZAH) 45 MG Oral Tab Take 45 mg by mouth daily. 30 tablet 2    hydrALAZINE 25 MG Oral Tab Take 1 tablet (25 mg total) by mouth 2 (two) times daily. 180 tablet 1    metFORMIN  MG Oral Tablet 24 Hr Take 1 tablet (750 mg total) by mouth 2 (two) times daily with meals. 180 tablet 3    Ciclopirox Olamine 0.77 % External Cream Apply to affected area twice daily for 1-4wks 90 g 1    Multiple Vitamins-Minerals (MULTIVITAMIN WOMENS 50+ ADV) Oral Tab Take 1 tablet by mouth daily with food. 90 tablet 1    Glucose Blood (TRUE METRIX BLOOD GLUCOSE TEST) In Vitro Strip Use to check blood sugar daily 100 strip 2     OneTouch UltraSoft 2 Lancets Does not apply Misc 1 Lancet daily. Use to test blood sugar daily 100 each 2    fexofenadine (ALLEGRA ALLERGY) 180 MG Oral Tab Take 1 tablet (180 mg total) by mouth daily.       No current facility-administered medications on file prior to visit.

## 2024-12-09 ENCOUNTER — OFFICE VISIT (OUTPATIENT)
Dept: PULMONOLOGY | Facility: CLINIC | Age: 49
End: 2024-12-09

## 2024-12-09 VITALS
HEART RATE: 86 BPM | DIASTOLIC BLOOD PRESSURE: 92 MMHG | HEIGHT: 65 IN | WEIGHT: 259 LBS | SYSTOLIC BLOOD PRESSURE: 148 MMHG | RESPIRATION RATE: 20 BRPM | BODY MASS INDEX: 43.15 KG/M2 | OXYGEN SATURATION: 96 %

## 2024-12-09 DIAGNOSIS — G47.33 OSA (OBSTRUCTIVE SLEEP APNEA): ICD-10-CM

## 2024-12-09 DIAGNOSIS — R06.02 SOB (SHORTNESS OF BREATH): Primary | ICD-10-CM

## 2024-12-09 DIAGNOSIS — K21.9 GASTROESOPHAGEAL REFLUX DISEASE, UNSPECIFIED WHETHER ESOPHAGITIS PRESENT: ICD-10-CM

## 2024-12-09 PROCEDURE — 3008F BODY MASS INDEX DOCD: CPT | Performed by: INTERNAL MEDICINE

## 2024-12-09 PROCEDURE — 3080F DIAST BP >= 90 MM HG: CPT | Performed by: INTERNAL MEDICINE

## 2024-12-09 PROCEDURE — 3077F SYST BP >= 140 MM HG: CPT | Performed by: INTERNAL MEDICINE

## 2024-12-09 PROCEDURE — 99204 OFFICE O/P NEW MOD 45 MIN: CPT | Performed by: INTERNAL MEDICINE

## 2024-12-09 NOTE — PATIENT INSTRUCTIONS
Schedule the following testing:    -Pulmonary Function Testing    -Schedule a SPLIT NIGHT sleep study.  Contact the number on the After Visit Summary (AVS):  -Strongly advise absolutely NO driving when sleepy  -Recommend good sleep hygiene as discussed   -Avoid alcohol and caffeine before going to bed    Schedule an appointment with Gastroenterology for the cough and the acid reflux      Come back in 2 months.

## 2024-12-10 NOTE — TELEPHONE ENCOUNTER
Called and spoke with Charlotte's poa Karo in regards to her upcoming surgery. She states that she has some questions and would like to know more about the procedure before Charlotte proceeds with surgery. She states that she has taken Charlotte to a couple of physician appts within the last month and a couple of her doctors would like her to have some testing completed and feels that these test need to be completed prior to her proceeding with surgery. I did inform her that Charlotte did select a surgical date of 1/21/25 for surgery. I did offer to make an appt so that they can come in to see Dr. Carrera so that he is able to explain in full detail about his proposed surgical plan for her as well as she can ask any and all questions that they have for the doctor as well. She states that she would give a call into the office later as she did not have a calendar available to her to schedule an appt and she was also in the grocery store at the time. Will give the office a call later when she has a calendar available to her.

## 2024-12-13 DIAGNOSIS — E66.9 TYPE 2 DIABETES MELLITUS WITH OBESITY (HCC): ICD-10-CM

## 2024-12-13 DIAGNOSIS — E78.2 MIXED HYPERLIPIDEMIA: ICD-10-CM

## 2024-12-13 DIAGNOSIS — E11.69 TYPE 2 DIABETES MELLITUS WITH OBESITY (HCC): ICD-10-CM

## 2024-12-17 RX ORDER — ATORVASTATIN CALCIUM 80 MG/1
80 TABLET, FILM COATED ORAL NIGHTLY
Qty: 90 TABLET | Refills: 3 | Status: SHIPPED | OUTPATIENT
Start: 2024-12-17

## 2024-12-17 NOTE — TELEPHONE ENCOUNTER
Refill passed per [facility] protocol.  Requested Prescriptions   Pending Prescriptions Disp Refills    ATORVASTATIN 80 MG Oral Tab [Pharmacy Med Name: Atorvastatin Calcium Oral Tablet 80 MG] 90 tablet 3     Sig: TAKE 1 TABLET EVERY NIGHT       Cholesterol Medication Protocol Passed - 12/17/2024  2:32 PM        Passed - ALT < 80     Lab Results   Component Value Date    ALT 26 09/10/2024             Passed - ALT resulted within past year        Passed - Lipid panel within past 12 months     Lab Results   Component Value Date    CHOLEST 249 (H) 09/10/2024    TRIG 366 (H) 09/10/2024    HDL 54 09/10/2024     (H) 09/10/2024    VLDL 68 (H) 09/10/2024    TCHDLRATIO 6.23 (H) 05/11/2018    NONHDLC 195 (H) 09/10/2024             Passed - In person appointment or virtual visit in the past 12 mos or appointment in next 3 mos     Recent Outpatient Visits              1 week ago SOB (shortness of breath)    [facility] Jose Maria, DO    Office Visit    1 month ago Acute maxillary sinusitis, recurrence not specified    [address] Raphael Farley,     Office Visit    2 months ago KEON (obstructive sleep apnea)    [address] Raphael Farley,     Office Visit    2 months ago Spondylolisthesis at L4-L5 level    [address]    Office Visit    2 months ago Essential hypertension    [address] Raphael Farley, DO    Office Visit          Future Appointments         Provider Department Appt Notes    In 3 days PFT ROOM [facility] Outpatient Respiratory Therapy qa gt.    In 3 weeks Charisma Bhatia APRN [facility] 12/13/24 fuov sched w/Charisma on 1/13/25 pt states GI issues-ataylor    In 1 month [facility] SLEEP [facility]OM[facility]  [place] CALL WITH CANCELLATIONS BEFORE 1/23/25-SS  auth 12/17 to 2/15/25    In 1 month Latonia Levy APRN [facility], [address] [place] [address] 5 mo f/u                       Recent Outpatient Visits              1 week ago SOB (shortness of breath)    [facility], [facility], [place] Jose Maria, DO    Office Visit    1 month ago Acute maxillary sinusitis, recurrence not specified    [address], [place]    Office Visit    2 months ago KEON (obstructive sleep apnea)    [address] Raphael Farley, DO    Office Visit    2 months ago Spondylolisthesis at L4-L5 level    [address], [place]    Office Visit    2 months ago Essential hypertension    [address], [place], Raphael Godwin, DO    Office Visit          Future Appointments         Provider Department Appt Notes    In 3 days PFT ROOM [facility] Outpatient Respiratory Therapy qa gt.    In 3 weeks [facility] 12/13/24 fuov sched w/Charisma on 1/13/25 pt states GI issues-ataylor    In 1 month [facility] SLEEP ROOMS [facility] [place] CALL WITH CANCELLATIONS BEFORE 1/23/25-SS  auth 12/17 to 2/15/25    In 1 month Latonia Levy APRN [facility], [address] [place] [address] 5 mo f/u

## 2024-12-20 ENCOUNTER — OFFICE VISIT (OUTPATIENT)
Dept: SLEEP CENTER | Age: 49
End: 2024-12-20
Attending: INTERNAL MEDICINE
Payer: MEDICARE

## 2024-12-20 ENCOUNTER — RT VISIT (OUTPATIENT)
Dept: RESPIRATORY THERAPY | Facility: HOSPITAL | Age: 49
End: 2024-12-20
Attending: INTERNAL MEDICINE
Payer: MEDICARE

## 2024-12-20 DIAGNOSIS — R06.02 SOB (SHORTNESS OF BREATH): ICD-10-CM

## 2024-12-20 DIAGNOSIS — G47.33 OSA (OBSTRUCTIVE SLEEP APNEA): ICD-10-CM

## 2024-12-20 PROCEDURE — 94726 PLETHYSMOGRAPHY LUNG VOLUMES: CPT

## 2024-12-20 PROCEDURE — 94729 DIFFUSING CAPACITY: CPT

## 2024-12-20 PROCEDURE — 94010 BREATHING CAPACITY TEST: CPT

## 2024-12-20 PROCEDURE — 95811 POLYSOM 6/>YRS CPAP 4/> PARM: CPT

## 2024-12-20 NOTE — TELEPHONE ENCOUNTER
Called and spoke with Charlotte's poa Karo in regards to making a decision on if she is ready to proceed with surgery. She states that she has not had the chance to fully speak with Charlotte about this matter. She also states that Charlotte has had some of her testing completed and are waiting on the results. She states that she would like to come back in for an appt with dr. Carrera to fully understand the nature of the surgery. She has some questions of if she will benefit from proceeding with this procedure. States that Charlotte is over weight and has concerns of it the procedure will improve her symptoms. She is requesting that I give her a call back next Friday as she states that she will speak with Charlotte and they will make a decision if the should proceed with surgery or postpone the date.

## 2024-12-26 ENCOUNTER — PATIENT MESSAGE (OUTPATIENT)
Dept: PULMONOLOGY | Facility: CLINIC | Age: 49
End: 2024-12-26

## 2024-12-26 ENCOUNTER — TELEPHONE (OUTPATIENT)
Dept: PULMONOLOGY | Facility: CLINIC | Age: 49
End: 2024-12-26

## 2024-12-26 DIAGNOSIS — G47.33 OSA (OBSTRUCTIVE SLEEP APNEA): Primary | ICD-10-CM

## 2024-12-26 NOTE — TELEPHONE ENCOUNTER
Order place and sent to [facility] via parachute    RECOMMENDATIONS:  1. It is recommended that the patient should be prescribed BiPAP at 19/13 cm H2O, with humidity at 3 and EPR on.  2. During the titration, the patient was fitted with a [employer] VITERA mask, size SMALL.

## 2024-12-27 NOTE — TELEPHONE ENCOUNTER
Called and left a message for Karo in regards to getting Charlotte scheduled for surgery. I just wanted to confirm weather or not they still wanted to proceed with surgery on 1/21/25. Asked that she give the office a call back.

## 2025-01-09 ENCOUNTER — OFFICE VISIT (OUTPATIENT)
Dept: FAMILY MEDICINE CLINIC | Facility: CLINIC | Age: 50
End: 2025-01-09
Payer: MEDICARE

## 2025-01-09 ENCOUNTER — LAB ENCOUNTER (OUTPATIENT)
Dept: LAB | Age: 50
End: 2025-01-09
Attending: FAMILY MEDICINE
Payer: MEDICARE

## 2025-01-09 VITALS
OXYGEN SATURATION: 97 % | HEART RATE: 76 BPM | SYSTOLIC BLOOD PRESSURE: 140 MMHG | WEIGHT: 259 LBS | TEMPERATURE: 98 F | RESPIRATION RATE: 16 BRPM | DIASTOLIC BLOOD PRESSURE: 88 MMHG | HEIGHT: 64.5 IN | BODY MASS INDEX: 43.68 KG/M2

## 2025-01-09 DIAGNOSIS — J44.89 ASTHMA WITH COPD (CHRONIC OBSTRUCTIVE PULMONARY DISEASE) (HCC): ICD-10-CM

## 2025-01-09 DIAGNOSIS — E66.9 TYPE 2 DIABETES MELLITUS WITH OBESITY (HCC): ICD-10-CM

## 2025-01-09 DIAGNOSIS — E78.2 MIXED HYPERLIPIDEMIA: ICD-10-CM

## 2025-01-09 DIAGNOSIS — M43.16 SPONDYLOLISTHESIS AT L4-L5 LEVEL: Primary | ICD-10-CM

## 2025-01-09 DIAGNOSIS — E11.69 TYPE 2 DIABETES MELLITUS WITH OBESITY (HCC): ICD-10-CM

## 2025-01-09 DIAGNOSIS — M48.061 SPINAL STENOSIS AT L4-L5 LEVEL: ICD-10-CM

## 2025-01-09 DIAGNOSIS — Z59.10 INADEQUATE HOUSING, UNSPECIFIED: ICD-10-CM

## 2025-01-09 DIAGNOSIS — I10 ESSENTIAL HYPERTENSION: ICD-10-CM

## 2025-01-09 DIAGNOSIS — E66.01 MORBID OBESITY WITH BMI OF 40.0-44.9, ADULT (HCC): ICD-10-CM

## 2025-01-09 DIAGNOSIS — F31.32 BIPOLAR AFFECTIVE DISORDER, CURRENTLY DEPRESSED, MODERATE (HCC): ICD-10-CM

## 2025-01-09 LAB
ALBUMIN SERPL-MCNC: 4.5 G/DL (ref 3.2–4.8)
ALBUMIN/GLOB SERPL: 1.8 {RATIO} (ref 1–2)
ALP LIVER SERPL-CCNC: 105 U/L
ALT SERPL-CCNC: 33 U/L
ANION GAP SERPL CALC-SCNC: 10 MMOL/L (ref 0–18)
AST SERPL-CCNC: 25 U/L (ref ?–34)
BILIRUB SERPL-MCNC: 0.4 MG/DL (ref 0.3–1.2)
BUN BLD-MCNC: 14 MG/DL (ref 9–23)
CALCIUM BLD-MCNC: 10.1 MG/DL (ref 8.7–10.4)
CHLORIDE SERPL-SCNC: 105 MMOL/L (ref 98–112)
CHOLEST SERPL-MCNC: 207 MG/DL (ref ?–200)
CO2 SERPL-SCNC: 25 MMOL/L (ref 21–32)
CREAT BLD-MCNC: 0.75 MG/DL
EGFRCR SERPLBLD CKD-EPI 2021: 96 ML/MIN/1.73M2 (ref 60–?)
EST. AVERAGE GLUCOSE BLD GHB EST-MCNC: 157 MG/DL (ref 68–126)
FASTING PATIENT LIPID ANSWER: YES
FASTING STATUS PATIENT QL REPORTED: YES
GLOBULIN PLAS-MCNC: 2.5 G/DL (ref 2–3.5)
GLUCOSE BLD-MCNC: 143 MG/DL (ref 70–99)
HBA1C MFR BLD: 7.1 % (ref ?–5.7)
HDLC SERPL-MCNC: 50 MG/DL (ref 40–59)
LDLC SERPL CALC-MCNC: 86 MG/DL (ref ?–100)
NONHDLC SERPL-MCNC: 157 MG/DL (ref ?–130)
OSMOLALITY SERPL CALC.SUM OF ELEC: 293 MOSM/KG (ref 275–295)
POTASSIUM SERPL-SCNC: 4.3 MMOL/L (ref 3.5–5.1)
PROT SERPL-MCNC: 7 G/DL (ref 5.7–8.2)
SODIUM SERPL-SCNC: 140 MMOL/L (ref 136–145)
TRIGL SERPL-MCNC: 438 MG/DL (ref 30–149)
VLDLC SERPL CALC-MCNC: 71 MG/DL (ref 0–30)

## 2025-01-09 PROCEDURE — 83036 HEMOGLOBIN GLYCOSYLATED A1C: CPT

## 2025-01-09 PROCEDURE — 3079F DIAST BP 80-89 MM HG: CPT | Performed by: FAMILY MEDICINE

## 2025-01-09 PROCEDURE — 80061 LIPID PANEL: CPT

## 2025-01-09 PROCEDURE — 36415 COLL VENOUS BLD VENIPUNCTURE: CPT

## 2025-01-09 PROCEDURE — 3008F BODY MASS INDEX DOCD: CPT | Performed by: FAMILY MEDICINE

## 2025-01-09 PROCEDURE — 3077F SYST BP >= 140 MM HG: CPT | Performed by: FAMILY MEDICINE

## 2025-01-09 PROCEDURE — 99214 OFFICE O/P EST MOD 30 MIN: CPT | Performed by: FAMILY MEDICINE

## 2025-01-09 PROCEDURE — 80053 COMPREHEN METABOLIC PANEL: CPT

## 2025-01-09 PROCEDURE — 3051F HG A1C>EQUAL 7.0%<8.0%: CPT | Performed by: FAMILY MEDICINE

## 2025-01-09 RX ORDER — HYDRALAZINE HYDROCHLORIDE 25 MG/1
25 TABLET, FILM COATED ORAL 2 TIMES DAILY
Qty: 180 TABLET | Refills: 3 | OUTPATIENT
Start: 2025-01-09

## 2025-01-09 SDOH — ECONOMIC STABILITY - HOUSING INSECURITY: INADEQUATE HOUSING UNSPECIFIED: Z59.10

## 2025-01-09 NOTE — PROGRESS NOTES
HPI:   Radha Murguia is a 49 year old female who is here with Karo for f/u on her back pain and chronic conditions.  C/o chronic low back pain that has been progressively worsening. States pain is radiating into both her legs. Denies any paresthesias or weakness. Symptoms are limiting her daily activities and has been unable to work due to pain. Taking ibuprofen 600-800mg twice daily for pain control. Has done physical therapy and epidural injections without any relief. Has been following with ortho spine, Dr. Carrera. Has looked into \"hippo therapy,\" and interested in trying it.  DM2, taking meds as directed. Fasting glucose levels have been stable. Eye exam utd. Denies any paresthesias.  Asthma, stable. No recent flare ups.   Bipolar disorder, stable. Follows with psychiatrist.   Morbid obesity, has been taking meds as directed by [facility] and exercising.     Current Outpatient Medications   Medication Sig Dispense Refill    Omeprazole 40 MG Oral Capsule Delayed Release Take 1 capsule (40 mg total) by mouth daily. 90 capsule 0    PARoxetine (PAXIL) 20 MG Oral Tab Take 1 tablet (20 mg total) by mouth daily. 30+20mg= 50mg daily 90 tablet 1    PARoxetine 30 MG Oral Tab Take 1 tablet (30 mg total) by mouth daily. Take with 20mg tab= 50mg daily 90 tablet 1    QUEtiapine Fumarate 150 MG Oral Tab Take 150 mg by mouth nightly. 90 tablet 1    atorvastatin 80 MG Oral Tab Take 1 tablet (80 mg total) by mouth nightly. 90 tablet 3    telmisartan 80 MG Oral Tab Take 1 tablet (80 mg total) by mouth daily. 90 tablet 3    Metoprolol Succinate  MG Oral Tablet 24 Hr Take 1 tablet (200 mg total) by mouth daily. 90 tablet 1    semaglutide (OZEMPIC, 2 MG/DOSE,) 8 MG/3ML Subcutaneous Solution Pen-injector Inject 2 mg into the skin once a week. 9 mL 1    Fezolinetant (VEOZAH) 45 MG Oral Tab Take 45 mg by mouth daily. 30 tablet 2    hydrALAZINE 25 MG Oral Tab Take 1 tablet (25 mg total) by mouth 2 (two) times daily. 180 tablet 1     metFORMIN  MG Oral Tablet 24 Hr Take 1 tablet (750 mg total) by mouth 2 (two) times daily with meals. 180 tablet 3    Ciclopirox Olamine 0.77 % External Cream Apply to affected area twice daily for 1-4wks 90 g 1    Multiple Vitamins-Minerals (MULTIVITAMIN WOMENS 50+ ADV) Oral Tab Take 1 tablet by mouth daily with food. 90 tablet 1    Glucose Blood (TRUE METRIX BLOOD GLUCOSE TEST) In Vitro Strip Use to check blood sugar daily 100 strip 2    OneTouch UltraSoft 2 Lancets Does not apply Misc 1 Lancet daily. Use to test blood sugar daily 100 each 2    fexofenadine (ALLEGRA ALLERGY) 180 MG Oral Tab Take 1 tablet (180 mg total) by mouth daily.       Past Medical History:    Allergic rhinitis    Anxiety state, unspecified    Arthritis    Asthma (HCC)    Back pain    Back problem    lower back pain- sees chiro    Bad breath    Belching    Bipolar affective (HCC)    patient denies    Bleeding nose    Breast CA (HCC)    DCIS    Cancer (HCC)    left breast    Chest pain    Chronic cough    Community acquired pneumonia    Constipation    COPD (chronic obstructive pulmonary disease) (HCC)    Coronavirus infection    Diabetes mellitus (HCC)    Diarrhea, unspecified    Disorder of liver    fatty liver    Ductal carcinoma in situ of breast    Esophageal reflux    Essential hypertension    Exposure to medical diagnostic radiation    finished in 2015    Flatulence/gas pain/belching    Headache, chronic daily    Heartburn    Hemorrhoids    High blood pressure    High cholesterol    Hyperlipidemia    Hypoxia    Indigestion    Leaking of urine    Mild mental retardation    Mouth sores    Obstructive apnea    CPAP 14 Sarina's    Pain in joints    Parainfluenza infection    Shortness of breath    Sleep apnea    cpap    Type II or unspecified type diabetes mellitus without mention of complication, not stated as uncontrolled    Wheezing       Past Surgical History:   Procedure Laterality Date    Breast lumpectomy Left 06/11/2015     Procedure: BREAST LUMPECTOMY;  Surgeon: Paul Miller MD;  Location:  MAIN OR    Carpal tunnel release Bilateral     Colonoscopy N/A 2021    Procedure: COLONOSCOPY with polypectomy, ESOPHAGOGASTRODUODENOSCOPY (EGD) with biopsies;  Surgeon: Akbar Perla MD;  Location:  ENDOSCOPY    Hernia surgery      BABY    Lumpectomy left Left 2015    DCIS;papilloma    Joanie biopsy stereo w/calc 1 site left (cpt=19081) Left 2015    DCIS; papilloma    Needle biopsy liver      Orthopedic surg (pbp) Bilateral     MANDA CARPAL ISHAAN RELEASE    Other  LEFT FOOT SPUR REMOVAL    Other surgical history      bilateral carpal tunnel release    Radiation left Left 2015    lump with rad    Stereotactic breast biopsy Left 4/21/15 [facility]    Left Breast       Family History   Problem Relation Age of Onset    Breast Cancer Maternal Grandmother         not known    Breast Cancer Paternal Grandmother         not known    Diabetes Father     Heart Attack Father     Other (Other) Father         heart attack-- at age 52    Cancer Brother         brain tumor    Diabetes Brother     Dementia Mother     Diabetes Mother     Stroke Mother     Bipolar Disorder Other     Suicide History Other     Breast Cancer Self 40     SOCIAL HISTORY:  Social History     Socioeconomic History    Marital status: Single   Occupational History    Occupation: works part-time   Tobacco Use    Smoking status: Never     Passive exposure: Never    Smokeless tobacco: Never   Vaping Use    Vaping status: Never Used   Substance and Sexual Activity    Alcohol use: No    Drug use: No    Sexual activity: Never   Other Topics Concern    Caffeine Concern No    Exercise No    Seat Belt No    Special Diet No    Stress Concern No    Weight Concern Yes     Comment: Working on weight loss   Social History Narrative    Lives  In [place] with mother     Social Drivers of Health     Food Insecurity: No Food Insecurity (2025)    NCSS - Food Insecurity     Worried  About Running Out of Food in the Last Year: No     Ran Out of Food in the Last Year: No   Transportation Needs: No Transportation Needs (1/9/2025)    NCSS - Transportation     Lack of Transportation: No   Housing Stability: At Risk (1/9/2025)    NCSS - Housing/Utilities     Has Housing: No     Worried About Losing Housing: No     Unable to Get Utilities: No         REVIEW OF SYSTEMS:   GENERAL: feels well otherwise  SKIN: denies any unusual skin lesions  LUNGS: denies shortness of breath with exertion  CARDIOVASCULAR: denies chest pain on exertion  GI: denies abdominal pain,denies heartburn  MUSCULOSKELETAL: no other joints are affected    EXAM:   /88   Pulse 76   Temp 97.6 °F (36.4 °C) (Temporal)   Resp 16   Ht 5' 4.5\" (1.638 m)   Wt 259 lb (117.5 kg)   LMP  (LMP Unknown)   SpO2 97%   BMI 43.77 kg/m²    GENERAL: well developed, well nourished,in no apparent distress, morbidly obese  SKIN: no rashes,no suspicious lesions  NECK: supple   LUNGS: clear to auscultation, no w/r/r  CARDIO: RRR without murmur   EXTREMITIES: no cyanosis, clubbing or edema  BACK: lumbosacral tenderness, bilat sciatic tenderness, neg straight leg bilat, LROM      ASSESSMENT/PLAN:   Radha Murguia is a 49 year old female who presents with:  1. Spondylolisthesis at L4-L5 level  2. Spinal stenosis at L4-L5 level  - advised to cont symptomatic tx and HEP  - informed her I do not know much about hippo/horse therapy, would advise her to ask Dr. Carrera   - instructed her to f/u with Dr. Carrera for alternative options including surgical intervention to help with symptom relief, since PT/epidural injections did not help    3. Type 2 diabetes mellitus with obesity (HCC)  - cpm  - diabetic labs pending, advised to obtain  - check blood glucose levels regularly  - check feet daily  - eye exam utd    4. Asthma with COPD (chronic obstructive pulmonary disease) (HCC)  - stable  - cpm    5. Bipolar affective disorder, currently depressed, moderate  (HCC)  - stable  - cpm    6. Morbid obesity with BMI of 40.0-44.9, adult (HCC)  - following with [facility]  - cpm    7. Inadequate housing, unspecified  - declined f/u info    The patient and Karo ( POA) indicates understanding of these issues and agrees to the plan.  The patient is asked to return if sx's persist or worsen.    25 minutes were spent face to face with the patient in which over half of that time was spent coordinating and counseling on care plan.      Supplementary Documentation:   Social Drivers of Health with Concerns     Housing Stability: At Risk (1/9/2025)     Patient declined follow-up to learn about resources for needs identified.

## 2025-01-20 ENCOUNTER — TELEPHONE (OUTPATIENT)
Dept: ORTHOPEDICS CLINIC | Facility: CLINIC | Age: 50
End: 2025-01-20

## 2025-01-20 ENCOUNTER — OFFICE VISIT (OUTPATIENT)
Dept: ORTHOPEDICS CLINIC | Facility: CLINIC | Age: 50
End: 2025-01-20
Payer: MEDICARE

## 2025-01-20 DIAGNOSIS — M43.16 SPONDYLOLISTHESIS AT L4-L5 LEVEL: Primary | ICD-10-CM

## 2025-01-20 PROCEDURE — 99215 OFFICE O/P EST HI 40 MIN: CPT | Performed by: STUDENT IN AN ORGANIZED HEALTH CARE EDUCATION/TRAINING PROGRAM

## 2025-01-20 PROCEDURE — G2211 COMPLEX E/M VISIT ADD ON: HCPCS | Performed by: STUDENT IN AN ORGANIZED HEALTH CARE EDUCATION/TRAINING PROGRAM

## 2025-01-20 NOTE — TELEPHONE ENCOUNTER
Date of Surgery:        Post Op Appt:      Case ID:     Notes:       SURGERY SCHEDULING SHEET     Radha Murguia  5/6/1975  [id number]     Procedure: L4-5 MIS TLIF     L4-5 Combined transforaminal lumbar interbody fusion and posterior fusion (69290)  L4-5 Interbody cage placement (05060)  L4-5 Posterior spinal instrumentation (86428)  L4-5 Posterior column osteotomy (22214)  Use of allograft or demineralized bone matrix (20930)  Use of Local autograft (20936)     Diagnosis: spondylolisthesis L4-5     Anesthesia: General     Length of Surgery: 2 hrs     Disposition: Inpatient procedure     Assist: ОЛЕГ Leonard     OR Table: Surendra     Position: Prone     Implant:  Atec [id number] InVictus MIS Screws, [id number] Calibrate expandable PSX , and [id number] Alphagraft DBM allograft      C-Arm: Yes     Special Equipment: None     Neuromonitoring: Yes     Pre-op Testing: PER ANESTHESIA GUIDELINES     Clearance: OTHER:  PCP     Post op: 2 weeks post op     Home health: Yes     Prehab: Yes     David Carrera MD  [facility] Orthopedic Surgery  Phone: [phone]  Fax: [phone]

## 2025-01-20 NOTE — H&P
[facility] - ORTHOPEDICS  [address], [address], [place] [address]  [address] [place] [address]  [phone]     HISTORY AND PHYSICAL EXAMINATION     Name: Radha Murguia   MRN: [id number]  Date: 01/20/25       CC: L4-5 spondylolisthesis     HPI:   Radha Murguia is a 49 year old female  follow-up patient whom I have been treating conservatively. Patient returns today for reevaluation of lumbar stenosis.       Back pain with radiation down bilateral lower extremities including posterior lateral buttocks, thigh.  Numbness worsens with walking.  Patient completed 6 weeks of physical therapy in Nov 2022 without relief of symptoms.      Last seen in clinic in February 2023.  MRI of the time demonstrated moderate stenosis at L4-5 secondary to spondylolisthesis.  Patient was referred for epidural steroid injection.  Patient has had lumbar epidural steroid injections without sustained relief.    Prior spine surgery: none.    Bowel and bladder symptoms: absent.    The patient has not had issues with balance and/or hand dexterity problems such as changes in penmanship or the use of buttons or zippers.    Treatment up to this time has included: PT, LENO    PMH:   Past Medical History:    Allergic rhinitis    Anxiety state, unspecified    Arthritis    Asthma (HCC)    Back pain    Back problem    lower back pain- sees chiro    Bad breath    Belching    Bipolar affective (HCC)    patient denies    Bleeding nose    Breast CA (HCC)    DCIS    Cancer (HCC)    left breast    Chest pain    Chronic cough    Community acquired pneumonia    Constipation    COPD (chronic obstructive pulmonary disease) (HCC)    Coronavirus infection    Diabetes mellitus (HCC)    Diarrhea, unspecified    Disorder of liver    fatty liver    Ductal carcinoma in situ of breast    Esophageal reflux    Essential hypertension    Exposure to medical diagnostic radiation    finished in 2015    Flatulence/gas  pain/belching    Headache, chronic daily    Heartburn    Hemorrhoids    High blood pressure    High cholesterol    Hyperlipidemia    Hypoxia    Indigestion    Leaking of urine    Mild mental retardation    Mouth sores    Obstructive apnea    CPAP 14 Sarina's    Pain in joints    Parainfluenza infection    Shortness of breath    Sleep apnea    cpap    Type II or unspecified type diabetes mellitus without mention of complication, not stated as uncontrolled    Wheezing       PAST SURGICAL HX:  Past Surgical History:   Procedure Laterality Date    Breast lumpectomy Left 2015    Procedure: BREAST LUMPECTOMY;  Surgeon: Paul Miller MD;  Location:  MAIN OR    Carpal tunnel release Bilateral     Colonoscopy N/A 2021    Procedure: COLONOSCOPY with polypectomy, ESOPHAGOGASTRODUODENOSCOPY (EGD) with biopsies;  Surgeon: Akbar Perla MD;  Location:  ENDOSCOPY    Hernia surgery      BABY    Lumpectomy left Left 2015    DCIS;papilloma    Joanie biopsy stereo w/calc 1 site left (cpt=19081) Left 2015    DCIS; papilloma    Needle biopsy liver      Orthopedic surg (pbp) Bilateral     MANDA CARPAL ISHAAN RELEASE    Other  LEFT FOOT SPUR REMOVAL    Other surgical history      bilateral carpal tunnel release    Radiation left Left 2015    lump with rad    Stereotactic breast biopsy Left 4/21/15 [facility]    Left Breast       FAMILY HX:  Family History   Problem Relation Age of Onset    Breast Cancer Maternal Grandmother         not known    Breast Cancer Paternal Grandmother         not known    Diabetes Father     Heart Attack Father     Other (Other) Father         heart attack-- at age 52    Cancer Brother         brain tumor    Diabetes Brother     Dementia Mother     Diabetes Mother     Stroke Mother     Bipolar Disorder Other     Suicide History Other     Breast Cancer Self 40       ALLERGIES:  Other    MEDICATIONS:   Current Outpatient Medications   Medication Sig Dispense Refill    Omeprazole 40 MG  Oral Capsule Delayed Release Take 1 capsule (40 mg total) by mouth daily. 90 capsule 0    PARoxetine (PAXIL) 20 MG Oral Tab Take 1 tablet (20 mg total) by mouth daily. 30+20mg= 50mg daily 90 tablet 1    PARoxetine 30 MG Oral Tab Take 1 tablet (30 mg total) by mouth daily. Take with 20mg tab= 50mg daily 90 tablet 1    QUEtiapine Fumarate 150 MG Oral Tab Take 150 mg by mouth nightly. 90 tablet 1    atorvastatin 80 MG Oral Tab Take 1 tablet (80 mg total) by mouth nightly. 90 tablet 3    telmisartan 80 MG Oral Tab Take 1 tablet (80 mg total) by mouth daily. 90 tablet 3    Metoprolol Succinate  MG Oral Tablet 24 Hr Take 1 tablet (200 mg total) by mouth daily. 90 tablet 1    semaglutide (OZEMPIC, 2 MG/DOSE,) 8 MG/3ML Subcutaneous Solution Pen-injector Inject 2 mg into the skin once a week. 9 mL 1    Fezolinetant (VEOZAH) 45 MG Oral Tab Take 45 mg by mouth daily. 30 tablet 2    hydrALAZINE 25 MG Oral Tab Take 1 tablet (25 mg total) by mouth 2 (two) times daily. 180 tablet 1    metFORMIN  MG Oral Tablet 24 Hr Take 1 tablet (750 mg total) by mouth 2 (two) times daily with meals. 180 tablet 3    Ciclopirox Olamine 0.77 % External Cream Apply to affected area twice daily for 1-4wks 90 g 1    Multiple Vitamins-Minerals (MULTIVITAMIN WOMENS 50+ ADV) Oral Tab Take 1 tablet by mouth daily with food. 90 tablet 1    Glucose Blood (TRUE METRIX BLOOD GLUCOSE TEST) In Vitro Strip Use to check blood sugar daily 100 strip 2    OneTouch UltraSoft 2 Lancets Does not apply Misc 1 Lancet daily. Use to test blood sugar daily 100 each 2    fexofenadine (ALLEGRA ALLERGY) 180 MG Oral Tab Take 1 tablet (180 mg total) by mouth daily.         ROS: A comprehensive 14 point review of systems was performed and was negative aside from the aforementioned per history of present illness.    SOCIAL HX:  Social History     Tobacco Use    Smoking status: Never     Passive exposure: Never    Smokeless tobacco: Never   Substance Use Topics     Alcohol use: No       PE:   There were no vitals filed for this visit.  Estimated body mass index is 43.67 kg/m² as calculated from the following:    Height as of 1/13/25: 5' 5\" (1.651 m).    Weight as of 1/13/25: 262 lb 6.4 oz (119 kg).    Physical Exam  Constitutional:       Appearance: Normal appearance.   HENT:      Head: Normocephalic and atraumatic.   Eyes:      Extraocular Movements: Extraocular movements intact.   Cardiovascular:      Pulses: Normal pulses. Skin warm and well perfused.  Pulmonary:      Effort: Pulmonary effort is normal. No respiratory distress.   Skin:     General: Skin is warm.   Psychiatric:         Mood and Affect: Mood normal.     Spine Exam:    Normal gait without difficulty  Able to heel, toe, tandem gait without difficulty  Level shoulders and hips in even stance    Restricted L-spine ROM    No tenderness to palpation of L-spine    Straight leg raise test: negative    Sustained clonus: negative    LE Strength: 5/5 IP QUAD TA EHL GSC  LE Sensation: normal in L2-S1 distribution  LE reflexes: normal    Radiographic Examination/Diagnostics:  XR and MRI personally viewed, independently interpreted and radiology report was reviewed.  X-ray of the lumbar spine demonstrates significant progression of spondylolisthesis L4-5   MRI from 2023 demonstrates reduction of spondylolisthesis with moderate to severe canal stenosis    IMPRESSION: Radha Murguia is a 49 year old female with L4-5 degenerative spondylolisthesis and spinal stenosis    PLAN:     We reviewed the patients history, symptoms, exam findings, and imaging today.  We had a detailed discussion outlining the etiology, anatomy, pathophysiology, and natural history of spinal stenosis. The typical management of this condition may include lifestyle modification, NSAIDs, physical therapy, oral steroids, epidural injections, neuromodulatory medications, and sometimes pain medications.     The patient has participated in extensive  conservative management and has failed gain any significant improvement in her symptoms over the last 12 months.  The symptoms have failed to respond to conservative measures for greater than 3 months now, to include: prescription strength analgesics and active documented physical therapy or therapeutic exercises including stretching and strengthening.  The patient does not have any cognitive or behavioral issues requiring further evaluation or management.      In addition to the above, she has significant functional impairment and is unable to perform activities of daily living.      We discussed the risks, benefits, and alternatives of treatment.  The patient fully understands the nature of her medical condition and fully understands the nature of the proposed surgical treatment.  I have fully explained all possible alternative treatments or procedures and the patient understands the significant risks involved in the proposed treatment, as well as the risks involved and benefits of all alternative treatments and procedures. The patient wishes to proceed with surgery.    Plan: L4-5 MIS TLIF    Will need repeat MRI      David Carrera MD  Orthopedic Spine Surgeon  [facility] Orthopaedic Surgery   [address] [place] [address].org  [email or web address]  t: [phone]   f: [phone]        This note was dictated using Dragon software.  While it was briefly proofread prior to completion, some grammatical, spelling, and word choice errors due to dictation may still occur.

## 2025-01-21 NOTE — TELEPHONE ENCOUNTER
Called and left a message for Karo in regards to getting Charlotte scheduled for surgery. Asked that she give the office a call back.

## 2025-01-28 NOTE — PROCEDURES
Pulmonary Function Test Report  Findings:  Prebronchodilator FEV1 is 2.51L, z-score -0.21.  Prebronchodilator FVC is 3.21L, z-score -0.42.                           FEV1/ FVC ratio is 78 %, z-score -0.39.   The flow-volume loop demonstrates a normal pattern.  The TLC is 4.97L, z-score -0.65. The residual volume 1.76L, z-score 0.37.   The diffusion capacity is 26.81, z-score 1.70 and 2.52 when corrected for alveolar volume.     Impression:  Spirometry is normal.  Lung volumes are normal.   ERV is out of proportionally low compared to the rest of lung volumes which can be seen in obesity.  Diffusion capacity is normal. It is actually markedly elevated, which can be seen in obesity, asthma, and heart failure, please clinically correlate.        Disclaimer: This PFT has been interpreted based on Z-score/LLN/ULN criteria as described in ATS guidelines 2022.   Bharat Orantes MD  [facility] Pulmonary Medicine  Office: [phone]

## 2025-02-03 NOTE — TELEPHONE ENCOUNTER
Called and spoke with Charlotte in regards to getting her scheduled for surgery. She states that at this time she would like to hold off on surgery as her POA would like for her to weigh her options.She states she  will give a call into the office once she is ready to proceed with surgery.

## 2025-02-12 ENCOUNTER — TELEMEDICINE (OUTPATIENT)
Dept: INTERNAL MEDICINE CLINIC | Facility: CLINIC | Age: 50
End: 2025-02-12
Payer: MEDICARE

## 2025-02-12 DIAGNOSIS — E78.2 MIXED HYPERLIPIDEMIA: ICD-10-CM

## 2025-02-12 DIAGNOSIS — Z51.81 ENCOUNTER FOR THERAPEUTIC DRUG MONITORING: Primary | ICD-10-CM

## 2025-02-12 DIAGNOSIS — E66.01 CLASS 3 SEVERE OBESITY WITH SERIOUS COMORBIDITY AND BODY MASS INDEX (BMI) OF 45.0 TO 49.9 IN ADULT, UNSPECIFIED OBESITY TYPE (HCC): ICD-10-CM

## 2025-02-12 DIAGNOSIS — E11.9 TYPE 2 DIABETES MELLITUS WITHOUT COMPLICATION, WITHOUT LONG-TERM CURRENT USE OF INSULIN (HCC): ICD-10-CM

## 2025-02-12 DIAGNOSIS — I10 ESSENTIAL HYPERTENSION: ICD-10-CM

## 2025-02-12 DIAGNOSIS — E66.813 CLASS 3 SEVERE OBESITY WITH SERIOUS COMORBIDITY AND BODY MASS INDEX (BMI) OF 45.0 TO 49.9 IN ADULT, UNSPECIFIED OBESITY TYPE (HCC): ICD-10-CM

## 2025-02-12 RX ORDER — SEMAGLUTIDE 2.68 MG/ML
2 INJECTION, SOLUTION SUBCUTANEOUS WEEKLY
Qty: 9 ML | Refills: 1 | Status: SHIPPED | OUTPATIENT
Start: 2025-02-12

## 2025-02-12 NOTE — PROGRESS NOTES
+[facility] Weight Management follow up via video visit:    Subjective    This visit is conducted using Telemedicine with live, interactive video and audio.    Chief Complaint:  Routine follow up visit for lifestyle and medical management for overweight, obesity, or morbid obesity. LOV in clinic weight: 253#.    PMH reviewed. Bariatric surgery planned or hx of surgery in the past: 0/10.    HPI:   Radha Murguia is a 49 year old female who is being followed up today for lifestyle and medical management as deemed appropriate for overweight, obesity or morbid obesity. Patient reports weight loss monitoring at home via scale with a weight of 239#. This appears to be a weight loss since LOV 4.5 months ago in clinic. Patient has been consistent with medication and tolerating increased dose of Ozempic without SEs.    Questions/Concerns/Comments since LOV: No reported s/s of hypoglycemia. Reports BS ranges up and down. Recent labs reviewed in EMR by PCP- HgbA1c improved. Pt reports she is being intentional about food choices and choosing protein and produce. Not snacking as much, but will go to chips commonly when she does. Doing core exercises for her back. Reports she may be needing back surgery for ongoing back pain with radiation down both legs. Currently not working.    Lifestyle/Social Hx Reviewed:    [facility] Medical Weight Loss Follow Up    Question 2/12/2025  3:23 PM CST - Filed by Patient   Please describe a success moment: None   Please describe a challenging moment/needs for improvement: None   Please complete this 24 hour food journal, listing everything you had to eat in the past day. Include the average time of day you ate these meals at    List foods, qty and prep for breakfast: Serial   List foods, qty and prep for lunch. Yogert   List foods, qty and prep for dinner. Chicken   List foods, qty and prep for snacks. Chips   List the types and qty of fluids consumed Water   On average, how many meals did you  Please call patient to find out where/when she was diagnosed with ADHD. Insurance is requiring proof of this diagnosis    eat out per week? 2   Exercise    How many days per week are you active or exercise    On average, how many days were anaerobic (strength/resistance) exercises performed? 5   On average, how many days were aerobic (cardio) exercises performed? 5   Perceived level of exertion on a scale of 1-5, with 5 being very intense: 4   Stress    Average stress level on a scale of 1-10, with 10 being extremely stressed: 4   If greater than 5/1O how would you grade your coping mechanisms? fair   Sleep hours and integrity    How many hours of uninterrupted sleep do you get a night: 7   Do you feel rested in the morning: No   If no, what may have been disrupting your sleep?    Please list any goal(s) for your next visit Being  better     ROS  General: feeling well, denies fatigue  EYES: denies vision changes or high pain/pressure.  CV: denies cp, palpitations  Resp: denies sob  GI: denies abdominal pain. Denies N/V/D/C.  Neuro: denies paresthesia or cognitive changes   Psych: denies any mood changes    Physical Exam:  >   BP Readings from Last 3 Encounters:   01/13/25 (!) 159/95   01/09/25 140/88   12/09/24 (!) 148/92       Home weight: see above  Home BP: not available  Home blood sugars: not reported  Today's calculated BMI from reported weight: 40.4    General: patient speaking in full sentences, no increased work of breathing. alert, appears stated age, cooperative, no distress, and morbidly obese  HENT: normocephalic  Resp: Breathing is non labored  Psych: patient appears cheerful, smiling, making good eye contact    Diagnoses and all orders for this visit:    Encounter for therapeutic drug monitoring  -     semaglutide (OZEMPIC, 2 MG/DOSE,) 8 MG/3ML Subcutaneous Solution Pen-injector; Inject 2 mg into the skin once a week.    Type 2 diabetes mellitus without complication, without long-term current use of insulin (HCC)  -     semaglutide (OZEMPIC, 2 MG/DOSE,) 8 MG/3ML Subcutaneous Solution Pen-injector; Inject 2 mg into the  skin once a week.    Class 3 severe obesity with serious comorbidity and body mass index (BMI) of 45.0 to 49.9 in adult, unspecified obesity type (HCC)    Mixed hyperlipidemia  -     semaglutide (OZEMPIC, 2 MG/DOSE,) 8 MG/3ML Subcutaneous Solution Pen-injector; Inject 2 mg into the skin once a week.    Essential hypertension  -     semaglutide (OZEMPIC, 2 MG/DOSE,) 8 MG/3ML Subcutaneous Solution Pen-injector; Inject 2 mg into the skin once a week.        Patient Instructions   Continue making lifestyle changes that focus on good nutrition, regular exercise and stress management.    Medication Plan: Continue current medication regimen.    Tips while taking an injectable weight loss medication:    Be an intuitive eater. Listen to your hunger and fullness signals, stopping when you are full.  Consume protein and produce in your day, striving for a rainbow of color of produce.  Reduce portions to staring size of 1 cup size and check in with your gut to see if you are full. Set the timer to slow down your eating pace to allow for 15-20 minutes to complete a meal.  Reduce refined sugars and high fat foods, as they may contribute to greater side effects of nausea and heartburn.  Stop eating 3 hours before bedtime to allow your food to digest.  Remain hydrated with water or non caloric and non caffeine beverages.  Use over the counter diana lozenge/supplement to help reduce nausea if needed.  If you have been off your medication for more than 2 weeks please notify our office to determine next dosing, as return to previous dose may not be appropriate or tolerated.    Next steps to work on before next office visit include:  Great work using mindful eating practices! Continue to focus on balanced nutrition with Protein and Produce and Water. Healthy snacks listed below.    Balanced Nutrition includes:     Build the mentality of Food 4 Fuel. Clean eating with whole foods and eliminating/reducing ultra processed foods.  Be an  intuitive eater and using mindful eating practices.  Eat a balanced plate with protein and produce at all meals: 1/4 plate- protein, 1/2 plate non starchy veggies, and 1/4 plate fruit or complex carbohydrate.  Drink water with all meals and use a salad plate to naturally reduce portions.  Eliminate/reduce late night eating by stopping after 7pm. Allowing your body to fast for 12 hours (drink only water, tea or black coffee without any additives).                    Return in about 4 months (around 6/12/2025) for weight management via clinic or Telemedicine Visit and in clinic in September.    DOCUMENTATION OF TIME SPENT: Code selection for this visit was based on time spent : 20 minutes on date of service in preparing to see the patient, obtaining and/or reviewing separately obtained history, performing a medically appropriate examination, counseling and educating the patient/family/caregiver, ordering medications or testing, referring and communicating with other healthcare providers, documenting clinical information in the electronic medical record, independently interpreting results and communicating results to the patient/family/caregiver and care coordination with the patient's other providers.    Answers submitted by the patient for this visit:  Medical Weight Loss Follow Up (Submitted on 2/12/2025)  If greater than 5/1O how would you grade your coping mechanisms?: fair

## 2025-02-12 NOTE — PATIENT INSTRUCTIONS
Continue making lifestyle changes that focus on good nutrition, regular exercise and stress management.    Medication Plan: Continue current medication regimen.    Tips while taking an injectable weight loss medication:    Be an intuitive eater. Listen to your hunger and fullness signals, stopping when you are full.  Consume protein and produce in your day, striving for a rainbow of color of produce.  Reduce portions to staring size of 1 cup size and check in with your gut to see if you are full. Set the timer to slow down your eating pace to allow for 15-20 minutes to complete a meal.  Reduce refined sugars and high fat foods, as they may contribute to greater side effects of nausea and heartburn.  Stop eating 3 hours before bedtime to allow your food to digest.  Remain hydrated with water or non caloric and non caffeine beverages.  Use over the counter diana lozenge/supplement to help reduce nausea if needed.  If you have been off your medication for more than 2 weeks please notify our office to determine next dosing, as return to previous dose may not be appropriate or tolerated.    Next steps to work on before next office visit include:  Great work using mindful eating practices! Continue to focus on balanced nutrition with Protein and Produce and Water. Healthy snacks listed below.    Balanced Nutrition includes:     Build the mentality of Food 4 Fuel. Clean eating with whole foods and eliminating/reducing ultra processed foods.  Be an intuitive eater and using mindful eating practices.  Eat a balanced plate with protein and produce at all meals: 1/4 plate- protein, 1/2 plate non starchy veggies, and 1/4 plate fruit or complex carbohydrate.  Drink water with all meals and use a salad plate to naturally reduce portions.  Eliminate/reduce late night eating by stopping after 7pm. Allowing your body to fast for 12 hours (drink only water, tea or black coffee without any additives).

## 2025-02-13 ENCOUNTER — OFFICE VISIT (OUTPATIENT)
Dept: PULMONOLOGY | Facility: CLINIC | Age: 50
End: 2025-02-13

## 2025-02-13 VITALS
DIASTOLIC BLOOD PRESSURE: 74 MMHG | BODY MASS INDEX: 43.71 KG/M2 | OXYGEN SATURATION: 96 % | WEIGHT: 262.38 LBS | SYSTOLIC BLOOD PRESSURE: 118 MMHG | HEART RATE: 91 BPM | HEIGHT: 65 IN | RESPIRATION RATE: 12 BRPM

## 2025-02-13 DIAGNOSIS — G47.33 OSA ON CPAP: Primary | ICD-10-CM

## 2025-02-13 PROCEDURE — 3078F DIAST BP <80 MM HG: CPT | Performed by: INTERNAL MEDICINE

## 2025-02-13 PROCEDURE — 3074F SYST BP LT 130 MM HG: CPT | Performed by: INTERNAL MEDICINE

## 2025-02-13 PROCEDURE — 99213 OFFICE O/P EST LOW 20 MIN: CPT | Performed by: INTERNAL MEDICINE

## 2025-02-13 PROCEDURE — 3008F BODY MASS INDEX DOCD: CPT | Performed by: INTERNAL MEDICINE

## 2025-02-13 NOTE — PATIENT INSTRUCTIONS
-Continue using the BIPAP machine:  -We will ask the DME to perform a nasal mask refit  1.  Change hosing/mask/filter regularly                 - clean regularly using distilled water with humidifier                 - contact DME if need supplies  2.  Absolutely no driving while sleepy  3.  Avoid alcohol and caffeine before bedtime  4.  Weight loss and exercise help improve sleep apnea as well  5.  Good sleep hygiene discussed at length  6.  Education provided       Come back in 2 months

## 2025-02-13 NOTE — PROGRESS NOTES
Order for Mask re-fit to nasal pillow or nasal mask [facility]

## 2025-02-13 NOTE — PROGRESS NOTES
Pulmonary Medicine Outpatient Progress Note           Reason for Consultation and CC: sleep issues     Referring Physician: Dr. Farley    Subjective:  Seen for f/u on 25.  Split night study ordered for KEON eval. Started on BIPAP. Feels the machine is fine but the mask is leaking. Initially had a full face mask that was too large. Tried a smaller full mask but still leaking. So   PFTS ordered were normal and started on PPI and referred to GI for shortness of breath.   GI agreed with PPI and evaluating her GB.    BIPAP compliance:  Time frame: last 30 days   % used overall: 90%  % used > 4 hours: 27%  Avg hours/night: 3 hrs 4 min   AHI: 7.3  BIPAP settin/13 cmH20    From the initial visit.   HPI: I had the pleasure of seeing Radha Murguia for a Pulmonary Medicine consultation on 24.  48 yo woman with hx of KEON, allergic rhinitis, bipolar do, HTN, DM , GERD being seen for sleep apnea.  States she can't breath. Started one month ago. Occurs all the time-even at rest and has difficulty breathing in. + coughing and feels something in her throat. + wheezing.   Denies runny nose or postnasal drip. Admits to significant acid reflux. Symptoms are worse after eating. Takes Omeprazole but not regularly.   Denies previous hx of asthma or COPD.    Went to an immediate care recently and had a CXR that was unremarkable. Was given Prednisone, Tessalon Perles, and Albuterol which didn't help.   Was seeing Dr. Saldana in the past or KEON. Has a machine but doesn't have the supplies.  Reports her machine is > 5 yrs old and stopped using the machine >1 yr ago b/c she couldn't get supplies.   Estimates her last sleep study was 3-4 yrs ago.    CPAP compliance:  Time frame: 2022-2023  % used overall: 100%  % used > 4 hours: 100%  Avg hours/night: 9 hrs 26 min  AHI: 2.3  CPAP settin cmH20      Pt was previously seeing Dr. Kleber Saldana MD from [facility]. Last saw him in 3/7/22:  \"HPI: I had  the pleasure of seeing Radha Murguia for evaluation of KEON. As you know Ms. Murguia is a 46 year old female with initial complaints of snoring, witnessed apneas, unrefreshing sleep, daytime sleepiness. The patient denies driving while sleepy, restless legs, cataplexy, dyspnea.  - pt was initially diagnosed with KEON many years ago and has been on CPAP since 2011. However, despite CPAP use, pt reports persistent difficulty sleeping at night, frequent awakenings and doesn't feel refreshed in am and throughout the day. Pt admits that she naps regularly for several hours per day. Works as .  - uses CPAP nightly; it is old- used to smell like smoke. Needs to be replaced.  - follows with neurology and psychiatry the latter has been prescribing her medicines for depression, anxiety and insomnia.   ASSESSMENT AND PLAN:  KEON: Previously diagnosed. Pt is verbally compliant with CPAP therapy; however, having breakthrough symptoms.  1. No need for PSG/titration at this point but does need new machine bc it is apparently malfunctioning.  - need compliance data downloaded  - DME: Mike  - will have CPAP coordinator submit order for repair/replace.  - encouraged to clean regularly and use distilled water with humidifier  2. Absolutely no driving while sleepy.  3. Avoid alcohol and caffeine  4. Weight loss and exercise encouraged  5. Good sleep hygiene discussed at length  6. Education provided.  Snoring- resolved  Hypersomnia-- persists; multifactorial- contributing factors include poor sleep hygiene and also SE of current meds  Insomnia- sleep maintenace in nature- KEON seems well controlled. Due to above; poor sleep hygiene- needs to avoid naps during the daytime.  - already on seroquel and melatonin- follows with psychiatrist. Will defer pharmacology to her.  Obesity with BMI of 47.4- encouraged weight loss through diet and exercise. Pt is not a candidate for Inspire device bc her BMI is >36\".       REVIEW  OF SYSTEMS:  Positives and negatives as stated in HPI. Remainder of 12 pt review of systems otherwise are negative.       PAST MEDICAL HISTORY:  Past Medical History:    Allergic rhinitis    Anxiety state, unspecified    Arthritis    Asthma (HCC)    Back pain    Back problem    lower back pain- sees chiro    Bad breath    Belching    Bipolar affective (HCC)    patient denies    Bleeding nose    Breast CA (HCC)    DCIS    Cancer (HCC)    left breast    Chest pain    Chronic cough    Community acquired pneumonia    Constipation    COPD (chronic obstructive pulmonary disease) (HCC)    Coronavirus infection    Diabetes mellitus (HCC)    Diarrhea, unspecified    Disorder of liver    fatty liver    Ductal carcinoma in situ of breast    Esophageal reflux    Essential hypertension    Exposure to medical diagnostic radiation    finished in 2015    Flatulence/gas pain/belching    Headache, chronic daily    Heartburn    Hemorrhoids    High blood pressure    High cholesterol    Hyperlipidemia    Hypoxia    Indigestion    Leaking of urine    Mild mental retardation    Mouth sores    Obstructive apnea    CPAP 14 Sarina's    Pain in joints    Parainfluenza infection    Shortness of breath    Sleep apnea    cpap    Type II or unspecified type diabetes mellitus without mention of complication, not stated as uncontrolled    Wheezing        PAST SURGICAL HISTORY:  Past Surgical History:   Procedure Laterality Date    Breast lumpectomy Left 06/11/2015    Procedure: BREAST LUMPECTOMY;  Surgeon: Paul Miller MD;  Location:  MAIN OR    Carpal tunnel release Bilateral     Colonoscopy N/A 11/23/2021    Procedure: COLONOSCOPY with polypectomy, ESOPHAGOGASTRODUODENOSCOPY (EGD) with biopsies;  Surgeon: Akbar Perla MD;  Location:  ENDOSCOPY    Hernia surgery      BABY    Lumpectomy left Left 05/11/2015    DCIS;papilloma    Joanie biopsy stereo w/calc 1 site left (cpt=19081) Left 04/2015    DCIS; papilloma    Needle biopsy liver       Orthopedic surg (pbp) Bilateral     MANDA CARPAL ISHAAN RELEASE    Other  LEFT FOOT SPUR REMOVAL    Other surgical history      bilateral carpal tunnel release    Radiation left Left 2015    lump with rad    Stereotactic breast biopsy Left 4/21/15 [facility]    Left Breast        PAST FAMILY HISTORY:  Family History   Problem Relation Age of Onset    Breast Cancer Maternal Grandmother         not known    Breast Cancer Paternal Grandmother         not known    Diabetes Father     Heart Attack Father     Other (Other) Father         heart attack-- at age 52    Cancer Brother         brain tumor    Diabetes Brother     Dementia Mother     Diabetes Mother     Stroke Mother     Bipolar Disorder Other     Suicide History Other     Breast Cancer Self 40        PAST SOCIAL HISTORY:  Social History     Socioeconomic History    Marital status: Single   Occupational History    Occupation: works part-time   Tobacco Use    Smoking status: Never     Passive exposure: Never    Smokeless tobacco: Never   Vaping Use    Vaping status: Never Used   Substance and Sexual Activity    Alcohol use: No    Drug use: No    Sexual activity: Never   Other Topics Concern    Caffeine Concern No    Exercise No    Seat Belt No    Special Diet No    Stress Concern No    Weight Concern Yes     Comment: Working on weight loss   Social History Narrative    Lives  In [place] with mother     Social Drivers of Health     Food Insecurity: No Food Insecurity (2025)    NCSS - Food Insecurity     Worried About Running Out of Food in the Last Year: No     Ran Out of Food in the Last Year: No   Transportation Needs: No Transportation Needs (2025)    NCSS - Transportation     Lack of Transportation: No   Housing Stability: At Risk (2025)    NCSS - Housing/Utilities     Has Housing: No     Worried About Losing Housing: No     Unable to Get Utilities: No       ALLERGIES:  Allergies[1]       MEDS:  Medications Ordered Prior to Encounter[2]        PHYSICAL EXAM:  /74   Pulse 91   Resp 12   Ht 5' 5\" (1.651 m)   Wt 262 lb 6.4 oz (119 kg)   LMP  (LMP Unknown)   SpO2 96%   BMI 43.67 kg/m²   CONSTITUTIONAL: alert, oriented, no apparent distress  HEENT: atraumatic normocephalic  MOUTH: mucous membranes are moist. No OP exudates  NECK/THROAT: no JVD. Trachea midline. No obvious thyromegaly  LUNG: clear b/l no wheezing, crackles. Chest symmetric with respiratory motion  HEART: regular rate and rhythm, no obvious murmers or gallops note  ABD: soft non tender. + bowel sounds. No organomegaly noted  EXT: no clubbing, cyanosis, or edema noted. Pulses intact grossly  NEURO/MUSCULOSKELETAL: no gross deficits  SKIN: warm, dry. No obvious lesions noted  LYMPH: no obvious LAD       IMAGES:  CXR 24  FINDINGS:    LUNGS:  No focal consolidation.  Normal vascularity.   CARDIAC:  Normal size cardiac silhouette.   MEDIASTINUM:  Normal.   PLEURA:  Normal.  No pleural effusions.   BONES:  Normal for age.   CONCLUSION:  There is no evidence of active cardiopulmonary disease.        LABS:  Lab Results   Component Value Date    WBC 5.9 2024    RBC 4.02 2024    HGB 12.7 2024    HCT 36.5 2024    MCV 90.8 2024    MCH 31.6 2024    MCHC 34.8 2024     No results for input(s): \"GLU\", \"BUN\", \"CREATSERUM\", \"GFRAA\", \"GFRNAA\", \"EGFRCR\", \"CA\", \"ALB\", \"NA\", \"K\", \"CL\", \"CO2\", \"ALKPHO\", \"AST\", \"ALT\", \"BILT\", \"TP\" in the last 168 hours.       PFT 2024      Outside PFTs done at [facility]  PFT's 3/7/22:  FEV1:2.48L (89%)  FVC: 2.69L (77%)  Ratio: 92%  T%  RV/T%  DLCO:104%  DLCO/VA: 127%  Bronchodilator challenge: no sig response       CPAP titration 2024  IMPRESSIONS: This study reveals moderate obstructive sleep apnea and was a successful titration.   RECOMMENDATIONS:   1. It is recommended that the patient should be prescribed BiPAP at 19/13 cm H2O, with humidity at 3 and EPR on.   2. During the titration, the patient was  fitted with a [employer] VITERA mask, size SMALL.       PSG/CPAP titration results (care everywhere 7/2017 done at [facility])  \"IMPRESSION:    1.   Successful CPAP titration with resolution of all obstructive apneic and hypopneic episodes as well as oxyhemoglobin desaturations.   2.   Moderately elevated periodic limb movement arousal index may have been due to lab effect or could be due to secondary sleep disorder such as periodic limb movement disorder.  Further clinical correlation will be necessary.   RECOMMENDATIONS:    1.   The patient should be initiated on CPAP at 14 cm of water pressure with EPR level 0, humidity level 5.  During this study, the patient used a ResMed AirFit N10, size small mask.   2.   The patient should avoid alcohol and sedative medications as these may worsen severity of KEON.  The patient should avoid drowsy driving.   3.   The patient will follow up in the Pulmonary Sleep Clinic after initiation of CPAP for ongoing clinical care.  Thank you for allowing me to participate in this patient's care.  Please do not hesitate to call us with any additional questions.   Dictated By Kleber Saldana M.D.   d: 07/19/2017 11:32:49\".        ASSESSMENT/PLAN:  Shortness of breath  -Atypical for pulmonary etiology. Likely d/t her uncontrolled GERD  -On OTC omeprazole regularly  -Seeing GI  -Checked PFTS which were normal   -Maybe related to KEON    KEON on BIPAP now but having mask issues  -Continue using the BIPAP machine:  -We will ask the DME to perform a nasal mask refit  1.  Change hosing/mask/filter regularly                 - clean regularly using distilled water with humidifier                 - contact DME if need supplies  2.  Absolutely no driving while sleepy  3.  Avoid alcohol and caffeine before bedtime  4.  Weight loss and exercise help improve sleep apnea as well  5.  Good sleep hygiene discussed at length  6.  Education provided       RTC in 2 months    Thank you for the opportunity to care  for Radha Brown    I spent a total of 25 minutes in direct contact with the patient and reviewing pertinent outside records on the day of the encounter.     KARITK Maria DO, MPH  Pulmonary and Critical Care Medicine  [facility]monary [facility]          [1]   Allergies  Allergen Reactions    Other Runny nose     Seasonal     [2]   Current Outpatient Medications on File Prior to Visit   Medication Sig Dispense Refill    semaglutide (OZEMPIC, 2 MG/DOSE,) 8 MG/3ML Subcutaneous Solution Pen-injector Inject 2 mg into the skin once a week. 9 mL 1    Omeprazole 40 MG Oral Capsule Delayed Release Take 1 capsule (40 mg total) by mouth daily. 90 capsule 0    PARoxetine (PAXIL) 20 MG Oral Tab Take 1 tablet (20 mg total) by mouth daily. 30+20mg= 50mg daily 90 tablet 1    PARoxetine 30 MG Oral Tab Take 1 tablet (30 mg total) by mouth daily. Take with 20mg tab= 50mg daily 90 tablet 1    QUEtiapine Fumarate 150 MG Oral Tab Take 150 mg by mouth nightly. 90 tablet 1    atorvastatin 80 MG Oral Tab Take 1 tablet (80 mg total) by mouth nightly. 90 tablet 3    telmisartan 80 MG Oral Tab Take 1 tablet (80 mg total) by mouth daily. 90 tablet 3    Metoprolol Succinate  MG Oral Tablet 24 Hr Take 1 tablet (200 mg total) by mouth daily. 90 tablet 1    [DISCONTINUED] semaglutide (OZEMPIC, 2 MG/DOSE,) 8 MG/3ML Subcutaneous Solution Pen-injector Inject 2 mg into the skin once a week. 9 mL 1    Fezolinetant (VEOZAH) 45 MG Oral Tab Take 45 mg by mouth daily. 30 tablet 2    hydrALAZINE 25 MG Oral Tab Take 1 tablet (25 mg total) by mouth 2 (two) times daily. 180 tablet 1    metFORMIN  MG Oral Tablet 24 Hr Take 1 tablet (750 mg total) by mouth 2 (two) times daily with meals. 180 tablet 3    Ciclopirox Olamine 0.77 % External Cream Apply to affected area twice daily for 1-4wks 90 g 1    Multiple Vitamins-Minerals (MULTIVITAMIN WOMENS 50+ ADV) Oral Tab Take 1 tablet by mouth daily with food. 90 tablet 1     Glucose Blood (TRUE METRIX BLOOD GLUCOSE TEST) In Vitro Strip Use to check blood sugar daily 100 strip 2    OneTouch UltraSoft 2 Lancets Does not apply Misc 1 Lancet daily. Use to test blood sugar daily 100 each 2    fexofenadine (ALLEGRA ALLERGY) 180 MG Oral Tab Take 1 tablet (180 mg total) by mouth daily.       No current facility-administered medications on file prior to visit.

## 2025-02-17 DIAGNOSIS — I10 ESSENTIAL HYPERTENSION: ICD-10-CM

## 2025-02-17 RX ORDER — HYDRALAZINE HYDROCHLORIDE 25 MG/1
25 TABLET, FILM COATED ORAL 2 TIMES DAILY
Qty: 60 TABLET | Refills: 0 | Status: SHIPPED | OUTPATIENT
Start: 2025-02-17

## 2025-02-19 ENCOUNTER — OFFICE VISIT (OUTPATIENT)
Dept: SURGERY | Facility: CLINIC | Age: 50
End: 2025-02-19
Payer: MEDICARE

## 2025-02-19 ENCOUNTER — HOSPITAL ENCOUNTER (OUTPATIENT)
Dept: ULTRASOUND IMAGING | Age: 50
Discharge: HOME | End: 2025-02-19
Attending: NURSE PRACTITIONER
Payer: MEDICARE

## 2025-02-19 VITALS
SYSTOLIC BLOOD PRESSURE: 138 MMHG | HEART RATE: 92 BPM | WEIGHT: 230 LBS | BODY MASS INDEX: 38.32 KG/M2 | DIASTOLIC BLOOD PRESSURE: 86 MMHG | HEIGHT: 65 IN | OXYGEN SATURATION: 96 %

## 2025-02-19 DIAGNOSIS — K30 INDIGESTION: ICD-10-CM

## 2025-02-19 DIAGNOSIS — M54.16 LUMBAR RADICULOPATHY: Primary | ICD-10-CM

## 2025-02-19 DIAGNOSIS — R10.13 EPIGASTRIC DISCOMFORT: ICD-10-CM

## 2025-02-19 PROCEDURE — 76700 US EXAM ABDOM COMPLETE: CPT | Performed by: NURSE PRACTITIONER

## 2025-02-19 PROCEDURE — 99203 OFFICE O/P NEW LOW 30 MIN: CPT | Performed by: NEUROLOGICAL SURGERY

## 2025-02-19 PROCEDURE — 3079F DIAST BP 80-89 MM HG: CPT | Performed by: NEUROLOGICAL SURGERY

## 2025-02-19 PROCEDURE — 3008F BODY MASS INDEX DOCD: CPT | Performed by: NEUROLOGICAL SURGERY

## 2025-02-19 PROCEDURE — 3075F SYST BP GE 130 - 139MM HG: CPT | Performed by: NEUROLOGICAL SURGERY

## 2025-02-19 RX ORDER — MELOXICAM 15 MG/1
15 TABLET ORAL DAILY
Qty: 30 TABLET | Refills: 0 | Status: SHIPPED | OUTPATIENT
Start: 2025-02-19

## 2025-02-19 RX ORDER — GABAPENTIN 300 MG/1
300 CAPSULE ORAL 3 TIMES DAILY
Qty: 90 CAPSULE | Refills: 0 | Status: SHIPPED | OUTPATIENT
Start: 2025-02-19

## 2025-02-19 NOTE — PATIENT INSTRUCTIONS
Refill policies:    Allow 2-3 business days for refills; controlled substances may take longer.  Contact your pharmacy at least 5 days prior to running out of medication and have them send an electronic request or submit request through the “request refill” option in your [employer] account.  Refills are not addressed on weekends; covering physicians do not authorize routine medications on weekends.  No narcotics or controlled substances are refilled after noon on Fridays or by on call physicians.  By law, narcotics must be electronically prescribed.  A 30 day supply with no refills is the maximum allowed.  If your prescription is due for a refill, you may be due for a follow up appointment.  To best provide you care, patients receiving routine medications need to be seen at least once a year.  Patients receiving narcotic/controlled substance medications need to be seen at least once every 3 months.  In the event that your preferred pharmacy does not have the requested medication in stock (e.g. Backordered), it is your responsibility to find another pharmacy that has the requested medication available.  We will gladly send a new prescription to that pharmacy at your request.    Scheduling Tests:    If your physician has ordered radiology tests such as MRI or CT scans, please contact Central Scheduling at [phone] right away to schedule the test.  Once scheduled, the [facility] Centralized Referral Team will work with your insurance carrier to obtain pre-certification or prior authorization.  Depending on your insurance carrier, approval may take 3-10 days.  It is highly recommended patients assure they have received an authorization before having a test performed.  If test is done without insurance authorization, patient may be responsible for the entire amount billed.      Precertification and Prior Authorizations:  If your physician has recommended that you have a procedure or additional testing performed the [facility]  Centralized Referral Team will contact your insurance carrier to obtain pre-certification or prior authorization.    You are strongly encouraged to contact your insurance carrier to verify that your procedure/test has been approved and is a COVERED benefit.  Although the [facility] Centralized Referral Team does its due diligence, the insurance carrier gives the disclaimer that \"Although the procedure is authorized, this does not guarantee payment.\"    Ultimately the patient is responsible for payment.   Thank you for your understanding in this matter.  Paperwork Completion:  If you require FMLA or disability paperwork for your recovery, please make sure to either drop it off or have it faxed to our office at [phone]. Be sure the form has your name and date of birth on it.  The form will be faxed to our Forms Department and they will complete it for you.  There is a 25$ fee for all forms that need to be filled out.  Please be aware there is a 10-14 day turnaround time.  You will need to sign a release of information (ZAC) form if your paperwork does not come with one.  You may call the Forms Department with any questions at [phone].  Their fax number is [phone].

## 2025-02-19 NOTE — PROGRESS NOTES
New patient:  Reason for visit:   Low back pain radiating to BLE, numbness on feet    Numeric Rating Scale:        Pain at Present: 10/10                                                                                                              Past Treatments for Current Pain Condition:     Chiropractor-last year  Completed PT in 2022 03/28/23 lumbar interlaminar epidural injection and 04/25/23 lumbar facet injection bilateral- Dr Mari    Prior diagnostic testing related to this condition:    MRI spine lumbar DOS 02/13/25  CT spine lumbar DOS 12/06/24

## 2025-02-19 NOTE — PROGRESS NOTES
[facility]  Neurological Surgery Clinic Note    Radha Murguia  5/6/1975  [id number]  PCP: Raphael Farley DO    REASON FOR VISIT:  B/L LE radiculopathy    HISTORY OF PRESENT ILLNESS:  Radha Murguia is a(n) 49 year old female with progressive low back pain and Le radiculopathy fo ryears but with recent progression.  She has numbness in her feet.  She deneis any weakness.  She denies any falls.  She as attempted PT in the past and LENO with no relief.  She is currently in chiro with no relief.  She cannot work due to the symptoms in her legs.  MRI shows severe spinal stenosis at L4-5 and severe foraminal stenosis at L5-S1.  She denies any bowel/bladder habit changes.       Location: LBP  Quality: LE radiculopathy  Severity: progressive  Duration: months/year  Timing: any, worse at night  Context: severe DDD and facet artrhopathy leading to stenosis at L4-5 and L5-S1  Modifying Factors: PT and LENO in the past offer no relief   Associated signs/symptoms: pain      PAST MEDICAL HISTORY:  Past Medical History:    Allergic rhinitis    Anxiety state, unspecified    Arthritis    Asthma (HCC)    Back pain    Back problem    lower back pain- sees chiro    Bad breath    Belching    Bipolar affective (HCC)    patient denies    Bleeding nose    Breast CA (HCC)    DCIS    Cancer (HCC)    left breast    Chest pain    Chronic cough    Community acquired pneumonia    Constipation    COPD (chronic obstructive pulmonary disease) (HCC)    Coronavirus infection    Diabetes mellitus (HCC)    Diarrhea, unspecified    Disorder of liver    fatty liver    Ductal carcinoma in situ of breast    Esophageal reflux    Essential hypertension    Exposure to medical diagnostic radiation    finished in 2015    Flatulence/gas pain/belching    Headache, chronic daily    Heartburn    Hemorrhoids    High blood pressure    High cholesterol    Hyperlipidemia    Hypoxia    Indigestion    Leaking of urine    Mild mental retardation     Mouth sores    Obstructive apnea    CPAP 14 Sarina's    Pain in joints    Parainfluenza infection    Shortness of breath    Sleep apnea    cpap    Type II or unspecified type diabetes mellitus without mention of complication, not stated as uncontrolled    Wheezing       PAST SURGICAL HISTORY:  Past Surgical History:   Procedure Laterality Date    Breast lumpectomy Left 06/11/2015    Procedure: BREAST LUMPECTOMY;  Surgeon: Paul Milelr MD;  Location:  MAIN OR    Carpal tunnel release Bilateral     Colonoscopy N/A 11/23/2021    Procedure: COLONOSCOPY with polypectomy, ESOPHAGOGASTRODUODENOSCOPY (EGD) with biopsies;  Surgeon: Akbar Perla MD;  Location:  ENDOSCOPY    Hernia surgery      BABY    Lumpectomy left Left 05/11/2015    DCIS;papilloma    Joanie biopsy stereo w/calc 1 site left (cpt=19081) Left 04/2015    DCIS; papilloma    Needle biopsy liver      Orthopedic surg (pbp) Bilateral     MANDA CARPAL ISHAAN RELEASE    Other  LEFT FOOT SPUR REMOVAL    Other surgical history      bilateral carpal tunnel release    Radiation left Left 2015    lump with rad    Stereotactic breast biopsy Left 4/21/15 [facility]    Left Breast       FAMILY HISTORY:  family history includes Bipolar Disorder in an other family member; Breast Cancer in her maternal grandmother and paternal grandmother; Breast Cancer (age of onset: 42) in her self; Cancer in her brother; Dementia in her mother; Diabetes in her brother, father, and mother; Heart Attack in her father; Other in her father; Stroke in her mother; Suicide History in an other family member.    SOCIAL HISTORY:   reports that she has never smoked. She has never been exposed to tobacco smoke. She has never used smokeless tobacco. She reports that she does not drink alcohol and does not use drugs.    ALLERGIES:  Allergies[1]    MEDICATIONS:  Medications Ordered Prior to Encounter[2]    REVIEW OF SYSTEMS:  Review of Systems   All other systems reviewed and are negative.        PHYSICAL EXAMINATION:  Neurological Exam  Mental Status  Awake, alert and oriented to person, place and time.    Cranial Nerves  CN II: Visual acuity is normal. Visual fields full to confrontation.  CN III, IV, VI: Extraocular movements intact bilaterally. Normal lids and orbits bilaterally. Pupils equal round and reactive to light bilaterally.  CN V: Facial sensation is normal.  CN VII: Full and symmetric facial movement.  CN VIII: Hearing is normal.  CN IX, X: Palate elevates symmetrically. Normal gag reflex.  CN XI: Shoulder shrug strength is normal.  CN XII: Tongue midline without atrophy or fasciculations.    Motor  Normal muscle bulk throughout. No fasciculations present. Normal muscle tone. No abnormal involuntary movements. Strength is 5/5 throughout all four extremities.    Sensory  Sensation is intact to light touch, pinprick, vibration and proprioception in all four extremities.    Reflexes  Deep tendon reflexes are 2+ and symmetric in all four extremities.    Coordination    Finger-to-nose, rapid alternating movements and heel-to-shin normal bilaterally without dysmetria.    Gait  Normal casual, toe, heel and tandem gait.       IMAGING:  As above    ASSESSMENT:  LBP and LE radiculopathy    Plan:   Start Gabapentin and Meloxicam  Start PT  Recommend L4-S1 TLIF      The plan of care was discussed with the patient at length. All questions and concerns were addressed at this time. The patient verbalized agreement with the plan and was appreciative.     Total visit time: 30 minutes; More than 50% spent coordinating care, counseling, reviewing imaging and discussing medication therapy.     Philip Walter,   Neurological Surgery  [facility]           [1]   Allergies  Allergen Reactions    Other Runny nose     Seasonal     [2]   Current Outpatient Medications on File Prior to Visit   Medication Sig Dispense Refill    hydrALAZINE 25 MG Oral Tab Take 1 tablet (25 mg total) by mouth 2 (two)  times daily. 60 tablet 0    semaglutide (OZEMPIC, 2 MG/DOSE,) 8 MG/3ML Subcutaneous Solution Pen-injector Inject 2 mg into the skin once a week. 9 mL 1    Omeprazole 40 MG Oral Capsule Delayed Release Take 1 capsule (40 mg total) by mouth daily. 90 capsule 0    PARoxetine (PAXIL) 20 MG Oral Tab Take 1 tablet (20 mg total) by mouth daily. 30+20mg= 50mg daily 90 tablet 1    PARoxetine 30 MG Oral Tab Take 1 tablet (30 mg total) by mouth daily. Take with 20mg tab= 50mg daily 90 tablet 1    QUEtiapine Fumarate 150 MG Oral Tab Take 150 mg by mouth nightly. 90 tablet 1    atorvastatin 80 MG Oral Tab Take 1 tablet (80 mg total) by mouth nightly. 90 tablet 3    telmisartan 80 MG Oral Tab Take 1 tablet (80 mg total) by mouth daily. 90 tablet 3    Metoprolol Succinate  MG Oral Tablet 24 Hr Take 1 tablet (200 mg total) by mouth daily. 90 tablet 1    Fezolinetant (VEOZAH) 45 MG Oral Tab Take 45 mg by mouth daily. 30 tablet 2    metFORMIN  MG Oral Tablet 24 Hr Take 1 tablet (750 mg total) by mouth 2 (two) times daily with meals. 180 tablet 3    Ciclopirox Olamine 0.77 % External Cream Apply to affected area twice daily for 1-4wks 90 g 1    Multiple Vitamins-Minerals (MULTIVITAMIN WOMENS 50+ ADV) Oral Tab Take 1 tablet by mouth daily with food. 90 tablet 1    Glucose Blood (TRUE METRIX BLOOD GLUCOSE TEST) In Vitro Strip Use to check blood sugar daily 100 strip 2    OneTouch UltraSoft 2 Lancets Does not apply Misc 1 Lancet daily. Use to test blood sugar daily 100 each 2    fexofenadine (ALLEGRA ALLERGY) 180 MG Oral Tab Take 1 tablet (180 mg total) by mouth daily.       No current facility-administered medications on file prior to visit.

## 2025-03-06 ENCOUNTER — TELEPHONE (OUTPATIENT)
Dept: FAMILY MEDICINE CLINIC | Facility: CLINIC | Age: 50
End: 2025-03-06

## 2025-03-07 ENCOUNTER — TELEPHONE (OUTPATIENT)
Dept: SURGERY | Facility: CLINIC | Age: 50
End: 2025-03-07

## 2025-03-07 NOTE — TELEPHONE ENCOUNTER
Received initial evaluation/plan of care from [facility] SHALINI 3/6/25 document ID: [id number] from [facility].    Endorsed to neurosurgery [facility] for clinical staff.

## 2025-03-10 NOTE — TELEPHONE ENCOUNTER
Initial evaluation/plan of care from [facility] DOS 3/6/25 document ID: [id number] from [facility]   received by [facility] clinical staff, endorsed to provider for review.     Placed on Shruthi's desk    Will be sent to scanning once received back from provider.

## 2025-03-18 ENCOUNTER — LAB ENCOUNTER (OUTPATIENT)
Dept: LAB | Age: 50
End: 2025-03-18
Attending: INTERNAL MEDICINE
Payer: MEDICARE

## 2025-03-18 DIAGNOSIS — I10 ESSENTIAL HYPERTENSION: ICD-10-CM

## 2025-03-18 DIAGNOSIS — R80.9 PROTEINURIA, UNSPECIFIED TYPE: ICD-10-CM

## 2025-03-18 LAB
ANION GAP SERPL CALC-SCNC: 10 MMOL/L (ref 0–18)
BASOPHILS # BLD AUTO: 0.02 X10(3) UL (ref 0–0.2)
BASOPHILS NFR BLD AUTO: 0.4 %
BILIRUB UR QL STRIP.AUTO: NEGATIVE
BUN BLD-MCNC: 16 MG/DL (ref 9–23)
CALCIUM BLD-MCNC: 9.9 MG/DL (ref 8.7–10.6)
CHLORIDE SERPL-SCNC: 104 MMOL/L (ref 98–112)
CLARITY UR REFRACT.AUTO: CLEAR
CO2 SERPL-SCNC: 26 MMOL/L (ref 21–32)
CREAT BLD-MCNC: 0.88 MG/DL
CREAT UR-SCNC: 47.3 MG/DL
EGFRCR SERPLBLD CKD-EPI 2021: 80 ML/MIN/1.73M2 (ref 60–?)
EOSINOPHIL # BLD AUTO: 0.08 X10(3) UL (ref 0–0.7)
EOSINOPHIL NFR BLD AUTO: 1.8 %
ERYTHROCYTE [DISTWIDTH] IN BLOOD BY AUTOMATED COUNT: 13.4 %
FASTING STATUS PATIENT QL REPORTED: YES
GLUCOSE BLD-MCNC: 177 MG/DL (ref 70–99)
GLUCOSE UR STRIP.AUTO-MCNC: 70 MG/DL
HCT VFR BLD AUTO: 38.2 %
HGB BLD-MCNC: 12.9 G/DL
IMM GRANULOCYTES # BLD AUTO: 0.01 X10(3) UL (ref 0–1)
IMM GRANULOCYTES NFR BLD: 0.2 %
KETONES UR STRIP.AUTO-MCNC: NEGATIVE MG/DL
LEUKOCYTE ESTERASE UR QL STRIP.AUTO: NEGATIVE
LYMPHOCYTES # BLD AUTO: 1.88 X10(3) UL (ref 1–4)
LYMPHOCYTES NFR BLD AUTO: 41.3 %
MAGNESIUM SERPL-MCNC: 1.8 MG/DL (ref 1.6–2.6)
MCH RBC QN AUTO: 30.4 PG (ref 26–34)
MCHC RBC AUTO-ENTMCNC: 33.8 G/DL (ref 31–37)
MCV RBC AUTO: 90.1 FL
MONOCYTES # BLD AUTO: 0.28 X10(3) UL (ref 0.1–1)
MONOCYTES NFR BLD AUTO: 6.2 %
NEUTROPHILS # BLD AUTO: 2.28 X10 (3) UL (ref 1.5–7.7)
NEUTROPHILS # BLD AUTO: 2.28 X10(3) UL (ref 1.5–7.7)
NEUTROPHILS NFR BLD AUTO: 50.1 %
NITRITE UR QL STRIP.AUTO: NEGATIVE
OSMOLALITY SERPL CALC.SUM OF ELEC: 296 MOSM/KG (ref 275–295)
PH UR STRIP.AUTO: 6.5 [PH] (ref 5–8)
PHOSPHATE SERPL-MCNC: 4.3 MG/DL (ref 2.4–5.1)
PLATELET # BLD AUTO: 237 10(3)UL (ref 150–450)
POTASSIUM SERPL-SCNC: 4.2 MMOL/L (ref 3.5–5.1)
PROT UR STRIP.AUTO-MCNC: 200 MG/DL
PROT UR-MCNC: 277.1 MG/DL (ref ?–14)
RBC # BLD AUTO: 4.24 X10(6)UL
RBC UR QL AUTO: NEGATIVE
SODIUM SERPL-SCNC: 140 MMOL/L (ref 136–145)
SP GR UR STRIP.AUTO: 1.01 (ref 1–1.03)
UROBILINOGEN UR STRIP.AUTO-MCNC: NORMAL MG/DL
WBC # BLD AUTO: 4.6 X10(3) UL (ref 4–11)

## 2025-03-18 PROCEDURE — 83735 ASSAY OF MAGNESIUM: CPT

## 2025-03-18 PROCEDURE — 36415 COLL VENOUS BLD VENIPUNCTURE: CPT

## 2025-03-18 PROCEDURE — 81001 URINALYSIS AUTO W/SCOPE: CPT

## 2025-03-18 PROCEDURE — 85025 COMPLETE CBC W/AUTO DIFF WBC: CPT

## 2025-03-18 PROCEDURE — 82570 ASSAY OF URINE CREATININE: CPT

## 2025-03-18 PROCEDURE — 80048 BASIC METABOLIC PNL TOTAL CA: CPT

## 2025-03-18 PROCEDURE — 84156 ASSAY OF PROTEIN URINE: CPT

## 2025-03-18 PROCEDURE — 84100 ASSAY OF PHOSPHORUS: CPT

## 2025-03-21 ENCOUNTER — OFFICE VISIT (OUTPATIENT)
Dept: NEPHROLOGY | Facility: CLINIC | Age: 50
End: 2025-03-21
Payer: MEDICARE

## 2025-03-21 VITALS — BODY MASS INDEX: 43 KG/M2 | WEIGHT: 259.38 LBS | SYSTOLIC BLOOD PRESSURE: 146 MMHG | DIASTOLIC BLOOD PRESSURE: 78 MMHG

## 2025-03-21 DIAGNOSIS — I10 ESSENTIAL HYPERTENSION: ICD-10-CM

## 2025-03-21 DIAGNOSIS — R80.9 PROTEINURIA, UNSPECIFIED TYPE: Primary | ICD-10-CM

## 2025-03-21 PROCEDURE — 99213 OFFICE O/P EST LOW 20 MIN: CPT | Performed by: INTERNAL MEDICINE

## 2025-03-21 PROCEDURE — 3077F SYST BP >= 140 MM HG: CPT | Performed by: INTERNAL MEDICINE

## 2025-03-21 PROCEDURE — 3078F DIAST BP <80 MM HG: CPT | Performed by: INTERNAL MEDICINE

## 2025-03-21 RX ORDER — HYDRALAZINE HYDROCHLORIDE 25 MG/1
25 TABLET, FILM COATED ORAL 2 TIMES DAILY
Qty: 180 TABLET | Refills: 3 | Status: SHIPPED | OUTPATIENT
Start: 2025-03-21

## 2025-03-21 NOTE — PROGRESS NOTES
Nephrology Consult Note      REASON FOR CONSULT:  Proteinuria         HPI:   Radha Murguia is a 49 year old female with   Chief Complaint   Patient presents with    Proteinuria     Raphael Farley DO    49 y /o female with hx of DM/HTN, obesity who presents to clinic for follow up.   She is S/p kidney biopsy which showed findings c/w DKD/obesity related changes       She denies any LE edema  Has joined weight loss clinic  No cp/sob  No dysuria    NO hx of kidney stones      ROS:    See above; 12 systems reviewed and otherwise unremarkable     PMH:  Past Medical History:    Allergic rhinitis    Anxiety state, unspecified    Arthritis    Asthma (HCC)    Back pain    Back problem    lower back pain- sees chiro    Bad breath    Belching    Bipolar affective (HCC)    patient denies    Bleeding nose    Breast CA (HCC)    DCIS    Cancer (HCC)    left breast    Chest pain    Chronic cough    Community acquired pneumonia    Constipation    COPD (chronic obstructive pulmonary disease) (HCC)    Coronavirus infection    Diabetes mellitus (HCC)    Diarrhea, unspecified    Disorder of liver    fatty liver    Ductal carcinoma in situ of breast    Esophageal reflux    Essential hypertension    Exposure to medical diagnostic radiation    finished in 2015    Flatulence/gas pain/belching    Headache, chronic daily    Heartburn    Hemorrhoids    High blood pressure    High cholesterol    Hyperlipidemia    Hypoxia    Indigestion    Leaking of urine    Mild mental retardation    Mouth sores    Obstructive apnea    CPAP 14 Sarina's    Pain in joints    Parainfluenza infection    Shortness of breath    Sleep apnea    cpap    Type II or unspecified type diabetes mellitus without mention of complication, not stated as uncontrolled    Wheezing         PSH:  Past Surgical History:   Procedure Laterality Date    Breast lumpectomy Left 06/11/2015    Procedure: BREAST LUMPECTOMY;  Surgeon: Paul Miller MD;  Location: [facility] OR    [facility]  tunnel release Bilateral     Colonoscopy N/A 11/23/2021    Procedure: COLONOSCOPY with polypectomy, ESOPHAGOGASTRODUODENOSCOPY (EGD) with biopsies;  Surgeon: Akbar Perla MD;  Location:  ENDOSCOPY    Hernia surgery      BABY    Lumpectomy left Left 05/11/2015    DCIS;papilloma    Joanie biopsy stereo w/calc 1 site left (cpt=19081) Left 04/2015    DCIS; papilloma    Needle biopsy liver      Orthopedic surg (pbp) Bilateral     MANDA CARPAL ISHAAN RELEASE    Other  LEFT FOOT SPUR REMOVAL    Other surgical history      bilateral carpal tunnel release    Radiation left Left 2015    lump with rad    Stereotactic breast biopsy Left 4/21/15 [facility]    Left Breast         Medications (Active prior to today's visit):  Current Outpatient Medications   Medication Sig Dispense Refill    Omeprazole 40 MG Oral Capsule Delayed Release Take 1 capsule (40 mg total) by mouth daily. 90 capsule 3    PARoxetine (PAXIL) 20 MG Oral Tab Take 1 tablet (20 mg total) by mouth daily. 30+20mg= 50mg daily 90 tablet 1    PARoxetine 30 MG Oral Tab Take 1 tablet (30 mg total) by mouth daily. Take with 20mg tab= 50mg daily 90 tablet 1    QUEtiapine (SEROQUEL) 200 MG Oral Tab Take 1 tablet (200 mg total) by mouth nightly. This is an increase. 90 tablet 1    Meloxicam 15 MG Oral Tab Take 1 tablet (15 mg total) by mouth daily. 30 tablet 0    gabapentin 300 MG Oral Cap Take 1 capsule (300 mg total) by mouth 3 (three) times daily. 90 capsule 0    hydrALAZINE 25 MG Oral Tab Take 1 tablet (25 mg total) by mouth 2 (two) times daily. 60 tablet 0    semaglutide (OZEMPIC, 2 MG/DOSE,) 8 MG/3ML Subcutaneous Solution Pen-injector Inject 2 mg into the skin once a week. 9 mL 1    QUEtiapine Fumarate 150 MG Oral Tab Take 150 mg by mouth nightly. 90 tablet 1    atorvastatin 80 MG Oral Tab Take 1 tablet (80 mg total) by mouth nightly. 90 tablet 3    telmisartan 80 MG Oral Tab Take 1 tablet (80 mg total) by mouth daily. 90 tablet 3    Metoprolol Succinate   MG Oral Tablet 24 Hr Take 1 tablet (200 mg total) by mouth daily. 90 tablet 1    Fezolinetant (VEOZAH) 45 MG Oral Tab Take 45 mg by mouth daily. 30 tablet 2    metFORMIN  MG Oral Tablet 24 Hr Take 1 tablet (750 mg total) by mouth 2 (two) times daily with meals. 180 tablet 3    Ciclopirox Olamine 0.77 % External Cream Apply to affected area twice daily for 1-4wks 90 g 1    Multiple Vitamins-Minerals (MULTIVITAMIN WOMENS 50+ ADV) Oral Tab Take 1 tablet by mouth daily with food. 90 tablet 1    Glucose Blood (TRUE METRIX BLOOD GLUCOSE TEST) In Vitro Strip Use to check blood sugar daily 100 strip 2    OneTouch UltraSoft 2 Lancets Does not apply Misc 1 Lancet daily. Use to test blood sugar daily 100 each 2    fexofenadine (ALLEGRA ALLERGY) 180 MG Oral Tab Take 1 tablet (180 mg total) by mouth daily.           Allergies:  Allergies   Allergen Reactions    Other Runny nose     Seasonal         Social History:  Social History     Socioeconomic History    Marital status: Single   Occupational History    Occupation: works part-time   Tobacco Use    Smoking status: Never     Passive exposure: Never    Smokeless tobacco: Never   Vaping Use    Vaping status: Never Used   Substance and Sexual Activity    Alcohol use: No    Drug use: No    Sexual activity: Never   Other Topics Concern    Caffeine Concern No    Exercise No    Seat Belt No    Special Diet No    Stress Concern No    Weight Concern Yes     Comment: Working on weight loss          Family History:  Family History   Problem Relation Age of Onset    Breast Cancer Maternal Grandmother         not known    Breast Cancer Paternal Grandmother         not known    Diabetes Father     Heart Attack Father     Other (Other) Father         heart attack-- at age 52    Cancer Brother         brain tumor    Diabetes Brother     Dementia Mother     Diabetes Mother     Stroke Mother     Bipolar Disorder Other     Suicide History Other     Breast Cancer Self 40             PHYSICAL EXAM:   /78   LMP  (LMP Unknown)    Wt Readings from Last 6 Encounters:   03/11/25 262 lb 2.1 oz (118.9 kg)   02/19/25 230 lb (104.3 kg)   02/13/25 262 lb 6.4 oz (119 kg)   01/13/25 262 lb 6.4 oz (119 kg)   01/09/25 259 lb (117.5 kg)   12/09/24 259 lb (117.5 kg)     General: Alert and oriented in no apparent distress.  HEENT: No scleral icterus, MMM  Neck: Supple, no KINGA or thyromegaly  Cardiac: Regular rate and rhythm, S1, S2 normal, no murmur or rub  Lungs: Clear without wheezes, rales, rhonchi.    Abdomen: Soft, non-tender. + bowel sounds, no palpable organomegaly  Extremities: Without clubbing, cyanosis or edema.  Neurologic:  normal affect, cranial nerves grossly intact, moving all extremities  Skin: Warm and dry, no rashes      CT scan 5/2019  right kidney have an essentially unremarkable appearance.  Left kidney contains hypodense lesions which statistically most likely represent cysts.   DIAGNOSIS:     Glomerulomegaly.      Glomerular Basement Membrane Thickening, Consistent with Mild Diabetic Glomerulopathy.      Comment:  See table below for summary of chronicity findings:      Chronicity Summary   Total Glomeruli  24   Global Glomerulosclerosis  2   Segmental Sclerosis  Absent    Interstitial fibrosis  Minimal    Tubular Atrophy  Minimal     Arterial Intimal Fibrosis  None   Arterial Hyalinosis Focal Mild\"       ASSESSMENT/PLAN:   There are no diagnoses linked to this encounter.    1. DM - well controlled ; A1C ~ 7    2. HTN- controlled;on metoprolol, telmisartan; hydralazine     - goal BP <140/90   - discussed importance of salt restriction    3. Proteinuria- s/p kidney biopsy which shows findings consistent with diabetic/obesity related kidney disease  - continue on ARB; has lost weight  (reports weight loss of about 80 lbs over past 3 years)  - as above  - recommended addition of SGLT2i ; risk/benefits reviewed; they want to think it over     4. L renal cysts  US from 2/2017  showed some complex features and the size since then appears to be bit larger - have referred to urology,   risk/benefits reviewed  Repeat imaging 2/25 shows 4 mm non-obstructing calculus on L side (lower pole). Advised hydration     5. Vit D def  -  Continue OTC vit D  (1000 international units daily)    F/U 6 mos     Travis Mcrae MD  3/21/2025

## 2025-03-25 DIAGNOSIS — I10 ESSENTIAL HYPERTENSION: ICD-10-CM

## 2025-03-25 RX ORDER — MELOXICAM 15 MG/1
15 TABLET ORAL DAILY
Qty: 30 TABLET | Refills: 0 | Status: SHIPPED | OUTPATIENT
Start: 2025-03-25

## 2025-03-25 RX ORDER — GABAPENTIN 300 MG/1
300 CAPSULE ORAL 3 TIMES DAILY
Qty: 90 CAPSULE | Refills: 0 | Status: SHIPPED | OUTPATIENT
Start: 2025-03-25

## 2025-03-25 RX ORDER — HYDRALAZINE HYDROCHLORIDE 25 MG/1
25 TABLET, FILM COATED ORAL 2 TIMES DAILY
Qty: 60 TABLET | Refills: 0 | OUTPATIENT
Start: 2025-03-25

## 2025-03-25 NOTE — TELEPHONE ENCOUNTER
Received refill request for:    gabapentin 300 MG Oral Cap 90 capsule 0 2/19/2025 --    Sig - Route: Take 1 capsule (300 mg total) by mouth 3 (three) times daily. - Oral    Sent to pharmacy as: Gabapentin 300 MG Oral Capsule (Neurontin)    E-Prescribing Status: Receipt confirmed by pharmacy (2/19/2025  2:12 PM CST)      Pharmacy    [id number] [place] [address] AT [facility] [address], [phone], [phone]     Meloxicam 15 MG Oral Tab30 iblylo0702/19/2025--Sig - Route: Take 1 tablet (15 mg total) by mouth daily. - OralSent to pharmacy as: Meloxicam 15 MG Oral TabletE-Prescribing Status: Receipt confirmed by pharmacy (2/19/2025  2:12 PM CST)     Per Dr. Walter at office visit on 2.19.25:    \"HISTORY OF PRESENT ILLNESS:  Radha Murguia is a(n) 49 year old female with progressive low back pain and Le radiculopathy fo [facility] but with recent progression.  She has numbness in her feet.  She deneis any weakness.  She denies any falls.  She as attempted PT in the past and LENO with no relief.  She is currently in chiro with no relief.  She cannot work due to the symptoms in her legs.  MRI shows severe spinal stenosis at L4-5 and severe foraminal stenosis at L5-S1.  She denies any bowel/bladder habit changes.     ASSESSMENT:  LBP and LE radiculopathy     Plan:   Start Gabapentin and Meloxicam  Start PT  Recommend L4-S1 TLIF\"    Noted that patient is not scheduled for surgery with Dr. Walter. Routed to WILLIS Vasquez.

## 2025-03-27 ENCOUNTER — TELEPHONE (OUTPATIENT)
Dept: SURGERY | Facility: CLINIC | Age: 50
End: 2025-03-27

## 2025-03-27 NOTE — TELEPHONE ENCOUNTER
Rec'd doc id# [id number], Progress note 3/24/2025, [facility].Endorsed to nurse's bin for provider review/signature.

## 2025-03-28 NOTE — TELEPHONE ENCOUNTER
Please Review. Protocol Failed; No Protocol     Requested Prescriptions   Pending Prescriptions Disp Refills    METOPROLOL SUCCINATE  MG Oral Tablet 24 Hr [Pharmacy Med Name: Metoprolol Succinate ER Oral Tablet Extended Release 24 Hour 200 MG] 90 tablet 3     Sig: TAKE 1 TABLET EVERY DAY       Hypertension Medications Protocol Failed - 3/28/2025  2:54 PM        Failed - Last BP reading less than 140/90     BP Readings from Last 1 Encounters:   03/21/25 146/78

## 2025-03-31 RX ORDER — METOPROLOL SUCCINATE 200 MG/1
200 TABLET, EXTENDED RELEASE ORAL DAILY
Qty: 90 TABLET | Refills: 0 | Status: SHIPPED | OUTPATIENT
Start: 2025-03-31

## 2025-03-31 NOTE — TELEPHONE ENCOUNTER
Progress note 3/24/2025, [facility] received by [facility] clinical staff, endorsed to provider for review.     Placed on Shruthi's desk    Will be sent to scanning once received back from provider.

## 2025-04-02 ENCOUNTER — TELEPHONE (OUTPATIENT)
Dept: PULMONOLOGY | Facility: CLINIC | Age: 50
End: 2025-04-02

## 2025-04-02 DIAGNOSIS — Z51.81 ENCOUNTER FOR THERAPEUTIC DRUG MONITORING: ICD-10-CM

## 2025-04-02 DIAGNOSIS — E11.9 TYPE 2 DIABETES MELLITUS WITHOUT COMPLICATION, WITHOUT LONG-TERM CURRENT USE OF INSULIN (HCC): ICD-10-CM

## 2025-04-02 DIAGNOSIS — I10 ESSENTIAL HYPERTENSION: ICD-10-CM

## 2025-04-02 DIAGNOSIS — E78.2 MIXED HYPERLIPIDEMIA: ICD-10-CM

## 2025-04-02 RX ORDER — SEMAGLUTIDE 2.68 MG/ML
INJECTION, SOLUTION SUBCUTANEOUS
Qty: 9 EACH | Refills: 3 | OUTPATIENT
Start: 2025-04-02

## 2025-04-02 NOTE — TELEPHONE ENCOUNTER
Received request from [facility] for CPAP resupply via parachute. Placed in Dr. Maria's folder to sign.

## 2025-04-09 ENCOUNTER — MED REC SCAN ONLY (OUTPATIENT)
Dept: SURGERY | Facility: CLINIC | Age: 50
End: 2025-04-09

## 2025-04-11 ENCOUNTER — TELEPHONE (OUTPATIENT)
Dept: SURGERY | Facility: CLINIC | Age: 50
End: 2025-04-11

## 2025-04-17 ENCOUNTER — TELEPHONE (OUTPATIENT)
Dept: SURGERY | Facility: CLINIC | Age: 50
End: 2025-04-17

## 2025-04-17 NOTE — TELEPHONE ENCOUNTER
LM for patient reminding her to schedule a follow up appointment with Dr Walter to discuss how physical therapy has been going/ formally schedule surgery. Please schedule follow up appointment with Dr Walter when patient returns call

## 2025-04-18 NOTE — TELEPHONE ENCOUNTER
Discharge summary DOS 4/11/25 from [facility] received by [facility] clinical staff, endorsed to provider for review.     Placed on Shruthi's desk    Will be sent to scanning once received back from provider.

## 2025-04-20 NOTE — PROGRESS NOTES
Pulmonary Medicine Outpatient Progress Note           Reason for Consultation and CC: sleep issues     Referring Physician: Dr. Farley    Subjective:  Seen for f/u on 25. Her friend-Karo is with her today as well   At the last visit, sent an order to the DME to perform a nasal mask refit. States she is now using nasal pillows.   Continues to report experiencing trouble falling asleep and staying asleep. Wakes during the night. States this was happening even when she was on CPAP and her AHI was better controlled.   BIPAP compliance:  Time frame: last 30 days   % used overall: 97%  % used > 4 hours: 80%  Avg hours/night: 6 hrs 18 min   AHI: 14.6  BIPAP settin/13 cmH20      From the previous visit.   Seen for f/u on 25.  Split night study ordered for KEON eval. Started on BIPAP. Feels the machine is fine but the mask is leaking. Initially had a full face mask that was too large. Tried a smaller full mask but still leaking.  PFTS ordered were normal and started on PPI and referred to GI for shortness of breath.   GI agreed with PPI and evaluating her GB.  BIPAP compliance:  Time frame: last 30 days   % used overall: 90%  % used > 4 hours: 27%  Avg hours/night: 3 hrs 4 min   AHI: 7.3  BIPAP settin/13 cmH20      From the initial visit.   HPI: I had the pleasure of seeing Radha Murguia for a Pulmonary Medicine consultation on 24.  50 yo woman with hx of KEON, allergic rhinitis, bipolar do, HTN, DM , GERD being seen for sleep apnea.  States she can't breath. Started one month ago. Occurs all the time-even at rest and has difficulty breathing in. + coughing and feels something in her throat. + wheezing.   Denies runny nose or postnasal drip. Admits to significant acid reflux. Symptoms are worse after eating. Takes Omeprazole but not regularly.   Denies previous hx of asthma or COPD.    Went to an immediate care recently and had a CXR that was unremarkable. Was given Prednisone, Tessalon Perles,  and Albuterol which didn't help.   Was seeing Dr. Saldana in the past or KEON. Has a machine but doesn't have the supplies.  Reports her machine is > 5 yrs old and stopped using the machine >1 yr ago b/c she couldn't get supplies.   Estimates her last sleep study was 3-4 yrs ago.  CPAP compliance:  Time frame: 2022-2023  % used overall: 100%  % used > 4 hours: 100%  Avg hours/night: 9 hrs 26 min  AHI: 2.3  CPAP settin cmH20      Pt was previously seeing Dr. Kleber Saldana MD from [facility]. Last saw him in 3/7/22:  \"HPI: I had the pleasure of seeing Radha Murguia for evaluation of KEON. As you know Ms. Murguia is a 46 year old female with initial complaints of snoring, witnessed apneas, unrefreshing sleep, daytime sleepiness. The patient denies driving while sleepy, restless legs, cataplexy, dyspnea.  - pt was initially diagnosed with KEON many years ago and has been on CPAP since . However, despite CPAP use, pt reports persistent difficulty sleeping at night, frequent awakenings and doesn't feel refreshed in am and throughout the day. Pt admits that she naps regularly for several hours per day. Works as .  - uses CPAP nightly; it is old- used to smell like smoke. Needs to be replaced.  - follows with neurology and psychiatry the latter has been prescribing her medicines for depression, anxiety and insomnia.   ASSESSMENT AND PLAN:  KEON: Previously diagnosed. Pt is verbally compliant with CPAP therapy; however, having breakthrough symptoms.  1. No need for PSG/titration at this point but does need new machine bc it is apparently malfunctioning.  - need compliance data downloaded  - DME: [facility]  - will have CPAP coordinator submit order for repair/replace.  - encouraged to clean regularly and use distilled water with humidifier  2. Absolutely no driving while sleepy.  3. Avoid alcohol and caffeine  4. Weight loss and exercise encouraged  5. Good sleep hygiene  discussed at length  6. Education provided.  Snoring- resolved  Hypersomnia-- persists; multifactorial- contributing factors include poor sleep hygiene and also SE of current meds  Insomnia- sleep maintenace in nature- KEON seems well controlled. Due to above; poor sleep hygiene- needs to avoid naps during the daytime.  - already on seroquel and melatonin- follows with psychiatrist. Will defer pharmacology to her.  Obesity with BMI of 47.4- encouraged weight loss through diet and exercise. Pt is not a candidate for Inspire device bc her BMI is >36\".       REVIEW OF SYSTEMS:  Positives and negatives as stated in HPI. Remainder of 12 pt review of systems otherwise are negative.       PAST MEDICAL HISTORY:  Past Medical History:    Allergic rhinitis    Anxiety state, unspecified    Arthritis    Asthma (HCC)    Back pain    Back problem    lower back pain- sees chiro    Bad breath    Belching    Bipolar affective (HCC)    patient denies    Bleeding nose    Breast CA (HCC)    DCIS    Cancer (HCC)    left breast    Chest pain    Chronic cough    Community acquired pneumonia    Constipation    COPD (chronic obstructive pulmonary disease) (HCC)    Coronavirus infection    Diabetes mellitus (HCC)    Diarrhea, unspecified    Disorder of liver    fatty liver    Ductal carcinoma in situ of breast    Esophageal reflux    Essential hypertension    Exposure to medical diagnostic radiation    finished in 2015    Flatulence/gas pain/belching    Headache, chronic daily    Heartburn    Hemorrhoids    High blood pressure    High cholesterol    Hyperlipidemia    Hypoxia    Indigestion    Leaking of urine    Mild mental retardation    Mouth sores    Obstructive apnea    CPAP 14 [facility]    Pain in joints    Parainfluenza infection    Shortness of breath    Sleep apnea    cpap    Type II or unspecified type diabetes mellitus without mention of complication, not stated as uncontrolled    Wheezing        PAST SURGICAL HISTORY:  Past  Surgical History:   Procedure Laterality Date    Breast lumpectomy Left 2015    Procedure: BREAST LUMPECTOMY;  Surgeon: Paul Miller MD;  Location:  MAIN OR    Carpal tunnel release Bilateral     Colonoscopy N/A 2021    Procedure: COLONOSCOPY with polypectomy, ESOPHAGOGASTRODUODENOSCOPY (EGD) with biopsies;  Surgeon: Akbar Perla MD;  Location:  ENDOSCOPY    Hernia surgery      BABY    Lumpectomy left Left 2015    DCIS;papilloma    Joanie biopsy stereo w/calc 1 site left (cpt=19081) Left 2015    DCIS; papilloma    Needle biopsy liver      Orthopedic surg (pbp) Bilateral     MANDA CARPAL ISHAAN RELEASE    Other  LEFT FOOT SPUR REMOVAL    Other surgical history      bilateral carpal tunnel release    Radiation left Left 2015    lump with rad    Stereotactic breast biopsy Left 4/21/15 [facility]    Left Breast        PAST FAMILY HISTORY:  Family History   Problem Relation Age of Onset    Breast Cancer Maternal Grandmother         not known    Breast Cancer Paternal Grandmother         not known    Diabetes Father     Heart Attack Father     Other (Other) Father         heart attack-- at age 52    Cancer Brother         brain tumor    Diabetes Brother     Dementia Mother     Diabetes Mother     Stroke Mother     Bipolar Disorder Other     Suicide History Other     Breast Cancer Self 40        PAST SOCIAL HISTORY:  Social History     Socioeconomic History    Marital status: Single   Occupational History    Occupation: works part-time   Tobacco Use    Smoking status: Never     Passive exposure: Never    Smokeless tobacco: Never   Vaping Use    Vaping status: Never Used   Substance and Sexual Activity    Alcohol use: No    Drug use: No    Sexual activity: Never   Other Topics Concern    Caffeine Concern No    Exercise No    Seat Belt No    Special Diet No    Stress Concern No    Weight Concern Yes     Comment: Working on weight loss   Social History Narrative    Lives  In [place] with mother      Social Drivers of Health     Food Insecurity: No Food Insecurity (1/9/2025)    NCSS - Food Insecurity     Worried About Running Out of Food in the Last Year: No     Ran Out of Food in the Last Year: No   Transportation Needs: No Transportation Needs (1/9/2025)    NCSS - Transportation     Lack of Transportation: No   Housing Stability: At Risk (1/9/2025)    NCSS - Housing/Utilities     Has Housing: No     Worried About Losing Housing: No     Unable to Get Utilities: No       ALLERGIES:  Allergies[1]       MEDS:  Medications Ordered Prior to Encounter[2]       PHYSICAL EXAM:  /89   Pulse 97   Resp 14   Ht 5' 5\" (1.651 m)   Wt 240 lb (108.9 kg)   SpO2 98%   BMI 39.94 kg/m²   CONSTITUTIONAL: alert, oriented, no apparent distress  HEENT: atraumatic normocephalic  MOUTH: mucous membranes are moist. No OP exudates  NECK/THROAT: no JVD. Trachea midline. No obvious thyromegaly  LUNG: clear b/l no wheezing, crackles. Chest symmetric with respiratory motion  HEART: regular rate and rhythm, no obvious murmers or gallops note  ABD: soft non tender. + bowel sounds. No organomegaly noted  EXT: no clubbing, cyanosis, or edema noted. Pulses intact grossly  NEURO/MUSCULOSKELETAL: no gross deficits  SKIN: warm, dry. No obvious lesions noted  LYMPH: no obvious LAD       IMAGES:  CXR 11/5/24  FINDINGS:    LUNGS:  No focal consolidation.  Normal vascularity.   CARDIAC:  Normal size cardiac silhouette.   MEDIASTINUM:  Normal.   PLEURA:  Normal.  No pleural effusions.   BONES:  Normal for age.   CONCLUSION:  There is no evidence of active cardiopulmonary disease.        LABS:  Lab Results   Component Value Date    WBC 4.6 03/18/2025    RBC 4.24 03/18/2025    HGB 12.9 03/18/2025    HCT 38.2 03/18/2025    MCV 90.1 03/18/2025    MCH 30.4 03/18/2025    MCHC 33.8 03/18/2025     No results for input(s): \"GLU\", \"BUN\", \"CREATSERUM\", \"GFRAA\", \"GFRNAA\", \"EGFRCR\", \"CA\", \"ALB\", \"NA\", \"K\", \"CL\", \"CO2\", \"ALKPHO\", \"AST\", \"ALT\", \"BILT\",  \"TP\" in the last 168 hours.       PFT 2024    Outside PFTs done at [facility]  PFT's 3/7/22:  FEV1:2.48L (89%)  FVC: 2.69L (77%)  Ratio: 92%  T%  RV/T%  DLCO:104%  DLCO/VA: 127%  Bronchodilator challenge: no sig response       CPAP titration 2024  IMPRESSIONS: This study reveals moderate obstructive sleep apnea and was a successful titration.   RECOMMENDATIONS:   1. It is recommended that the patient should be prescribed BiPAP at 19/13 cm H2O, with humidity at 3 and EPR on.   2. During the titration, the patient was fitted with a Ynvisible VITERA mask, size SMALL.       PSG/CPAP titration results (care everywhere 2017 done at [facility])  \"IMPRESSION:    1.   Successful CPAP titration with resolution of all obstructive apneic and hypopneic episodes as well as oxyhemoglobin desaturations.   2.   Moderately elevated periodic limb movement arousal index may have been due to lab effect or could be due to secondary sleep disorder such as periodic limb movement disorder.  Further clinical correlation will be necessary.   RECOMMENDATIONS:    1.   The patient should be initiated on CPAP at 14 cm of water pressure with EPR level 0, humidity level 5.  During this study, the patient used a ResMed AirFit N10, size small mask.   2.   The patient should avoid alcohol and sedative medications as these may worsen severity of KEON.  The patient should avoid drowsy driving.   3.   The patient will follow up in the Pulmonary Sleep Clinic after initiation of CPAP for ongoing clinical care.  Thank you for allowing me to participate in this patient's care.  Please do not hesitate to call us with any additional questions.   Dictated By Kleber Saldana M.D.   d: 2017 11:32:49\".        ASSESSMENT/PLAN:  Shortness of breath (resolved)  -Atypical for pulmonary etiology. Likely d/t her uncontrolled GERD  -On OTC omeprazole regularly  -Seeing GI  -Checked PFTS which were normal   -Maybe related to KEON    KEON on BIPAP now but  still not controlled. Has tried different masks. Seems her AHI was better controlled when she was on CPAP a few years ago (when she was being seen at [facility])  -Will order a CPAP re-titration study to see if KEON can be controlled with CPAP  -In the meantime, continue using the BIPAP machine:  -We will ask the DME to perform a nasal mask refit  1.  Change hosing/mask/filter regularly                 - clean regularly using distilled water with humidifier                 - contact DME if need supplies  2.  Absolutely no driving while sleepy  3.  Avoid alcohol and caffeine before bedtime  4.  Weight loss and exercise help improve sleep apnea as well  5.  Good sleep hygiene discussed at length  6.  Education provided    Insomnia and hypersomnia  -Per hx and continues to experience issues despite using PAP therapy  -Will ask her to f/u with Dr. Hall for further evaluation        RTC with Dr. Hall in 2-3 months. Karo asked that she be called with results.       Thank you for the opportunity to care for Radha Murguia.    I spent a total of 25 minutes in direct contact with the patient and reviewing pertinent outside records on the day of the encounter.     KARTIK Maria DO, MPH  Pulmonary and Critical Care Medicine  [facility] Pulmonary and Critical Care Medicine          [1]   Allergies  Allergen Reactions    Other Runny nose     Seasonal     [2]   Current Outpatient Medications on File Prior to Visit   Medication Sig Dispense Refill    amitriptyline 25 MG Oral Tab Take 1 tablet (25 mg total) by mouth nightly. 30 tablet 1    PARoxetine 30 MG Oral Tab Take 1 tablet (30 mg total) by mouth daily. This is a decrease. Total daily dose 30mg 90 tablet 1    Metoprolol Succinate  MG Oral Tablet 24 Hr Take 1 tablet (200 mg total) by mouth daily. 90 tablet 0    MELOXICAM 15 MG Oral Tab TAKE 1 TABLET(15 MG) BY MOUTH DAILY 30 tablet 0    hydrALAZINE 25 MG Oral Tab Take 1 tablet (25 mg total) by mouth 2 (two) times  daily. 180 tablet 3    Omeprazole 40 MG Oral Capsule Delayed Release Take 1 capsule (40 mg total) by mouth daily. 90 capsule 3    QUEtiapine (SEROQUEL) 200 MG Oral Tab Take 1 tablet (200 mg total) by mouth nightly. This is an increase. 90 tablet 1    semaglutide (OZEMPIC, 2 MG/DOSE,) 8 MG/3ML Subcutaneous Solution Pen-injector Inject 2 mg into the skin once a week. 9 mL 1    atorvastatin 80 MG Oral Tab Take 1 tablet (80 mg total) by mouth nightly. 90 tablet 3    telmisartan 80 MG Oral Tab Take 1 tablet (80 mg total) by mouth daily. 90 tablet 3    Fezolinetant (VEOZAH) 45 MG Oral Tab Take 45 mg by mouth daily. 30 tablet 2    metFORMIN  MG Oral Tablet 24 Hr Take 1 tablet (750 mg total) by mouth 2 (two) times daily with meals. 180 tablet 3    Ciclopirox Olamine 0.77 % External Cream Apply to affected area twice daily for 1-4wks 90 g 1    Multiple Vitamins-Minerals (MULTIVITAMIN WOMENS 50+ ADV) Oral Tab Take 1 tablet by mouth daily with food. 90 tablet 1    Glucose Blood (TRUE METRIX BLOOD GLUCOSE TEST) In Vitro Strip Use to check blood sugar daily 100 strip 2    OneTouch UltraSoft 2 Lancets Does not apply Misc 1 Lancet daily. Use to test blood sugar daily 100 each 2    fexofenadine (ALLEGRA ALLERGY) 180 MG Oral Tab Take 1 tablet (180 mg total) by mouth daily.       No current facility-administered medications on file prior to visit.

## 2025-04-21 ENCOUNTER — OFFICE VISIT (OUTPATIENT)
Dept: PULMONOLOGY | Facility: CLINIC | Age: 50
End: 2025-04-21

## 2025-04-21 VITALS
WEIGHT: 240 LBS | HEART RATE: 97 BPM | HEIGHT: 65 IN | DIASTOLIC BLOOD PRESSURE: 89 MMHG | RESPIRATION RATE: 14 BRPM | SYSTOLIC BLOOD PRESSURE: 132 MMHG | BODY MASS INDEX: 39.99 KG/M2 | OXYGEN SATURATION: 98 %

## 2025-04-21 DIAGNOSIS — G47.33 OSA TREATED WITH BIPAP: Primary | ICD-10-CM

## 2025-04-21 PROCEDURE — 99213 OFFICE O/P EST LOW 20 MIN: CPT | Performed by: INTERNAL MEDICINE

## 2025-04-21 PROCEDURE — 3008F BODY MASS INDEX DOCD: CPT | Performed by: INTERNAL MEDICINE

## 2025-04-21 PROCEDURE — 3075F SYST BP GE 130 - 139MM HG: CPT | Performed by: INTERNAL MEDICINE

## 2025-04-21 PROCEDURE — 3079F DIAST BP 80-89 MM HG: CPT | Performed by: INTERNAL MEDICINE

## 2025-04-21 NOTE — PATIENT INSTRUCTIONS
Schedule a CPAP RE-TITRATION sleep study.  Contact the number on the After Visit Summary (AVS):  -Strongly advise absolutely NO driving when sleepy  -Recommend good sleep hygiene as discussed   -Avoid alcohol and caffeine before going to bed    Keep using your BIPAP in the meantime.     Recommend follow up in 2-3 months with Dr. Pop Hall in our clinic for the sleep apnea and insomnia symptoms you are experiencing.

## 2025-04-24 ENCOUNTER — TELEPHONE (OUTPATIENT)
Dept: SLEEP CENTER | Age: 50
End: 2025-04-24

## 2025-04-24 ENCOUNTER — OFFICE VISIT (OUTPATIENT)
Dept: SLEEP CENTER | Age: 50
End: 2025-04-24
Attending: ANESTHESIOLOGY
Payer: MEDICARE

## 2025-04-24 PROCEDURE — 95811 POLYSOM 6/>YRS CPAP 4/> PARM: CPT

## 2025-04-30 ENCOUNTER — TELEPHONE (OUTPATIENT)
Dept: SURGERY | Facility: CLINIC | Age: 50
End: 2025-04-30

## 2025-04-30 ENCOUNTER — OFFICE VISIT (OUTPATIENT)
Dept: SURGERY | Facility: CLINIC | Age: 50
End: 2025-04-30
Payer: MEDICARE

## 2025-04-30 VITALS
HEIGHT: 65 IN | WEIGHT: 240 LBS | HEART RATE: 91 BPM | DIASTOLIC BLOOD PRESSURE: 86 MMHG | BODY MASS INDEX: 39.99 KG/M2 | SYSTOLIC BLOOD PRESSURE: 134 MMHG | OXYGEN SATURATION: 99 %

## 2025-04-30 DIAGNOSIS — M54.16 LUMBAR RADICULOPATHY: Primary | ICD-10-CM

## 2025-04-30 PROCEDURE — 99213 OFFICE O/P EST LOW 20 MIN: CPT | Performed by: NEUROLOGICAL SURGERY

## 2025-04-30 PROCEDURE — 3075F SYST BP GE 130 - 139MM HG: CPT | Performed by: NEUROLOGICAL SURGERY

## 2025-04-30 PROCEDURE — 3079F DIAST BP 80-89 MM HG: CPT | Performed by: NEUROLOGICAL SURGERY

## 2025-04-30 PROCEDURE — 3008F BODY MASS INDEX DOCD: CPT | Performed by: NEUROLOGICAL SURGERY

## 2025-04-30 NOTE — PROGRESS NOTES
[facility]  Neurological Surgery Clinic Note     Radha Murguia  5/6/1975  [id number]  PCP: Raphael Farley DO     REASON FOR VISIT:  B/L LE radiculopathy     HISTORY OF PRESENT ILLNESS:  Radha Murguia is a(n) 49 year old female with progressive low back pain and LE radiculopathy for years but with recent progression.  She has numbness in her feet.  She deneis any weakness.  She denies any falls.  She as attempted PT in the past and LENO with no relief.  She is currently in chiro with no relief. She has attempted PT recently and medications with no significant improvement. She cannot work due to the symptoms in her legs.  MRI shows severe spinal stenosis at L4-5 and severe foraminal stenosis at L5-S1.  She denies any bowel/bladder habit changes.         Location: LBP  Quality: LE radiculopathy  Severity: progressive  Duration: months/year  Timing: any, worse at night  Context: severe DDD and facet artrhopathy leading to stenosis at L4-5 and L5-S1  Modifying Factors: PT, LENO, meds offer no relief  Associated signs/symptoms: pain       PAST MEDICAL HISTORY:  Past Medical History       Past Medical History:    Allergic rhinitis    Anxiety state, unspecified    Arthritis    Asthma (HCC)    Back pain    Back problem     lower back pain- sees chiro    Bad breath    Belching    Bipolar affective (HCC)     patient denies    Bleeding nose    Breast CA (HCC)     DCIS    Cancer (HCC)     left breast    Chest pain    Chronic cough    Community acquired pneumonia    Constipation    COPD (chronic obstructive pulmonary disease) (HCC)    Coronavirus infection    Diabetes mellitus (HCC)    Diarrhea, unspecified    Disorder of liver     fatty liver    Ductal carcinoma in situ of breast    Esophageal reflux    Essential hypertension    Exposure to medical diagnostic radiation     finished in 2015    Flatulence/gas pain/belching    Headache, chronic daily    Heartburn    Hemorrhoids    High blood pressure    High  cholesterol    Hyperlipidemia    Hypoxia    Indigestion    Leaking of urine    Mild mental retardation    Mouth sores    Obstructive apnea     CPAP 14 [facility]    Pain in joints    Parainfluenza infection    Shortness of breath    Sleep apnea     cpap    Type II or unspecified type diabetes mellitus without mention of complication, not stated as uncontrolled    Wheezing            PAST SURGICAL HISTORY:  Past Surgical History         Past Surgical History:   Procedure Laterality Date    Breast lumpectomy Left 06/11/2015     Procedure: BREAST LUMPECTOMY;  Surgeon: Paul Miller MD;  Location:  MAIN OR    Carpal tunnel release Bilateral      Colonoscopy N/A 11/23/2021     Procedure: COLONOSCOPY with polypectomy, ESOPHAGOGASTRODUODENOSCOPY (EGD) with biopsies;  Surgeon: Akbar Perla MD;  Location:  ENDOSCOPY    Hernia surgery         BABY    Lumpectomy left Left 05/11/2015     DCIS;papilloma    Joanie biopsy stereo w/calc 1 site left (cpt=19081) Left 04/2015     DCIS; papilloma    Needle biopsy liver        Orthopedic surg (pbp) Bilateral       MANDA CARPAL ISHAAN RELEASE    Other   LEFT FOOT SPUR REMOVAL    Other surgical history         bilateral carpal tunnel release    Radiation left Left 2015     lump with rad    Stereotactic breast biopsy Left 4/21/15 [facility]     Left Breast            FAMILY HISTORY:  family history includes Bipolar Disorder in an other family member; Breast Cancer in her maternal grandmother and paternal grandmother; Breast Cancer (age of onset: 40) in her self; Cancer in her brother; Dementia in her mother; Diabetes in her brother, father, and mother; Heart Attack in her father; Other in her father; Stroke in her mother; Suicide History in an other family member.     SOCIAL HISTORY:   reports that she has never smoked. She has never been exposed to tobacco smoke. She has never used smokeless tobacco. She reports that she does not drink alcohol and does not use drugs.      ALLERGIES:  [Allergies]    [Allergies]        Allergen Reactions    Other Runny nose       Seasonal           MEDICATIONS:  [Medications Ordered Prior to Encounter]    [Medications Ordered Prior to Encounter]         Current Outpatient Medications on File Prior to Visit   Medication Sig Dispense Refill    hydrALAZINE 25 MG Oral Tab Take 1 tablet (25 mg total) by mouth 2 (two) times daily. 60 tablet 0    semaglutide (OZEMPIC, 2 MG/DOSE,) 8 MG/3ML Subcutaneous Solution Pen-injector Inject 2 mg into the skin once a week. 9 mL 1    Omeprazole 40 MG Oral Capsule Delayed Release Take 1 capsule (40 mg total) by mouth daily. 90 capsule 0    PARoxetine (PAXIL) 20 MG Oral Tab Take 1 tablet (20 mg total) by mouth daily. 30+20mg= 50mg daily 90 tablet 1    PARoxetine 30 MG Oral Tab Take 1 tablet (30 mg total) by mouth daily. Take with 20mg tab= 50mg daily 90 tablet 1    QUEtiapine Fumarate 150 MG Oral Tab Take 150 mg by mouth nightly. 90 tablet 1    atorvastatin 80 MG Oral Tab Take 1 tablet (80 mg total) by mouth nightly. 90 tablet 3    telmisartan 80 MG Oral Tab Take 1 tablet (80 mg total) by mouth daily. 90 tablet 3    Metoprolol Succinate  MG Oral Tablet 24 Hr Take 1 tablet (200 mg total) by mouth daily. 90 tablet 1    Fezolinetant (VEOZAH) 45 MG Oral Tab Take 45 mg by mouth daily. 30 tablet 2    metFORMIN  MG Oral Tablet 24 Hr Take 1 tablet (750 mg total) by mouth 2 (two) times daily with meals. 180 tablet 3    Ciclopirox Olamine 0.77 % External Cream Apply to affected area twice daily for 1-4wks 90 g 1    Multiple Vitamins-Minerals (MULTIVITAMIN WOMENS 50+ ADV) Oral Tab Take 1 tablet by mouth daily with food. 90 tablet 1    Glucose Blood (TRUE METRIX BLOOD GLUCOSE TEST) In Vitro Strip Use to check blood sugar daily 100 strip 2    OneTouch UltraSoft 2 Lancets Does not apply Misc 1 Lancet daily. Use to test blood sugar daily 100 each 2    fexofenadine (ALLEGRA ALLERGY) 180 MG Oral Tab Take 1 tablet (180 mg  total) by mouth daily.          No current facility-administered medications on file prior to visit.        REVIEW OF SYSTEMS:  Review of Systems   All other systems reviewed and are negative.        PHYSICAL EXAMINATION:  Neurological Exam  Mental Status  Awake, alert and oriented to person, place and time.     Cranial Nerves  CN II: Visual acuity is normal. Visual fields full to confrontation.  CN III, IV, VI: Extraocular movements intact bilaterally. Normal lids and orbits bilaterally. Pupils equal round and reactive to light bilaterally.  CN V: Facial sensation is normal.  CN VII: Full and symmetric facial movement.  CN VIII: Hearing is normal.  CN IX, X: Palate elevates symmetrically. Normal gag reflex.  CN XI: Shoulder shrug strength is normal.  CN XII: Tongue midline without atrophy or fasciculations.     Motor  Normal muscle bulk throughout. No fasciculations present. Normal muscle tone. No abnormal involuntary movements. Strength is 5/5 throughout all four extremities.     Sensory  Sensation is intact to light touch, pinprick, vibration and proprioception in all four extremities.     Reflexes  Deep tendon reflexes are 2+ and symmetric in all four extremities.     Coordination     Finger-to-nose, rapid alternating movements and heel-to-shin normal bilaterally without dysmetria.     Gait  Normal casual, toe, heel and tandem gait.        IMAGING:  As above     ASSESSMENT:  LBP and LE radiculopathy     Plan:  Plan for  L4-S1 TLIF        The plan of care was discussed with the patient at length. All questions and concerns were addressed at this time. The patient verbalized agreement with the plan and was appreciative.      Total visit time: 30 minutes; More than 50% spent coordinating care, counseling, reviewing imaging and discussing medication therapy.      Philip Walter,   Neurological Surgery  [facility]

## 2025-04-30 NOTE — PATIENT INSTRUCTIONS
Refill policies:    Allow 2-3 business days for refills; controlled substances may take longer.  Contact your pharmacy at least 5 days prior to running out of medication and have them send an electronic request or submit request through the “request refill” option in your [employer] account.  Refills are not addressed on weekends; covering physicians do not authorize routine medications on weekends.  No narcotics or controlled substances are refilled after noon on Fridays or by on call physicians.  By law, narcotics must be electronically prescribed.  A 30 day supply with no refills is the maximum allowed.  If your prescription is due for a refill, you may be due for a follow up appointment.  To best provide you care, patients receiving routine medications need to be seen at least once a year.  Patients receiving narcotic/controlled substance medications need to be seen at least once every 3 months.  In the event that your preferred pharmacy does not have the requested medication in stock (e.g. Backordered), it is your responsibility to find another pharmacy that has the requested medication available.  We will gladly send a new prescription to that pharmacy at your request.    Scheduling Tests:    If your physician has ordered radiology tests such as MRI or CT scans, please contact Central Scheduling at [phone] right away to schedule the test.  Once scheduled, the [facility] Centralized Referral Team will work with your insurance carrier to obtain pre-certification or prior authorization.  Depending on your insurance carrier, approval may take 3-10 days.  It is highly recommended patients assure they have received an authorization before having a test performed.  If test is done without insurance authorization, patient may be responsible for the entire amount billed.      Precertification and Prior Authorizations:  If your physician has recommended that you have a procedure or additional testing performed the [facility]  Centralized Referral Team will contact your insurance carrier to obtain pre-certification or prior authorization.    You are strongly encouraged to contact your insurance carrier to verify that your procedure/test has been approved and is a COVERED benefit.  Although the [facility] Centralized Referral Team does its due diligence, the insurance carrier gives the disclaimer that \"Although the procedure is authorized, this does not guarantee payment.\"    Ultimately the patient is responsible for payment.   Thank you for your understanding in this matter.  Paperwork Completion:  If you require FMLA or disability paperwork for your recovery, please make sure to either drop it off or have it faxed to our office at [phone]. Be sure the form has your name and date of birth on it.  The form will be faxed to our Forms Department and they will complete it for you.  There is a 25$ fee for all forms that need to be filled out.  Please be aware there is a 10-14 day turnaround time.  You will need to sign a release of information (ZAC) form if your paperwork does not come with one.  You may call the Forms Department with any questions at [phone].  Their fax number is [phone].

## 2025-04-30 NOTE — PROGRESS NOTES
Established patient:  Reason for follow up:   F/u after PT-lumbar radiculopathy   Pt reports no improvement with PT    Numeric Rating Scale:         Pain at Present: 10/10

## 2025-05-01 ENCOUNTER — OFFICE VISIT (OUTPATIENT)
Dept: FAMILY MEDICINE CLINIC | Facility: CLINIC | Age: 50
End: 2025-05-01
Payer: MEDICARE

## 2025-05-01 VITALS
DIASTOLIC BLOOD PRESSURE: 84 MMHG | RESPIRATION RATE: 16 BRPM | SYSTOLIC BLOOD PRESSURE: 134 MMHG | WEIGHT: 269.5 LBS | HEART RATE: 84 BPM | OXYGEN SATURATION: 96 % | HEIGHT: 65 IN | BODY MASS INDEX: 44.9 KG/M2 | TEMPERATURE: 98 F

## 2025-05-01 DIAGNOSIS — J44.89 ASTHMA WITH COPD (CHRONIC OBSTRUCTIVE PULMONARY DISEASE) (HCC): ICD-10-CM

## 2025-05-01 DIAGNOSIS — Z01.818 PREOP EXAMINATION: ICD-10-CM

## 2025-05-01 DIAGNOSIS — I10 ESSENTIAL HYPERTENSION: ICD-10-CM

## 2025-05-01 DIAGNOSIS — G47.33 OSA ON CPAP: ICD-10-CM

## 2025-05-01 DIAGNOSIS — E66.9 TYPE 2 DIABETES MELLITUS WITH OBESITY (HCC): ICD-10-CM

## 2025-05-01 DIAGNOSIS — M54.16 LUMBAR RADICULOPATHY: Primary | ICD-10-CM

## 2025-05-01 DIAGNOSIS — E11.69 TYPE 2 DIABETES MELLITUS WITH OBESITY (HCC): ICD-10-CM

## 2025-05-01 PROBLEM — T39.95XA ANALGESIC REBOUND HEADACHE: Status: RESOLVED | Noted: 2022-11-22 | Resolved: 2025-05-01

## 2025-05-01 PROBLEM — R06.02 SOB (SHORTNESS OF BREATH): Status: RESOLVED | Noted: 2024-12-20 | Resolved: 2025-05-01

## 2025-05-01 PROBLEM — G44.40 ANALGESIC REBOUND HEADACHE: Status: RESOLVED | Noted: 2022-11-22 | Resolved: 2025-05-01

## 2025-05-01 PROCEDURE — 99214 OFFICE O/P EST MOD 30 MIN: CPT | Performed by: FAMILY MEDICINE

## 2025-05-01 NOTE — PROGRESS NOTES
The following individual(s) verbally consented to be recorded using ambient AI listening technology and understand that they can each withdraw their consent to this listening technology at any point by asking the clinician to turn off or pause the recording:    Patient name: Radha Murguia is a 49 year old female who presents for a pre-operative physical exam.   Radha Murguia is scheduled for a L4-S1 MIS TLIF procedure at [facility] on 5/15/25 performed by Dr Walter. Indication: lumbar radiculopathy     History of Present Illness  Charlotte Murguia is a 49 year old female who presents for pre-surgical evaluation due to worsening back pain and bilateral leg pain.    She has been experiencing worsening back pain and bilateral leg pain, which has become more severe and persistent. The pain radiates down both legs and is present daily without relief, significantly impacting her mobility and daily activities.    She experiences numbness and tingling in her legs and feet, with pain localized in her knees and feet. This has been a persistent issue, affecting her ability to engage in activities such as walking to the rec center and swimming.    DM2, she is currently taking meds as directed. Eye exam utd. Denies any paresthesias.     HTN, stable. Compliant with medication.    Asthma with COPD and KEON, following with pulm.       She  has had previous anesthesia:  Yes.  Previous complications:  No    Short Social Hx on File[1]   Past Medical History[2]  Past Surgical History[3]  Allergies: Allergies[4]  Current Medications[5]     REVIEW OF SYSTEMS:   +lumbar radiculopathy  Denies f/c, cp/sob/p, abd pain, n/v/d/c    PHYSICAL EXAM:   /84   Pulse 84   Temp 97.6 °F (36.4 °C) (Temporal)   Resp 16   Ht 5' 5\" (1.651 m)   Wt 269 lb 8 oz (122.2 kg)   SpO2 96%   BMI 44.85 kg/m²    GEN: A&Ox3, NAD  HEENT: NC/AT, EOMI, MMM  NECK: supple  CV: RRR, no m/r/g  LUNG: CTAB, no w/r/r  ABD: obese, body  habitus limits exam  EXT: no c/c/e  MS: +tenderness of paraspinal musculature and spinous tenderness noted, neg bilateral straight leg    LABORATORY DATA:   Labs and EKG per neurosurgery    ASSESSMENT AND PLAN:   Radha Murguia has pulmonary conditions that she follows with pulmonary and symptoms are stable.    She is a fair surgical candidate. This consult was sent back the referring physician, Dr. Kevin Walter.  Assessment & Plan  1. Lumbar radiculopathy  2. Preop examination  Chronic back pain with radiculopathy at L4-L5, worsening, limiting mobility. Surgery scheduled for May 15th to improve mobility and quality of life.   Pre-admission testing and EKG to be arranged through the hospital. If labs/EKG wnl, she is cleared for surgery.   Nephrology consultation advised for kidney function.   Anesthesiology will be informed of sleep apnea.   Medication adjustments and supplement restrictions noted.  - Stop Ozempic one week prior to surgery.  - Stop Metformin one day prior to surgery.  - Stop Trazodone one to two days prior to surgery.  - Avoid aspirin, NSAIDs, omega-3, krill oil, vitamin E, and herbal supplements one week prior to surgery.  - Use Tylenol for pain management.    3. Type 2 diabetes mellitus with obesity (HCC)  - stable  - cpm    4. Essential hypertension  - bp stable  - cpm    5. Asthma with COPD (chronic obstructive pulmonary disease) (HCC)  6. KEON on CPAP  - stable  - following with pulm    She understands and agrees with tx plan  RTC in upcoming 1-2mo for annual physical, sooner if needed           [1]   Social History  Socioeconomic History    Marital status: Single   Occupational History    Occupation: works part-time   Tobacco Use    Smoking status: Never     Passive exposure: Never    Smokeless tobacco: Never   Vaping Use    Vaping status: Never Used   Substance and Sexual Activity    Alcohol use: No    Drug use: No    Sexual activity: Never   Other Topics Concern    Caffeine Concern No     Exercise Yes    Seat Belt Yes    Special Diet No    Stress Concern No    Weight Concern Yes   Social History Narrative    Lives  In [place] with mother     Social Drivers of Health     Food Insecurity: No Food Insecurity (1/9/2025)    NCSS - Food Insecurity     Worried About Running Out of Food in the Last Year: No     Ran Out of Food in the Last Year: No   Transportation Needs: No Transportation Needs (1/9/2025)    NCSS - Transportation     Lack of Transportation: No   Housing Stability: At Risk (1/9/2025)    NCSS - Housing/Utilities     Has Housing: No     Worried About Losing Housing: No     Unable to Get Utilities: No   [2]   Past Medical History:   Allergic rhinitis    Anxiety state, unspecified    Arthritis    Asthma (HCC)    Back pain    Back problem    lower back pain- sees chiro    Bad breath    Belching    Bipolar affective (HCC)    patient denies    Bleeding nose    Breast CA (HCC)    DCIS    Calculus of kidney    Cancer (HCC)    left breast    Chest pain    Chronic cough    Community acquired pneumonia    Constipation    COPD (chronic obstructive pulmonary disease) (HCC)    Coronavirus infection    Diabetes mellitus (HCC)    Diarrhea, unspecified    Disorder of liver    fatty liver    Ductal carcinoma in situ of breast    Esophageal reflux    Essential hypertension    Exposure to medical diagnostic radiation    finished in 2015    Flatulence/gas pain/belching    Headache, chronic daily    Heartburn    Hemorrhoids    High blood pressure    High cholesterol    Hyperlipidemia    Hypoxia    Indigestion    Leaking of urine    Migraines    Mild mental retardation    Mouth sores    Obesity    Obstructive apnea    CPAP 14 Sarina's    Pain in joints    Parainfluenza infection    Shortness of breath    Sleep apnea    bipap    Type II or unspecified type diabetes mellitus without mention of complication, not stated as uncontrolled    Wheezing   [3]   Past Surgical History:  Procedure Laterality Date    Breast  lumpectomy Left 06/11/2015    Procedure: BREAST LUMPECTOMY;  Surgeon: Paul Miller MD;  Location:  MAIN OR    Carpal tunnel release Bilateral     Colonoscopy N/A 11/23/2021    Procedure: COLONOSCOPY with polypectomy, ESOPHAGOGASTRODUODENOSCOPY (EGD) with biopsies;  Surgeon: Akbar Perla MD;  Location:  ENDOSCOPY    Hernia surgery      BABY    Lumpectomy left Left 05/11/2015    DCIS;papilloma    Joanie biopsy stereo w/calc 1 site left (cpt=19081) Left 04/2015    DCIS; papilloma    Needle biopsy liver      Orthopedic surg (pbp) Bilateral     MANDA CARPAL ISHAAN RELEASE    Other  LEFT FOOT SPUR REMOVAL    Other surgical history      bilateral carpal tunnel release    Radiation left Left 2015    lump with rad    Stereotactic breast biopsy Left 4/21/15 Paul EDW    Left Breast   [4]   Allergies  Allergen Reactions    Other Runny nose     Seasonal     [5]   Current Outpatient Medications   Medication Sig Dispense Refill    amitriptyline 25 MG Oral Tab Take 1 tablet (25 mg total) by mouth nightly.      PARoxetine 30 MG Oral Tab Take 1 tablet (30 mg total) by mouth daily. This is a decrease. Total daily dose 30mg (Patient taking differently: Take 50 mg by mouth in the morning. This is a decrease. Total daily dose 30mg.) 90 tablet 1    Metoprolol Succinate  MG Oral Tablet 24 Hr Take 1 tablet (200 mg total) by mouth daily. 90 tablet 0    hydrALAZINE 25 MG Oral Tab Take 1 tablet (25 mg total) by mouth 2 (two) times daily. 180 tablet 3    Omeprazole 40 MG Oral Capsule Delayed Release Take 1 capsule (40 mg total) by mouth daily. 90 capsule 3    QUEtiapine (SEROQUEL) 200 MG Oral Tab Take 1 tablet (200 mg total) by mouth nightly. This is an increase. 90 tablet 1    semaglutide (OZEMPIC, 2 MG/DOSE,) 8 MG/3ML Subcutaneous Solution Pen-injector Inject 2 mg into the skin once a week. (Patient taking differently: Inject 2 mg into the skin once a week. Sunday or Monday) 9 mL 1    atorvastatin 80 MG Oral Tab Take 1 tablet (80  mg total) by mouth nightly. 90 tablet 3    telmisartan 80 MG Oral Tab Take 1 tablet (80 mg total) by mouth daily. 90 tablet 3    metFORMIN  MG Oral Tablet 24 Hr Take 1 tablet (750 mg total) by mouth 2 (two) times daily with meals. 180 tablet 3    Ciclopirox Olamine 0.77 % External Cream Apply to affected area twice daily for 1-4wks 90 g 1    Glucose Blood (TRUE METRIX BLOOD GLUCOSE TEST) In Vitro Strip Use to check blood sugar daily 100 strip 2    OneTouch UltraSoft 2 Lancets Does not apply Misc 1 Lancet daily. Use to test blood sugar daily 100 each 2    fexofenadine (ALLEGRA ALLERGY) 180 MG Oral Tab Take 1 tablet (180 mg total) by mouth in the morning.

## 2025-05-05 ENCOUNTER — EKG ENCOUNTER (OUTPATIENT)
Dept: LAB | Facility: HOSPITAL | Age: 50
End: 2025-05-05
Attending: NEUROLOGICAL SURGERY
Payer: MEDICARE

## 2025-05-05 DIAGNOSIS — M54.16 LUMBAR RADICULOPATHY: ICD-10-CM

## 2025-05-05 DIAGNOSIS — I10 ESSENTIAL HYPERTENSION: ICD-10-CM

## 2025-05-05 DIAGNOSIS — R80.9 PROTEINURIA, UNSPECIFIED TYPE: ICD-10-CM

## 2025-05-05 LAB
ANION GAP SERPL CALC-SCNC: 13 MMOL/L (ref 0–18)
ANTIBODY SCREEN: NEGATIVE
APTT PPP: 25.6 SECONDS (ref 23–36)
ATRIAL RATE: 89 BPM
BASOPHILS # BLD AUTO: 0.01 X10(3) UL (ref 0–0.2)
BASOPHILS NFR BLD AUTO: 0.2 %
BILIRUB UR QL STRIP.AUTO: NEGATIVE
BUN BLD-MCNC: 17 MG/DL (ref 9–23)
CALCIUM BLD-MCNC: 9.5 MG/DL (ref 8.7–10.6)
CHLORIDE SERPL-SCNC: 104 MMOL/L (ref 98–112)
CLARITY UR REFRACT.AUTO: CLEAR
CO2 SERPL-SCNC: 23 MMOL/L (ref 21–32)
CREAT BLD-MCNC: 0.94 MG/DL (ref 0.55–1.02)
CREAT UR-SCNC: 86.8 MG/DL
EGFRCR SERPLBLD CKD-EPI 2021: 73 ML/MIN/1.73M2 (ref 60–?)
EOSINOPHIL # BLD AUTO: 0.06 X10(3) UL (ref 0–0.7)
EOSINOPHIL NFR BLD AUTO: 1.2 %
ERYTHROCYTE [DISTWIDTH] IN BLOOD BY AUTOMATED COUNT: 13.3 %
FASTING STATUS PATIENT QL REPORTED: YES
GLUCOSE BLD-MCNC: 187 MG/DL (ref 70–99)
GLUCOSE UR STRIP.AUTO-MCNC: 200 MG/DL
HCT VFR BLD AUTO: 35.8 % (ref 35–48)
HGB BLD-MCNC: 12.6 G/DL (ref 12–16)
IMM GRANULOCYTES # BLD AUTO: 0.01 X10(3) UL (ref 0–1)
IMM GRANULOCYTES NFR BLD: 0.2 %
INR BLD: 0.94 (ref 0.8–1.2)
KETONES UR STRIP.AUTO-MCNC: NEGATIVE MG/DL
LEUKOCYTE ESTERASE UR QL STRIP.AUTO: NEGATIVE
LYMPHOCYTES # BLD AUTO: 1.66 X10(3) UL (ref 1–4)
LYMPHOCYTES NFR BLD AUTO: 33.9 %
MAGNESIUM SERPL-MCNC: 1.8 MG/DL (ref 1.6–2.6)
MCH RBC QN AUTO: 30.7 PG (ref 26–34)
MCHC RBC AUTO-ENTMCNC: 35.2 G/DL (ref 31–37)
MCV RBC AUTO: 87.3 FL (ref 80–100)
MONOCYTES # BLD AUTO: 0.27 X10(3) UL (ref 0.1–1)
MONOCYTES NFR BLD AUTO: 5.5 %
NEUTROPHILS # BLD AUTO: 2.88 X10 (3) UL (ref 1.5–7.7)
NEUTROPHILS # BLD AUTO: 2.88 X10(3) UL (ref 1.5–7.7)
NEUTROPHILS NFR BLD AUTO: 59 %
NITRITE UR QL STRIP.AUTO: NEGATIVE
OSMOLALITY SERPL CALC.SUM OF ELEC: 296 MOSM/KG (ref 275–295)
P AXIS: 20 DEGREES
P-R INTERVAL: 192 MS
PH UR STRIP.AUTO: 6.5 [PH] (ref 5–8)
PHOSPHATE SERPL-MCNC: 3.3 MG/DL (ref 2.4–5.1)
PLATELET # BLD AUTO: 257 10(3)UL (ref 150–450)
POTASSIUM SERPL-SCNC: 3.8 MMOL/L (ref 3.5–5.1)
PROT UR STRIP.AUTO-MCNC: 300 MG/DL
PROT UR-MCNC: 877.1 MG/DL (ref ?–14)
PROTHROMBIN TIME: 12.7 SECONDS (ref 11.6–14.8)
Q-T INTERVAL: 366 MS
QRS DURATION: 92 MS
QTC CALCULATION (BEZET): 445 MS
R AXIS: 16 DEGREES
RBC # BLD AUTO: 4.1 X10(6)UL (ref 3.8–5.3)
RH BLOOD TYPE: POSITIVE
SODIUM SERPL-SCNC: 140 MMOL/L (ref 136–145)
SP GR UR STRIP.AUTO: 1.02 (ref 1–1.03)
T AXIS: 48 DEGREES
UROBILINOGEN UR STRIP.AUTO-MCNC: NORMAL MG/DL
VENTRICULAR RATE: 89 BPM
VIT D+METAB SERPL-MCNC: 29.9 NG/ML (ref 30–100)
WBC # BLD AUTO: 4.9 X10(3) UL (ref 4–11)

## 2025-05-05 PROCEDURE — 83735 ASSAY OF MAGNESIUM: CPT

## 2025-05-05 PROCEDURE — 87081 CULTURE SCREEN ONLY: CPT

## 2025-05-05 PROCEDURE — 36415 COLL VENOUS BLD VENIPUNCTURE: CPT

## 2025-05-05 PROCEDURE — 80048 BASIC METABOLIC PNL TOTAL CA: CPT

## 2025-05-05 PROCEDURE — 85730 THROMBOPLASTIN TIME PARTIAL: CPT

## 2025-05-05 PROCEDURE — 85610 PROTHROMBIN TIME: CPT

## 2025-05-05 PROCEDURE — 86901 BLOOD TYPING SEROLOGIC RH(D): CPT

## 2025-05-05 PROCEDURE — 93010 ELECTROCARDIOGRAM REPORT: CPT | Performed by: INTERNAL MEDICINE

## 2025-05-05 PROCEDURE — 93005 ELECTROCARDIOGRAM TRACING: CPT

## 2025-05-05 PROCEDURE — 86850 RBC ANTIBODY SCREEN: CPT

## 2025-05-05 PROCEDURE — 82570 ASSAY OF URINE CREATININE: CPT

## 2025-05-05 PROCEDURE — 81001 URINALYSIS AUTO W/SCOPE: CPT

## 2025-05-05 PROCEDURE — 84100 ASSAY OF PHOSPHORUS: CPT

## 2025-05-05 PROCEDURE — 84156 ASSAY OF PROTEIN URINE: CPT

## 2025-05-05 PROCEDURE — 82306 VITAMIN D 25 HYDROXY: CPT

## 2025-05-05 PROCEDURE — 85025 COMPLETE CBC W/AUTO DIFF WBC: CPT

## 2025-05-05 PROCEDURE — 86900 BLOOD TYPING SEROLOGIC ABO: CPT

## 2025-05-07 ENCOUNTER — TELEPHONE (OUTPATIENT)
Dept: NEPHROLOGY | Facility: CLINIC | Age: 50
End: 2025-05-07

## 2025-05-07 ENCOUNTER — NURSE ONLY (OUTPATIENT)
Age: 50
End: 2025-05-07
Payer: MEDICARE

## 2025-05-07 DIAGNOSIS — Z71.9 ENCOUNTER FOR EDUCATION: Primary | ICD-10-CM

## 2025-05-07 NOTE — PROGRESS NOTES
RN Spine Navigator Education for Charlotte Valenciap     If you have received instructions from your surgeon that are different from those listed below, please follow your physician's instructions.    You are scheduled for a Lumbar fusion with Dr. Walter on 5/15/25.      Patient Surgical Goals: Charlotte suffers from pain in the lower back that radiates down her legs bilaterally. She has difficulty walking and feels limited with standing, sleeping, and walking. She especially feels limited and restricted in any activities requiring her to be upright for more than 5-10 minutes. Her goal for surgery is pain relief, improved mobility, and improved quality of life.     Before Your Surgery    Choose a Care Partner  Patient attended spine navigator visit with care partner.  Care partner is her friend Karo. Your care partner(s) should be able to provide transportation to and from the hospital. They should be able to help at home for the first week after discharge, including helping you with meals, medication, and dressing changes.    Clearance Before Surgery 5/1/25  You will need to see your primary care provider within 30 days before surgery. Please make sure this appointment is at least a week before surgery as more testing or doctor visits may be ordered. Presurgical testing may include labs, nasal swab, imaging, EKG.   Make sure that you complete all preadmission testing so that surgery does not get delayed.    Home Environment  Assessed home status: home has stairs and bedroom and bathroom are on first floor. Suggested arrangements for help at home.  It is recommended that you prepare your home by putting clean sheets on your bed, freezing meals, and putting frequently used items at waist level.   Prevent falls by removing items that could cause you to trip, adding nightlights and adding a nonskid mat in shower.   Assistive devices can be purchased at a medical supply store or online including canes, walkers, toileting  devices (commodes, raised toilet seats, toilet paper wiping aid), long handled sponge, shower chair or tub transfer bench, grabber/reacher tool, sock aid, long handled shoehorn, if needed.      Many items to assist with recovery after surgery can be borrowed from loan closets for little or no cost.  We recommend that you start with checking your local [facility] website or you can try these other resources to see if they have something available for you.    [employer]   A National directory which enables anyone to easily find existing places to borrow or donate wheelchairs, walkers, hospital beds, shower safety equipment and more, at no cost.  https://[employer].org/    [employer] [place]:  An [place]-based nonprofit organization that provides information and advocacy for people with disabilities.   They have a medical equipment reuse program that may be able to assist or provide referrals. (572) 639-4743   https://www.[employer].org/      Tobacco, Nicotine and Marijuana Use   Not applicable    Medications to Stop   For 7-10 days before surgery do not take any blood thinning medications. This includes non-steroidal anti-inflammatories or NSAIDs (Advil, Aleve, Motrin, ibuprofen, naproxen, meloxicam, diclofenac, celecoxib, etc.), aspirin (unless told otherwise by your cardiologist or surgeon), herbal supplements and vitamins (garlic, turmeric, vitamin E, fish oil or krill oil, etc.). You may only take Tylenol or prescribed narcotic medication if needed for pain.   Other medications to stop include: appetite suppressants GLP-1 day 24 hours before for daily or a week for weekly, hold oral DM medications day of surgery but check with providers about insulin, and hold ACE/ARBS day of surgery  Telmisartan- Do not take the morning of surgery  metFORMIN -Do not take the morning of surgery  semaglutide (OZEMPIC)- Stop 1 week before surgery      Leading Up to Day of Surgery  Five days before surgery wash with Hibiclens soap after  your regular body soap every day. Do not put use Hibiclens on your face, hair or privates. Your last shower should be the night before surgery.  One-two business days before surgery, the preadmission testing staff will call you and let you know what time to arrive, where to park, when to take your Tylenol and Gatorade, and will provide any additional instructions.   After 11pm the day before surgery, do not eat or drink anything (including water, gum, or candies) except for Tylenol and Gatorade.   Drink 12 ounces of regular Gatorade (NOT RED) 12 hours prior to your scheduled surgery time. Drink your second 12 ounces of regular Gatorade and take 1000 mg of Tylenol (acetaminophen) 4 hours before your scheduled surgery time.     Items to Bring to the Hospital   Bring insurance card, ID, advanced directive, or medical power of  paperwork, loose fitting clothing, shoes with a back that can accommodate swollen feet, long phone charging cord, CPAP mask and tubing.  Do not bring jewelry, valuables, or medications.   If you take an uncommon medication that the hospital may not have, it must be brought to the hospital in the original container, and you must notify the nurse of this medication.     In the Hospital     Operative Day and Hospital Stay  In the preoperative area, you will change into a gown, have an IV placed in your hand or arm by the nurse, and sign any consent paperwork that is needed for your procedure. You will speak to the surgeon and anesthesiologist. It is important to tell the doctors and nurses if you have had any significant side effects from pain medications or anesthesia such as a rash or severe nausea.    In the operating room, the anesthesiologist will attach monitors, give you oxygen through a mask, and give you medicine through the IV to fall asleep. After you are sleeping, the breathing tube will be placed. The surgeon may use additional nerve monitoring during your surgery. This is to  make sure that the muscles and nerves in your arms and legs are working normally as he operates. The equipment will be hooked up and removed while you are asleep. You will wake up on the hospital bed.     During the surgery, your care partner can sit in the surgical waiting area. There are TV screens in that area that keep them informed of your progress. If the procedure is at [facility], there is a person who can speak to them about your surgical progress.     In the recovery room, monitors will be attached that check your heart rate, blood pressure and oxygen levels. While you may not remember this part, a nurse is with you and constantly checking on your condition. Medications for pain and nausea will be given if you need them. You may have a mane catheter to empty your bladder or a drain at your surgical site. Your family is not allowed in the recovery room. When you are ready to leave the recovery room, you will be transported on your bed to the inpatient unit accompanied by your family once a room is available.  On the inpatient unit, a team of doctors and advanced practice providers will manage your care. The spine care nurses will check your blood pressure, temperature, heartbeat, breathing, stomach sounds and your incision. They may assess the strength and sensation in your arms and legs. Medications that are given in the hospital include antibiotics, IV fluids, pain medications, muscle relaxers, stool softeners, and your home medications. You may get blood drawn and another x-ray. Physical and occupational therapists may come to your room to teach you how to move around safely after surgery.     Post Op Plan   The average length of hospital stay is one to three days. A  may visit you to help arrange extra care for you once you leave the hospital. Occasionally, it is recommended that a home health nurse or therapist visit you in your home for a short time. The best place to recover is your own  home, but if you need more assistance than home health and your care partner can provide, the  will help you and your family choose other facilities to help you recover your strength.    Preventing surgical complications  It is important to follow all instructions before and after surgery to decrease the risks of surgery and prevent complications.     Blood clots: walk, wear leg compression devices in the hospital, and do ankle pumps at home  Infection: wash with Hibiclens before surgery, wash your hands, don't touch your incision  Pneumonia: take deep breaths and cough and use the breathing exercise (incentive spirometer)   Nausea/vomiting: start with liquids and small meals and do not take pills on an empty stomach  Constipation: drink water and walk frequently  If you are prone to constipation, start an over the counter stool softener while taking your narcotic medication.  After surgery if you have gone 3-4 days without a bowel movement, we recommend you use either a suppository or an enema (follow the packaging instructions for use). The longer you stay constipated the more painful and difficult it will be to relieve your constipation.      Tell your nurse if you are experiencing pain, nausea, vomiting or constipation as they have medications to help treat these.      At home     Understanding Pain After Surgery  We will do our best to manage your pain after surgery, but it is not possible to be completely pain-free. There will be operative pain in your back or neck. Pain in the arms or legs may be one of the first things to improve. Numbness, weakness, and tingling should improve over time. However, there can be a temporary increase in symptoms in the first few days due to inflammation from surgery.   Pain medications will be prescribed to take home at discharge. The goal of pain medicine after surgery is to make your pain tolerable, not to make you pain free. We encourage you to use the medication  prescribed to you after your surgery, but please take the lowest possible dose to manage your pain. Taking high doses of narcotics can cause side effects. Transition to plain Tylenol when your pain improves. At [facility], your prescriptions can be filled by our pharmacy and brought to you bedside. You may get more continuous pain relief by alternating between medications if you have multiple instead of taking them at the same time. Write down when you have taken a medication as it may be difficult to remember after a few doses.    PAIN SCALE EXPLANATION:    The goal of pain medicine is to make your pain tolerable, not to make you pain free.  Narcotic pain medication is appropriate for pian that is considered moderate to severe, pain that is less than 5/10 is not considered severe and use of narcotic medication is not appropriate.  Pain less than 5/10 can be managed to a tolerable level with regular Tylenol (acetaminophen), ibuprofen( if okayed by your surgeon), and your muscle relaxer.  For best pain control you should take your Tylenol and muscle relaxer spaced out by about 2 hours.     Example of appropriate narcotic use.  Pain >5/10 you can take 1 tablet of your narcotic medication every 6 hours with the muscle relaxer taken 2 hours after your narcotic medication.  Pain 6-7/10 take 1 tablet of narcotic medication every 4-6 hours with the muscle relaxer taken 2 hours after your narcotic medication.  Pain >8/10 take 2 tablets of narcotic medication but try to space it out to ever 6 hours with the muscle relaxer taken 2 hours after your narcotic medication.    Post operative medication   Tylenol (acetaminophen): take for pain. Do not take more than 3000 mg - 4000 mg in 24 hours because it can damage your liver.   Narcotics: take for moderate to severe pain. Do not drink alcohol or drive while taking narcotics. Some narcotic medications (Norco, Tylenol #3, Percocet, Vicodin) contain Tylenol (acetaminophen).  Make sure to not exceed the maximum dose if you are taking additional Tylenol with these medications.  Muscle relaxers: take for muscle cramping. These can cause drowsiness.    NSAIDS (Advil, Aleve, Motrin, ibuprofen, naproxen, meloxicam, diclofenac, celecoxib, etc.) or aspirin: Do not take these unless your physician says it is ok. For a fusion, it may be several months before you can take NSAIDS.  Stool softeners: take to prevent constipation while you are taking narcotic medications. You can get these over the counter at the pharmacy. You may use laxatives, which are stronger, if needed.    If you believe you will need more of medication your surgeon has prescribed to you, request a refill through your pharmacy or through the refill request in [employer] (log in, go to medications, then select refill request) at least two business days before you run out of medication.     Nonpharmacologic pain management   You may use ice on your incision for 20 minutes every hour to help with pain and swelling. Do not place ice directly on your skin. You can use heat to sooth your muscles but avoid placing heat directly on your incision. Make frequent position changes. You can do gentle stretching while avoiding significant bending or twisting. Use deep breathing techniques and distractions such as TV, music, reading, or games.     Movement restrictions  After surgery, no bending or twisting. Do not lift anything over 10lbs (about the weight of a gallon of milk) or lift anything over your shoulders. Avoid pulling or pushing. You may use stairs while holding the handrail.  It is ok to ride in the car but refrain from driving or traveling long distances until cleared to do so by your surgeon. You may not drive while taking narcotics or muscle relaxants. If you had a fracture or fusion surgery, your doctor may give you a brace. Braces are worn for comfort, when up and moving around, or constantly depending on your doctor's order.  Wear your brace as instructed.     Post Op Exercise   Walk frequently. Start with walking short distances and increase as you start to feel better. Do ankle pumps (bending at your ankles, bring your feet towards your head then point them towards the ground) 15-20 times every hour when awake to help prevent blood clots.     Your surgeon will let you know at your post operative appointment if you are ready to decrease your movement restrictions and increase your exercise. If you have questions in between appointments about lessening your restrictions, please contact the office.     You and your doctor will discuss how you are feeling as you heal and decide if outpatient physical therapy or a medical fitness referral is needed.    Caring for your incision  Always wash your hands before touching your incision. Your incision will be closed with sutures under the skin and skin glue or Steri-Strips (thin white bandages) on top of the skin. Do not attempt to peal off Skin glue/Steri-Strips as they will come off on their own. If the incision is closed with sutures or staples on top of the skin, they will be removed at a post op appointment. The incision may be covered with a gauze dressing that can come off after three days. Once the original gauze dressing is removed, we prefer that you leave your incision open to air (without a gauze dressing). If the incision has drainage or is rubbing against your clothes or brace, you may place gauze and medical tape over it. Change the gauze and medical tape daily. Look at your incision daily to check for signs of infection.   You can shower three days after your surgery or sooner if your surgeon allows. We recommend the care partner be present during the first shower for safety. Let soapy water run over the incision, but do not scrub it or spray it directly. Gently pat it dry after with a clean towel. Do not apply any creams or lotions to the incision. Do not soak in a tub, pool, or  any body of water until your incision is fully healed.    Signs of Infection   Check your incision daily for swelling, redness, drainage, pus, bad smell, or opening of the incision.     When to Call for Assistance  Call the Neurosurgery Office ([phone] Option #2) if you experience signs of infection, opening of the incision, continuous nausea or vomiting, poor pain control despite using the pain medication as directed, a sudden increase in pain, temperature over 101F, or with any concerns, unanswered questions, or new problems, such as new numbness/weakness/tingling.  Call 911 or go to the nearest emergency room if you experience chest pain, difficulty breathing, loss of bowel or bladder control, extreme drowsiness, or any other life-threatening situation.     Please remember that the office of Dr. Walter is closed on the weekends, holidays, and there is no one in the office from 4:30 pm to 8 am. If you have an urgent matter that needs attention you will need to go to the emergency room or immediate care for assistance. If it is an urgent matter during work hours please make sure to call the office of Dr. Walter as Lov[employer] messages are not meant for Urgent/Emergent situations. happyviewhart messages can take 5-7 days for a response.      Follow-up Plan   Appointments with Dr. Walter or Shruthi at 1, 4 and 12 weeks  1 week post-op appointment scheduled on 5/23/25 at 11:30 with WILLIS Quesada   4 week post-op appointment scheduled on 6/16/25 at 3:20 with Dr. Walter  3 month post surgery appointment scheduled on 8/4/25 at 2:30 with Shruthi PEDRO    Questions: Charlotte and Karo were very pleasant to speak with this morning, they acknowledged all informaiton provided and had no additional questions at this time.      If you would like clarification regarding anything discussed today, you can call your surgeons office and speak with one of the nursing staff. If you feel you require and additional appointment to review  information again you can call the RN Spine Navigator at [phone] #[phone] to schedule. If you have questions about your upcoming surgery, changes in your symptoms, or if you need to refill your medications please do not call the RN spine navigator, you will need to speak with someone from your surgeons office.     Spine navigator spent 33 minutes conducting a virtual visit to provide education. Thank you for letting the RN Spine Navigator participate in your care.

## 2025-05-07 NOTE — TELEPHONE ENCOUNTER
Patient is cleared for procedure per Dr. Mcrae. Letter has been forwarded to PCP. Called and LVM to notify letter has been sent

## 2025-05-07 NOTE — TELEPHONE ENCOUNTER
Patient called to inform she is scheduled for lumbar fusion (L4-S1) on 5/15. Patient's PCP Dr. Farley requesting surgical clearance from nephrology standpoint. Please advise

## 2025-05-12 ENCOUNTER — TELEPHONE (OUTPATIENT)
Dept: FAMILY MEDICINE CLINIC | Facility: CLINIC | Age: 50
End: 2025-05-12

## 2025-05-12 DIAGNOSIS — R94.31 ABNORMAL EKG: Primary | ICD-10-CM

## 2025-05-12 DIAGNOSIS — Z01.810 PREOP CARDIOVASCULAR EXAM: ICD-10-CM

## 2025-05-12 NOTE — TELEPHONE ENCOUNTER
Labs all wnl for pre-op clearance. However, EKG shows NSR but nonspecific T wave abnormality noted that is new in the lateral leads. Unable to clear for surgery given new finding.  Placed order for lexiscan, called patient and informed her of EKG and further stress testing required. Provided number for scheduling. She will call in the morning.  Will have staff call neurosurgery in the morning informing them of this and postponing surgery.

## 2025-05-13 ENCOUNTER — PATIENT MESSAGE (OUTPATIENT)
Dept: SURGERY | Facility: CLINIC | Age: 50
End: 2025-05-13

## 2025-05-13 NOTE — TELEPHONE ENCOUNTER
Spoke with Cierra at Dr.Kamran Walter's office regarding patient needing surgery postponed. Verbalizes understanding, will call patient to reschedule surgery.

## 2025-05-19 ENCOUNTER — HOSPITAL ENCOUNTER (OUTPATIENT)
Dept: CV DIAGNOSTICS | Facility: HOSPITAL | Age: 50
Discharge: HOME | End: 2025-05-19
Attending: FAMILY MEDICINE
Payer: MEDICARE

## 2025-05-19 DIAGNOSIS — Z01.810 PREOP CARDIOVASCULAR EXAM: ICD-10-CM

## 2025-05-19 DIAGNOSIS — R94.31 ABNORMAL EKG: ICD-10-CM

## 2025-05-19 PROCEDURE — 78452 HT MUSCLE IMAGE SPECT MULT: CPT | Performed by: FAMILY MEDICINE

## 2025-05-19 PROCEDURE — 93017 CV STRESS TEST TRACING ONLY: CPT | Performed by: FAMILY MEDICINE

## 2025-05-19 PROCEDURE — 93018 CV STRESS TEST I&R ONLY: CPT | Performed by: FAMILY MEDICINE

## 2025-05-19 RX ORDER — REGADENOSON 0.08 MG/ML
INJECTION, SOLUTION INTRAVENOUS
Status: COMPLETED
Start: 2025-05-19 | End: 2025-05-19

## 2025-05-19 RX ORDER — AMINOPHYLLINE 25 MG/ML
INJECTION, SOLUTION INTRAVENOUS
Status: COMPLETED
Start: 2025-05-19 | End: 2025-05-19

## 2025-05-19 RX ADMIN — AMINOPHYLLINE 125 MG: 25 INJECTION, SOLUTION INTRAVENOUS at 08:00:00

## 2025-05-19 RX ADMIN — REGADENOSON 0.4 MG: 0.08 INJECTION, SOLUTION INTRAVENOUS at 07:30:00

## 2025-06-03 ENCOUNTER — TELEPHONE (OUTPATIENT)
Dept: SURGERY | Facility: CLINIC | Age: 50
End: 2025-06-03

## 2025-06-03 DIAGNOSIS — M54.16 LUMBAR RADICULOPATHY: Primary | ICD-10-CM

## 2025-06-04 NOTE — TELEPHONE ENCOUNTER
You are scheduled for L4-S1 TLIF on 7.31.25 with  at [facility].     PCP clearance is needed.  We have faxed a request for pre-op clearance to your PCP Dr Farley. Please contact their office for appointment. Please have this appointment completed at least 1 week prior to your surgery so there is enough time to see other specialists, if necessary    You will need  pre operative labs which will include MRSA/MSSA testing.  You will need to contact the Pre-admission department at [phone] to schedule an appointment for your labs.     The Pre-Admission nurse will review your health history and give you information from the hospital. The nurse will also get you scheduled for all your pre-op testing required for your surgery.     Do not take any blood thinning medications such as over the counter non-steroidal antiinflammatories (Advil, Aleve, ibuprofen, Naproxen, Meloxicam etc.), herbal supplements (garlic,tumeric etc.), vitamin E, fish oil or krill oil for at least 7 days prior to surgery. You may only take Tylenol, Extra Strength Tylenol, Arthritis Tylenol, or prescription Norco or Tramadol for pain if something is needed.    You should have nothing to eat or drink after 11:00pm the night prior to surgery except for the following:    Do drink 12 ounces of regular Gatorade (NOT RED) 12 hours and 4 hours prior to your scheduled surgery time, Do not drink any other liquids (including water) before your surgery. Do not chew gum or eat candies before surgery.  Take 1000 mg of Tylenol (Acetaminophen) 4 hours before your scheduled surgery time, take this with your scheduled Gatorade.    In order to prevent infection, you will need to purchase Hibiclens soap and use it after your regular body soap for 5 days prior to your procedure.  The last shower should be the night before surgery.  This soap can be found at any pharmacy in the first aid section. See detailed instructions below.      Our office will get prior  authorization for surgery through your insurance.     Surgery is usually scheduled as outpatient admission.  This is an estimate and varies from person to person.  Ultimately, the surgeon will determine when you are ready to be discharged.    The hospital will contact you 1-2 days before surgery with your arrival time.     If you were on blood thinners (such as Coumadin, Pradaxa, Xarelto, etc) prior to surgery that we had you stop for surgery, please make sure you get instructions about when to resume the medication before you are discharged from the hospital.    Per Dr. Walter's surgery protocol your appointments are as follows:    1 week pre-op surgical review scheduled on 7.28.25 at 11;00 am with Dr. Walter.     1 week post-op appointment scheduled on 8.6.25 at 11:30 am with WILLIS Quesada     4 week post-op appointment scheduled on 8.29.25 at 11:30 am with Dr. Walter     3 month post surgery appointment scheduled on 10.22.25 at 11:00 am with WILLIS Quesada       Restrictions for after surgery:  Lifting restriction of 10 pounds or less.  No bending or twisting.  No prolonged sitting or standing.  Driving is restricted for the first 1-2 weeks (this will vary from surgeon to surgeon.)  Fusion patients may require bracing such as TLSO or LSO brace. Braces are custom made to fit the patient.  Patient's are to keep their incisions covered for the first 3 days post surgery. After that they may leave the incision open to air. It is better for the healing process if the incision is left open to air.   May shower after the third day.  Do not scrub the incision and avoid any lotions or creams to the incision.    Please make sure to arrive at least 15 minutes prior to your scheduled appointment in order to get checked in and roomed in a timely manner.  Depending on provider availability, late patients may be required to reschedule.  REFILLS:  After surgery, please remember that we do have a 48 hour refill policy that  does not include weekends, please make sure to request your medications in a timely manner so that you do not go days without medication.  *Refills should be requested through your pharmacy or through the refill request in [employer] (log in, go to medications, then select refill request).  Dry Lube MESSAGING:  Please remember that our office is closed during the weekend and no one is available for [employer]â„¢ messages. If you have an urgent or emergent matter please go to a walk-in center or the emergency room. Also please remember your e[employer]D messages are part of your legal medical record and should not be utilized as a personal email with our providers as it is visible to all [facility] employees. Also, Since [employer] messages are not for emergent matters it may be several days before there is a response to your message.  FMLA/PAPERWORK COMPLETED BY OUR MEDICAL FORMS DEPARTMENT:  If you require FMLA paperwork for your surgery, please make sure to either Drop it off or have it faxed to our office at [phone]. Make sure it has your NAME, , and has your signature. You will need to have a Release of Information on file. To facilitate this process we ask that you requested it at the  on your way out and sign it. Without a signed ZAC or signature on the form we will not be able to fax it and this will cause a delay with your forms. Fees charged for forms are $25 for initial submission and $15 for recertifications. If you have questions on the status of your forms please call the forms department at [phone].  **We do have a 3 week policy for all forms and paperwork, please make sure to allow plenty of time for completion. Same day paperwork will not be completed. **    Spine Navigator  You will have a 30 minute telehealth visit with our spine navigator approximately 2 weeks before your surgery. This visit is NOT optional. This appointment will get booked when you schedule your spinal surgery.  When  speaking with Pre-admissions you will be told about a spine class as it relates to your surgery, this is an OPTIONAL class. The same or similar information will be discussed with you during your telehealth appointment with the spine navigator (Spine Navigator appointment is not optional). However, if you would like to have this information repeated to you or if you would feel more comfortable with additional information, you can elect to schedule this class during your pre-admissions phone call.  The Pre-op Spine Surgery Class is held at [facility] most Wednesdays from 3:30-4:30 pm. Call Pre-admission Testing (PAT) to register at:  [phone]. Please park in the [facility] Parking garage, check in at registration and meet in the [facility] lobby for your escort to class on the Ortho Spine unit. If unable to attend, but would like additional information, the class is available online at [email or web address].     Regarding current visitor information:    Please visit the following website for the detailed and up-to-date visitor screening and restriction policy at [email or web address] [email or web address].    Hibiclens Bathing  Hibiclens is a body soap that is used before surgery protect you from getting an infection after surgery   Hibiclens comes in a large blue bottle and can be found in most pharmacies in the First Aid supplies  Shower with this daily for FIVE consecutive days before surgery, using the entire bottle over the five days.  The last shower should be the night before surgery.   Steps to bathing with Hibiclens  Do not use Hibiclens on your hair, face or private areas  Wash your hair and face as normal with your usual cleansers  Rinse well  Using a clean wet washcloth apply enough Hibiclens to cover your body. Wash from the neck down avoiding the genital areas and concentrating on the surgical area  Rinse well  Dry yourself with a clean, dry towel  Do not use any powders,  creams, lotions or sprays on your body as these attract bacteria  Deodorant and facial creams are acceptable.   (Laundering/cleaning: Chlorhexidine gluconate skin cleansers will cause stains if used with chlorine releasing products. Rinse completely and use only non-chlorine detergents.)    For Office Use Only:    Medical Clearances Needed: PCP  PA: Rocky VANG  CPT Codes:

## 2025-06-14 ENCOUNTER — HOSPITAL ENCOUNTER (OUTPATIENT)
Dept: CT IMAGING | Facility: HOSPITAL | Age: 50
Discharge: HOME | End: 2025-06-14
Payer: MEDICARE

## 2025-06-14 DIAGNOSIS — R14.2 BELCHING: ICD-10-CM

## 2025-06-14 DIAGNOSIS — R10.32 LLQ PAIN: ICD-10-CM

## 2025-06-14 DIAGNOSIS — R19.7 DIARRHEA, UNSPECIFIED TYPE: ICD-10-CM

## 2025-06-14 DIAGNOSIS — R14.0 BLOATING: ICD-10-CM

## 2025-06-14 DIAGNOSIS — R10.33 PERIUMBILICAL PAIN: ICD-10-CM

## 2025-06-14 DIAGNOSIS — R11.2 NAUSEA AND VOMITING, UNSPECIFIED VOMITING TYPE: ICD-10-CM

## 2025-06-14 DIAGNOSIS — R10.31 RLQ ABDOMINAL PAIN: ICD-10-CM

## 2025-06-14 DIAGNOSIS — R14.3 EXCESSIVE GAS: ICD-10-CM

## 2025-06-14 LAB
CREAT BLD-MCNC: 0.8 MG/DL (ref 0.55–1.02)
EGFRCR SERPLBLD CKD-EPI 2021: 89 ML/MIN/1.73M2 (ref 60–?)

## 2025-06-14 PROCEDURE — 82565 ASSAY OF CREATININE: CPT

## 2025-06-14 PROCEDURE — 74177 CT ABD & PELVIS W/CONTRAST: CPT

## 2025-06-17 DIAGNOSIS — E78.2 MIXED HYPERLIPIDEMIA: ICD-10-CM

## 2025-06-17 DIAGNOSIS — E11.9 TYPE 2 DIABETES MELLITUS WITHOUT COMPLICATION, WITHOUT LONG-TERM CURRENT USE OF INSULIN (HCC): ICD-10-CM

## 2025-06-17 DIAGNOSIS — Z51.81 ENCOUNTER FOR THERAPEUTIC DRUG MONITORING: ICD-10-CM

## 2025-06-17 DIAGNOSIS — I10 ESSENTIAL HYPERTENSION: ICD-10-CM

## 2025-06-19 RX ORDER — METFORMIN HYDROCHLORIDE 750 MG/1
750 TABLET, EXTENDED RELEASE ORAL 2 TIMES DAILY WITH MEALS
Qty: 180 TABLET | Refills: 3 | Status: SHIPPED | OUTPATIENT
Start: 2025-06-19

## 2025-06-19 RX ORDER — SEMAGLUTIDE 2.68 MG/ML
INJECTION, SOLUTION SUBCUTANEOUS
Qty: 9 EACH | Refills: 3 | OUTPATIENT
Start: 2025-06-19

## 2025-06-19 RX ORDER — METOPROLOL SUCCINATE 200 MG/1
200 TABLET, EXTENDED RELEASE ORAL DAILY
Qty: 90 TABLET | Refills: 3 | Status: SHIPPED | OUTPATIENT
Start: 2025-06-19

## 2025-06-19 NOTE — TELEPHONE ENCOUNTER
Refill passes per [facility] protocol.    Future Appointments   Date Time Provider Department Center   7/16/2025  1:00 PM Raphael Farley,  [facility]

## 2025-06-19 NOTE — TELEPHONE ENCOUNTER
Requesting ozempic 2  LOV: 9/26/24 telemedicine 2/12/25  RTC: 4 months  Filled: 2/12/25 #9ml with 1 refills    Future Appointments   Date Time Provider Department Center   6/19/2025 12:40 PM Jazz Cruz APRN [facility] [place]   7/7/2025  7:15 AM WDR US RM1 WDR US EDW [place]   7/15/2025  4:30 PM Maurizio Hall MD [facility]   7/16/2025  1:00 PM Raphael Farley,  [facility]   7/28/2025 11:00 AM KARTIK Walter DO ENINAPER2 [facility] [place]   8/6/2025 11:30 AM Shruthi Gibson APRN ENINAPER2 [facility] [place]   8/14/2025  9:40 AM Charisma Bhatia APRN SGINP E[facility] SUB [facility]   8/29/2025 11:20 AM KARTIK Walter DO ENINAPER2 [facility] [place]   10/2/2025  1:00 PM Travis Mcrae MD EMGNEPHNAPER [facility] [place]   10/22/2025 11:00 AM Shruthi Gibson APRN ENKENTONPER2 EM[facility] [place]     Too soon. Need for appointment

## 2025-07-02 NOTE — TELEPHONE ENCOUNTER
Patient is scheduled for L4-S1 TLIF on 7.31.25 with  at [facility].     Y pre-op apt scheduled (if sx is more than 30 days from last apt)  NApre-op apt scheduled with RN spine navigator  Y Surgical instructions reviewed by nursing staff with patient  Y  form completed  Y Surgery order signed   Y Placed sx on surgery sheet  Y Placed on outlook calendar  Y Visyst message sent to patient with sx instructions  Y Faxed pre-op clearance request to PCP ELIJAH JENKINS Faxed letter to prescribing provider requesting anticoagulants be held for surgery  Y E-mail sent to [employer]  Y Post-op appointments made  Y PA NEEDED. Routed to PA team to initiate.  Y Post-Op outreach pt reminder placed.   Y Entire Neurosurgery Checklist Completed    Clearances: PCP  PA:TRUPTI GARZA MA2  CPT Codes: 05637, 52098, 59368, 66905, 83828, 31880, 47249, 03261, 77086

## 2025-07-07 ENCOUNTER — HOSPITAL ENCOUNTER (OUTPATIENT)
Dept: ULTRASOUND IMAGING | Age: 50
Discharge: HOME | End: 2025-07-07
Attending: NURSE PRACTITIONER
Payer: MEDICARE

## 2025-07-07 DIAGNOSIS — K76.0 FATTY LIVER: ICD-10-CM

## 2025-07-07 DIAGNOSIS — R16.0 HEPATOMEGALY: ICD-10-CM

## 2025-07-07 PROCEDURE — 76981 USE PARENCHYMA: CPT | Performed by: NURSE PRACTITIONER

## 2025-07-07 PROCEDURE — 76705 ECHO EXAM OF ABDOMEN: CPT | Performed by: NURSE PRACTITIONER

## 2025-07-07 NOTE — TELEPHONE ENCOUNTER
Case is still in denied status.  Due to this being a Medicare plan the guidelines are the case cannot be re-submitted until after 65 days (after 7/19/25).  Turnaround time is 11 calendar days.  Wouldn't be able to start case until Monday 7/21 so then it may not be approved before surgery date of 7/31.      Other option is to do an expedited appeal with [employer].  Phone #[phone], Fax#[phone].    Case#[id number]    Per Amadeo SPEARS with [employer].     Please advise which route you would like to go.  Thank You

## 2025-07-10 ENCOUNTER — TELEPHONE (OUTPATIENT)
Dept: SURGERY | Facility: CLINIC | Age: 50
End: 2025-07-10

## 2025-07-10 NOTE — TELEPHONE ENCOUNTER
Incoming call from Velia from the [employer] appeals dept    Velia stated she sent a fax yesterday in regards to patients appeal     she stated the information she received didn't address the reason the surgery was denied    she states has to finish this today, so she needs information asap      She states there is no info provided to indicate that this procedure needs to be informed at the inpatient lvl of care, 2 night stay     She needs anything to indicate     ph:[phone]  fx:[phone] - needs before 2 pm our time

## 2025-07-10 NOTE — TELEPHONE ENCOUNTER
Noted that Nursing was not able to address [employer]'s request to provide information regarding reason for denial, as surgery was not denied upon initial submission.     Call was routed as standard priority.     Routed to Nursing manager.

## 2025-07-11 ENCOUNTER — TELEPHONE (OUTPATIENT)
Dept: SURGERY | Facility: CLINIC | Age: 50
End: 2025-07-11

## 2025-07-14 ENCOUNTER — MED REC SCAN ONLY (OUTPATIENT)
Dept: SURGERY | Facility: CLINIC | Age: 50
End: 2025-07-14

## 2025-07-14 NOTE — TELEPHONE ENCOUNTER
Received DWO and letter of medical necessity from [facility] for LSO brace. Paperwork endorsed to provider for review and signature, placed on Dr Walter's desk.

## 2025-07-15 ENCOUNTER — OFFICE VISIT (OUTPATIENT)
Dept: PULMONOLOGY | Facility: CLINIC | Age: 50
End: 2025-07-15
Payer: MEDICARE

## 2025-07-15 VITALS
BODY MASS INDEX: 43.76 KG/M2 | WEIGHT: 262.63 LBS | HEART RATE: 87 BPM | HEIGHT: 65 IN | SYSTOLIC BLOOD PRESSURE: 150 MMHG | DIASTOLIC BLOOD PRESSURE: 92 MMHG | OXYGEN SATURATION: 94 % | RESPIRATION RATE: 16 BRPM

## 2025-07-15 DIAGNOSIS — G25.81 RLS (RESTLESS LEGS SYNDROME): ICD-10-CM

## 2025-07-15 DIAGNOSIS — Z51.81 ENCOUNTER FOR THERAPEUTIC DRUG MONITORING: ICD-10-CM

## 2025-07-15 DIAGNOSIS — G47.33 OSA ON CPAP: Primary | ICD-10-CM

## 2025-07-15 PROCEDURE — 3008F BODY MASS INDEX DOCD: CPT | Performed by: INTERNAL MEDICINE

## 2025-07-15 PROCEDURE — 3080F DIAST BP >= 90 MM HG: CPT | Performed by: INTERNAL MEDICINE

## 2025-07-15 PROCEDURE — 3077F SYST BP >= 140 MM HG: CPT | Performed by: INTERNAL MEDICINE

## 2025-07-15 PROCEDURE — 99214 OFFICE O/P EST MOD 30 MIN: CPT | Performed by: INTERNAL MEDICINE

## 2025-07-15 RX ORDER — ROPINIROLE 0.5 MG/1
0.5 TABLET, FILM COATED ORAL NIGHTLY
Qty: 30 TABLET | Refills: 5 | Status: SHIPPED | OUTPATIENT
Start: 2025-07-15 | End: 2026-01-11

## 2025-07-15 NOTE — PROGRESS NOTES
Dear  Raphael  :           As you know, Charlotte is a 50-year-old female who I am now evaluating for sleep disturbance.       HISTORY OF PRESENT ILLNESS: The patient has had a longstanding diagnosis of obstructive sleep apnea and has been on CPAP for approximately 25 years.  She currently is on BiPAP  with average daily usage of 5 hours and 40 minutes with residual events of 16.6/h.  She tells me that her sleep is not refreshing.  She also has difficulty initiating and maintaining sleep as well as excessive daytime somnolence.  There is snoring such that others complain and constant dozing during the day.  There is no current drowsy driving.  There is no cataplexy, sleep paralysis nor hypnagogic hallucination.  She has constant urge to move the limbs at night that improves with movement and is worse in the evening.  She had 35 periodic limb movements per hour on her sleep test recently but these were not associated with arousal.  She tells me that she prefers a nasal mask although she is currently using a nasal cushion mask which she does not like.  Her sleep is unrefreshing.  She has not gained weight recently.  There is morning headache.  There is no depressed mood.    PAST MEDICAL AND SURGICAL HISTORY:   1.  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 Ear nose and throat abnormal. Bowel normal. Bladder function normal. No depression. No thyroid disease. No lymphatic system concerns.  No rash. Muscles and  joints unremarkable. No weight loss no weight gain.    PHYSICAL EXAMINATION: Physical Examination:  Vital signs normal. HEENT examination is unremarkable with pupils equal round and reactive to light and accommodation. Neck without adenopathy, thyromegaly, JVD nor bruit. Lungs clear to auscultation and percussion. Cardiac regular rate and rhythm no murmur. Abdomen nontender, without hepatosplenomegaly and no mass appreciable. Extremities and Musculoskeletal without clubbing cyanosis nor edema, and mobility acceptable. Neurologic grossly intact with symmetric tone and strength and reflex.  Crowded oropharynx Mallampati score 3.    LABORATORY: CPAP titration April 2025-CPAP 13 CWP with 6 residual events per hour.    ASSESSMENT AND PLAN:  PROBLEM 1.  Sleep disturbance-complex with sleep apnea, insomnia, restless leg syndrome impairing her ability to fall asleep.  Additionally, the patient is currently using a nasal cushion mask which would not work with BiPAP.  An oral nasal mask is optimal when BiPAP is used; however, she was intolerant.  Would then use an over the nose nasal mask with CPAP mode set to 13 CWP.  Her current setting is too high.  Additionally, although the periodic limb movements were not associated with arousal, she has a significant clinical syndrome of restless legs impairing her ability to fall asleep and therefore I would offer treatment.  I believe the limb movements are contributing to her insomnia.  She has been treated with trazodone as well.    RECOMMENDATIONS:  1.  Change PAP therapy to CPAP at 13 CWP  2.  Change mask interface to over the nose nasal mask  3.  Weight loss  4.  Avoid alcohol  5.  Never drive if sleepy  6.  Sleep apnea, insomnia, periodic limb movement and sleep hygiene literature provided  7.  Ropinirole 0.5 mg nightly and patient or Karo will call in 1 week to give an update.  She may require 1 mg dosing.  8.  Return to see me at the 2 to 3-month interval or sooner if  needed and contact me promptly if new trouble.  9.  Will send CBC, ferritin, iron, TIBC    I am delighted to assist in Charlotte's care.            With warmest regards,     Maurizio Hall MD  Medical Director, Critical Care, [facility]  Medical Director, [facility]

## 2025-07-16 ENCOUNTER — OFFICE VISIT (OUTPATIENT)
Dept: FAMILY MEDICINE CLINIC | Facility: CLINIC | Age: 50
End: 2025-07-16
Payer: MEDICARE

## 2025-07-16 ENCOUNTER — LAB ENCOUNTER (OUTPATIENT)
Dept: LAB | Age: 50
End: 2025-07-16
Attending: FAMILY MEDICINE
Payer: MEDICARE

## 2025-07-16 ENCOUNTER — LAB ENCOUNTER (OUTPATIENT)
Dept: LAB | Age: 50
End: 2025-07-16
Attending: INTERNAL MEDICINE
Payer: MEDICARE

## 2025-07-16 VITALS
OXYGEN SATURATION: 98 % | BODY MASS INDEX: 43.32 KG/M2 | DIASTOLIC BLOOD PRESSURE: 82 MMHG | HEART RATE: 90 BPM | RESPIRATION RATE: 16 BRPM | WEIGHT: 260 LBS | HEIGHT: 65 IN | SYSTOLIC BLOOD PRESSURE: 138 MMHG | TEMPERATURE: 97 F

## 2025-07-16 DIAGNOSIS — G25.81 RLS (RESTLESS LEGS SYNDROME): ICD-10-CM

## 2025-07-16 DIAGNOSIS — J44.89 ASTHMA WITH COPD (CHRONIC OBSTRUCTIVE PULMONARY DISEASE) (HCC): ICD-10-CM

## 2025-07-16 DIAGNOSIS — E66.9 TYPE 2 DIABETES MELLITUS WITH OBESITY (HCC): ICD-10-CM

## 2025-07-16 DIAGNOSIS — I10 ESSENTIAL HYPERTENSION: ICD-10-CM

## 2025-07-16 DIAGNOSIS — G47.33 OSA ON CPAP: ICD-10-CM

## 2025-07-16 DIAGNOSIS — E11.69 TYPE 2 DIABETES MELLITUS WITH OBESITY (HCC): ICD-10-CM

## 2025-07-16 DIAGNOSIS — Z01.818 PREOP EXAMINATION: ICD-10-CM

## 2025-07-16 DIAGNOSIS — Z01.818 PREOP EXAMINATION: Primary | ICD-10-CM

## 2025-07-16 DIAGNOSIS — M54.16 LUMBAR RADICULOPATHY: ICD-10-CM

## 2025-07-16 DIAGNOSIS — E66.01 MORBID OBESITY WITH BMI OF 40.0-44.9, ADULT (HCC): ICD-10-CM

## 2025-07-16 LAB
ALBUMIN SERPL-MCNC: 4.8 G/DL (ref 3.2–4.8)
ALBUMIN/GLOB SERPL: 1.8 {RATIO} (ref 1–2)
ALP LIVER SERPL-CCNC: 129 U/L (ref 41–108)
ALT SERPL-CCNC: 57 U/L (ref 10–49)
ANION GAP SERPL CALC-SCNC: 7 MMOL/L (ref 0–18)
ANTIBODY SCREEN: NEGATIVE
APTT PPP: 26.1 SECONDS (ref 23–36)
AST SERPL-CCNC: 36 U/L (ref ?–34)
BASOPHILS # BLD AUTO: 0.03 X10(3) UL (ref 0–0.2)
BASOPHILS NFR BLD AUTO: 0.5 %
BILIRUB SERPL-MCNC: 0.3 MG/DL (ref 0.3–1.2)
BILIRUB UR QL STRIP.AUTO: NEGATIVE
BUN BLD-MCNC: 16 MG/DL (ref 9–23)
CALCIUM BLD-MCNC: 9.8 MG/DL (ref 8.7–10.6)
CHLORIDE SERPL-SCNC: 103 MMOL/L (ref 98–112)
CLARITY UR REFRACT.AUTO: CLEAR
CO2 SERPL-SCNC: 28 MMOL/L (ref 21–32)
CREAT BLD-MCNC: 0.9 MG/DL (ref 0.55–1.02)
DEPRECATED HBV CORE AB SER IA-ACNC: 39 NG/ML (ref 50–306)
EGFRCR SERPLBLD CKD-EPI 2021: 77 ML/MIN/1.73M2 (ref 60–?)
EOSINOPHIL # BLD AUTO: 0.12 X10(3) UL (ref 0–0.7)
EOSINOPHIL NFR BLD AUTO: 1.9 %
ERYTHROCYTE [DISTWIDTH] IN BLOOD BY AUTOMATED COUNT: 13.2 %
EST. AVERAGE GLUCOSE BLD GHB EST-MCNC: 174 MG/DL (ref 68–126)
FASTING STATUS PATIENT QL REPORTED: NO
GLOBULIN PLAS-MCNC: 2.7 G/DL (ref 2–3.5)
GLUCOSE BLD-MCNC: 196 MG/DL (ref 70–99)
GLUCOSE UR STRIP.AUTO-MCNC: 50 MG/DL
HBA1C MFR BLD: 7.7 % (ref ?–5.7)
HCT VFR BLD AUTO: 39.2 % (ref 35–48)
HGB BLD-MCNC: 13.2 G/DL (ref 12–16)
IMM GRANULOCYTES # BLD AUTO: 0.07 X10(3) UL (ref 0–1)
IMM GRANULOCYTES NFR BLD: 1.1 %
INR BLD: 0.97 (ref 0.8–1.2)
IRON SATN MFR SERPL: 20 % (ref 15–50)
IRON SERPL-MCNC: 70 UG/DL (ref 50–170)
KETONES UR STRIP.AUTO-MCNC: NEGATIVE MG/DL
LEUKOCYTE ESTERASE UR QL STRIP.AUTO: NEGATIVE
LYMPHOCYTES # BLD AUTO: 2.33 X10(3) UL (ref 1–4)
LYMPHOCYTES NFR BLD AUTO: 36.6 %
MCH RBC QN AUTO: 30.3 PG (ref 26–34)
MCHC RBC AUTO-ENTMCNC: 33.7 G/DL (ref 31–37)
MCV RBC AUTO: 89.9 FL (ref 80–100)
MONOCYTES # BLD AUTO: 0.32 X10(3) UL (ref 0.1–1)
MONOCYTES NFR BLD AUTO: 5 %
NEUTROPHILS # BLD AUTO: 3.5 X10 (3) UL (ref 1.5–7.7)
NEUTROPHILS # BLD AUTO: 3.5 X10(3) UL (ref 1.5–7.7)
NEUTROPHILS NFR BLD AUTO: 54.9 %
NITRITE UR QL STRIP.AUTO: NEGATIVE
OSMOLALITY SERPL CALC.SUM OF ELEC: 293 MOSM/KG (ref 275–295)
PH UR STRIP.AUTO: 6.5 [PH] (ref 5–8)
PLATELET # BLD AUTO: 274 10(3)UL (ref 150–450)
POTASSIUM SERPL-SCNC: 4.4 MMOL/L (ref 3.5–5.1)
PROT SERPL-MCNC: 7.5 G/DL (ref 5.7–8.2)
PROT UR STRIP.AUTO-MCNC: 300 MG/DL
PROTHROMBIN TIME: 13 SECONDS (ref 11.6–14.8)
RBC # BLD AUTO: 4.36 X10(6)UL (ref 3.8–5.3)
RBC UR QL AUTO: NEGATIVE
RH BLOOD TYPE: POSITIVE
SODIUM SERPL-SCNC: 138 MMOL/L (ref 136–145)
SP GR UR STRIP.AUTO: 1.02 (ref 1–1.03)
TOTAL IRON BINDING CAPACITY: 351 UG/DL (ref 250–425)
TRANSFERRIN SERPL-MCNC: 293 MG/DL (ref 250–380)
UROBILINOGEN UR STRIP.AUTO-MCNC: NORMAL MG/DL
WBC # BLD AUTO: 6.4 X10(3) UL (ref 4–11)

## 2025-07-16 PROCEDURE — 85025 COMPLETE CBC W/AUTO DIFF WBC: CPT

## 2025-07-16 PROCEDURE — 86901 BLOOD TYPING SEROLOGIC RH(D): CPT

## 2025-07-16 PROCEDURE — 83540 ASSAY OF IRON: CPT

## 2025-07-16 PROCEDURE — 81001 URINALYSIS AUTO W/SCOPE: CPT

## 2025-07-16 PROCEDURE — 83550 IRON BINDING TEST: CPT

## 2025-07-16 PROCEDURE — 83036 HEMOGLOBIN GLYCOSYLATED A1C: CPT

## 2025-07-16 PROCEDURE — 36415 COLL VENOUS BLD VENIPUNCTURE: CPT

## 2025-07-16 PROCEDURE — 87081 CULTURE SCREEN ONLY: CPT

## 2025-07-16 PROCEDURE — 85610 PROTHROMBIN TIME: CPT

## 2025-07-16 PROCEDURE — 86900 BLOOD TYPING SEROLOGIC ABO: CPT

## 2025-07-16 PROCEDURE — 80053 COMPREHEN METABOLIC PANEL: CPT

## 2025-07-16 PROCEDURE — 85730 THROMBOPLASTIN TIME PARTIAL: CPT

## 2025-07-16 PROCEDURE — 82728 ASSAY OF FERRITIN: CPT

## 2025-07-16 PROCEDURE — 99214 OFFICE O/P EST MOD 30 MIN: CPT | Performed by: FAMILY MEDICINE

## 2025-07-16 PROCEDURE — 86850 RBC ANTIBODY SCREEN: CPT

## 2025-07-16 NOTE — PROGRESS NOTES
Order for mask refit to nasal mask sent to [facility] via [facility].     BiPAP changed to CPAP 13 cwp via [employer].

## 2025-07-16 NOTE — PROGRESS NOTES
Radha Murguia is a 50 year old female who presents for a pre-operative physical exam.   Radha Murguia is scheduled for a L4-S1 TLIF procedure at [facility] on 7/31/25 performed by Dr Kevin Walter. Indication: lumbar radiculopathy    History of Present Illness  Charlotte Murguia is a 50 year old female with diabetes, back pain, liver cirrhosis, and sleep apnea who presents for preoperative evaluation and management of her symptoms.    She has worsening back pain described as 'excruciating', primarily located in the middle and sides of her back, radiating down both legs. The pain significantly impacts her daily activities, including walking to the store and the pool, and now states she has been stumbling more around her apartment.     DM2, taking Ozempic and Metformin as prescribed. Her last dose of Ozempic taken today. Eye exam utd. Denies any paresthesias.     HTN, stable. Compliant with medication. No SEs noted.    Asthma with COPD and KEON, following with pulmonary, Dr. Hall. She was seen yesterday and will be having adjustments made to her KEON tx.     She  has had previous anesthesia:  Yes.  Previous complications:  No    Short Social Hx on File[1]   Past Medical History[2]  Past Surgical History[3]  Allergies: Allergies[4]  Current Medications[5]     REVIEW OF SYSTEMS:   Denies any f/c, cough, cp/sob/p, abd pain, n/v/d/c  +lumbar radiculopathy    PHYSICAL EXAM:   /82   Pulse 90   Temp 97 °F (36.1 °C) (Temporal)   Resp 16   Ht 5' 5\" (1.651 m)   Wt 260 lb (117.9 kg)   LMP  (LMP Unknown)   SpO2 98%   BMI 43.27 kg/m²    GEN: A&Ox3, NAD, obese  HEENT: NC/AT, EOMI, MMM  NECK: supple  CV: RRR, no m/r/g  LUNG: CTAB, no w/r/r  ABD: obese, body habitus limits exam  EXT: no c/c/e  MS: +tenderness of paraspinal musculature and spinous tenderness noted, neg bilateral straight leg    LABORATORY DATA:   Labs placed: cbc, cmp, UA, PT/PTT/INR, MRSA/MSSA nasal swab, Type and Screen  Nuclear stress test  Lexiscan (5/19/25): normal     ASSESSMENT AND PLAN:   Radha Murguia has pulmonary conditions, which are stable and followed with pulmonary.   She is a fair surgical candidate. This consult was sent back the referring physician, Dr. Kevin Walter.    Assessment & Plan  Preop examination  2.    Lumbar radiculopathy   Chronic back pain with radiculopathy affecting the left side, radiating down both legs, associated with balance issues.   - Preoperative nuclear stress testing was normal on 5/19/25, indicating no cardiac issues.  - cleared for surgery on July 31st, if labs are within acceptable limits  - Avoid all anti-inflammatory medications at least one week prior to surgery.  - Use Tylenol Extra Strength or Tylenol Arthritis for pain management before surgery.  - Stop Ozempic one week prior to surgery   - Stop metformin 24 hours before surgery.  - Stop trazodone 2-3 days before surgery to avoid interaction with anesthesia.  - Complete preoperative blood work   - Ensure preadmission testing instructions are followed, including use of Hibiclens prior to surgery.    3. Type 2 Diabetes Mellitus with Obesity ([facility])  Diabetes management requires monitoring. Fasting sugars and A1c to be checked as part of preoperative blood work.   - Ozempic to be stopped one week prior to surgery due to its effect on gastric motility.    4. Hypertension  Blood pressure well-controlled   Continue present management    5. Asthma with COPD (chronic obstructive pulmonary disease) ([facility])  Stable  - cpm    6. Obstructive Sleep Apnea on CPAP  Obstructive sleep apnea with issues related to CPAP usage. CPAP settings being adjusted by sleep specialist to improve compliance and effectiveness   - Ensure consistent use of CPAP every night.    7. Restless Legs Syndrome  Restless legs syndrome diagnosed by sleep specialist. Medication prescribed but advised to start post-surgery       8. Morbid obesity with BMI of 40.0-44.9, adults ([facility])  - cont with  WLC    She and Karo (POA) understand and agree with tx plan  RTC as needed    Plan   Orders Placed This Encounter   Procedures    CBC W Differential W Platelet [E]    Comp Metabolic Panel (14) [E]    UA/M With Culture Reflex [E]    Prothrombin Time (PT) [E]    PTT, Activated [E]    Hemoglobin A1C [E]    Type and screen    MSSA and MRSA Culture Screen               [1]   Social History  Socioeconomic History    Marital status: Single   Occupational History    Occupation: works part-time   Tobacco Use    Smoking status: Never     Passive exposure: Never    Smokeless tobacco: Never   Vaping Use    Vaping status: Never Used   Substance and Sexual Activity    Alcohol use: No    Drug use: No    Sexual activity: Never   Other Topics Concern    Caffeine Concern No    Exercise Yes    Seat Belt Yes    Special Diet No    Stress Concern No    Weight Concern Yes   Social History Narrative    Lives  In [place] with mother     Social Drivers of Health     Food Insecurity: No Food Insecurity (1/9/2025)    NCSS - Food Insecurity     Worried About Running Out of Food in the Last Year: No     Ran Out of Food in the Last Year: No   Transportation Needs: No Transportation Needs (1/9/2025)    NCSS - Transportation     Lack of Transportation: No   Housing Stability: At Risk (1/9/2025)    NCSS - Housing/Utilities     Has Housing: No     Worried About Losing Housing: No     Unable to Get Utilities: No   [2]   Past Medical History:   Allergic rhinitis    Anxiety state, unspecified    Arthritis    Asthma (HCC)    Back pain    Back problem    lower back pain- sees chiro    Bad breath    Belching    Bipolar affective (HCC)    patient denies    Bleeding nose    Blood disorder    Thrombocytopenia    Breast CA (HCC)    DCIS    Calculus of kidney    Cancer (HCC)    left breast    Chest pain    Chronic cough    Community acquired pneumonia    Constipation    COPD (chronic obstructive pulmonary disease) (HCC)    Coronavirus infection    Diabetes  mellitus (HCC)    Diarrhea, unspecified    Disorder of liver    fatty liver    Ductal carcinoma in situ of breast    Esophageal reflux    Essential hypertension    Exposure to medical diagnostic radiation    finished in 2015    Flatulence/gas pain/belching    Headache, chronic daily    Heartburn    Hemorrhoids    High blood pressure    High cholesterol    Hyperlipidemia    Hypoxia    Indigestion    Leaking of urine    Migraines    Mild mental retardation    Mouth sores    Obesity    Obstructive apnea    CPAP 14 Sarina's    Osteoarthritis    Knees    Pain in joints    Parainfluenza infection    Shortness of breath    Sleep apnea    bipap    Type II or unspecified type diabetes mellitus without mention of complication, not stated as uncontrolled    Wheezing   [3]   Past Surgical History:  Procedure Laterality Date    Breast lumpectomy Left 06/11/2015    Procedure: BREAST LUMPECTOMY;  Surgeon: Paul Miller MD;  Location:  MAIN OR    Carpal tunnel release Bilateral     Colonoscopy N/A 11/23/2021    Procedure: COLONOSCOPY with polypectomy, ESOPHAGOGASTRODUODENOSCOPY (EGD) with biopsies;  Surgeon: Akbar Perla MD;  Location:  ENDOSCOPY    Hernia surgery      BABY    Lumpectomy left Left 05/11/2015    DCIS;papilloma    Joanie biopsy stereo w/calc 1 site left (cpt=19081) Left 04/2015    DCIS; papilloma    Needle biopsy liver      Orthopedic surg (pbp) Bilateral     MANDA CARPAL ISHAAN RELEASE    Other  LEFT FOOT SPUR REMOVAL    Other surgical history      bilateral carpal tunnel release    Radiation left Left 2015    lump with rad    Stereotactic breast biopsy Left 4/21/15 Paul EDW    Left Breast   [4]   Allergies  Allergen Reactions    Other Runny nose     Seasonal     [5]   Current Outpatient Medications   Medication Sig Dispense Refill    rOPINIRole 0.5 MG Oral Tab Take 1 tablet (0.5 mg total) by mouth nightly. 30 tablet 5    PARoxetine 40 MG Oral Tab Take 1 tablet (40 mg total) by mouth daily. This is an  increase. 90 tablet 1    QUEtiapine (SEROQUEL) 200 MG Oral Tab Take 1 tablet (200 mg total) by mouth nightly. 90 tablet 1    PARoxetine (PAXIL) 10 MG Oral Tab Take 1 tablet (10 mg total) by mouth every morning. Take with 40mg =50mg daily 90 tablet 1    metFORMIN  MG Oral Tablet 24 Hr Take 1 tablet (750 mg total) by mouth 2 (two) times daily with meals. 180 tablet 3    Metoprolol Succinate  MG Oral Tablet 24 Hr Take 1 tablet (200 mg total) by mouth daily. 90 tablet 3    traZODone 50 MG Oral Tab 1 to 2 tablets (50-100mg) by mouth nightly as needed for Sleep.      hydrALAZINE 25 MG Oral Tab Take 1 tablet (25 mg total) by mouth 2 (two) times daily. 180 tablet 3    Omeprazole 40 MG Oral Capsule Delayed Release Take 1 capsule (40 mg total) by mouth daily. 90 capsule 3    semaglutide (OZEMPIC, 2 MG/DOSE,) 8 MG/3ML Subcutaneous Solution Pen-injector Inject 2 mg into the skin once a week. (Patient taking differently: Inject 2 mg into the skin once a week. Sunday or Monday) 9 mL 1    atorvastatin 80 MG Oral Tab Take 1 tablet (80 mg total) by mouth nightly. 90 tablet 3    telmisartan 80 MG Oral Tab Take 1 tablet (80 mg total) by mouth daily. 90 tablet 3    Ciclopirox Olamine 0.77 % External Cream Apply to affected area twice daily for 1-4wks 90 g 1    Glucose Blood (TRUE METRIX BLOOD GLUCOSE TEST) In Vitro Strip Use to check blood sugar daily 100 strip 2    OneTouch UltraSoft 2 Lancets Does not apply Misc 1 Lancet daily. Use to test blood sugar daily 100 each 2    fexofenadine (ALLEGRA ALLERGY) 180 MG Oral Tab Take 1 tablet (180 mg total) by mouth in the morning.

## 2025-07-18 ENCOUNTER — RESULTS FOLLOW-UP (OUTPATIENT)
Dept: PULMONOLOGY | Facility: CLINIC | Age: 50
End: 2025-07-18

## 2025-07-18 DIAGNOSIS — R79.89 ELEVATED LIVER FUNCTION TESTS: Primary | ICD-10-CM

## 2025-07-28 ENCOUNTER — LAB ENCOUNTER (OUTPATIENT)
Dept: LAB | Age: 50
DRG: 448 | End: 2025-07-28
Attending: INTERNAL MEDICINE

## 2025-07-28 ENCOUNTER — OFFICE VISIT (OUTPATIENT)
Dept: SURGERY | Facility: CLINIC | Age: 50
End: 2025-07-28
Payer: MEDICARE

## 2025-07-28 VITALS
WEIGHT: 260 LBS | BODY MASS INDEX: 43.32 KG/M2 | SYSTOLIC BLOOD PRESSURE: 128 MMHG | HEART RATE: 87 BPM | DIASTOLIC BLOOD PRESSURE: 62 MMHG | OXYGEN SATURATION: 98 % | HEIGHT: 65 IN

## 2025-07-28 DIAGNOSIS — R79.89 ELEVATED LIVER FUNCTION TESTS: ICD-10-CM

## 2025-07-28 DIAGNOSIS — M54.16 LUMBAR RADICULOPATHY: Primary | ICD-10-CM

## 2025-07-28 LAB
ALBUMIN SERPL-MCNC: 4.6 G/DL (ref 3.2–4.8)
ALBUMIN/GLOB SERPL: 1.8 (ref 1–2)
ALP LIVER SERPL-CCNC: 125 U/L (ref 41–108)
ALT SERPL-CCNC: 51 U/L (ref 10–49)
ANION GAP SERPL CALC-SCNC: 10 MMOL/L (ref 0–18)
AST SERPL-CCNC: 32 U/L (ref ?–34)
BILIRUB SERPL-MCNC: 0.3 MG/DL (ref 0.3–1.2)
BUN BLD-MCNC: 17 MG/DL (ref 9–23)
CALCIUM BLD-MCNC: 10.2 MG/DL (ref 8.7–10.6)
CHLORIDE SERPL-SCNC: 103 MMOL/L (ref 98–112)
CO2 SERPL-SCNC: 25 MMOL/L (ref 21–32)
CREAT BLD-MCNC: 0.81 MG/DL (ref 0.55–1.02)
EGFRCR SERPLBLD CKD-EPI 2021: 87 ML/MIN/1.73M2 (ref 60–?)
FASTING STATUS PATIENT QL REPORTED: YES
GLOBULIN PLAS-MCNC: 2.6 G/DL (ref 2–3.5)
GLUCOSE BLD-MCNC: 187 MG/DL (ref 70–99)
OSMOLALITY SERPL CALC.SUM OF ELEC: 292 MOSM/KG (ref 275–295)
POTASSIUM SERPL-SCNC: 4 MMOL/L (ref 3.5–5.1)
PROT SERPL-MCNC: 7.2 G/DL (ref 5.7–8.2)
SODIUM SERPL-SCNC: 138 MMOL/L (ref 136–145)

## 2025-07-28 PROCEDURE — 99215 OFFICE O/P EST HI 40 MIN: CPT | Performed by: NEUROLOGICAL SURGERY

## 2025-07-28 PROCEDURE — 3074F SYST BP LT 130 MM HG: CPT | Performed by: NEUROLOGICAL SURGERY

## 2025-07-28 PROCEDURE — 36415 COLL VENOUS BLD VENIPUNCTURE: CPT

## 2025-07-28 PROCEDURE — 80053 COMPREHEN METABOLIC PANEL: CPT

## 2025-07-28 PROCEDURE — 3078F DIAST BP <80 MM HG: CPT | Performed by: NEUROLOGICAL SURGERY

## 2025-07-28 PROCEDURE — 3008F BODY MASS INDEX DOCD: CPT | Performed by: NEUROLOGICAL SURGERY

## 2025-07-28 NOTE — PATIENT INSTRUCTIONS
Refill policies:    Allow 2-3 business days for refills; controlled substances may take longer.  Contact your pharmacy at least 5 days prior to running out of medication and have them send an electronic request or submit request through the “request refill” option in your [employer] account.  Refills are not addressed on weekends; covering physicians do not authorize routine medications on weekends.  No narcotics or controlled substances are refilled after noon on Fridays or by on call physicians.  By law, narcotics must be electronically prescribed.  A 30 day supply with no refills is the maximum allowed.  If your prescription is due for a refill, you may be due for a follow up appointment.  To best provide you care, patients receiving routine medications need to be seen at least once a year.  Patients receiving narcotic/controlled substance medications need to be seen at least once every 3 months.  In the event that your preferred pharmacy does not have the requested medication in stock (e.g. Backordered), it is your responsibility to find another pharmacy that has the requested medication available.  We will gladly send a new prescription to that pharmacy at your request.    Scheduling Tests:    If your physician has ordered radiology tests such as MRI or CT scans, please contact Central Scheduling at [phone] right away to schedule the test.  Once scheduled, the [facility] Centralized Referral Team will work with your insurance carrier to obtain pre-certification or prior authorization.  Depending on your insurance carrier, approval may take 3-10 days.  It is highly recommended patients assure they have received an authorization before having a test performed.  If test is done without insurance authorization, patient may be responsible for the entire amount billed.      Precertification and Prior Authorizations:  If your physician has recommended that you have a procedure or additional testing performed the [facility]ending [facility] to [facility]  Centralized Referral Team will contact your insurance carrier to obtain pre-certification or prior authorization.    You are strongly encouraged to contact your insurance carrier to verify that your procedure/test has been approved and is a COVERED benefit.  Although the [facility] Centralized Referral Team does its due diligence, the insurance carrier gives the disclaimer that \"Although the procedure is authorized, this does not guarantee payment.\"    Ultimately the patient is responsible for payment.   Thank you for your understanding in this matter.  Paperwork Completion:  If you require FMLA or disability paperwork for your recovery, please make sure to either drop it off or have it faxed to our office at [phone]. Be sure the form has your name and date of birth on it.  The form will be faxed to our Forms Department and they will complete it for you.  There is a 25$ fee for all forms that need to be filled out.  Please be aware there is a 10-14 day turnaround time.  You will need to sign a release of information (ZAC) form if your paperwork does not come with one.  You may call the Forms Department with any questions at [phone].  Their fax number is [phone].

## 2025-07-28 NOTE — PROGRESS NOTES
[facility]  Neurological Surgery Clinic Note     Radha Murguia  5/6/1975  [id number]  PCP: Raphael Farley DO     REASON FOR VISIT:  B/L LE radiculopathy     HISTORY OF PRESENT ILLNESS:  Radha Murguia is a(n) 49 year old female with progressive low back pain and LE radiculopathy for years but with recent progression.  She has numbness in her feet.  She deneis any weakness.  She denies any falls.  She as attempted PT in the past and LENO with no relief.  She is currently in chiro with no relief. She has attempted PT recently and medications with no significant improvement. She cannot work due to the symptoms in her legs.  MRI shows severe spinal stenosis at L4-5 and severe foraminal stenosis at L5-S1.  She denies any bowel/bladder habit changes.         Location: LBP  Quality: LE radiculopathy  Severity: progressive  Duration: months/year  Timing: any, worse at night  Context: severe DDD and facet artrhopathy leading to stenosis at L4-5 and L5-S1  Modifying Factors: PT, LENO, meds offer no relief  Associated signs/symptoms: pain       PAST MEDICAL HISTORY:  Past Medical History         Past Medical History:    Allergic rhinitis    Anxiety state, unspecified    Arthritis    Asthma (HCC)    Back pain    Back problem     lower back pain- sees chiro    Bad breath    Belching    Bipolar affective (HCC)     patient denies    Bleeding nose    Breast CA (HCC)     DCIS    Cancer (HCC)     left breast    Chest pain    Chronic cough    Community acquired pneumonia    Constipation    COPD (chronic obstructive pulmonary disease) (HCC)    Coronavirus infection    Diabetes mellitus (HCC)    Diarrhea, unspecified    Disorder of liver     fatty liver    Ductal carcinoma in situ of breast    Esophageal reflux    Essential hypertension    Exposure to medical diagnostic radiation     finished in 2015    Flatulence/gas pain/belching    Headache, chronic daily    Heartburn    Hemorrhoids    High blood pressure    High  Please send patient to L&D now  These messages should be sent via [employer] to the on-call doctor  We often don't access EPIC messages for hours  cholesterol    Hyperlipidemia    Hypoxia    Indigestion    Leaking of urine    Mild mental retardation    Mouth sores    Obstructive apnea     CPAP 14 Sarina's    Pain in joints    Parainfluenza infection    Shortness of breath    Sleep apnea     cpap    Type II or unspecified type diabetes mellitus without mention of complication, not stated as uncontrolled    Wheezing            PAST SURGICAL HISTORY:  Past Surgical History             Past Surgical History:   Procedure Laterality Date    Breast lumpectomy Left 06/11/2015     Procedure: BREAST LUMPECTOMY;  Surgeon: Paul Miller MD;  Location:  MAIN OR    Carpal tunnel release Bilateral      Colonoscopy N/A 11/23/2021     Procedure: COLONOSCOPY with polypectomy, ESOPHAGOGASTRODUODENOSCOPY (EGD) with biopsies;  Surgeon: Akbar Perla MD;  Location:  ENDOSCOPY    Hernia surgery         BABY    Lumpectomy left Left 05/11/2015     DCIS;papilloma    Joanie biopsy stereo w/calc 1 site left (cpt=19081) Left 04/2015     DCIS; papilloma    Needle biopsy liver        Orthopedic surg (pbp) Bilateral       MANDA CARPAL ISHAAN RELEASE    Other   LEFT FOOT SPUR REMOVAL    Other surgical history         bilateral carpal tunnel release    Radiation left Left 2015     lump with rad    Stereotactic breast biopsy Left 4/21/15 Paul EDW     Left Breast            FAMILY HISTORY:  family history includes Bipolar Disorder in an other family member; Breast Cancer in her maternal grandmother and paternal grandmother; Breast Cancer (age of onset: 42) in her self; Cancer in her brother; Dementia in her mother; Diabetes in her brother, father, and mother; Heart Attack in her father; Other in her father; Stroke in her mother; Suicide History in an other family member.     SOCIAL HISTORY:   reports that she has never smoked. She has never been exposed to tobacco smoke. She has never used smokeless tobacco. She reports that she does not drink alcohol and does not use drugs.      ALLERGIES:  [Allergies]    [Allergies]            Allergen Reactions    Other Runny nose       Seasonal            MEDICATIONS:  [Medications Ordered Prior to Encounter]    [Medications Ordered Prior to Encounter]              Current Outpatient Medications on File Prior to Visit   Medication Sig Dispense Refill    hydrALAZINE 25 MG Oral Tab Take 1 tablet (25 mg total) by mouth 2 (two) times daily. 60 tablet 0    semaglutide (OZEMPIC, 2 MG/DOSE,) 8 MG/3ML Subcutaneous Solution Pen-injector Inject 2 mg into the skin once a week. 9 mL 1    Omeprazole 40 MG Oral Capsule Delayed Release Take 1 capsule (40 mg total) by mouth daily. 90 capsule 0    PARoxetine (PAXIL) 20 MG Oral Tab Take 1 tablet (20 mg total) by mouth daily. 30+20mg= 50mg daily 90 tablet 1    PARoxetine 30 MG Oral Tab Take 1 tablet (30 mg total) by mouth daily. Take with 20mg tab= 50mg daily 90 tablet 1    QUEtiapine Fumarate 150 MG Oral Tab Take 150 mg by mouth nightly. 90 tablet 1    atorvastatin 80 MG Oral Tab Take 1 tablet (80 mg total) by mouth nightly. 90 tablet 3    telmisartan 80 MG Oral Tab Take 1 tablet (80 mg total) by mouth daily. 90 tablet 3    Metoprolol Succinate  MG Oral Tablet 24 Hr Take 1 tablet (200 mg total) by mouth daily. 90 tablet 1    Fezolinetant (VEOZAH) 45 MG Oral Tab Take 45 mg by mouth daily. 30 tablet 2    metFORMIN  MG Oral Tablet 24 Hr Take 1 tablet (750 mg total) by mouth 2 (two) times daily with meals. 180 tablet 3    Ciclopirox Olamine 0.77 % External Cream Apply to affected area twice daily for 1-4wks 90 g 1    Multiple Vitamins-Minerals (MULTIVITAMIN WOMENS 50+ ADV) Oral Tab Take 1 tablet by mouth daily with food. 90 tablet 1    Glucose Blood (TRUE METRIX BLOOD GLUCOSE TEST) In Vitro Strip Use to check blood sugar daily 100 strip 2    OneTouch UltraSoft 2 Lancets Does not apply Misc 1 Lancet daily. Use to test blood sugar daily 100 each 2    fexofenadine (ALLEGRA ALLERGY) 180 MG Oral Tab Take 1 tablet  (180 mg total) by mouth daily.          No current facility-administered medications on file prior to visit.         REVIEW OF SYSTEMS:  Review of Systems   All other systems reviewed and are negative.        PHYSICAL EXAMINATION:  Neurological Exam  Mental Status  Awake, alert and oriented to person, place and time.     Cranial Nerves  CN II: Visual acuity is normal. Visual fields full to confrontation.  CN III, IV, VI: Extraocular movements intact bilaterally. Normal lids and orbits bilaterally. Pupils equal round and reactive to light bilaterally.  CN V: Facial sensation is normal.  CN VII: Full and symmetric facial movement.  CN VIII: Hearing is normal.  CN IX, X: Palate elevates symmetrically. Normal gag reflex.  CN XI: Shoulder shrug strength is normal.  CN XII: Tongue midline without atrophy or fasciculations.     Motor  Normal muscle bulk throughout. No fasciculations present. Normal muscle tone. No abnormal involuntary movements. Strength is 5/5 throughout all four extremities.     Sensory  Sensation is intact to light touch, pinprick, vibration and proprioception in all four extremities.     Reflexes  Deep tendon reflexes are 2+ and symmetric in all four extremities.     Coordination     Finger-to-nose, rapid alternating movements and heel-to-shin normal bilaterally without dysmetria.     Gait  Normal casual, toe, heel and tandem gait.        IMAGING:  As above     ASSESSMENT:  LBP and LE radiculopathy     Plan:  Plan for  L4-S1 TLIF-risks and benefits discussed at length          The plan of care was discussed with the patient at length. All questions and concerns were addressed at this time. The patient verbalized agreement with the plan and was appreciative.      Total visit time: 30 minutes; More than 50% spent coordinating care, counseling, reviewing imaging and discussing medication therapy.      Philip Walter,   Neurological Surgery  [facility]

## 2025-07-31 ENCOUNTER — ANESTHESIA (OUTPATIENT)
Dept: SURGERY | Facility: HOSPITAL | Age: 50
DRG: 448 | End: 2025-07-31
Payer: MEDICARE

## 2025-07-31 ENCOUNTER — HOSPITAL ENCOUNTER (INPATIENT)
Facility: HOSPITAL | Age: 50
LOS: 1 days | Discharge: HOME HEALTH | DRG: 448 | End: 2025-08-01
Attending: NEUROLOGICAL SURGERY | Admitting: NEUROLOGICAL SURGERY

## 2025-07-31 ENCOUNTER — APPOINTMENT (OUTPATIENT)
Dept: GENERAL RADIOLOGY | Facility: HOSPITAL | Age: 50
DRG: 448 | End: 2025-07-31
Attending: NEUROLOGICAL SURGERY

## 2025-07-31 ENCOUNTER — ANESTHESIA EVENT (OUTPATIENT)
Dept: SURGERY | Facility: HOSPITAL | Age: 50
DRG: 448 | End: 2025-07-31
Payer: MEDICARE

## 2025-07-31 DIAGNOSIS — M47.26 OTHER SPONDYLOSIS WITH RADICULOPATHY, LUMBAR REGION: Primary | ICD-10-CM

## 2025-07-31 PROBLEM — Z98.1 S/P LUMBAR SPINAL FUSION: Status: ACTIVE | Noted: 2025-07-31

## 2025-07-31 LAB
GLUCOSE BLD-MCNC: 192 MG/DL (ref 70–99)
GLUCOSE BLD-MCNC: 247 MG/DL (ref 70–99)
GLUCOSE BLD-MCNC: 269 MG/DL (ref 70–99)
GLUCOSE BLD-MCNC: 309 MG/DL (ref 70–99)

## 2025-07-31 PROCEDURE — 0SG00AJ FUSION OF LUMBAR VERTEBRAL JOINT WITH INTERBODY FUSION DEVICE, POSTERIOR APPROACH, ANTERIOR COLUMN, OPEN APPROACH: ICD-10-PCS | Performed by: NEUROLOGICAL SURGERY

## 2025-07-31 PROCEDURE — 22634 ARTHRD CMBN 1NTRSPC EA ADDL: CPT | Performed by: NEUROLOGICAL SURGERY

## 2025-07-31 PROCEDURE — 0SG30AJ FUSION OF LUMBOSACRAL JOINT WITH INTERBODY FUSION DEVICE, POSTERIOR APPROACH, ANTERIOR COLUMN, OPEN APPROACH: ICD-10-PCS | Performed by: NEUROLOGICAL SURGERY

## 2025-07-31 PROCEDURE — 4A11X4G MONITORING OF PERIPHERAL NERVOUS ELECTRICAL ACTIVITY, INTRAOPERATIVE, EXTERNAL APPROACH: ICD-10-PCS | Performed by: NEUROLOGICAL SURGERY

## 2025-07-31 PROCEDURE — 01NR0ZZ RELEASE SACRAL NERVE, OPEN APPROACH: ICD-10-PCS | Performed by: NEUROLOGICAL SURGERY

## 2025-07-31 PROCEDURE — 63053 LAM FACTC/FRMT ARTHRD LUM EA: CPT | Performed by: NEUROLOGICAL SURGERY

## 2025-07-31 PROCEDURE — 76000 FLUOROSCOPY <1 HR PHYS/QHP: CPT | Performed by: NEUROLOGICAL SURGERY

## 2025-07-31 PROCEDURE — 99223 1ST HOSP IP/OBS HIGH 75: CPT | Performed by: INTERNAL MEDICINE

## 2025-07-31 PROCEDURE — 0SB40ZZ EXCISION OF LUMBOSACRAL DISC, OPEN APPROACH: ICD-10-PCS | Performed by: NEUROLOGICAL SURGERY

## 2025-07-31 PROCEDURE — 22842 INSERT SPINE FIXATION DEVICE: CPT | Performed by: NEUROLOGICAL SURGERY

## 2025-07-31 PROCEDURE — 01NB0ZZ RELEASE LUMBAR NERVE, OPEN APPROACH: ICD-10-PCS | Performed by: NEUROLOGICAL SURGERY

## 2025-07-31 PROCEDURE — 22633 ARTHRD CMBN 1NTRSPC LUMBAR: CPT | Performed by: NEUROLOGICAL SURGERY

## 2025-07-31 PROCEDURE — 63052 LAM FACETC/FRMT ARTHRD LUM 1: CPT | Performed by: NEUROLOGICAL SURGERY

## 2025-07-31 PROCEDURE — 0SB20ZZ EXCISION OF LUMBAR VERTEBRAL DISC, OPEN APPROACH: ICD-10-PCS | Performed by: NEUROLOGICAL SURGERY

## 2025-07-31 PROCEDURE — 22853 INSJ BIOMECHANICAL DEVICE: CPT | Performed by: NEUROLOGICAL SURGERY

## 2025-07-31 DEVICE — IMPLANTABLE DEVICE: Type: IMPLANTABLE DEVICE | Site: BACK | Status: FUNCTIONAL

## 2025-07-31 RX ORDER — METHOCARBAMOL 100 MG/ML
1000 INJECTION, SOLUTION INTRAMUSCULAR; INTRAVENOUS ONCE
Status: COMPLETED | OUTPATIENT
Start: 2025-07-31 | End: 2025-07-31

## 2025-07-31 RX ORDER — DEXTROSE MONOHYDRATE 25 G/50ML
50 INJECTION, SOLUTION INTRAVENOUS
Status: DISCONTINUED | OUTPATIENT
Start: 2025-07-31 | End: 2025-08-01

## 2025-07-31 RX ORDER — PAROXETINE 20 MG/1
40 TABLET, FILM COATED ORAL DAILY
Status: DISCONTINUED | OUTPATIENT
Start: 2025-08-01 | End: 2025-08-01

## 2025-07-31 RX ORDER — BISACODYL 10 MG
10 SUPPOSITORY, RECTAL RECTAL
Status: DISCONTINUED | OUTPATIENT
Start: 2025-07-31 | End: 2025-08-01

## 2025-07-31 RX ORDER — INSULIN DEGLUDEC 100 U/ML
20 INJECTION, SOLUTION SUBCUTANEOUS DAILY
Status: DISCONTINUED | OUTPATIENT
Start: 2025-07-31 | End: 2025-08-01

## 2025-07-31 RX ORDER — DIPHENHYDRAMINE HYDROCHLORIDE 50 MG/ML
25 INJECTION, SOLUTION INTRAMUSCULAR; INTRAVENOUS EVERY 4 HOURS PRN
Status: DISCONTINUED | OUTPATIENT
Start: 2025-07-31 | End: 2025-08-01

## 2025-07-31 RX ORDER — PANTOPRAZOLE SODIUM 40 MG/1
40 TABLET, DELAYED RELEASE ORAL
Status: DISCONTINUED | OUTPATIENT
Start: 2025-08-01 | End: 2025-08-01

## 2025-07-31 RX ORDER — ATORVASTATIN CALCIUM 40 MG/1
80 TABLET, FILM COATED ORAL NIGHTLY
Status: DISCONTINUED | OUTPATIENT
Start: 2025-07-31 | End: 2025-08-01

## 2025-07-31 RX ORDER — DIAZEPAM 10 MG/2ML
5 INJECTION, SOLUTION INTRAMUSCULAR; INTRAVENOUS ONCE
Status: COMPLETED | OUTPATIENT
Start: 2025-07-31 | End: 2025-07-31

## 2025-07-31 RX ORDER — POLYETHYLENE GLYCOL 3350 17 G/17G
17 POWDER, FOR SOLUTION ORAL DAILY PRN
Status: DISCONTINUED | OUTPATIENT
Start: 2025-07-31 | End: 2025-08-01

## 2025-07-31 RX ORDER — SENNOSIDES 8.6 MG
17.2 TABLET ORAL NIGHTLY
Status: DISCONTINUED | OUTPATIENT
Start: 2025-07-31 | End: 2025-08-01

## 2025-07-31 RX ORDER — ALBUTEROL SULFATE 0.83 MG/ML
2.5 SOLUTION RESPIRATORY (INHALATION) AS NEEDED
Status: DISCONTINUED | OUTPATIENT
Start: 2025-07-31 | End: 2025-07-31 | Stop reason: HOSPADM

## 2025-07-31 RX ORDER — KETOROLAC TROMETHAMINE 30 MG/ML
30 INJECTION, SOLUTION INTRAMUSCULAR; INTRAVENOUS EVERY 6 HOURS PRN
Status: DISCONTINUED | OUTPATIENT
Start: 2025-07-31 | End: 2025-08-01

## 2025-07-31 RX ORDER — OXYCODONE HYDROCHLORIDE 5 MG/1
5 TABLET ORAL EVERY 4 HOURS PRN
Status: DISCONTINUED | OUTPATIENT
Start: 2025-07-31 | End: 2025-07-31 | Stop reason: HOSPADM

## 2025-07-31 RX ORDER — DEXAMETHASONE SODIUM PHOSPHATE 4 MG/ML
4 VIAL (ML) INJECTION EVERY 6 HOURS
Status: COMPLETED | OUTPATIENT
Start: 2025-07-31 | End: 2025-08-01

## 2025-07-31 RX ORDER — DOCUSATE SODIUM 100 MG/1
100 CAPSULE, LIQUID FILLED ORAL 2 TIMES DAILY
Status: DISCONTINUED | OUTPATIENT
Start: 2025-07-31 | End: 2025-08-01

## 2025-07-31 RX ORDER — HYDROMORPHONE HYDROCHLORIDE 1 MG/ML
0.4 INJECTION, SOLUTION INTRAMUSCULAR; INTRAVENOUS; SUBCUTANEOUS EVERY 5 MIN PRN
Status: DISCONTINUED | OUTPATIENT
Start: 2025-07-31 | End: 2025-07-31 | Stop reason: HOSPADM

## 2025-07-31 RX ORDER — INSULIN ASPART 100 [IU]/ML
INJECTION, SOLUTION INTRAVENOUS; SUBCUTANEOUS
Status: COMPLETED
Start: 2025-07-31 | End: 2025-07-31

## 2025-07-31 RX ORDER — NICOTINE POLACRILEX 4 MG
30 LOZENGE BUCCAL
Status: DISCONTINUED | OUTPATIENT
Start: 2025-07-31 | End: 2025-07-31 | Stop reason: HOSPADM

## 2025-07-31 RX ORDER — HYDROMORPHONE HYDROCHLORIDE 1 MG/ML
0.2 INJECTION, SOLUTION INTRAMUSCULAR; INTRAVENOUS; SUBCUTANEOUS EVERY 5 MIN PRN
Status: DISCONTINUED | OUTPATIENT
Start: 2025-07-31 | End: 2025-07-31 | Stop reason: HOSPADM

## 2025-07-31 RX ORDER — ACETAMINOPHEN 500 MG
1000 TABLET ORAL ONCE
Status: DISCONTINUED | OUTPATIENT
Start: 2025-07-31 | End: 2025-07-31 | Stop reason: HOSPADM

## 2025-07-31 RX ORDER — SODIUM CHLORIDE 9 MG/ML
INJECTION, SOLUTION INTRAVENOUS CONTINUOUS PRN
Status: DISCONTINUED | OUTPATIENT
Start: 2025-07-31 | End: 2025-07-31 | Stop reason: SURG

## 2025-07-31 RX ORDER — DIPHENHYDRAMINE HYDROCHLORIDE 50 MG/ML
12.5 INJECTION, SOLUTION INTRAMUSCULAR; INTRAVENOUS AS NEEDED
Status: DISCONTINUED | OUTPATIENT
Start: 2025-07-31 | End: 2025-07-31 | Stop reason: HOSPADM

## 2025-07-31 RX ORDER — ACETAMINOPHEN 10 MG/ML
1000 INJECTION, SOLUTION INTRAVENOUS EVERY 6 HOURS
Status: COMPLETED | OUTPATIENT
Start: 2025-07-31 | End: 2025-08-01

## 2025-07-31 RX ORDER — TRAZODONE HYDROCHLORIDE 50 MG/1
TABLET ORAL NIGHTLY PRN
Status: DISCONTINUED | OUTPATIENT
Start: 2025-07-31 | End: 2025-07-31

## 2025-07-31 RX ORDER — NICOTINE POLACRILEX 4 MG
30 LOZENGE BUCCAL
Status: DISCONTINUED | OUTPATIENT
Start: 2025-07-31 | End: 2025-08-01

## 2025-07-31 RX ORDER — DIAZEPAM 5 MG/1
5 TABLET ORAL EVERY 6 HOURS PRN
Status: DISCONTINUED | OUTPATIENT
Start: 2025-07-31 | End: 2025-08-01

## 2025-07-31 RX ORDER — SODIUM CHLORIDE, SODIUM LACTATE, POTASSIUM CHLORIDE, CALCIUM CHLORIDE 600; 310; 30; 20 MG/100ML; MG/100ML; MG/100ML; MG/100ML
INJECTION, SOLUTION INTRAVENOUS CONTINUOUS
Status: DISCONTINUED | OUTPATIENT
Start: 2025-07-31 | End: 2025-08-01

## 2025-07-31 RX ORDER — DIPHENHYDRAMINE HYDROCHLORIDE 50 MG/ML
12.5 INJECTION, SOLUTION INTRAMUSCULAR; INTRAVENOUS EVERY 4 HOURS PRN
Status: DISCONTINUED | OUTPATIENT
Start: 2025-07-31 | End: 2025-08-01

## 2025-07-31 RX ORDER — PHENYLEPHRINE HCL 10 MG/ML
VIAL (ML) INJECTION AS NEEDED
Status: DISCONTINUED | OUTPATIENT
Start: 2025-07-31 | End: 2025-07-31 | Stop reason: SURG

## 2025-07-31 RX ORDER — OXYCODONE HYDROCHLORIDE 5 MG/1
15 TABLET ORAL EVERY 4 HOURS PRN
Status: DISCONTINUED | OUTPATIENT
Start: 2025-07-31 | End: 2025-07-31 | Stop reason: HOSPADM

## 2025-07-31 RX ORDER — LIDOCAINE HYDROCHLORIDE AND EPINEPHRINE 20; 5 MG/ML; UG/ML
INJECTION, SOLUTION EPIDURAL; INFILTRATION; INTRACAUDAL; PERINEURAL AS NEEDED
Status: DISCONTINUED | OUTPATIENT
Start: 2025-07-31 | End: 2025-07-31 | Stop reason: HOSPADM

## 2025-07-31 RX ORDER — INSULIN ASPART 100 [IU]/ML
INJECTION, SOLUTION INTRAVENOUS; SUBCUTANEOUS ONCE
Status: COMPLETED | OUTPATIENT
Start: 2025-07-31 | End: 2025-07-31

## 2025-07-31 RX ORDER — NICOTINE POLACRILEX 4 MG
15 LOZENGE BUCCAL
Status: DISCONTINUED | OUTPATIENT
Start: 2025-07-31 | End: 2025-08-01

## 2025-07-31 RX ORDER — METHOCARBAMOL 100 MG/ML
INJECTION, SOLUTION INTRAMUSCULAR; INTRAVENOUS
Status: COMPLETED
Start: 2025-07-31 | End: 2025-07-31

## 2025-07-31 RX ORDER — LABETALOL HYDROCHLORIDE 5 MG/ML
INJECTION, SOLUTION INTRAVENOUS
Status: COMPLETED
Start: 2025-07-31 | End: 2025-07-31

## 2025-07-31 RX ORDER — HYDRALAZINE HYDROCHLORIDE 25 MG/1
25 TABLET, FILM COATED ORAL 2 TIMES DAILY
Status: DISCONTINUED | OUTPATIENT
Start: 2025-07-31 | End: 2025-08-01

## 2025-07-31 RX ORDER — LABETALOL HYDROCHLORIDE 5 MG/ML
5 INJECTION, SOLUTION INTRAVENOUS EVERY 5 MIN PRN
Status: DISCONTINUED | OUTPATIENT
Start: 2025-07-31 | End: 2025-07-31 | Stop reason: HOSPADM

## 2025-07-31 RX ORDER — QUETIAPINE FUMARATE 100 MG/1
200 TABLET, FILM COATED ORAL NIGHTLY
Status: DISCONTINUED | OUTPATIENT
Start: 2025-07-31 | End: 2025-08-01

## 2025-07-31 RX ORDER — HYDRALAZINE HYDROCHLORIDE 20 MG/ML
INJECTION INTRAMUSCULAR; INTRAVENOUS
Status: COMPLETED
Start: 2025-07-31 | End: 2025-07-31

## 2025-07-31 RX ORDER — HYDROMORPHONE HYDROCHLORIDE 1 MG/ML
0.6 INJECTION, SOLUTION INTRAMUSCULAR; INTRAVENOUS; SUBCUTANEOUS EVERY 5 MIN PRN
Status: DISCONTINUED | OUTPATIENT
Start: 2025-07-31 | End: 2025-07-31 | Stop reason: HOSPADM

## 2025-07-31 RX ORDER — ACETAMINOPHEN 10 MG/ML
INJECTION, SOLUTION INTRAVENOUS AS NEEDED
Status: DISCONTINUED | OUTPATIENT
Start: 2025-07-31 | End: 2025-07-31 | Stop reason: SURG

## 2025-07-31 RX ORDER — OXYCODONE HYDROCHLORIDE 5 MG/1
10 TABLET ORAL EVERY 4 HOURS PRN
Status: DISCONTINUED | OUTPATIENT
Start: 2025-07-31 | End: 2025-07-31 | Stop reason: HOSPADM

## 2025-07-31 RX ORDER — HYDROMORPHONE HYDROCHLORIDE 1 MG/ML
INJECTION, SOLUTION INTRAMUSCULAR; INTRAVENOUS; SUBCUTANEOUS
Status: COMPLETED
Start: 2025-07-31 | End: 2025-07-31

## 2025-07-31 RX ORDER — CETIRIZINE HYDROCHLORIDE 10 MG/1
10 TABLET ORAL DAILY
Status: DISCONTINUED | OUTPATIENT
Start: 2025-07-31 | End: 2025-08-01

## 2025-07-31 RX ORDER — DEXTROSE MONOHYDRATE 25 G/50ML
50 INJECTION, SOLUTION INTRAVENOUS
Status: DISCONTINUED | OUTPATIENT
Start: 2025-07-31 | End: 2025-07-31 | Stop reason: HOSPADM

## 2025-07-31 RX ORDER — NICOTINE POLACRILEX 4 MG
15 LOZENGE BUCCAL
Status: DISCONTINUED | OUTPATIENT
Start: 2025-07-31 | End: 2025-07-31 | Stop reason: HOSPADM

## 2025-07-31 RX ORDER — SODIUM CHLORIDE, SODIUM LACTATE, POTASSIUM CHLORIDE, CALCIUM CHLORIDE 600; 310; 30; 20 MG/100ML; MG/100ML; MG/100ML; MG/100ML
INJECTION, SOLUTION INTRAVENOUS CONTINUOUS
Status: DISCONTINUED | OUTPATIENT
Start: 2025-07-31 | End: 2025-07-31 | Stop reason: HOSPADM

## 2025-07-31 RX ORDER — SODIUM PHOSPHATE, DIBASIC AND SODIUM PHOSPHATE, MONOBASIC 7; 19 G/230ML; G/230ML
1 ENEMA RECTAL ONCE AS NEEDED
Status: DISCONTINUED | OUTPATIENT
Start: 2025-07-31 | End: 2025-08-01

## 2025-07-31 RX ORDER — METHOCARBAMOL 750 MG/1
750 TABLET, FILM COATED ORAL EVERY 6 HOURS PRN
Status: DISCONTINUED | OUTPATIENT
Start: 2025-07-31 | End: 2025-08-01

## 2025-07-31 RX ORDER — ONDANSETRON 2 MG/ML
4 INJECTION INTRAMUSCULAR; INTRAVENOUS EVERY 6 HOURS PRN
Status: DISCONTINUED | OUTPATIENT
Start: 2025-07-31 | End: 2025-07-31 | Stop reason: HOSPADM

## 2025-07-31 RX ORDER — METOPROLOL SUCCINATE 100 MG/1
200 TABLET, EXTENDED RELEASE ORAL
Status: DISCONTINUED | OUTPATIENT
Start: 2025-07-31 | End: 2025-08-01

## 2025-07-31 RX ORDER — ONDANSETRON 2 MG/ML
4 INJECTION INTRAMUSCULAR; INTRAVENOUS EVERY 6 HOURS PRN
Status: DISCONTINUED | OUTPATIENT
Start: 2025-07-31 | End: 2025-08-01

## 2025-07-31 RX ORDER — PROCHLORPERAZINE EDISYLATE 5 MG/ML
5 INJECTION INTRAMUSCULAR; INTRAVENOUS EVERY 8 HOURS PRN
Status: DISCONTINUED | OUTPATIENT
Start: 2025-07-31 | End: 2025-08-01

## 2025-07-31 RX ORDER — SODIUM CHLORIDE 9 MG/ML
INJECTION, SOLUTION INTRAVENOUS CONTINUOUS
Status: DISCONTINUED | OUTPATIENT
Start: 2025-07-31 | End: 2025-08-01

## 2025-07-31 RX ORDER — DEXAMETHASONE SODIUM PHOSPHATE 4 MG/ML
VIAL (ML) INJECTION AS NEEDED
Status: DISCONTINUED | OUTPATIENT
Start: 2025-07-31 | End: 2025-07-31 | Stop reason: SURG

## 2025-07-31 RX ORDER — DIPHENHYDRAMINE HCL 25 MG
25 CAPSULE ORAL EVERY 4 HOURS PRN
Status: DISCONTINUED | OUTPATIENT
Start: 2025-07-31 | End: 2025-08-01

## 2025-07-31 RX ORDER — PROCHLORPERAZINE EDISYLATE 5 MG/ML
5 INJECTION INTRAMUSCULAR; INTRAVENOUS EVERY 8 HOURS PRN
Status: DISCONTINUED | OUTPATIENT
Start: 2025-07-31 | End: 2025-07-31 | Stop reason: HOSPADM

## 2025-07-31 RX ORDER — ONDANSETRON 2 MG/ML
INJECTION INTRAMUSCULAR; INTRAVENOUS AS NEEDED
Status: DISCONTINUED | OUTPATIENT
Start: 2025-07-31 | End: 2025-07-31 | Stop reason: SURG

## 2025-07-31 RX ORDER — NALOXONE HYDROCHLORIDE 0.4 MG/ML
0.08 INJECTION, SOLUTION INTRAMUSCULAR; INTRAVENOUS; SUBCUTANEOUS AS NEEDED
Status: DISCONTINUED | OUTPATIENT
Start: 2025-07-31 | End: 2025-07-31 | Stop reason: HOSPADM

## 2025-07-31 RX ORDER — TRAZODONE HYDROCHLORIDE 50 MG/1
50 TABLET ORAL NIGHTLY PRN
Status: DISCONTINUED | OUTPATIENT
Start: 2025-07-31 | End: 2025-08-01

## 2025-07-31 RX ORDER — LIDOCAINE HYDROCHLORIDE 10 MG/ML
INJECTION, SOLUTION EPIDURAL; INFILTRATION; INTRACAUDAL; PERINEURAL AS NEEDED
Status: DISCONTINUED | OUTPATIENT
Start: 2025-07-31 | End: 2025-07-31 | Stop reason: SURG

## 2025-07-31 RX ORDER — HYDRALAZINE HYDROCHLORIDE 20 MG/ML
5 INJECTION INTRAMUSCULAR; INTRAVENOUS ONCE
Status: COMPLETED | OUTPATIENT
Start: 2025-07-31 | End: 2025-07-31

## 2025-07-31 RX ORDER — DIAZEPAM 10 MG/2ML
INJECTION, SOLUTION INTRAMUSCULAR; INTRAVENOUS
Status: COMPLETED
Start: 2025-07-31 | End: 2025-07-31

## 2025-07-31 RX ORDER — LOSARTAN POTASSIUM 50 MG/1
50 TABLET ORAL DAILY
Status: DISCONTINUED | OUTPATIENT
Start: 2025-07-31 | End: 2025-08-01

## 2025-07-31 RX ORDER — NALOXONE HYDROCHLORIDE 0.4 MG/ML
0.08 INJECTION, SOLUTION INTRAMUSCULAR; INTRAVENOUS; SUBCUTANEOUS
Status: DISCONTINUED | OUTPATIENT
Start: 2025-07-31 | End: 2025-08-01

## 2025-07-31 RX ORDER — KETOROLAC TROMETHAMINE 30 MG/ML
INJECTION, SOLUTION INTRAMUSCULAR; INTRAVENOUS AS NEEDED
Status: DISCONTINUED | OUTPATIENT
Start: 2025-07-31 | End: 2025-07-31 | Stop reason: SURG

## 2025-07-31 RX ADMIN — SODIUM CHLORIDE: 9 INJECTION, SOLUTION INTRAVENOUS at 07:44:00

## 2025-07-31 RX ADMIN — SODIUM CHLORIDE, SODIUM LACTATE, POTASSIUM CHLORIDE, CALCIUM CHLORIDE: 600; 310; 30; 20 INJECTION, SOLUTION INTRAVENOUS at 10:50:00

## 2025-07-31 RX ADMIN — PHENYLEPHRINE HCL 100 MCG: 10 MG/ML VIAL (ML) INJECTION at 08:00:00

## 2025-07-31 RX ADMIN — ACETAMINOPHEN 1000 MG: 10 INJECTION, SOLUTION INTRAVENOUS at 10:32:00

## 2025-07-31 RX ADMIN — SODIUM CHLORIDE, SODIUM LACTATE, POTASSIUM CHLORIDE, CALCIUM CHLORIDE: 600; 310; 30; 20 INJECTION, SOLUTION INTRAVENOUS at 07:44:00

## 2025-07-31 RX ADMIN — DEXAMETHASONE SODIUM PHOSPHATE 8 MG: 4 MG/ML VIAL (ML) INJECTION at 08:15:00

## 2025-07-31 RX ADMIN — KETOROLAC TROMETHAMINE 30 MG: 30 INJECTION, SOLUTION INTRAMUSCULAR; INTRAVENOUS at 10:41:00

## 2025-07-31 RX ADMIN — LIDOCAINE HYDROCHLORIDE 50 MG: 10 INJECTION, SOLUTION EPIDURAL; INFILTRATION; INTRACAUDAL; PERINEURAL at 07:44:00

## 2025-07-31 RX ADMIN — ONDANSETRON 4 MG: 2 INJECTION INTRAMUSCULAR; INTRAVENOUS at 10:32:00

## 2025-08-01 VITALS
HEART RATE: 90 BPM | HEIGHT: 65 IN | RESPIRATION RATE: 16 BRPM | BODY MASS INDEX: 43.32 KG/M2 | SYSTOLIC BLOOD PRESSURE: 160 MMHG | WEIGHT: 260 LBS | OXYGEN SATURATION: 98 % | TEMPERATURE: 98 F | DIASTOLIC BLOOD PRESSURE: 88 MMHG

## 2025-08-01 LAB
ANION GAP SERPL CALC-SCNC: 12 MMOL/L (ref 0–18)
BUN BLD-MCNC: 15 MG/DL (ref 9–23)
CALCIUM BLD-MCNC: 9.4 MG/DL (ref 8.7–10.6)
CHLORIDE SERPL-SCNC: 105 MMOL/L (ref 98–112)
CO2 SERPL-SCNC: 23 MMOL/L (ref 21–32)
CREAT BLD-MCNC: 0.99 MG/DL (ref 0.55–1.02)
EGFRCR SERPLBLD CKD-EPI 2021: 69 ML/MIN/1.73M2 (ref 60–?)
ERYTHROCYTE [DISTWIDTH] IN BLOOD BY AUTOMATED COUNT: 13.2 %
GLUCOSE BLD-MCNC: 237 MG/DL (ref 70–99)
GLUCOSE BLD-MCNC: 255 MG/DL (ref 70–99)
GLUCOSE BLD-MCNC: 265 MG/DL (ref 70–99)
HCT VFR BLD AUTO: 35.1 % (ref 35–48)
HGB BLD-MCNC: 11.9 G/DL (ref 12–16)
MCH RBC QN AUTO: 30.5 PG (ref 26–34)
MCHC RBC AUTO-ENTMCNC: 33.9 G/DL (ref 31–37)
MCV RBC AUTO: 90 FL (ref 80–100)
OSMOLALITY SERPL CALC.SUM OF ELEC: 300 MOSM/KG (ref 275–295)
PLATELET # BLD AUTO: 242 10(3)UL (ref 150–450)
POTASSIUM SERPL-SCNC: 4.2 MMOL/L (ref 3.5–5.1)
RBC # BLD AUTO: 3.9 X10(6)UL (ref 3.8–5.3)
SODIUM SERPL-SCNC: 140 MMOL/L (ref 136–145)
WBC # BLD AUTO: 8.5 X10(3) UL (ref 4–11)

## 2025-08-01 PROCEDURE — 99232 SBSQ HOSP IP/OBS MODERATE 35: CPT | Performed by: INTERNAL MEDICINE

## 2025-08-01 RX ORDER — HYDROCODONE BITARTRATE AND ACETAMINOPHEN 5; 325 MG/1; MG/1
2 TABLET ORAL EVERY 4 HOURS PRN
Refills: 0 | Status: DISCONTINUED | OUTPATIENT
Start: 2025-08-01 | End: 2025-08-01

## 2025-08-01 RX ORDER — METHOCARBAMOL 750 MG/1
750 TABLET, FILM COATED ORAL 3 TIMES DAILY PRN
Qty: 30 TABLET | Refills: 0 | Status: SHIPPED | OUTPATIENT
Start: 2025-08-01

## 2025-08-01 RX ORDER — DIAZEPAM 5 MG/1
5 TABLET ORAL NIGHTLY PRN
Qty: 10 TABLET | Refills: 0 | Status: SHIPPED | OUTPATIENT
Start: 2025-08-01

## 2025-08-01 RX ORDER — HYDROCODONE BITARTRATE AND ACETAMINOPHEN 5; 325 MG/1; MG/1
1 TABLET ORAL EVERY 6 HOURS PRN
Qty: 30 TABLET | Refills: 0 | Status: SHIPPED | OUTPATIENT
Start: 2025-08-01

## 2025-08-01 RX ORDER — METHOCARBAMOL 750 MG/1
750 TABLET, FILM COATED ORAL 3 TIMES DAILY
Status: DISCONTINUED | OUTPATIENT
Start: 2025-08-01 | End: 2025-08-01

## 2025-08-01 RX ORDER — LOSARTAN POTASSIUM 100 MG/1
100 TABLET ORAL DAILY
Status: DISCONTINUED | OUTPATIENT
Start: 2025-08-02 | End: 2025-08-01

## 2025-08-01 RX ORDER — HYDROCODONE BITARTRATE AND ACETAMINOPHEN 5; 325 MG/1; MG/1
1 TABLET ORAL EVERY 4 HOURS PRN
Refills: 0 | Status: DISCONTINUED | OUTPATIENT
Start: 2025-08-01 | End: 2025-08-01

## 2025-08-04 ENCOUNTER — TELEPHONE (OUTPATIENT)
Dept: FAMILY MEDICINE CLINIC | Facility: CLINIC | Age: 50
End: 2025-08-04

## 2025-08-04 ENCOUNTER — PATIENT OUTREACH (OUTPATIENT)
Dept: CASE MANAGEMENT | Age: 50
End: 2025-08-04

## 2025-08-04 ENCOUNTER — TELEPHONE (OUTPATIENT)
Dept: PULMONOLOGY | Facility: CLINIC | Age: 50
End: 2025-08-04

## 2025-08-05 ENCOUNTER — HOSPITAL ENCOUNTER (OUTPATIENT)
Age: 50
Discharge: HOME | End: 2025-08-05

## 2025-08-05 ENCOUNTER — NURSE TRIAGE (OUTPATIENT)
Dept: FAMILY MEDICINE CLINIC | Facility: CLINIC | Age: 50
End: 2025-08-05

## 2025-08-05 VITALS
WEIGHT: 220 LBS | SYSTOLIC BLOOD PRESSURE: 168 MMHG | HEART RATE: 81 BPM | DIASTOLIC BLOOD PRESSURE: 98 MMHG | OXYGEN SATURATION: 94 % | RESPIRATION RATE: 24 BRPM | BODY MASS INDEX: 36.65 KG/M2 | HEIGHT: 65 IN | TEMPERATURE: 100 F

## 2025-08-05 DIAGNOSIS — I10 HYPERTENSION, UNSPECIFIED TYPE: Primary | ICD-10-CM

## 2025-08-05 PROCEDURE — 99213 OFFICE O/P EST LOW 20 MIN: CPT | Performed by: NURSE PRACTITIONER

## 2025-08-07 ENCOUNTER — OFFICE VISIT (OUTPATIENT)
Dept: SURGERY | Facility: CLINIC | Age: 50
End: 2025-08-07

## 2025-08-07 VITALS
HEART RATE: 100 BPM | WEIGHT: 220 LBS | SYSTOLIC BLOOD PRESSURE: 160 MMHG | HEIGHT: 65 IN | DIASTOLIC BLOOD PRESSURE: 100 MMHG | BODY MASS INDEX: 36.65 KG/M2

## 2025-08-07 DIAGNOSIS — Z98.1 S/P LUMBAR FUSION: Primary | ICD-10-CM

## 2025-08-07 PROCEDURE — 3080F DIAST BP >= 90 MM HG: CPT | Performed by: NEUROLOGICAL SURGERY

## 2025-08-07 PROCEDURE — 3077F SYST BP >= 140 MM HG: CPT | Performed by: NEUROLOGICAL SURGERY

## 2025-08-07 PROCEDURE — 99024 POSTOP FOLLOW-UP VISIT: CPT | Performed by: NEUROLOGICAL SURGERY

## 2025-08-07 PROCEDURE — 3008F BODY MASS INDEX DOCD: CPT | Performed by: NEUROLOGICAL SURGERY

## 2025-08-07 RX ORDER — HYDROCODONE BITARTRATE AND ACETAMINOPHEN 10; 325 MG/1; MG/1
1 TABLET ORAL EVERY 6 HOURS PRN
Qty: 45 TABLET | Refills: 0 | Status: SHIPPED | OUTPATIENT
Start: 2025-08-07

## 2025-08-08 ENCOUNTER — TELEPHONE (OUTPATIENT)
Dept: FAMILY MEDICINE CLINIC | Facility: CLINIC | Age: 50
End: 2025-08-08

## 2025-08-10 DIAGNOSIS — E78.2 MIXED HYPERLIPIDEMIA: ICD-10-CM

## 2025-08-10 DIAGNOSIS — E11.9 TYPE 2 DIABETES MELLITUS WITHOUT COMPLICATION, WITHOUT LONG-TERM CURRENT USE OF INSULIN (HCC): ICD-10-CM

## 2025-08-10 DIAGNOSIS — Z51.81 ENCOUNTER FOR THERAPEUTIC DRUG MONITORING: ICD-10-CM

## 2025-08-10 DIAGNOSIS — I10 ESSENTIAL HYPERTENSION: ICD-10-CM

## 2025-08-11 RX ORDER — SEMAGLUTIDE 2.68 MG/ML
INJECTION, SOLUTION SUBCUTANEOUS
Qty: 9 EACH | Refills: 3 | OUTPATIENT
Start: 2025-08-11

## 2025-08-21 ENCOUNTER — PATIENT MESSAGE (OUTPATIENT)
Dept: PULMONOLOGY | Facility: CLINIC | Age: 50
End: 2025-08-21

## 2025-08-22 ENCOUNTER — HOSPITAL ENCOUNTER (OUTPATIENT)
Dept: GENERAL RADIOLOGY | Age: 50
Discharge: HOME | End: 2025-08-22
Attending: NEUROLOGICAL SURGERY

## 2025-08-22 DIAGNOSIS — Z98.1 S/P LUMBAR FUSION: ICD-10-CM

## 2025-08-22 PROCEDURE — 72100 X-RAY EXAM L-S SPINE 2/3 VWS: CPT | Performed by: NEUROLOGICAL SURGERY

## 2025-08-27 ENCOUNTER — TELEPHONE (OUTPATIENT)
Dept: SURGERY | Facility: CLINIC | Age: 50
End: 2025-08-27

## 2025-08-29 ENCOUNTER — OFFICE VISIT (OUTPATIENT)
Dept: SURGERY | Facility: CLINIC | Age: 50
End: 2025-08-29

## 2025-08-29 VITALS — BODY MASS INDEX: 37 KG/M2 | DIASTOLIC BLOOD PRESSURE: 88 MMHG | SYSTOLIC BLOOD PRESSURE: 138 MMHG | WEIGHT: 220 LBS

## 2025-08-29 DIAGNOSIS — Z98.1 S/P LUMBAR FUSION: Primary | ICD-10-CM

## (undated) DIAGNOSIS — R80.9 MICROALBUMINURIA DUE TO TYPE 2 DIABETES MELLITUS (HCC): ICD-10-CM

## (undated) DIAGNOSIS — E78.2 MIXED HYPERLIPIDEMIA: ICD-10-CM

## (undated) DIAGNOSIS — E66.9 DIABETES MELLITUS TYPE 2 IN OBESE (HCC): ICD-10-CM

## (undated) DIAGNOSIS — E11.69 DIABETES MELLITUS TYPE 2 IN OBESE (HCC): ICD-10-CM

## (undated) DIAGNOSIS — I10 ESSENTIAL HYPERTENSION: ICD-10-CM

## (undated) DIAGNOSIS — E11.29 MICROALBUMINURIA DUE TO TYPE 2 DIABETES MELLITUS (HCC): ICD-10-CM

## (undated) DIAGNOSIS — E66.01 MORBID OBESITY WITH BMI OF 45.0-49.9, ADULT (HCC): ICD-10-CM

## (undated) DIAGNOSIS — E11.9 TYPE 2 DIABETES MELLITUS WITHOUT COMPLICATION (HCC): ICD-10-CM

## (undated) DIAGNOSIS — G43.009 MIGRAINE WITHOUT AURA AND WITHOUT STATUS MIGRAINOSUS, NOT INTRACTABLE: Primary | ICD-10-CM

## (undated) DIAGNOSIS — Z51.81 ENCOUNTER FOR THERAPEUTIC DRUG MONITORING: ICD-10-CM

## (undated) DIAGNOSIS — E78.1 HYPERTRIGLYCERIDEMIA: ICD-10-CM

## (undated) DEVICE — PAIN TRAY: Brand: MEDLINE INDUSTRIES, INC.

## (undated) DEVICE — TRAP 4 CPTR CHMBR N EZ INLN

## (undated) DEVICE — SUT MCRYL 4-0 27IN ABSRB UD 19MM PS-2 3/8

## (undated) DEVICE — SUT MCRYL 3-0 27IN ABSRB UD 19MM PS-2 3/8

## (undated) DEVICE — Device

## (undated) DEVICE — ELITE HYSTEROSCOPE SEAL: Brand: TRUCLEAR

## (undated) DEVICE — GLOVE SUR 8 SENSICARE PIP WHT PWD F

## (undated) DEVICE — GLOVE SURG SENSICARE SZ 7-1/2

## (undated) DEVICE — MEDI-VAC NON-CONDUCTIVE SUCTION TUBING: Brand: CARDINAL HEALTH

## (undated) DEVICE — SLEEVE COMPR MD KNEE LEN SGL USE KENDALL SCD

## (undated) DEVICE — GLOVE SURG SENSICARE SZ 6-1/2

## (undated) DEVICE — HEMOSTAT KIT SURGIFLO THROM 8ML

## (undated) DEVICE — SOFT TISSUE SHAVER MINI: Brand: TRUCLEAR

## (undated) DEVICE — SOLUTION IRRIG 3000ML 0.9% NACL FLX CONT

## (undated) DEVICE — SET TB INFLO FOR TRUCLEAR SYS HYSTEROLUX

## (undated) DEVICE — 1200CC GUARDIAN II: Brand: GUARDIAN

## (undated) DEVICE — NEEDLE SPINAL 22X5 405148

## (undated) DEVICE — SPECIMEN SOCK - STANDARD: Brand: MEDI-VAC

## (undated) DEVICE — SUT VCRL 2-0 18IN ABSRB UD CR L26MM CT-2

## (undated) DEVICE — Device: Brand: DEFENDO AIR/WATER/SUCTION AND BIOPSY VALVE

## (undated) DEVICE — COVER TBL W44XL90IN POLYETH UNIV DISP

## (undated) DEVICE — GLOVE SURG SENSICARE SZ 7

## (undated) DEVICE — BUR SUR 3.8MM PRECIS NEURO MTCH HD

## (undated) DEVICE — SOLUTION IRRIG 1000ML 0.9% NACL USP BTL

## (undated) DEVICE — REMOVER PREP SOLUTION 4OZ

## (undated) DEVICE — 3M™ RED DOT™ MONITORING ELECTRODE WITH FOAM TAPE AND STICKY GEL, 50/BAG, 20/CASE, 72/PLT 2570: Brand: RED DOT™

## (undated) DEVICE — COVER LT HNDL RIG FOR SUR CAM DISP

## (undated) DEVICE — ENDOSCOPY PACK UPPER: Brand: MEDLINE INDUSTRIES, INC.

## (undated) DEVICE — SUT VCRL 0 18IN CT-1 ABSRB VLT CR L36MM 1/2

## (undated) DEVICE — AVANOS* TUOHY EPIDURAL NEEDLE: Brand: AVANOS

## (undated) DEVICE — HYSTEROSCOPIC OUTFLOW TUBE SET

## (undated) DEVICE — FILTERLINE NASAL ADULT O2/CO2

## (undated) DEVICE — PACK LAMINECTOMY

## (undated) DEVICE — GLOVE SUR 9 SENSICARE PI PIP CRM PWD F

## (undated) DEVICE — SYRINGE MED 10ML LL TIP W/O SFTY DISP

## (undated) DEVICE — BANDAID CURAD 3IN X 1IN

## (undated) DEVICE — SNARE CAPTIFLEX MICRO-OVL OLY

## (undated) DEVICE — FORCEP BIOPSY RJ4 LG CAP W/ND

## (undated) DEVICE — GLOVE SUR 8 SENSICARE PI PIP GRN PWD F

## (undated) DEVICE — GYN CDS: Brand: MEDLINE INDUSTRIES, INC.

## (undated) DEVICE — PREMIUM WET SKIN PREP TRAY: Brand: MEDLINE INDUSTRIES, INC.

## (undated) DEVICE — SUT COAT VCRL 2-0 27IN ABSRB UD 26MM CT-2

## (undated) DEVICE — SKIN MARKER DUAL TIP WITH RULER CAP AND LABELS: Brand: DEVON

## (undated) DEVICE — ENDOSCOPY PACK - LOWER: Brand: MEDLINE INDUSTRIES, INC.

## (undated) DEVICE — POM MASK W/CO2

## (undated) DEVICE — MARKER SKIN PREP RESIST STRL

## (undated) DEVICE — GOWN SUR 2XL LEV 4 BLU W/ WHT V NK BND AERO

## (undated) DEVICE — 2000CC GUARDIAN II: Brand: GUARDIAN

## (undated) DEVICE — ANTISEPTIC 4OZ 70% ISO ALC

## (undated) DEVICE — GLOVE SUR 6 SENSICARE PI PIP CRM PWD F

## (undated) NOTE — MR AVS SNAPSHOT
[facility] Utilities  [address] [place], [facility] [place] [id number]               Thank you for choosing us for your health care visit with Mimi Farley DO.   We are glad to serve you and happy to clotrimazole-betamethasone 1-0.05 % Crea   Apply 1 Application topically 3 (three) times daily.    Commonly known as:  LOTRISONE           Cyclobenzaprine HCl 5 MG Tabs   Use 1-2 tablets po qhs prn muscle spasms   Commonly known as:  FLEXERIL           EAS - amoxicillin 875 MG Tabs  - Fluticasone Propionate 50 MCG/ACT Susp            Follow-up Instructions     Return if symptoms worsen or fail to improve.          Referral Information     Referral Order Referred to Address [facility] Phone Visits Status Diagnosis Alt 86 14-54 U/L    Bilirubin, Total 0.3 0.1-2.0 mg/dL    Total Protein 8.7 6.1-8.3 g/dL    Albumin 4.5 3.5-4.8 g/dL    Sodium 136 136-144 mmol/L    Potassium 4.3 3.6-5.1 mmol/L    Chloride 103 101-111 mmol/L    CO2 23.0 22.0-32.0 mmol/L                LI Call the [email or web address] for assistance with your inactive [employer] account    If you have questions, you can call [phone] to talk to our [facility] Staff. Remember, [employer] is NOT to be used for urgent needs. For medical emergencies, dial 911.     Vi

## (undated) NOTE — LETTER
Estrada Posadas MD        I acknowledge that I have been informed of the risk involved by refusing the Urine Pregnancy Test on [address].     I, thereb

## (undated) NOTE — LETTER
Date: 9/19/2023    Patient Name: Mary Rae          To Whom it may concern: This letter has been written at the patient's request. The above patient was seen at the [facility] for treatment of a medical condition. This patient should be excused from attending work at this time. The patient may return to work on 9/20/22 with the following limitations: none.         Sincerely,          Ben Grigsby, DO

## (undated) NOTE — LETTER
10/14/21        Adrián Peter  [address] [address]      Dear Chelsi Morrison records indicate that you have outstanding lab work and or testing that was ordered for you and has not yet been completed:  Orders Placed This [place]

## (undated) NOTE — LETTER
Dr. Frank Anne D.P.M.   [facility] - ORTHOPEDICS  [address] [place] [address] [address] [id number]  [phone]    5/7/2021    Orthotic Instructions    Aleida Murguia your custom-made orthotics have been created from the neg parts of your body during the break-in                 period. Pain in your hips, knees and lower back is not uncommon. Simply cut back        on your wear time each day until the pain subsides. If the pain persists, contact the         office. 9.   I

## (undated) NOTE — LETTER
02/18/20        Adrián Peter  [address]  [id number]      Dear Cookie Marker records indicate that you have outstanding lab work and or testing that was ordered for you and has not yet been completed:  Orders Placed This [place]

## (undated) NOTE — LETTER
Patient Name: Radha Murguia  -Age / Sex: 1975-A: 48 y  female  Medical Records: [id number] [facility]: [id number]    Surgery Date: 4/3/2024   Procedure: EXAMINATION UNDER ANESTHESIA, pelvic ultrasound (abdominal and vaginal), dilation, hysteroscopy with truclear, hysteroscopic endometrial sampling and curettage, N/A      Please be aware that the above-named patient DOES NOT have an adult to stay with him/her overnight the day of surgery. Patients are required to have adult supervision minimally overnight the day of surgery, as outlined in the [facility] Anesthesia Guidelines.      Below is a list of alternate arrangements that may be options for this patient:    []    Patient surgery date will be rescheduled in order for the patient to make arrangements to                 have overnight adult supervision.    []    Patient has made arrangements for adult to drive them home and provide supervision                 overnight the day of surgery.        []    Patient to be an outpatient in a bed after surgery.      After alternate arrangements have been made fax form to:  [phone]   Thank you.      Date_________________   MD Signature ______________________________________

## (undated) NOTE — LETTER
03/15/21        Adrián Peter  [address] [address]      Dear Niyah Braun records indicate that you have outstanding lab work and or testing that was ordered for you and has not yet been completed:  Orders Placed This [place]

## (undated) NOTE — LETTER
08/28/17        Adrián Peter  [address] [address]      Dear Yumi Veliz records indicate that you have outstanding lab work and or testing that was ordered for you and has not yet been completed:          Lipid Panel      CMP

## (undated) NOTE — Clinical Note
Thank you for referring Emy Sesay to the [facility]. I met with her in consultation today. I have ordered labs, set up a nutrition consultation with our dietician.   She was started on Ozempic weekly for medication therapy and will follow-u

## (undated) NOTE — LETTER
Patient Name: Radha Murguia  -Age / Sex: 1975-A: 48 y  female  Medical Records: [id number] [facility]: [id number]    Surgery Date: 4/3/2024   Procedure: EXAM UNDER ANESTHESIA, pelvic ultrasound (abdominal and vaginal), dilation, hysteroscopy with truclear, hysteroscopic endometrial sampling and curettage, N/A      Please be aware that the above-named patient DOES NOT have an adult  stay with him/her overnight the day of surgery. Patients are required to have adult supervision minimally overnight the day of surgery, as outlined in the [facility] Anesthesia Guidelines.      Below is a list of alternate arrangements that may be options for this patient:    []    Patient surgery date will be rescheduled in order for the patient to make arrangements to                 have overnight adult supervision.    []    Patient has made arrangements for adult to drive them home and provide supervision                 overnight the day of surgery.        []    Patient to be an outpatient in a bed after surgery.      After alternate arrangements have been made fax form to:  [phone]   Thank you.      Date_________________   MD Signature ______________________________________

## (undated) NOTE — LETTER
To:  Dr. Christiano Hebert  Date:  11/16/2021      Patient Name: Sanaz Falling / Sex: 5/6/1975-A: 48 y  female     CSN: [id number]     Medical Records: [id number]    Surgery Date: 11/23/2021   Procedure: COLONOSCOPY, ESOPHAGOGASTRODUODENOSCOPY (EGD), N

## (undated) NOTE — Clinical Note
I just wanted to send an update on our mutual patient, Romelia Rodríguez. She was seen for 4 month f/u at [facility] today with a total weight loss of 15# since initial consult. She is doing well, BP controlled and reported FBS readings 100-130s.  She has a f/u wit

## (undated) NOTE — Clinical Note
I just wanted to send an update on our mutual patient, Pascual December. She returned for f/u at [facility] today with [address] in 10/2021. She has lost 21# in total and doing well with Ozempic. She reports on 2 occasions her BS was 60-70, she was asymptomatic, but noted from the [facility] monitor. She states her monitor is over 11years old. I encouraged to request a new one from you to confirm it is reading correctly. Additionally, her BP was mildly elevated and I advised her to check at home and f/u with you if remains >130/80. She states she has caffeine in the form of soda on a regular basis. I encouraged her to cut out the caffeine and to eat small meals 3-4x/day of balanced protein and produce. I increased her Ozempic to 2 mg to get the maximum weight loss benefit, advised her to continue to monitor her BS readings and report if <80. Discontinue glimepiride to reduce hypoglycemia as I noted her last HgbA1c was 5.7. Thank you.

## (undated) NOTE — Clinical Note
Patient was seen for their 1 month f/u at [facility] with a total weight loss of -1# since initial consult.

## (undated) NOTE — MR AVS SNAPSHOT
[facility]  [address] [place], [address] [address]               Thank you for choosing us for your health care visit with Luc Ambrose DO.   We are glad to serve you and happy to Regular exercise is a good way to help your body control cholesterol. Regular exercise can help in many ways.  It can:  · Raise your good cholesterol  · Help lower your bad cholesterol  · Let blood flow better through your body  · Give more oxygen to your m Learn ways to control stress. This will help you deal with stress in your home and work life. Controlling stress can greatly lower your risk of getting cardiovascular disease.   Making the most of medicines  Healthy eating and exercise are a good start to k · Adults who are 24years old or older and have an LDL cholesterol level of 190 mg/dL or higher. If you are in a high-risk group, talk with your healthcare provider about your treatment goals.  Make sure you understand why these goals are important, based · Fiber is found in foods such as vegetables, fruits, beans, and whole grains. Unlike other carbs, fiber isn’t digested or absorbed. So it doesn’t raise blood sugar.  In fact, fiber can help keep blood sugar from rising too fast. It also helps keep blood ch protein also contain saturated fat. By choosing low-fat protein sources, you can get the benefits of protein without the extra fat:  · Plant protein is found in dry beans and peas, nuts, and soy products, such as tofu and soymilk.  These sources tend to be Take 1 tablet (4 mg total) by mouth every morning before breakfast.   Commonly known as:  AMARYL           Lancets 30G Misc   Please provide lancets for truetrack lancing device           MetFORMIN HCl  MG Tb24   Take 1 tablet (750 mg total) by mouth Imaging:  US ABDOMEN COMPLETE (CPT=76700)    Instructions: To schedule an appointment for your radiology test please call Stephane Brenner Scheduling at [phone].          Referral Orders      Normal Orders This Visit    PULMONARY - INTERNAL [

## (undated) NOTE — LETTER
Date: 3/13/2019    Patient Name: Gudelia Drew          To Whom it may concern: This letter has been written at the patient's request. The above patient was seen at the [facility] for treatment of a medical condition.     This patient s

## (undated) NOTE — Clinical Note
April 13, 2017    [address] [address]      Dear Adrian Grier: It was a pleasure speaking with you over the phone recently.  To follow up, I wanted to send you my contact information to utilize when you have a patients. Our goal is to make sure that you get the best care possible from everyone involved in your care.  We can help coordinate your visits with other doctors, facilities, and testing; we can talk with you over the phone about your symptoms and any of y service, you will have to choose which physician is best able to treat you and all of your conditions. Please let your physician or our office staff know if you have entered into a similar agreement with another physician/practice. You have a right to:  ?

## (undated) NOTE — ED AVS SNAPSHOT
Christin Hdez   MRN: [id number]    Department:  [facility] Emergency Department   Date of Visit:  3/8/2020           Disclosure     Insurance plans vary and the physician(s) referred by the ER may not be covered by your plan.  Please contact yo tell this physician (or your personal doctor if your instructions are to return to your personal doctor) about any new or lasting problems. The primary care or specialist physician will see patients referred from the [facility] Emergency Department.  Michael Aguirre

## (undated) NOTE — LETTER
Date: 1/22/2021    Patient Name: Jorge Batres          To Whom it may concern:     This letter has been written at the patient's request. The above patient was seen at one of the [facility] locations for treatment of a medical condition

## (undated) NOTE — LETTER
06/24/20        Adrián Peter  [address] [address]      Dear Kumar Augustine records indicate that you have outstanding lab work and or testing that was ordered for you and has not yet been completed:  Orders Placed This [place]

## (undated) NOTE — LETTER
LICO. Notifier: Wilian[employer]   ETHAN. Patient Name: Charito De La Cruz Identification Number: [id number]      Advance Beneficiary Notice of Noncoverage (ABN)  NOTE:  If Medicare doesn’t pay for D.below, you may have to pay.   Medicare does not pay for ? OPTION 3. I don’t want the D.listed above. I understand with this choice  I am not responsible for payment, and I cannot appeal to see if Medicare would pay. H. Additional Information:     This notice gives our opinion, not an official Medicare decisi

## (undated) NOTE — LETTER
Radha Murguia, :1975    CONSENT FOR PROCEDURE/SEDATION    1. I authorize the performance upon Radha Murguia  the following: Endometrial biopsy procedure    2. I authorize Dr. Ludmila Merino MD (and whomever is designated as the doctor’s assistant), to perform the above-mentioned procedures.    3. If any unforeseen conditions arise during this procedure calling for additional  procedures, operations, or medications (including anesthesia and blood transfusion), I further request and authorize the doctor to do whatever he/she deems advisable in my interest.    4. I consent to the taking and reproduction of any photographs in the course of this procedure for professional purposes.    5. I consent to the administration of such sedation as may be considered necessary or advisable by the physician responsible for this service, with the exception of ______________________________________________________    6. I have been informed by my doctor of the nature and purpose of this procedure sedation, possible alternative methods of treatment, risk involved and possible complications.    7. If I have a Do Not Resuscitate (DNR) order in place, the physician and I (or the individual authorized to consent on my behalf) will discuss and agree as to whether the Do Not Resuscitate (DNR) order will remain in effect or will be discontinued during the performance of the procedure and the applicable recovery period. If the Do Not Resuscitate (DNR) order is discontinued and is to be reinstated following the procedure/recovery period, the physician will determine when the applicable recovery period ends for purposes of reinstating the Do Not Resuscitate (DNR) order.    Signature of Patient:_X______________________________________________    Signature of person authorized to consent for patient:  _______________________________________________________________    Relationship to patient:  Self____________________________________________    Witness: _________________________________________ Date:_29 February 2024_________     Physician Signature: _______________________________ Date:_29 February 2024__________